# Patient Record
Sex: FEMALE | Race: WHITE | NOT HISPANIC OR LATINO | Employment: OTHER | URBAN - METROPOLITAN AREA
[De-identification: names, ages, dates, MRNs, and addresses within clinical notes are randomized per-mention and may not be internally consistent; named-entity substitution may affect disease eponyms.]

---

## 2017-04-19 ENCOUNTER — APPOINTMENT (OUTPATIENT)
Dept: MRI IMAGING | Facility: HOSPITAL | Age: 77
End: 2017-04-19

## 2017-04-19 ENCOUNTER — APPOINTMENT (OUTPATIENT)
Dept: CT IMAGING | Facility: HOSPITAL | Age: 77
End: 2017-04-19

## 2017-04-19 ENCOUNTER — HOSPITAL ENCOUNTER (INPATIENT)
Facility: HOSPITAL | Age: 77
LOS: 2 days | Discharge: HOME | End: 2017-04-21
Attending: EMERGENCY MEDICINE | Admitting: HOSPITALIST

## 2017-04-19 ENCOUNTER — APPOINTMENT (OUTPATIENT)
Dept: CARDIOLOGY | Facility: HOSPITAL | Age: 77
End: 2017-04-19
Attending: HOSPITALIST

## 2017-04-19 ENCOUNTER — APPOINTMENT (OUTPATIENT)
Dept: GENERAL RADIOLOGY | Facility: HOSPITAL | Age: 77
End: 2017-04-19

## 2017-04-19 DIAGNOSIS — R62.7 ADULT FAILURE TO THRIVE: ICD-10-CM

## 2017-04-19 DIAGNOSIS — R73.09 ELEVATED GLUCOSE: ICD-10-CM

## 2017-04-19 DIAGNOSIS — G45.1 HEMISPHERIC CAROTID ARTERY SYNDROME: Primary | ICD-10-CM

## 2017-04-19 DIAGNOSIS — I50.32 CHRONIC DIASTOLIC HEART FAILURE (HCC): ICD-10-CM

## 2017-04-19 DIAGNOSIS — N17.9 RENAL FAILURE (ARF), ACUTE ON CHRONIC (HCC): ICD-10-CM

## 2017-04-19 DIAGNOSIS — Z74.09 IMPAIRED MOBILITY AND ADLS: ICD-10-CM

## 2017-04-19 DIAGNOSIS — N30.00 ACUTE CYSTITIS WITHOUT HEMATURIA: ICD-10-CM

## 2017-04-19 DIAGNOSIS — Z74.09 IMPAIRED FUNCTIONAL MOBILITY, BALANCE, GAIT, AND ENDURANCE: ICD-10-CM

## 2017-04-19 DIAGNOSIS — N18.9 RENAL FAILURE (ARF), ACUTE ON CHRONIC (HCC): ICD-10-CM

## 2017-04-19 DIAGNOSIS — Z78.9 IMPAIRED MOBILITY AND ADLS: ICD-10-CM

## 2017-04-19 DIAGNOSIS — G45.9 TRANSIENT CEREBRAL ISCHEMIA, UNSPECIFIED TYPE: ICD-10-CM

## 2017-04-19 DIAGNOSIS — J96.01 ACUTE HYPOXEMIC RESPIRATORY FAILURE (HCC): ICD-10-CM

## 2017-04-19 PROBLEM — N18.30 CHRONIC KIDNEY DISEASE (CKD), STAGE III (MODERATE) (HCC): Status: ACTIVE | Noted: 2017-04-19

## 2017-04-19 PROBLEM — K21.9 GERD (GASTROESOPHAGEAL REFLUX DISEASE): Status: ACTIVE | Noted: 2017-04-19

## 2017-04-19 LAB
ALBUMIN SERPL-MCNC: 4 G/DL (ref 3.2–4.8)
ALBUMIN/GLOB SERPL: 1.3 G/DL (ref 1.5–2.5)
ALP SERPL-CCNC: 54 U/L (ref 25–100)
ALT SERPL W P-5'-P-CCNC: 15 U/L (ref 7–40)
ANION GAP SERPL CALCULATED.3IONS-SCNC: -2 MMOL/L (ref 3–11)
ANION GAP SERPL CALCULATED.3IONS-SCNC: 10 MMOL/L (ref 3–11)
AST SERPL-CCNC: 25 U/L (ref 0–33)
BACTERIA UR QL AUTO: ABNORMAL /HPF
BASOPHILS # BLD AUTO: 0.07 10*3/MM3 (ref 0–0.2)
BASOPHILS NFR BLD AUTO: 0.4 % (ref 0–1)
BILIRUB SERPL-MCNC: 0.2 MG/DL (ref 0.3–1.2)
BILIRUB UR QL STRIP: NEGATIVE
BNP SERPL-MCNC: 242 PG/ML (ref 0–100)
BUN BLD-MCNC: 32 MG/DL (ref 9–23)
BUN BLD-MCNC: 34 MG/DL (ref 9–23)
BUN BLDA-MCNC: 47 MG/DL (ref 8–26)
BUN/CREAT SERPL: 21.3 (ref 7–25)
BUN/CREAT SERPL: 21.3 (ref 7–25)
CA-I BLDA-SCNC: 1.21 MMOL/L (ref 1.2–1.32)
CALCIUM SPEC-SCNC: 9.4 MG/DL (ref 8.7–10.4)
CALCIUM SPEC-SCNC: 9.6 MG/DL (ref 8.7–10.4)
CHLORIDE BLDA-SCNC: 103 MMOL/L (ref 98–109)
CHLORIDE SERPL-SCNC: 104 MMOL/L (ref 99–109)
CHLORIDE SERPL-SCNC: 107 MMOL/L (ref 99–109)
CLARITY UR: ABNORMAL
CO2 BLDA-SCNC: 27 MMOL/L (ref 24–29)
CO2 SERPL-SCNC: 27 MMOL/L (ref 20–31)
CO2 SERPL-SCNC: 35 MMOL/L (ref 20–31)
COLOR UR: YELLOW
CREAT BLD-MCNC: 1.5 MG/DL (ref 0.6–1.3)
CREAT BLD-MCNC: 1.6 MG/DL (ref 0.6–1.3)
CREAT BLDA-MCNC: 1.7 MG/DL (ref 0.6–1.3)
DEPRECATED RDW RBC AUTO: 56 FL (ref 37–54)
EOSINOPHIL # BLD AUTO: 0.2 10*3/MM3 (ref 0.1–0.3)
EOSINOPHIL NFR BLD AUTO: 1.2 % (ref 0–3)
ERYTHROCYTE [DISTWIDTH] IN BLOOD BY AUTOMATED COUNT: 16.2 % (ref 11.3–14.5)
GFR SERPL CREATININE-BSD FRML MDRD: 31 ML/MIN/1.73
GFR SERPL CREATININE-BSD FRML MDRD: 34 ML/MIN/1.73
GLOBULIN UR ELPH-MCNC: 3 GM/DL
GLUCOSE BLD-MCNC: 205 MG/DL (ref 70–100)
GLUCOSE BLD-MCNC: 92 MG/DL (ref 70–100)
GLUCOSE BLDC GLUCOMTR-MCNC: 200 MG/DL (ref 70–130)
GLUCOSE BLDC GLUCOMTR-MCNC: 60 MG/DL (ref 70–130)
GLUCOSE BLDC GLUCOMTR-MCNC: 87 MG/DL (ref 70–130)
GLUCOSE BLDC GLUCOMTR-MCNC: 94 MG/DL (ref 70–130)
GLUCOSE BLDC GLUCOMTR-MCNC: 95 MG/DL (ref 70–130)
GLUCOSE UR STRIP-MCNC: ABNORMAL MG/DL
HCT VFR BLD AUTO: 34.2 % (ref 34.5–44)
HCT VFR BLDA CALC: 34 % (ref 38–51)
HGB BLD-MCNC: 10.5 G/DL (ref 11.5–15.5)
HGB BLDA-MCNC: 11.6 G/DL (ref 12–17)
HGB UR QL STRIP.AUTO: ABNORMAL
HYALINE CASTS UR QL AUTO: ABNORMAL /LPF
IMM GRANULOCYTES # BLD: 0.05 10*3/MM3 (ref 0–0.03)
IMM GRANULOCYTES NFR BLD: 0.3 % (ref 0–0.6)
INR PPP: 0.99
INR PPP: 1 (ref 0.8–1.2)
KETONES UR QL STRIP: NEGATIVE
LEUKOCYTE ESTERASE UR QL STRIP.AUTO: ABNORMAL
LYMPHOCYTES # BLD AUTO: 1.53 10*3/MM3 (ref 0.6–4.8)
LYMPHOCYTES NFR BLD AUTO: 9.4 % (ref 24–44)
MCH RBC QN AUTO: 28.8 PG (ref 27–31)
MCHC RBC AUTO-ENTMCNC: 30.7 G/DL (ref 32–36)
MCV RBC AUTO: 94 FL (ref 80–99)
MONOCYTES # BLD AUTO: 1.08 10*3/MM3 (ref 0–1)
MONOCYTES NFR BLD AUTO: 6.6 % (ref 0–12)
NEUTROPHILS # BLD AUTO: 13.37 10*3/MM3 (ref 1.5–8.3)
NEUTROPHILS NFR BLD AUTO: 82.1 % (ref 41–71)
NITRITE UR QL STRIP: POSITIVE
PH UR STRIP.AUTO: 6.5 [PH] (ref 5–8)
PLATELET # BLD AUTO: 280 10*3/MM3 (ref 150–450)
PMV BLD AUTO: 10.7 FL (ref 6–12)
POTASSIUM BLD-SCNC: 4.1 MMOL/L (ref 3.5–5.5)
POTASSIUM BLD-SCNC: 4.9 MMOL/L (ref 3.5–5.5)
POTASSIUM BLDA-SCNC: 4.9 MMOL/L (ref 3.5–4.9)
PROT SERPL-MCNC: 7 G/DL (ref 5.7–8.2)
PROT UR QL STRIP: ABNORMAL
PROTHROMBIN TIME: 10.8 SECONDS (ref 9.6–11.5)
PROTHROMBIN TIME: 12.1 SECONDS (ref 12.8–15.2)
RBC # BLD AUTO: 3.64 10*6/MM3 (ref 3.89–5.14)
RBC # UR: ABNORMAL /HPF
REF LAB TEST METHOD: ABNORMAL
SODIUM BLD-SCNC: 137 MMOL/L (ref 132–146)
SODIUM BLD-SCNC: 144 MMOL/L (ref 132–146)
SODIUM BLDA-SCNC: 141 MMOL/L (ref 138–146)
SP GR UR STRIP: 1.02 (ref 1–1.03)
SQUAMOUS #/AREA URNS HPF: ABNORMAL /HPF
TROPONIN I SERPL-MCNC: 0.02 NG/ML (ref 0–0.07)
TROPONIN I SERPL-MCNC: 0.02 NG/ML (ref 0–0.07)
UROBILINOGEN UR QL STRIP: ABNORMAL
WBC NRBC COR # BLD: 16.3 10*3/MM3 (ref 3.5–10.8)
WBC UR QL AUTO: ABNORMAL /HPF

## 2017-04-19 PROCEDURE — G8997 SWALLOW GOAL STATUS: HCPCS

## 2017-04-19 PROCEDURE — 25010000002 HEPARIN (PORCINE) PER 1000 UNITS: Performed by: HOSPITALIST

## 2017-04-19 PROCEDURE — G8996 SWALLOW CURRENT STATUS: HCPCS

## 2017-04-19 PROCEDURE — 81001 URINALYSIS AUTO W/SCOPE: CPT | Performed by: EMERGENCY MEDICINE

## 2017-04-19 PROCEDURE — 99223 1ST HOSP IP/OBS HIGH 75: CPT | Performed by: HOSPITALIST

## 2017-04-19 PROCEDURE — 84484 ASSAY OF TROPONIN QUANT: CPT

## 2017-04-19 PROCEDURE — 87086 URINE CULTURE/COLONY COUNT: CPT | Performed by: EMERGENCY MEDICINE

## 2017-04-19 PROCEDURE — 71010 HC CHEST PA OR AP: CPT

## 2017-04-19 PROCEDURE — 93880 EXTRACRANIAL BILAT STUDY: CPT

## 2017-04-19 PROCEDURE — 99223 1ST HOSP IP/OBS HIGH 75: CPT | Performed by: PSYCHIATRY & NEUROLOGY

## 2017-04-19 PROCEDURE — 70551 MRI BRAIN STEM W/O DYE: CPT

## 2017-04-19 PROCEDURE — 70450 CT HEAD/BRAIN W/O DYE: CPT

## 2017-04-19 PROCEDURE — 93005 ELECTROCARDIOGRAM TRACING: CPT | Performed by: EMERGENCY MEDICINE

## 2017-04-19 PROCEDURE — 80053 COMPREHEN METABOLIC PANEL: CPT | Performed by: EMERGENCY MEDICINE

## 2017-04-19 PROCEDURE — 85025 COMPLETE CBC W/AUTO DIFF WBC: CPT | Performed by: EMERGENCY MEDICINE

## 2017-04-19 PROCEDURE — 99291 CRITICAL CARE FIRST HOUR: CPT

## 2017-04-19 PROCEDURE — 87186 SC STD MICRODIL/AGAR DIL: CPT | Performed by: EMERGENCY MEDICINE

## 2017-04-19 PROCEDURE — 87077 CULTURE AEROBIC IDENTIFY: CPT | Performed by: EMERGENCY MEDICINE

## 2017-04-19 PROCEDURE — 80047 BASIC METABLC PNL IONIZED CA: CPT

## 2017-04-19 PROCEDURE — 93306 TTE W/DOPPLER COMPLETE: CPT

## 2017-04-19 PROCEDURE — 82962 GLUCOSE BLOOD TEST: CPT

## 2017-04-19 PROCEDURE — 92610 EVALUATE SWALLOWING FUNCTION: CPT

## 2017-04-19 PROCEDURE — 25010000003 CEFTRIAXONE PER 250 MG: Performed by: EMERGENCY MEDICINE

## 2017-04-19 PROCEDURE — 83880 ASSAY OF NATRIURETIC PEPTIDE: CPT | Performed by: EMERGENCY MEDICINE

## 2017-04-19 PROCEDURE — 85014 HEMATOCRIT: CPT

## 2017-04-19 PROCEDURE — 85610 PROTHROMBIN TIME: CPT

## 2017-04-19 PROCEDURE — 85610 PROTHROMBIN TIME: CPT | Performed by: EMERGENCY MEDICINE

## 2017-04-19 PROCEDURE — P9612 CATHETERIZE FOR URINE SPEC: HCPCS

## 2017-04-19 PROCEDURE — G8998 SWALLOW D/C STATUS: HCPCS

## 2017-04-19 RX ORDER — ONDANSETRON 4 MG/1
4 TABLET, FILM COATED ORAL EVERY 6 HOURS PRN
Status: DISCONTINUED | OUTPATIENT
Start: 2017-04-19 | End: 2017-04-21 | Stop reason: HOSPADM

## 2017-04-19 RX ORDER — ONDANSETRON 2 MG/ML
4 INJECTION INTRAMUSCULAR; INTRAVENOUS EVERY 6 HOURS PRN
Status: DISCONTINUED | OUTPATIENT
Start: 2017-04-19 | End: 2017-04-21 | Stop reason: HOSPADM

## 2017-04-19 RX ORDER — GLIMEPIRIDE 4 MG/1
2 TABLET ORAL EVERY EVENING
COMMUNITY
End: 2017-09-05

## 2017-04-19 RX ORDER — CEFTRIAXONE SODIUM 1 G/50ML
1 INJECTION, SOLUTION INTRAVENOUS DAILY
Status: DISCONTINUED | OUTPATIENT
Start: 2017-04-20 | End: 2017-04-21 | Stop reason: HOSPADM

## 2017-04-19 RX ORDER — GLIMEPIRIDE 4 MG/1
4 TABLET ORAL
COMMUNITY
End: 2017-09-24 | Stop reason: HOSPADM

## 2017-04-19 RX ORDER — ATORVASTATIN CALCIUM 40 MG/1
80 TABLET, FILM COATED ORAL NIGHTLY
Status: DISCONTINUED | OUTPATIENT
Start: 2017-04-19 | End: 2017-04-21 | Stop reason: HOSPADM

## 2017-04-19 RX ORDER — LOSARTAN POTASSIUM 50 MG/1
50 TABLET ORAL DAILY
Status: DISCONTINUED | OUTPATIENT
Start: 2017-04-20 | End: 2017-04-21 | Stop reason: HOSPADM

## 2017-04-19 RX ORDER — INSULIN GLARGINE 100 [IU]/ML
40 INJECTION, SOLUTION SUBCUTANEOUS EVERY MORNING
COMMUNITY
End: 2017-09-24 | Stop reason: HOSPADM

## 2017-04-19 RX ORDER — BACLOFEN 10 MG/1
10 TABLET ORAL 3 TIMES DAILY
Status: DISCONTINUED | OUTPATIENT
Start: 2017-04-19 | End: 2017-04-21 | Stop reason: HOSPADM

## 2017-04-19 RX ORDER — ASPIRIN 325 MG
325 TABLET ORAL DAILY
Status: DISCONTINUED | OUTPATIENT
Start: 2017-04-20 | End: 2017-04-21 | Stop reason: HOSPADM

## 2017-04-19 RX ORDER — LEVOTHYROXINE SODIUM 0.1 MG/1
200 TABLET ORAL
Status: DISCONTINUED | OUTPATIENT
Start: 2017-04-20 | End: 2017-04-21 | Stop reason: HOSPADM

## 2017-04-19 RX ORDER — ACETAMINOPHEN 325 MG/1
650 TABLET ORAL EVERY 6 HOURS PRN
Status: DISCONTINUED | OUTPATIENT
Start: 2017-04-19 | End: 2017-04-21 | Stop reason: HOSPADM

## 2017-04-19 RX ORDER — AMLODIPINE BESYLATE 10 MG/1
10 TABLET ORAL NIGHTLY
Status: DISCONTINUED | OUTPATIENT
Start: 2017-04-19 | End: 2017-04-21 | Stop reason: HOSPADM

## 2017-04-19 RX ORDER — SODIUM CHLORIDE 0.9 % (FLUSH) 0.9 %
1-10 SYRINGE (ML) INJECTION AS NEEDED
Status: DISCONTINUED | OUTPATIENT
Start: 2017-04-19 | End: 2017-04-21 | Stop reason: HOSPADM

## 2017-04-19 RX ORDER — METOPROLOL TARTRATE 50 MG/1
50 TABLET, FILM COATED ORAL EVERY 12 HOURS SCHEDULED
Status: DISCONTINUED | OUTPATIENT
Start: 2017-04-19 | End: 2017-04-21 | Stop reason: HOSPADM

## 2017-04-19 RX ORDER — BACITRACIN, NEOMYCIN, POLYMYXIN B 400; 3.5; 5 [USP'U]/G; MG/G; [USP'U]/G
OINTMENT TOPICAL
Status: DISCONTINUED
Start: 2017-04-19 | End: 2017-04-19 | Stop reason: WASHOUT

## 2017-04-19 RX ORDER — CLOPIDOGREL BISULFATE 75 MG/1
75 TABLET ORAL DAILY
Status: DISCONTINUED | OUTPATIENT
Start: 2017-04-19 | End: 2017-04-21 | Stop reason: HOSPADM

## 2017-04-19 RX ORDER — CEFTRIAXONE SODIUM 1 G/50ML
1 INJECTION, SOLUTION INTRAVENOUS ONCE
Status: COMPLETED | OUTPATIENT
Start: 2017-04-19 | End: 2017-04-19

## 2017-04-19 RX ORDER — NICOTINE POLACRILEX 4 MG
15 LOZENGE BUCCAL
Status: DISCONTINUED | OUTPATIENT
Start: 2017-04-19 | End: 2017-04-21 | Stop reason: HOSPADM

## 2017-04-19 RX ORDER — BACLOFEN 10 MG/1
10 TABLET ORAL 3 TIMES DAILY
COMMUNITY

## 2017-04-19 RX ORDER — INSULIN GLARGINE 100 [IU]/ML
30 INJECTION, SOLUTION SUBCUTANEOUS EVERY EVENING
COMMUNITY
End: 2017-09-05

## 2017-04-19 RX ORDER — PANTOPRAZOLE SODIUM 40 MG/1
40 TABLET, DELAYED RELEASE ORAL DAILY
Status: DISCONTINUED | OUTPATIENT
Start: 2017-04-20 | End: 2017-04-21 | Stop reason: HOSPADM

## 2017-04-19 RX ORDER — DEXTROSE MONOHYDRATE 25 G/50ML
25 INJECTION, SOLUTION INTRAVENOUS
Status: DISCONTINUED | OUTPATIENT
Start: 2017-04-19 | End: 2017-04-21 | Stop reason: HOSPADM

## 2017-04-19 RX ORDER — FUROSEMIDE 40 MG/1
40 TABLET ORAL DAILY
Status: DISCONTINUED | OUTPATIENT
Start: 2017-04-20 | End: 2017-04-21 | Stop reason: HOSPADM

## 2017-04-19 RX ORDER — LOSARTAN POTASSIUM 50 MG/1
50 TABLET ORAL DAILY
COMMUNITY
End: 2017-09-24 | Stop reason: HOSPADM

## 2017-04-19 RX ORDER — HEPARIN SODIUM 5000 [USP'U]/ML
5000 INJECTION, SOLUTION INTRAVENOUS; SUBCUTANEOUS EVERY 12 HOURS SCHEDULED
Status: DISCONTINUED | OUTPATIENT
Start: 2017-04-19 | End: 2017-04-21 | Stop reason: HOSPADM

## 2017-04-19 RX ORDER — ASPIRIN 300 MG/1
300 SUPPOSITORY RECTAL ONCE
Status: COMPLETED | OUTPATIENT
Start: 2017-04-19 | End: 2017-04-19

## 2017-04-19 RX ORDER — GABAPENTIN 300 MG/1
300 CAPSULE ORAL NIGHTLY
Status: DISCONTINUED | OUTPATIENT
Start: 2017-04-19 | End: 2017-04-21 | Stop reason: HOSPADM

## 2017-04-19 RX ORDER — LEVOTHYROXINE SODIUM 0.2 MG/1
200 TABLET ORAL DAILY
COMMUNITY

## 2017-04-19 RX ORDER — ASPIRIN 300 MG/1
300 SUPPOSITORY RECTAL DAILY
Status: DISCONTINUED | OUTPATIENT
Start: 2017-04-20 | End: 2017-04-21 | Stop reason: HOSPADM

## 2017-04-19 RX ORDER — ASPIRIN 325 MG
325 TABLET ORAL ONCE
Status: DISCONTINUED | OUTPATIENT
Start: 2017-04-19 | End: 2017-04-19

## 2017-04-19 RX ADMIN — HEPARIN SODIUM 5000 UNITS: 5000 INJECTION, SOLUTION INTRAVENOUS; SUBCUTANEOUS at 23:07

## 2017-04-19 RX ADMIN — ATORVASTATIN CALCIUM 80 MG: 40 TABLET, FILM COATED ORAL at 20:51

## 2017-04-19 RX ADMIN — BACLOFEN 10 MG: 10 TABLET ORAL at 23:06

## 2017-04-19 RX ADMIN — GABAPENTIN 300 MG: 300 CAPSULE ORAL at 23:06

## 2017-04-19 RX ADMIN — CLOPIDOGREL BISULFATE 75 MG: 75 TABLET ORAL at 23:05

## 2017-04-19 RX ADMIN — ASPIRIN 300 MG: 300 SUPPOSITORY RECTAL at 13:02

## 2017-04-19 RX ADMIN — AMLODIPINE BESYLATE 10 MG: 10 TABLET ORAL at 23:07

## 2017-04-19 RX ADMIN — ACETAMINOPHEN 650 MG: 325 TABLET ORAL at 23:18

## 2017-04-19 RX ADMIN — METOPROLOL TARTRATE 50 MG: 50 TABLET, FILM COATED ORAL at 23:07

## 2017-04-19 RX ADMIN — CEFTRIAXONE SODIUM 1 G: 1 INJECTION, SOLUTION INTRAVENOUS at 12:38

## 2017-04-19 NOTE — CONSULTS
Neurology    Referring Provider: Dr. Dela Cruz    Reason for Consultation: TIA      Chief complaint: Left-sided weakness    Subjective .     History of present illness:  Mrs. Oliveira is a pleasant 76-year-old female with a past medical history significant for diabetes mellitus, hypertension, prior ischemic stroke (residual right-sided weakness) who is admitted to the hospitalist service for an episode of left hemiparesis.  She states that she woke up this morning and noticed slurring of her speech as well as left facial droop and weakness involving the left arm.  She denied any headache, visual changes, or dysphagia.  By the time she arrived to the [facility] ED her symptoms had resolved.  CT head and MRI brain were done which were both unremarkable for any acute process.    Review of Systems: Positive for weakness and slurring of her speech.Otherwise complete review of systems was discussed with the patient and found to be negative except for that mentioned in history of present illness or in the initial H&P dated 04/19/2017    History  Past Medical History:   Diagnosis Date   • Diabetes mellitus    • Disease of thyroid gland    • GERD (gastroesophageal reflux disease)    • Hypertension    • PVD (peripheral vascular disease)    • Stroke    ,   Past Surgical History:   Procedure Laterality Date   • CARPAL TUNNEL RELEASE     • CHOLECYSTECTOMY     • FEMORAL POPLITEAL BYPASS     • HYSTERECTOMY     , History reviewed. No pertinent family history.,   Social History   Substance Use Topics   • Smoking status: Former Smoker   • Smokeless tobacco: None   • Alcohol use No   ,   Prescriptions Prior to Admission   Medication Sig Dispense Refill Last Dose   • amLODIPine (NORVASC) 10 MG tablet Take 10 mg by mouth Every Night.   11/6/2016 at 2000   • aspirin 81 MG chewable tablet Chew 81 mg Daily.   11/8/2016 at 0800   • baclofen (LIORESAL) 10 MG tablet Take 10 mg by mouth 3 (Three) Times a Day.      • clopidogrel (PLAVIX) 75 MG tablet Take  75 mg by mouth Daily.   11/8/2016 at 0800   • furosemide (LASIX) 40 MG tablet Take 1 tablet by mouth Daily. 30 tablet 12    • gabapentin (NEURONTIN) 100 MG capsule Take 300 mg by mouth Every Night.   11/7/2016 at 2000   • glimepiride (AMARYL) 4 MG tablet Take 4 mg by mouth Every Morning Before Breakfast.      • glimepiride (AMARYL) 4 MG tablet Take 4 mg by mouth Every Evening. Take 1/2 tab in evening      • insulin glargine (LANTUS) 100 UNIT/ML injection Inject 40 Units under the skin Every Morning.      • insulin glargine (LANTUS) 100 UNIT/ML injection Inject 30 Units under the skin Every Evening.      • levothyroxine (SYNTHROID, LEVOTHROID) 175 MCG tablet Take 200 mcg by mouth Daily.   11/8/2016 at 0600   • losartan (COZAAR) 50 MG tablet Take 50 mg by mouth Daily.      • metFORMIN (GLUCOPHAGE) 500 MG tablet Take 1,000 mg by mouth 2 (Two) Times a Day With Meals.      • metoprolol tartrate (LOPRESSOR) 50 MG tablet Take 1 tablet by mouth Every 12 (Twelve) Hours. 60 tablet 12    • pantoprazole (PROTONIX) 40 MG EC tablet Take 40 mg by mouth Daily.   Unknown at Unknown time   • pravastatin (PRAVACHOL) 40 MG tablet Take 40 mg by mouth Daily.   11/8/2016 at 0800   • benzocaine-menthol (CEPACOL) 15-3.6 MG lozenge lozenge Dissolve 1 lozenge in the mouth Every 2 (Two) Hours As Needed (sore throat). 168 each 0    • docusate sodium (COLACE) 100 MG capsule Take 100 mg by mouth 2 (Two) Times a Day.      • ferrous sulfate 325 (65 FE) MG tablet Take 325 mg by mouth Daily With Breakfast.      • insulin detemir (LEVEMIR) 100 UNIT/ML injection Inject 20 Units under the skin Every Night. 10 mL 0    • insulin lispro (humaLOG) 100 UNIT/ML injection Inject 0-9 Units under the skin 4 (Four) Times a Day With Meals & at Bedtime. 10 mL 0    • insulin lispro (humaLOG) 100 UNIT/ML injection Inject 4 Units under the skin 3 (Three) Times a Day With Meals. 10 mL 12    • Multiple Vitamins-Minerals (MULTIVITAMIN PO) Take 1 tablet by mouth Daily.    "Past Week at Unknown time   • nitroglycerin (NITROSTAT) 0.4 MG SL tablet Place 0.4 mg under the tongue Every 5 (Five) Minutes As Needed for chest pain. Take no more than 3 doses in 15 minutes.      • potassium chloride (MICRO-K) 10 MEQ CR capsule Take 2 capsules by mouth Daily. 30 capsule 12     and Allergies:  Erythromycin    Objective     Vital Signs   Blood pressure 150/61, pulse 73, temperature 98.8 °F (37.1 °C), temperature source Oral, resp. rate 16, height 60\" (152.4 cm), weight 167 lb (75.8 kg), SpO2 97 %.    Physical Exam:      Gen: Lying in bed with eyes open. In NAD. Appears stated age   Eyes: PERRL, conjuntivae/lids normal   ENT: External canals normal bilaterally. Oropharynx normal.   Neck: Supple. No thyroid enlargement noted   Respiratory: CTA bilaterally. Respirations unlabored   CV: RRR, S1 and S2 nml. Radial pulses 2+ bilaterally.    Skin: No rashes noted on exposed skin. Normal tugor.   MSK: Normal bulk and tone. Nml ROM     Neurologic:   Mental status: Awake, alert, oriented x4. Follows commands. Speech fluent.    CN: PERRL, EOM intact, sensation intact in upper/mid/lower face bilaterally, facial movements symmetric, hearing intact to finger rub bilaterally, palate elevates symmetrically, tongue movements and SCMs strong bilaterally    Motor: Full strength noted in the left arm and leg.  There is residual weakness in the right arm and leg about 4+/5    Reflexes: 1+ throughout    Sensory: Intact to LT throughout   Coordination: No dysmetria noted   Gait: Not tested        Results Reviewed:     Labs reviewed  CT head personally reviewed.  Agree with the report  MRI brain personally reviewed.  No evidence of acute infarct seen.  Agree with the report                 Assessment/Plan     1.  TIA = Possibly due to small vessel disease vs atheroembolism from underlying carotid disease. Awaiting carotid duplex report. Pt reports TTE was recently done by Dr. Huynh last week and was unremarkable. On ASA " and Plavix at home. Recommend continuing for now. Will check P2Y12 assay in AM. If evidence of Plavix resistance, will switch to Aggrenox. PT/OT/ST evaluation when feasible.    2.  Hypertension = continue meds    3.  Hyperlipidemia = FLP in AM. On atorvastatin    4.  Type 2 diabetes mellitus = A1c in AM. Glycemic monitoring        Erika Giang MD  04/19/17  2:41 PM

## 2017-04-19 NOTE — PLAN OF CARE
Problem: Patient Care Overview (Adult)  Goal: Plan of Care Review  Outcome: Ongoing (interventions implemented as appropriate)    04/19/17 5804   Coping/Psychosocial Response Interventions   Plan Of Care Reviewed With patient   Patient Care Overview   Progress (Evaluation)   Outcome Evaluation   Outcome Summary/Follow up Plan Clinical Dys Evaluation completed. No s/s w/ any PO even when pt pushed. Timing and elevation adequate per palpation w/ all even when pushed. Inc'd prep time w/ solid 2' ltd dention (pt reports she used to have lower front bridge that has broken). Despite this, able to fully masticate and clear solid item w/ independent self-cue for liquid wash when needed. Rec: Mech soft and thins (2' pt's ltd dentition), and S/L eval per pathway

## 2017-04-19 NOTE — PLAN OF CARE
Problem: Stroke (Ischemic) (Adult)  Goal: Signs and Symptoms of Listed Potential Problems Will be Absent or Manageable (Stroke)  Outcome: Ongoing (interventions implemented as appropriate)    04/19/17 5051   Stroke (Ischemic)   Problems Assessed (Stroke (Ischemic)/TIA) motor/sensory impairment;eating/swallowing impairment;cognitive impairment   Problems Present (Stroke (Ischemic)/TIA) motor/sensory impairment

## 2017-04-19 NOTE — PROGRESS NOTES
Acute Care - Speech Language Pathology   Swallow Initial Evaluation [facility]   Clinical Swallow Evaluation       Patient Name: Jennifer Oliveira  : 1940  MRN: [id number]  Today's Date: 2017               Admit Date: 2017    Visit Dx:     ICD-10-CM ICD-9-CM   1. 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 Elevated glucose R73.09 790.29   3. Renal failure (ARF), acute on chronic N17.9 584.9    N18.9 585.9   4. Acute cystitis without hematuria N30.00 595.0   5. Adult failure to thrive R62.7 783.7     Patient Active Problem List   Diagnosis   • Acute hypoxemic respiratory failure   • PAD (peripheral artery disease)   • Normochromic normocytic anemia   • Chronic diastolic heart failure   • Diabetes type 2, uncontrolled   • Lower extremity cellulitis   • Hypothyroid   • HTN (hypertension)   • Metabolic encephalopathy   • TIA (transient ischemic attack)   • Acute cystitis without hematuria   • Chronic kidney disease (CKD), stage III (moderate)   • GERD (gastroesophageal reflux disease) with hx of gastritis      Past Medical History:   Diagnosis Date   • Diabetes mellitus    • Disease of thyroid gland    • GERD (gastroesophageal reflux disease)    • Hypertension    • PVD (peripheral vascular disease)    • Stroke      Past Surgical History:   Procedure Laterality Date   • CARPAL TUNNEL RELEASE     • CHOLECYSTECTOMY     • FEMORAL POPLITEAL BYPASS     • HYSTERECTOMY            SWALLOW EVALUATION (last 72 hours)      Swallow Evaluation       17 1530                Rehab Evaluation    Document Type evaluation  -SG        Subjective Information no complaints;agree to therapy  -SG        Patient Effort, Rehab Treatment good  -SG        Symptoms Noted During/After Treatment none  -SG        General Information    Patient Profile Review yes  -SG        Onset of Illness/Injury 17  -SG        Pertinent History Of Current Problem TIA vs. atheroembolism. Adm on CVA pathway  -SG        Current  Diet Limitations NPO  -SG        Precautions/Limitations, Vision WFL with corrective lenses  -SG        Precautions/Limitations, Hearing WFL  -SG        Prior Level of Function- Communication other (comment)   unknown baseline  -SG        Prior Level of Function- Swallowing no diet consistency restrictions  -SG        Plans/Goals Discussed With patient;agreed upon  -SG        Barriers to Rehab none identified  -SG        Clinical Impression    Patient's Goals For Discharge return to PO diet  -SG        SLP Swallowing Diagnosis oral dysfunction   appears to be baseline 2' ltd denetition  -SG        Rehab Potential/Prognosis, Swallowing good, to achieve stated therapy goals  -SG        Criteria for Skilled Therapeutic Interventions Met no problems identified which require skilled intervention  -SG        FCM, Swallowing 7-->Level 7  -SG        Therapy Frequency evaluation only  -SG        SLP Diet Recommendation chopped;thin liquids  -SG        SLP Rec. for Method of Medication Administration meds whole with thin liquid  -SG        Monitor For Signs Of Aspiration cough;gurgly voice;throat clearing;fever;upper respiratory infection;pneumonia  -SG        Anticipated Discharge Disposition inpatient rehabilitation facility  -SG        Pain Assessment    Pain Assessment No/denies pain  -SG        Oral Motor Structure and Function    Oral Motor Anatomy and Physiology patient demonstrates anatomy that is WNL  -SG        Dentition Assessment missing teeth   lower front bridge broken per pt  -SG        Secretion Management WNL/WFL  -SG        Mucosal Quality moist, healthy  -SG        Velar Elevation WFL (within functional limits)  -SG        Gag Response WFL (within functional limits)  -SG        Volitional Swallow no difficulties initiating volitional swallow  -SG        Volitional Cough no difficulties initiating volitional cough  -SG        Oral Musculature General Assessment oral labial impairment   mild weakness on L   -SG        General Feeding/Swallowing Observations    Current Feeding Method NPO  -SG        Observations of Self Feeding Skills appropriate self feeding skills observed  -SG        Observations of Posture During Feeding upright at 90 for evaluation  -SG        General Swallow Observations    General Swallow Observations Intact  -SG        Clinical Swallow Exam    Mode of Presentation fed by clinician;cup;spoon;straw  -SG        Oral Preparation Concerns increased prep time   w/ solid but cleared w/ liquid wash  -SG        Oral Residue sulci residue   pt able to clear independently  -SG        Oral Phase Results intact oral phase without signs of dysfunction  -SG        Pharyngeal Phase Results no signs/symptoms of pharyngeal impairment  -SG        Summary of Clinical Exam Clinical Dys Evaluation completed. No s/s w/ any PO even when pt pushed. Timing and elevation adequate per palpation w/ all even when pushed. Inc'd prep time w/ solid 2' ltd dentions (pt reports she used to have lower front bridge that has broken). Despite this, able to fully masticate and clear solid item w/ independent self-cue for liquid wash when needed. Rec: Mech soft and thins (2' pt's ltd dentition), and S/L eval per pathway  -SG        Swallow Recommendations    Eating Assistance needs constant supervision during self eating activity  -SG        Oral Care oral care with toothbrush and dentifrice BID and PRN  -SG        Modified Eating Strategies upright positioning 90 degrees  -SG        Other Recommendations mechanical soft;thin  -SG        Recommended Diet soft textures;chopped;thin liquids  -SG          User Key  (r) = Recorded By, (t) = Taken By, (c) = Cosigned By    Initials Name Effective Dates     Taniya Oneil Jason, MS LEIGH-SLP 06/22/15 -         EDUCATION  The patient has been educated in the following areas:   Dysphagia (Swallowing Impairment) Oral Care/Hydration Modified Diet Instruction.    SLP Recommendation and  Plan  SLP Swallowing Diagnosis: oral dysfunction (appears to be baseline 2' ltd denetition)  SLP Diet Recommendation: chopped, thin liquids     SLP Rec. for Method of Medication Administration: meds whole with thin liquid  Monitor For Signs Of Aspiration: cough, gurgly voice, throat clearing, fever, upper respiratory infection, pneumonia     Criteria for Skilled Therapeutic Interventions Met: no problems identified which require skilled intervention  Anticipated Discharge Disposition: inpatient rehabilitation facility  Rehab Potential/Prognosis, Swallowing: good, to achieve stated therapy goals  Therapy Frequency: evaluation only             Plan of Care Review  Plan Of Care Reviewed With: patient  Progress:  (Evaluation)  Outcome Summary/Follow up Plan: Clinical Dys Evaluation completed. No s/s w/ any PO even when pt pushed. Timing and elevation adequate per palpation w/ all even when pushed. Inc'd prep time w/ solid 2' ltd dentions (pt reports she used to have lower front bridge that has broken). Despite this, able to fully masticate and clear solid item w/ independent self-cue for liquid wash when needed. Rec: Mech soft and thins (2' pt's ltd dentition), and S/L eval per pathway             Time Calculation:         Time Calculation- SLP       04/19/17 1600          Time Calculation- SLP    SLP Start Time 1530  -SG      SLP Received On 04/19/17  -        User Key  (r) = Recorded By, (t) = Taken By, (c) = Cosigned By    Initials Name Provider Type     Taniya Gomez MS CCC-SLP Speech and Language Pathologist          Therapy Charges for Today     Code Description Service Date Service Provider Modifiers Qty    53703667588 HC ST SWALLOWING CURRENT STATUS 4/19/2017 Taniya Gomez MS CCC-SLP GN, CH 1    [id number] HC ST SWALLOWING PROJECTED 4/19/2017 Taniya Gomez MS CCC-SLP GN, CH 1    [id number] HC ST SWALLOWING DISCHARGE 4/19/2017 Taniya Gomez MS  CCC-SLP GN,  1    [id number]  ST EVAL ORAL PHARYNG SWALLOW 4 4/19/2017 Taniya Gomez, MS CCC-SLP GN 1          SLP G-Codes  Functional Limitations: Swallowing  Swallow Current Status (): 0 percent impaired, limited or restricted  Swallow Goal Status (): 0 percent impaired, limited or restricted  Swallow Discharge Status (): 0 percent impaired, limited or restricted    Taniya Gomez MS CCC-SLP  4/19/2017

## 2017-04-19 NOTE — ED PROVIDER NOTES
Subjective   HPI Comments: Jennifer Oliveira is a 76 y.o.female who presents to the ED with c/o slurred speech. Patient has chronic right hemiplegia s/p previous CVA.   At baseline, she gets out of bed and room to room on a walker at home without difficulty.She reports that she was normal last night when she went to bed. She awoke this morning at 0500 to use the restroom and noticed left sided weakness. She went back to sleep but when she awoke again, the left-sided weakness was still present along with a left facial droop. Additionally, she noted slurred speech at that time. Her son agrees that her speech appears to be slurred, although he states that it is improved compared to before. She denies any headache, numbness/tingling, or any other acute sx at this time.    Patient is a 76 y.o. female presenting with neurologic complaint.   History provided by:  Patient  Neurologic Problem   The patient's primary symptoms include focal weakness, slurred speech and weakness (left sided). This is a new problem. The current episode started today. The neurological problem developed insidiously. The problem has been gradually improving since onset. There was left-sided, lower extremity, upper extremity and facial focality noted. Pertinent negatives include no abdominal pain, back pain, chest pain, diaphoresis, fever, headaches, nausea, shortness of breath or vomiting. Her past medical history is significant for a CVA.       Review of Systems   Constitutional: Negative for activity change, appetite change, chills, diaphoresis and fever.   HENT: Negative for congestion, postnasal drip, rhinorrhea and sore throat.    Eyes: Negative for visual disturbance.   Respiratory: Negative for apnea, cough, chest tightness and shortness of breath.    Cardiovascular: Negative for chest pain and leg swelling.   Gastrointestinal: Negative for abdominal pain, blood in stool, diarrhea, nausea and vomiting.   Genitourinary: Negative for difficulty  urinating.   Musculoskeletal: Negative for back pain.   Skin: Negative for pallor.   Allergic/Immunologic: Negative for immunocompromised state.   Neurological: Positive for focal weakness, speech difficulty (slurred) and weakness (left sided). Negative for headaches.   Psychiatric/Behavioral: Negative for behavioral problems.   All other systems reviewed and are negative.      Past Medical History:   Diagnosis Date   • Diabetes mellitus    • Disease of thyroid gland    • GERD (gastroesophageal reflux disease)    • Hypertension    • PVD (peripheral vascular disease)    • Stroke       Discharge Diagnoses:  Principal Problem:  Acute hypoxemic respiratory failure  Active Problems:  PAD (peripheral artery disease)  Anemia  Acute on chronic diastolic (congestive) heart failure  Diabetes type 2, uncontrolled  Lower extremity cellulitis  Hypothyroid  HTN (hypertension)  Metabolic encephalopathy    Allergies   Allergen Reactions   • Erythromycin        Past Surgical History:   Procedure Laterality Date   • CARPAL TUNNEL RELEASE     • CHOLECYSTECTOMY     • FEMORAL POPLITEAL BYPASS     • HYSTERECTOMY         History reviewed. No pertinent family history.    Social History     Social History   • Marital status: Single     Spouse name: N/A   • Number of children: N/A   • Years of education: N/A     Social History Main Topics   • Smoking status: Former Smoker   • Smokeless tobacco: None   • Alcohol use No   • Drug use: No   • Sexual activity: Not Asked     Other Topics Concern   • None     Social History Narrative   • None         Objective   Physical Exam   Constitutional: She is oriented to person, place, and time. She appears well-developed and well-nourished.   Able to give history.   HENT:   Head: Normocephalic and atraumatic.   Nose: Nose normal.   Eyes: Conjunctivae and EOM are normal. Pupils are equal, round, and reactive to light.   Neck: Normal range of motion. Neck supple. No JVD present. Carotid bruit is not present.    Cardiovascular: Normal rate, regular rhythm, normal heart sounds and intact distal pulses.    Pulmonary/Chest: Effort normal and breath sounds normal. No respiratory distress. She exhibits no tenderness.   Abdominal: Soft. Bowel sounds are normal. She exhibits no mass. There is no tenderness. There is no guarding.   Mildly obese   Musculoskeletal: Normal range of motion.   arthritic changes in hand, otherwise good pulses.  brawny edema to knees, moderate with venous stasis changes, 3/4 pulses, no tissue loss.  RUE chronically weak, in chronic sling.   Lymphadenopathy:     She has no cervical adenopathy.   Neurological: She is alert and oriented to person, place, and time. No cranial nerve deficit. She exhibits normal muscle tone.   Reflex Scores:       Patellar reflexes are 1+ on the right side and 1+ on the left side.  I did not appreciate any slurring, however her son reports that her speech is slightly slurred and improving.  mild LLE and LUE weakness, the patient required assistance from 2 people to CT scanner.   Skin: Skin is warm and dry. She is not diaphoretic.   Psychiatric: She has a normal mood and affect. Her behavior is normal.   Nursing note and vitals reviewed.      Critical Care  Performed by: LAVINIA JARQUIN  Authorized by: LAVINIA JARQUIN   Total critical care time: 50 minutes  Critical care time was exclusive of separately billable procedures and treating other patients.  Critical care was necessary to treat or prevent imminent or life-threatening deterioration of the following conditions: CNS failure or compromise.  Critical care was time spent personally by me on the following activities: evaluation of patient's response to treatment, obtaining history from patient or surrogate, ordering and review of laboratory studies, pulse oximetry, ordering and performing treatments and interventions, ordering and review of radiographic studies, re-evaluation of patient's condition, examination of  patient, development of treatment plan with patient or surrogate and review of old charts.               ED Course  ED Course   Comment By Time   Discussed case in detail with Dr. Dela Cruz, hospitalist, who will admit. -MARCOS Cabrera 04/19 3114     Recent Results (from the past 24 hour(s))   POC Protime / INR    Collection Time: 04/19/17 10:27 AM   Result Value Ref Range    Protime 12.1 (L) 12.8 - 15.2 seconds    INR 1.0 0.8 - 1.2   POC CHEM 8    Collection Time: 04/19/17 10:28 AM   Result Value Ref Range    Glucose 200 (H) 70 - 130 mg/dL    BUN, Arterial 47 (H) 8 - 26 mg/dL    Creatinine 1.70 (H) 0.60 - 1.30 mg/dL    Sodium 141 138 - 146 mmol/L    Potassium 4.9 3.5 - 4.9 mmol/L    Chloride 103 98 - 109 mmol/L    Total CO2 27 24 - 29 mmol/L    Hemoglobin 11.6 (L) 12.0 - 17.0 g/dL    Hematocrit 34 (L) 38 - 51 %    Ionized Calcium 1.21 1.20 - 1.32 mmol/L   POC Troponin, Rapid    Collection Time: 04/19/17 10:31 AM   Result Value Ref Range    Troponin I 0.02 0.00 - 0.07 ng/mL   Comprehensive Metabolic Panel    Collection Time: 04/19/17 10:34 AM   Result Value Ref Range    Glucose 205 (H) 70 - 100 mg/dL    BUN 34 (H) 9 - 23 mg/dL    Creatinine 1.60 (H) 0.60 - 1.30 mg/dL    Sodium 144 132 - 146 mmol/L    Potassium 4.9 3.5 - 5.5 mmol/L    Chloride 107 99 - 109 mmol/L    CO2 27.0 20.0 - 31.0 mmol/L    Calcium 9.4 8.7 - 10.4 mg/dL    Total Protein 7.0 5.7 - 8.2 g/dL    Albumin 4.00 3.20 - 4.80 g/dL    ALT (SGPT) 15 7 - 40 U/L    AST (SGOT) 25 0 - 33 U/L    Alkaline Phosphatase 54 25 - 100 U/L    Total Bilirubin 0.2 (L) 0.3 - 1.2 mg/dL    eGFR Non African Amer 31 (L) >60 mL/min/1.73    Globulin 3.0 gm/dL    A/G Ratio 1.3 (L) 1.5 - 2.5 g/dL    BUN/Creatinine Ratio 21.3 7.0 - 25.0    Anion Gap 10.0 3.0 - 11.0 mmol/L   BNP    Collection Time: 04/19/17 10:34 AM   Result Value Ref Range    .0 (H) 0.0 - 100.0 pg/mL   CBC Auto Differential    Collection Time: 04/19/17 10:34 AM   Result Value Ref Range    WBC 16.30 (H)  3.50 - 10.80 10*3/mm3    RBC 3.64 (L) 3.89 - 5.14 10*6/mm3    Hemoglobin 10.5 (L) 11.5 - 15.5 g/dL    Hematocrit 34.2 (L) 34.5 - 44.0 %    MCV 94.0 80.0 - 99.0 fL    MCH 28.8 27.0 - 31.0 pg    MCHC 30.7 (L) 32.0 - 36.0 g/dL    RDW 16.2 (H) 11.3 - 14.5 %    RDW-SD 56.0 (H) 37.0 - 54.0 fl    MPV 10.7 6.0 - 12.0 fL    Platelets 280 150 - 450 10*3/mm3    Neutrophil % 82.1 (H) 41.0 - 71.0 %    Lymphocyte % 9.4 (L) 24.0 - 44.0 %    Monocyte % 6.6 0.0 - 12.0 %    Eosinophil % 1.2 0.0 - 3.0 %    Basophil % 0.4 0.0 - 1.0 %    Immature Grans % 0.3 0.0 - 0.6 %    Neutrophils, Absolute 13.37 (H) 1.50 - 8.30 10*3/mm3    Lymphocytes, Absolute 1.53 0.60 - 4.80 10*3/mm3    Monocytes, Absolute 1.08 (H) 0.00 - 1.00 10*3/mm3    Eosinophils, Absolute 0.20 0.10 - 0.30 10*3/mm3    Basophils, Absolute 0.07 0.00 - 0.20 10*3/mm3    Immature Grans, Absolute 0.05 (H) 0.00 - 0.03 10*3/mm3   Urinalysis With / Culture If Indicated    Collection Time: 04/19/17 12:08 PM   Result Value Ref Range    Color, UA Yellow Yellow, Straw    Appearance, UA Cloudy (A) Clear    pH, UA 6.5 5.0 - 8.0    Specific Gravity, UA 1.021 1.001 - 1.030    Glucose,  mg/dL (1+) (A) Negative    Ketones, UA Negative Negative    Bilirubin, UA Negative Negative    Blood, UA Small (1+) (A) Negative    Protein, UA >=300 mg/dL (3+) (A) Negative    Leuk Esterase, UA Small (1+) (A) Negative    Nitrite, UA Positive (A) Negative    Urobilinogen, UA 0.2 E.U./dL 0.2 - 1.0 E.U./dL   Urinalysis, Microscopic Only    Collection Time: 04/19/17 12:08 PM   Result Value Ref Range    RBC, UA 0-2 None Seen, 0-2 /HPF    WBC, UA Too Numerous to Count (A) None Seen /HPF    Bacteria, UA 1+ (A) None Seen, Trace /HPF    Squamous Epithelial Cells, UA 0-2 None Seen, 0-2 /HPF    Hyaline Casts, UA 7-12 0 - 6 /LPF    Methodology Automated Microscopy    POC Troponin, Rapid    Collection Time: 04/19/17 12:28 PM   Result Value Ref Range    Troponin I 0.02 0.00 - 0.07 ng/mL   Protime-INR    Collection  Time: 04/19/17 12:31 PM   Result Value Ref Range    Protime 10.8 9.6 - 11.5 Seconds    INR 0.99      Note: In addition to lab results from this visit, the labs listed above may include labs taken at another facility or during a different encounter within the last 24 hours. Please correlate lab times with ED admission and discharge times for further clarification of the services performed during this visit.    MRI Brain Without Contrast   Preliminary Result   Suspected focal atrophy, as from old infarct of the anterior   left mabel, but unchanged from prior studies. No evidence of acute   infarct or other new intracranial disease. Chronic appearing changes of   the aging brain elsewhere.       D:  04/19/2017   E:  04/19/2017               XR Chest 1 View   Preliminary Result   Chronic appearing changes of the lower lungs similar to   previous exam except for mild new right basilar discoid atelectasis.       D:  04/19/2017   E:  04/19/2017              CT Head Without Contrast   Preliminary Result   Chronic appearing changes of the aging brain. No evidence of   acute infarction, intracranial hemorrhage, or other new intracranial   disease.       NOTE: Exam time is shown as 1024. Study was reviewed and discussed with   Dr. High at 1025.            D:  04/19/2017   E:  04/19/2017            Vitals:    04/19/17 1219 04/19/17 1220 04/19/17 1238 04/19/17 1240   BP:  149/76  159/62   BP Location:       Patient Position:       Pulse: 64  68 69   Resp:       Temp:       TempSrc:       SpO2: 97%  100% 97%   Weight:       Height:         Medications   cefTRIAXone (ROCEPHIN) IVPB 1 g (0 g Intravenous Stopped 4/19/17 1308)   aspirin suppository 300 mg (300 mg Rectal Given 4/19/17 1302)     ECG/EMG Results (last 24 hours)     ** No results found for the last 24 hours. **                        MDM  Number of Diagnoses or Management Options  Acute cystitis without hematuria:   Adult failure to thrive:   Elevated glucose:    Hemispheric carotid artery syndrome:   Renal failure (ARF), acute on chronic:   Diagnosis management comments:       I reviewed all available studies the bedside with the patient and her family.    The patient's MRI of the brain shows no evidence of acute infarct or any evidence of old infarct for that matter.  Her weakness on the right side is chronic may be from another source.  Certainly she may have had a TIA today and the daughter spoke of some type of blockage in her neck that the MRI is reassuring and we'll give her an aspirin pending further evaluation.    His are as the cause of her weakness today this very well may be her urinary tract infection.  She has too numerous to count white cells in the urine I've started her on IV Rocephin.    She also has some acute on chronic renal failure as well and an elevated glucose.    On recheck she seemed to be a little bit more confused but her left side seem to be a little bit stronger than when she initially came in.    She will need admission the hospital for serial exams and further evaluation and treatment I've a call to Dr. tidwell, on-call hospital medicine, to admit the patient.    All are agreeable with the plan       Amount and/or Complexity of Data Reviewed  Clinical lab tests: reviewed  Tests in the radiology section of CPT®: reviewed  Tests in the medicine section of CPT®: reviewed  Decide to obtain previous medical records or to obtain history from someone other than the patient: yes        Final diagnoses:   Hemispheric carotid artery syndrome   Elevated glucose   Renal failure (ARF), acute on chronic   Acute cystitis without hematuria   Adult failure to thrive     EMR Dragon/Transcription disclaimer:   Much of this encounter note is an electronic transcription/translation of spoken language to printed text. The electronic translation of spoken language may permit erroneous, or at times, nonsensical words or phrases to be inadvertently transcribed;  Although I have reviewed the note for such errors, some may still exist.     Documentation assistance provided by gertrudis Cabrera.  Information recorded by the scribe was done at my direction and has been verified and validated by me.     Tracy Cabrera  04/19/17 1035       Tracy Cabrera  04/19/17 1147       Tracy Cabrera  04/19/17 1246       Joel High MD  04/19/17 5998

## 2017-04-19 NOTE — H&P
[facility]  HISTORY AND PHYSICAL    Primary Care Physician: Samuel Buck MD    Subjective     Chief Complaint:  Slurred Speech and Facial Droop     History of Present Illness:   Patient is a 76-year-old female with a past history of CKD III, diastolic heart failure, chronic anemia, colon polyps, type 2 diabetes mellitus, hypertension, PAD with prior right fem-pop bypass, and remote CVA with chronic right homa-plegia who presented to the [facility] ER today for evaluation of the acute onset of transient left-sided weakness, facial droop, and slurred speech.  Patient went to sleep last night at her baseline of health but then awoke this morning at 5 AM and noted that she was weak on her entire left side, unable to get to the bathroom.  Her neighbor came over and helped her to the kitchen.  When her daughter found out, she called EMS and patient was brought to [facility] where she has been ruled out for an acute stroke and her left-sided deficits have improved with some only mild residual subjective left-sided weakness.  When seen, patient denies headache, vision changes, aphasia, dysphagia, or focal numbness/tingling.  No recent fevers or chills.  Patient denies any recent melena or hematochezia.  No recent falls or loss of consciousness.  Of note, patient does deny any dysuria, frequency, or urinary urgency.  ER evaluation did disclose a UTI.  Patient does have chronic erythema and swelling to her right ankle, managed with Ace wraps and supportive care.      Review of Systems   Constitutional: Positive for fatigue. Negative for appetite change, chills, fever and unexpected weight change.   HENT: Negative.    Eyes: Negative.    Respiratory: Negative for cough, chest tightness, shortness of breath and wheezing.    Cardiovascular: Positive for leg swelling. Negative for chest pain.   Gastrointestinal: Negative.    Endocrine: Negative.    Genitourinary: Negative for  difficulty urinating, dysuria, flank pain, frequency, hematuria and urgency.   Skin: Positive for color change (BLE ).   Neurological: Positive for facial asymmetry, speech difficulty and weakness. Negative for dizziness, light-headedness and headaches.   Psychiatric/Behavioral: Negative.       Otherwise complete ROS performed and negative except as mentioned in the HPI.    Past Medical History:   Diagnosis Date   • CKD (chronic kidney disease), stage III     baseline Cr 1.5-1.6   • Diabetes mellitus    • Diastolic heart failure     last LVEF 70%   • Disease of thyroid gland    • GERD (gastroesophageal reflux disease)    • Hypertension    • PAD (peripheral artery disease)     s/p right fem-pop bypass 7/2016   • Stroke     residual chronic right hemiplegia       Past Surgical History:   Procedure Laterality Date   • CARPAL TUNNEL RELEASE     • CHOLECYSTECTOMY     • FEMORAL POPLITEAL BYPASS Right    • HYSTERECTOMY         Family History   Problem Relation Age of Onset   • Diabetes Mother    • Heart disease Father        Social History     Social History   • Marital status: Single     Spouse name: N/A   • Number of children: N/A   • Years of education: N/A     Occupational History   • Not on file.     Social History Main Topics   • Smoking status: Former Smoker   • Smokeless tobacco: Never Used   • Alcohol use No   • Drug use: No   • Sexual activity: Not on file     Other Topics Concern   • Not on file     Social History Narrative    Lives alone in [place].        Medications:  Prescriptions Prior to Admission   Medication Sig Dispense Refill Last Dose   • amLODIPine (NORVASC) 10 MG tablet Take 10 mg by mouth Every Night.   11/6/2016 at 2000   • aspirin 81 MG chewable tablet Chew 81 mg Daily.   11/8/2016 at 0800   • baclofen (LIORESAL) 10 MG tablet Take 10 mg by mouth 3 (Three) Times a Day.      • clopidogrel (PLAVIX) 75 MG tablet Take 75 mg by mouth Daily.   11/8/2016 at 0800   • furosemide (LASIX) 40 MG tablet Take  "1 tablet by mouth Daily. 30 tablet 12    • gabapentin (NEURONTIN) 100 MG capsule Take 300 mg by mouth Every Night.   11/7/2016 at 2000   • glimepiride (AMARYL) 4 MG tablet Take 4 mg by mouth Every Morning Before Breakfast.      • glimepiride (AMARYL) 4 MG tablet Take 2 mg by mouth Every Evening.      • insulin glargine (LANTUS) 100 UNIT/ML injection Inject 40 Units under the skin Every Morning.      • insulin glargine (LANTUS) 100 UNIT/ML injection Inject 30 Units under the skin Every Evening.      • levothyroxine (SYNTHROID, LEVOTHROID) 200 MCG tablet Take 200 mcg by mouth Daily.      • losartan (COZAAR) 50 MG tablet Take 50 mg by mouth Daily.      • metFORMIN (GLUCOPHAGE) 500 MG tablet Take 1,000 mg by mouth 2 (Two) Times a Day With Meals.      • metoprolol tartrate (LOPRESSOR) 50 MG tablet Take 1 tablet by mouth Every 12 (Twelve) Hours. 60 tablet 12    • pantoprazole (PROTONIX) 40 MG EC tablet Take 40 mg by mouth Daily.   Unknown at Unknown time   • pravastatin (PRAVACHOL) 40 MG tablet Take 40 mg by mouth Daily.   11/8/2016 at 0800       Allergies:  Allergies   Allergen Reactions   • Erythromycin      Objective     Physical Exam:  Vital Signs: /62  Pulse 69  Temp 98.5 °F (36.9 °C) (Oral)   Resp 14  Ht 60\" (152.4 cm)  Wt 167 lb (75.8 kg)  SpO2 97%  BMI 32.61 kg/m2  Physical Exam  Constitutional: no acute distress, awake, alert  Eyes: PERRLA, sclerae anicteric, no conjunctival injection, EOMI  Neck: supple, no thyromegaly, trachea midline, no bruits  Respiratory: Clear to auscultation bilaterally, nonlabored respirations   Cardiovascular: RRR, no murmurs, rubs, or gallops, weakly palpable pedal pulses bilaterally  Gastrointestinal: Positive bowel sounds, soft, nontender, nondistended, obese  Musculoskeletal: 2+ right ankle edema with mild blanching erythema to shin, no clubbing or cyanosis to bilateral lower extremities  Psychiatric: oriented x 3, appropriate affect, cooperative  Neurologic: Right arm " and leg hemiparesis (4/5), left-sided strength 5/5, Cranial Nerves grossly intact to confrontation, speech fluent, reflexes symmetric, sensation intact to light touch throughout        Results Reviewed:    Results from last 7 days  Lab Units 04/19/17  1034   WBC 10*3/mm3 16.30*   HEMOGLOBIN g/dL 10.5*   PLATELETS 10*3/mm3 280       Results from last 7 days  Lab Units 04/19/17  1034   SODIUM mmol/L 144   POTASSIUM mmol/L 4.9   TOTAL CO2 mmol/L 27.0   CREATININE mg/dL 1.60*   GLUCOSE mg/dL 205*   CALCIUM mg/dL 9.4         MRI Brain with possible old left mabel infarct, no acute abnormality  CT Head negative for acute abnormality  ECG NSR no acute abnormalities  CXR chronic changes, right base atelectasis    I have personally reviewed and interpreted available lab data, radiology studies and ECG obtained at time of admission.     Assessment / Plan     Assessment/Problem List:   Hospital Problem List     * (Principal)TIA (transient ischemic attack)    Overview Addendum 4/19/2017  1:11 PM by Samuel Dela Cruz MD     - Onset AM 4/19, MRI Brain negative for acute CVA   - Associated with transient left-sided weakness and dysarthria   - Pt chronically on aspirin, plavix, pravastatin  - Hx of right-sided weakness due to hx of CVA, MRI Brain with possible old left pontine CVA         PAD (peripheral artery disease)    Overview Signed 4/19/2017  1:13 PM by Samuel Dela Cruz MD     - Hx of Fem-pop bypass 7/2016         Normochromic normocytic anemia    Overview Signed 4/19/2017  1:22 PM by Samuel Dela Cruz MD     - Baseline Hgb 9s  - Hx of C-scope with polyps 2016, EGD 2016 with gastritis          Chronic diastolic heart failure    Overview Signed 4/19/2017  1:21 PM by Samuel Dela Cruz MD     - Last echo 11/2016 LVEF 70%, normal VHD         Diabetes type 2, uncontrolled    Hypothyroid    HTN (hypertension)    Acute cystitis without hematuria    Overview Signed 4/19/2017  1:12 PM by Samuel Dela Cruz MD     - Possible  contributor to TIA symptoms          Chronic kidney disease (CKD), stage III (moderate)    Overview Signed 4/19/2017  1:14 PM by Samuel Dela Cruz MD     - baseline Cr 1.5-1.6         GERD (gastroesophageal reflux disease) with hx of gastritis             Plan:  - Continue aspirin, plavix   - Change from pravastatin to atorvastatin  - Neuro has seen, will check P2Y12 and consider change to aggrenox if plavix resistance  - Check echo with bubble study and carotid duplex  - PT/OT/SLP/CM consulted  - Check FLP and A1c  - Neuro checks and NIH SS as per protocol  - IV rocephin for cystitis, follow up urine culture   - Restart home antihypertensives   - Monitor renal function, Cr is at baseline  - Low dose SSI insulin, restart long acting prn  - Monitor volume status while inpateint    DVT prophylaxis: SQ heparin, no SCDs/OKSANA hose due to PAD  Code Status: FULL   Admission Status: Patient will be admitted to INPATIENT status due to the need for care of TIA and acute cystitis which can only be reasonably provided in an hospital setting such as aggressive/expedited ancillary services and/or consultation services and the necessity for IV medications, close physician monitoring and/or the possible need for procedures.  In such, I feel patient’s risk for adverse outcomes and need for care warrant INPATIENT evaluation and predict the patient’s care encounter to likely last beyond 2 midnights.     Samuel Dela Cruz MD 04/19/17 9:04 PM

## 2017-04-20 LAB
ANION GAP SERPL CALCULATED.3IONS-SCNC: 6 MMOL/L (ref 3–11)
ARTICHOKE IGE QN: 94 MG/DL (ref 0–130)
BASOPHILS # BLD AUTO: 0.05 10*3/MM3 (ref 0–0.2)
BASOPHILS NFR BLD AUTO: 0.5 % (ref 0–1)
BH CV ECHO MEAS - AO ROOT AREA: 3.8 CM^2
BH CV ECHO MEAS - AO ROOT DIAM: 2.2 CM
BH CV ECHO MEAS - EDV(CUBED): 57.1 ML
BH CV ECHO MEAS - EDV(TEICH): 63.9 ML
BH CV ECHO MEAS - EF(CUBED): 77.8 %
BH CV ECHO MEAS - EF(TEICH): 70.7 %
BH CV ECHO MEAS - ESV(CUBED): 12.6 ML
BH CV ECHO MEAS - ESV(TEICH): 18.7 ML
BH CV ECHO MEAS - FS: 39.5 %
BH CV ECHO MEAS - IVS/LVPW: 0.98
BH CV ECHO MEAS - IVSD: 1.1 CM
BH CV ECHO MEAS - LA DIMENSION: 3.7 CM
BH CV ECHO MEAS - LA/AO: 1.7
BH CV ECHO MEAS - LV MASS(C)D: 131.9 GRAMS
BH CV ECHO MEAS - LVIDD: 3.9 CM
BH CV ECHO MEAS - LVIDS: 2.3 CM
BH CV ECHO MEAS - LVPWD: 1.1 CM
BH CV ECHO MEAS - MV A MAX VEL: 109 CM/SEC
BH CV ECHO MEAS - MV DEC SLOPE: 581.5 CM/SEC^2
BH CV ECHO MEAS - MV DEC TIME: 0.21 SEC
BH CV ECHO MEAS - MV E MAX VEL: 128 CM/SEC
BH CV ECHO MEAS - MV E/A: 1.2
BH CV ECHO MEAS - MV P1/2T MAX VEL: 149 CM/SEC
BH CV ECHO MEAS - MV P1/2T: 75 MSEC
BH CV ECHO MEAS - MVA P1/2T LCG: 1.5 CM^2
BH CV ECHO MEAS - MVA(P1/2T): 2.9 CM^2
BH CV ECHO MEAS - PA ACC SLOPE: 504 CM/SEC^2
BH CV ECHO MEAS - PA ACC TIME: 0.15 SEC
BH CV ECHO MEAS - PA PR(ACCEL): 12.4 MMHG
BH CV ECHO MEAS - PULM A REVS VEL: 25.5 CM/SEC
BH CV ECHO MEAS - PULM DIAS VEL: 41.1 CM/SEC
BH CV ECHO MEAS - PULM S/D: 1.5
BH CV ECHO MEAS - PULM SYS VEL: 61.9 CM/SEC
BH CV ECHO MEAS - RV MAX PG: 1.7 MMHG
BH CV ECHO MEAS - RV V1 MAX: 65.2 CM/SEC
BH CV ECHO MEAS - SV(CUBED): 44.4 ML
BH CV ECHO MEAS - SV(TEICH): 45.2 ML
BH CV ECHO MEAS - TAPSE (>1.6): 2 CM2
BH CV XLRA - RV BASE: 2.6 CM
BH CV XLRA - RV LENGTH: 6.8 CM
BH CV XLRA - RV MID: 2.4 CM
BH CV XLRA - TDI S': 9.43 CM/SEC
BH CV XLRA MEAS LEFT CCA RATIO VEL: 315 CM/SEC
BH CV XLRA MEAS LEFT DIST CCA EDV: 37.9 CM/SEC
BH CV XLRA MEAS LEFT DIST CCA PSV: 315 CM/SEC
BH CV XLRA MEAS LEFT DIST ICA EDV: 37.3 CM/SEC
BH CV XLRA MEAS LEFT DIST ICA PSV: 139 CM/SEC
BH CV XLRA MEAS LEFT ICA RATIO VEL: 190 CM/SEC
BH CV XLRA MEAS LEFT ICA/CCA RATIO: 0.6
BH CV XLRA MEAS LEFT MID ICA EDV: 32.2 CM/SEC
BH CV XLRA MEAS LEFT MID ICA PSV: 190 CM/SEC
BH CV XLRA MEAS LEFT PROX CCA EDV: 39.1 CM/SEC
BH CV XLRA MEAS LEFT PROX CCA PSV: 193 CM/SEC
BH CV XLRA MEAS LEFT PROX ECA PSV: 306 CM/SEC
BH CV XLRA MEAS LEFT PROX ICA EDV: 38.9 CM/SEC
BH CV XLRA MEAS LEFT PROX ICA PSV: 185 CM/SEC
BH CV XLRA MEAS LEFT PROX SCLA PSV: 292 CM/SEC
BH CV XLRA MEAS LEFT VERTEBRAL A PSV: 89.8 CM/SEC
BH CV XLRA MEAS RIGHT CCA RATIO VEL: 91.1 CM/SEC
BH CV XLRA MEAS RIGHT DIST CCA EDV: 8.8 CM/SEC
BH CV XLRA MEAS RIGHT DIST CCA PSV: 91.1 CM/SEC
BH CV XLRA MEAS RIGHT DIST ICA EDV: 18.9 CM/SEC
BH CV XLRA MEAS RIGHT DIST ICA PSV: 87.2 CM/SEC
BH CV XLRA MEAS RIGHT ICA RATIO VEL: 179 CM/SEC
BH CV XLRA MEAS RIGHT ICA/CCA RATIO: 2
BH CV XLRA MEAS RIGHT MID ICA EDV: 20.4 CM/SEC
BH CV XLRA MEAS RIGHT MID ICA PSV: 117 CM/SEC
BH CV XLRA MEAS RIGHT PROX CCA EDV: 10.7 CM/SEC
BH CV XLRA MEAS RIGHT PROX CCA PSV: 114 CM/SEC
BH CV XLRA MEAS RIGHT PROX ECA PSV: 126 CM/SEC
BH CV XLRA MEAS RIGHT PROX ICA EDV: 47.1 CM/SEC
BH CV XLRA MEAS RIGHT PROX ICA PSV: 179 CM/SEC
BH CV XLRA MEAS RIGHT PROX SCLA PSV: 187 CM/SEC
BH CV XLRA MEAS RIGHT VERTEBRAL A PSV: 47.1 CM/SEC
BUN BLD-MCNC: 27 MG/DL (ref 9–23)
BUN/CREAT SERPL: 16.9 (ref 7–25)
CALCIUM SPEC-SCNC: 9.5 MG/DL (ref 8.7–10.4)
CHLORIDE SERPL-SCNC: 105 MMOL/L (ref 99–109)
CHOLEST SERPL-MCNC: 190 MG/DL (ref 0–200)
CO2 SERPL-SCNC: 30 MMOL/L (ref 20–31)
CREAT BLD-MCNC: 1.6 MG/DL (ref 0.6–1.3)
DEPRECATED RDW RBC AUTO: 56.6 FL (ref 37–54)
EOSINOPHIL # BLD AUTO: 0.36 10*3/MM3 (ref 0.1–0.3)
EOSINOPHIL NFR BLD AUTO: 3.8 % (ref 0–3)
ERYTHROCYTE [DISTWIDTH] IN BLOOD BY AUTOMATED COUNT: 16.3 % (ref 11.3–14.5)
GFR SERPL CREATININE-BSD FRML MDRD: 31 ML/MIN/1.73
GLUCOSE BLD-MCNC: 72 MG/DL (ref 70–100)
GLUCOSE BLDC GLUCOMTR-MCNC: 137 MG/DL (ref 70–130)
GLUCOSE BLDC GLUCOMTR-MCNC: 140 MG/DL (ref 70–130)
GLUCOSE BLDC GLUCOMTR-MCNC: 168 MG/DL (ref 70–130)
GLUCOSE BLDC GLUCOMTR-MCNC: 80 MG/DL (ref 70–130)
HBA1C MFR BLD: 6.9 % (ref 4.8–5.6)
HCT VFR BLD AUTO: 29.6 % (ref 34.5–44)
HDLC SERPL-MCNC: 37 MG/DL (ref 40–60)
HGB BLD-MCNC: 9.2 G/DL (ref 11.5–15.5)
IMM GRANULOCYTES # BLD: 0.02 10*3/MM3 (ref 0–0.03)
IMM GRANULOCYTES NFR BLD: 0.2 % (ref 0–0.6)
LYMPHOCYTES # BLD AUTO: 1.51 10*3/MM3 (ref 0.6–4.8)
LYMPHOCYTES NFR BLD AUTO: 16 % (ref 24–44)
MCH RBC QN AUTO: 29.3 PG (ref 27–31)
MCHC RBC AUTO-ENTMCNC: 31.1 G/DL (ref 32–36)
MCV RBC AUTO: 94.3 FL (ref 80–99)
MONOCYTES # BLD AUTO: 0.82 10*3/MM3 (ref 0–1)
MONOCYTES NFR BLD AUTO: 8.7 % (ref 0–12)
NEUTROPHILS # BLD AUTO: 6.65 10*3/MM3 (ref 1.5–8.3)
NEUTROPHILS NFR BLD AUTO: 70.8 % (ref 41–71)
PA ADP PRP-ACNC: 225 PRU
PLATELET # BLD AUTO: 215 10*3/MM3 (ref 150–450)
PMV BLD AUTO: 10.5 FL (ref 6–12)
POTASSIUM BLD-SCNC: 4.2 MMOL/L (ref 3.5–5.5)
RBC # BLD AUTO: 3.14 10*6/MM3 (ref 3.89–5.14)
SODIUM BLD-SCNC: 141 MMOL/L (ref 132–146)
TRIGL SERPL-MCNC: 314 MG/DL (ref 0–150)
WBC NRBC COR # BLD: 9.41 10*3/MM3 (ref 3.5–10.8)

## 2017-04-20 PROCEDURE — 85025 COMPLETE CBC W/AUTO DIFF WBC: CPT | Performed by: HOSPITALIST

## 2017-04-20 PROCEDURE — 85576 BLOOD PLATELET AGGREGATION: CPT | Performed by: PSYCHIATRY & NEUROLOGY

## 2017-04-20 PROCEDURE — 97162 PT EVAL MOD COMPLEX 30 MIN: CPT

## 2017-04-20 PROCEDURE — 80048 BASIC METABOLIC PNL TOTAL CA: CPT | Performed by: HOSPITALIST

## 2017-04-20 PROCEDURE — 63710000001 INSULIN LISPRO (HUMAN) PER 5 UNITS: Performed by: HOSPITALIST

## 2017-04-20 PROCEDURE — 99232 SBSQ HOSP IP/OBS MODERATE 35: CPT | Performed by: PSYCHIATRY & NEUROLOGY

## 2017-04-20 PROCEDURE — 97165 OT EVAL LOW COMPLEX 30 MIN: CPT

## 2017-04-20 PROCEDURE — 97116 GAIT TRAINING THERAPY: CPT

## 2017-04-20 PROCEDURE — 25010000002 HEPARIN (PORCINE) PER 1000 UNITS: Performed by: HOSPITALIST

## 2017-04-20 PROCEDURE — 25010000003 CEFTRIAXONE PER 250 MG: Performed by: HOSPITALIST

## 2017-04-20 PROCEDURE — 83036 HEMOGLOBIN GLYCOSYLATED A1C: CPT | Performed by: HOSPITALIST

## 2017-04-20 PROCEDURE — 80061 LIPID PANEL: CPT | Performed by: HOSPITALIST

## 2017-04-20 PROCEDURE — 82962 GLUCOSE BLOOD TEST: CPT

## 2017-04-20 PROCEDURE — 99233 SBSQ HOSP IP/OBS HIGH 50: CPT | Performed by: HOSPITALIST

## 2017-04-20 RX ADMIN — CEFTRIAXONE SODIUM 1 G: 1 INJECTION, SOLUTION INTRAVENOUS at 12:15

## 2017-04-20 RX ADMIN — FUROSEMIDE 40 MG: 40 TABLET ORAL at 12:15

## 2017-04-20 RX ADMIN — ATORVASTATIN CALCIUM 80 MG: 40 TABLET, FILM COATED ORAL at 21:06

## 2017-04-20 RX ADMIN — METOPROLOL TARTRATE 50 MG: 50 TABLET, FILM COATED ORAL at 21:06

## 2017-04-20 RX ADMIN — GABAPENTIN 300 MG: 300 CAPSULE ORAL at 21:06

## 2017-04-20 RX ADMIN — BACLOFEN 10 MG: 10 TABLET ORAL at 08:48

## 2017-04-20 RX ADMIN — ASPIRIN 325 MG ORAL TABLET 325 MG: 325 PILL ORAL at 12:15

## 2017-04-20 RX ADMIN — LOSARTAN POTASSIUM 50 MG: 50 TABLET, FILM COATED ORAL at 08:48

## 2017-04-20 RX ADMIN — METOPROLOL TARTRATE 50 MG: 50 TABLET, FILM COATED ORAL at 08:48

## 2017-04-20 RX ADMIN — HEPARIN SODIUM 5000 UNITS: 5000 INJECTION, SOLUTION INTRAVENOUS; SUBCUTANEOUS at 21:06

## 2017-04-20 RX ADMIN — PANTOPRAZOLE SODIUM 40 MG: 40 TABLET, DELAYED RELEASE ORAL at 08:48

## 2017-04-20 RX ADMIN — BACLOFEN 10 MG: 10 TABLET ORAL at 16:30

## 2017-04-20 RX ADMIN — LEVOTHYROXINE SODIUM 200 MCG: 100 TABLET ORAL at 05:25

## 2017-04-20 RX ADMIN — AMLODIPINE BESYLATE 10 MG: 10 TABLET ORAL at 21:06

## 2017-04-20 RX ADMIN — BACLOFEN 10 MG: 10 TABLET ORAL at 21:06

## 2017-04-20 RX ADMIN — INSULIN LISPRO 2 UNITS: 100 INJECTION, SOLUTION INTRAVENOUS; SUBCUTANEOUS at 21:08

## 2017-04-20 RX ADMIN — HEPARIN SODIUM 5000 UNITS: 5000 INJECTION, SOLUTION INTRAVENOUS; SUBCUTANEOUS at 08:47

## 2017-04-20 NOTE — CONSULTS
Diabetes Education  Assessment/Teaching    Patient Name:  Jennifer Oliveira  YOB: 1940  MRN: [id number]  Admit Date:  4/19/2017      Assessment Date:  4/20/2017       Most Recent Value    General Information      Referral From:  MD soliman    Height  5' (1.524 m)    Weight  167 lb (75.8 kg)    Weight Method  Stated    Pregnancy Assessment     Diabetes History     What type of diabetes do you have?  Type 2    Length of Diabetes Diagnosis  10 + years    Current DM knowledge  good    Do you have any diabetes complications?  circulation problems    Education Preferences     Nutrition Information     Assessment Topics     DM Goals                Most Recent Value    DM Education Needs     Meter  Has own    Meter Type  One Touch    Frequency of Testing  Daily    Medication  Oral, Insulin, Side effects, Actions, Administration, Pen [Pt's home meds are Amaryl, Lantus, and Metfromin]    Problem Solving  Hypoglycemia, Hyperglycemia, Signs, Symptoms, Treatment    Reducing Risks  A1C testing    Healthy Coping  Appropriate    Discharge Plan  Home, Follow-up with PCP    Motivation  Moderate    Teaching Method  Explanation, Discussion, Handouts    Patient Response  Verbalized understanding            Other Comments:  Courtesy Visit Completed. Pt is a pleasant 76 year old female with history well controlled Type 2 DM per her A1C of 6.9%. Pt home meds are Amaryl, Lantus, and Metformin. Pt does self-monitor daily at home. Reviewed pt home meds and common side effects. Also, discussed and reviewed treatment of hypoglycemia and recommended target BG goals. Pt does have limited use of her right hand. Pt is able to use her insulin pen at home and administer her insulin with her left hand. Pt was congratulated on her successful diabetes management at home and encouraged to continue.         Electronically signed by:  Aggie Peralta RN  04/20/17 11:31 AM

## 2017-04-20 NOTE — PLAN OF CARE
Problem: Patient Care Overview (Adult)  Goal: Plan of Care Review  Outcome: Ongoing (interventions implemented as appropriate)    04/20/17 1054   Coping/Psychosocial Response Interventions   Plan Of Care Reviewed With patient   Outcome Evaluation   Outcome Summary/Follow up Plan Pt demonstrates deficits with bed mobility, txfr I and ADL I. Recommended iinpt rehab to pt, but she states she wants to go home. Educated pt that currently she would need to go home with some as she needs help with all ADL's and txfrs.         Problem: Stroke (Ischemic) (Adult)  Goal: Signs and Symptoms of Listed Potential Problems Will be Absent or Manageable (Stroke)  Outcome: Ongoing (interventions implemented as appropriate)    04/20/17 1054   Stroke (Ischemic)   Problems Assessed (Stroke (Ischemic)/TIA) communication impairment;motor/sensory impairment;cognitive impairment   Problems Present (Stroke (Ischemic)/TIA) motor/sensory impairment         Problem: Inpatient Occupational Therapy  Goal: Bed Mobility Goal LTG- OT  Outcome: Ongoing (interventions implemented as appropriate)    04/20/17 1054   Bed Mobility OT LTG   Bed Mobility OT LTG, Date Established 04/20/17   Bed Mobility OT LTG, Time to Achieve 1 wk   Bed Mobility OT LTG, Activity Type all bed mobility   Bed Mobility OT LTG, [place] Level set up required       Goal: Transfer Training Goal 1 LTG- OT  Outcome: Ongoing (interventions implemented as appropriate)    04/20/17 1054   Transfer Training OT LTG   Transfer Training OT LTG, Date Established 04/20/17   Transfer Training OT LTG, Time to Achieve 1 wk   Transfer Training OT LTG, Activity Type toilet;sit to stand/stand to sit;bed to chair /chair to bed   Transfer Training OT LTG, [place] Level supervision required       Goal: Toileting Goal LTG- OT  Outcome: Ongoing (interventions implemented as appropriate)    04/20/17 1054   Toileting OT LTG   Toileting Goal OT LTG, Date Established 04/20/17   Toileting Goal OT  LTG, Time to Achieve 1 wk   Toileting Goal OT LTG, [place] Level conditional independence       Goal: Functional Mobility Goal LTG- OT  Outcome: Ongoing (interventions implemented as appropriate)    04/20/17 1054   Functional Mobility OT LTG   Functional Mobility Goal OT LTG, Date Established 04/20/17   Functional Mobility Goal OT LTG, Time to Achieve 1 wk   Functional Mobility Goal OT LTG, [place] Level modified independent   Functional Mobility Goal OT LTG, Assist Device homa walker   Functional Mobility Goal OT LTG, Distance to Achieve to the door

## 2017-04-20 NOTE — PROGRESS NOTES
[facility] Medicine Services  INPATIENT PROGRESS NOTE    Date of Admission: 4/19/2017  Length of Stay: 1  Primary Care Physician: Samuel Buck MD    Subjective   CC: f/u TIA, UTI  HPI:  Pt feels overall better today without recurrent facial droop or left-sided weakness. Continues to deny urinary urgency, frequency, or dysuria. No fevers or chills. Had not been up yet with therapy when seen this morning. No headache, vision changes, or speech difficulties.     Review Of Systems:   Review of Systems   Constitutional: Negative for chills and fever.   Respiratory: Negative for cough and shortness of breath.    Cardiovascular: Negative for chest pain and palpitations.   Gastrointestinal: Negative for abdominal pain, nausea and vomiting.   Genitourinary: Negative for dysuria and frequency.   Neurological: Positive for weakness. Negative for headaches.        Chronic right-sided weakness     Objective      Temp:  [97.8 °F (36.6 °C)-98.9 °F (37.2 °C)] 97.8 °F (36.6 °C)  Heart Rate:  [63-73] 73  Resp:  [16-18] 18  BP: (135-151)/(50-78) 142/78  Physical Exam  Constitutional: no acute distress, awake, alert  Respiratory: Clear to auscultation bilaterally, nonlabored respirations   Cardiovascular: RRR, no murmurs, rubs, or gallops, palpable pedal pulses bilaterally  Gastrointestinal: Positive bowel sounds, soft, nontender, nondistended  Musculoskeletal: No bilateral ankle edema  Psychiatric: oriented x 3, appropriate affect, cooperative  Neurologic: 4/5 strength right arm and leg, 5/5 strength on left, no facial droop or asymmetry, cranial nerves grossly intact     Results Review:    I have reviewed the labs, radiology results and diagnostic studies.      Results from last 7 days  Lab Units 04/20/17  0747   WBC 10*3/mm3 9.41   HEMOGLOBIN g/dL 9.2*   PLATELETS 10*3/mm3 215       Results from last 7 days  Lab Units 04/20/17  0747   SODIUM mmol/L 141   POTASSIUM mmol/L 4.2   CHLORIDE mmol/L 105   TOTAL  CO2 mmol/L 30.0   BUN mg/dL 27*   CREATININE mg/dL 1.60*   GLUCOSE mg/dL 72   CALCIUM mg/dL 9.5       Results from last 7 days  Lab Units 04/20/17  0747   HEMOGLOBIN A1C % 6.90*       Results from last 7 days  Lab Units 04/20/17  0747   CHOLESTEROL mg/dL 190   TRIGLYCERIDES mg/dL 314*   HDL CHOL mg/dL 37*   LDL 94     P2Y12 225 (goal < 208 for pts on plavix)    Culture Data: Cultures:    Urine Culture   Date Value Ref Range Status   04/19/2017 >100,000 CFU/mL Gram Negative Bacilli (A)  Preliminary     Radiology Data:     Carotid Duplex completed with official interp pending  Echo completed with official interp pending    I have reviewed the medications.    Assessment/Plan   Patient is a 76-year-old female with a past history of CKD III, diastolic heart failure, chronic anemia, colon polyps, type 2 diabetes mellitus, hypertension, PAD with prior right fem-pop bypass, and remote CVA with chronic right homa-plegia who presented to the [facility] ER on 4/19 for evaluation of the acute onset of transient left-sided weakness, facial droop, and slurred speech with subsequent evaluation disclosing UTI but no acute CVA.    Problem List  Hospital Problem List     * (Principal)TIA (transient ischemic attack)    Overview Addendum 4/19/2017  1:11 PM by Samuel Dela Cruz MD     - Onset AM 4/19, MRI Brain negative for acute CVA   - Associated with transient left-sided weakness and dysarthria   - Pt chronically on aspirin, plavix, pravastatin  - Hx of right-sided weakness due to hx of CVA, MRI Brain with possible old left pontine CVA         Acute cystitis without hematuria    Overview Signed 4/19/2017  1:12 PM by Samuel Dela Cruz MD     - Possible contributor to TIA symptoms          Chronic diastolic heart failure    Overview Signed 4/19/2017  1:21 PM by Samuel Dela Cruz MD     - Last echo 11/2016 LVEF 70%, normal VHD         Diabetes type 2, uncontrolled    HTN (hypertension)    Chronic kidney disease (CKD), stage III (moderate)     Overview Signed 4/19/2017  1:14 PM by Samuel Dela Cruz MD     - baseline Cr 1.5-1.6         PAD (peripheral artery disease)    Overview Signed 4/19/2017  1:13 PM by Samuel Dela Cruz MD     - Hx of Fem-pop bypass 7/2016         Normochromic normocytic anemia    Overview Signed 4/19/2017  1:22 PM by Samuel Dela Cruz MD     - Baseline Hgb 9s  - Hx of C-scope with polyps 2016, EGD 2016 with gastritis          Hypothyroid    GERD (gastroesophageal reflux disease) with hx of gastritis         Assessment/Plan:  - Continue aspirin, plavix   - Changed from pravastatin to atorvastatin  - Neuro has seen, P2Y12 elevated, ? Change to aggrenox  - F/u echo with bubble study and carotid duplex  - PT/OT/SLP/CM consulted  - A1c at goal of 6.9%, Lipid panel with hypertriglyceridemia, LDL 94  - Neuro checks and NIH SS as per protocol  - IV rocephin for cystitis, follow up urine culture (hopefully will be resulted tomorrow)  - Monitor BP on home meds   - Monitor renal function, Cr is at baseline  - Low dose SSI insulin (has not needed) with QIDACHS, FSBS reviewed and acceptable   - Monitor volume status while inpateint  - May need short term rehab as lives alone     DVT prophylaxis: SQ heparin  Discharge Planning: I expect patient to be discharged to home vs. Short term rehab in 1-2 days.    Samuel Dela Cruz MD   04/20/17   1:11 PM    Please note that portions of this note may have been completed with a voice recognition program. Efforts were made to edit the dictations, but occasionally words are mistranscribed.

## 2017-04-20 NOTE — PROGRESS NOTES
Acute Care - Occupational Therapy Initial Evaluation  [facility]     Patient Name: Jennifer Oliveira  : 1940  MRN: [id number]  Today's Date: 2017  Onset of Illness/Injury or Date of Surgery Date: 17  Date of Referral to OT: 17  Referring Physician: DR ZAPATA    Admit Date: 2017       ICD-10-CM ICD-9-CM   1. 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 Elevated glucose R73.09 790.29   3. Renal failure (ARF), acute on chronic N17.9 584.9    N18.9 585.9   4. Acute cystitis without hematuria N30.00 595.0   5. Adult failure to thrive R62.7 783.7   6. Impaired mobility and ADLs Z74.09 799.89   7. Impaired functional mobility, balance, gait, and endurance Z74.09 V49.89     Patient Active Problem List   Diagnosis   • Acute hypoxemic respiratory failure   • PAD (peripheral artery disease)   • Normochromic normocytic anemia   • Chronic diastolic heart failure   • Diabetes type 2, uncontrolled   • Lower extremity cellulitis   • Hypothyroid   • HTN (hypertension)   • Metabolic encephalopathy   • TIA (transient ischemic attack)   • Acute cystitis without hematuria   • Chronic kidney disease (CKD), stage III (moderate)   • GERD (gastroesophageal reflux disease) with hx of gastritis      Past Medical History:   Diagnosis Date   • CKD (chronic kidney disease), stage III     baseline Cr 1.5-1.6   • Diabetes mellitus    • Diastolic heart failure     last LVEF 70%   • Disease of thyroid gland    • GERD (gastroesophageal reflux disease)    • Hypertension    • PAD (peripheral artery disease)     s/p right fem-pop bypass 2016   • Stroke     residual chronic right hemiplegia     Past Surgical History:   Procedure Laterality Date   • CARPAL TUNNEL RELEASE     • CHOLECYSTECTOMY     • FEMORAL POPLITEAL BYPASS Right    • HYSTERECTOMY            OT ASSESSMENT FLOWSHEET (last 72 hours)      OT Evaluation       17 0955 17 0946 17 1530 17 1500       Rehab Evaluation    Document Type  evaluation   COMPLETED CHART REVIEW [id number]. CO- RX WITH O.T.   -CD evaluation  -AN evaluation  -SG      Subjective Information no complaints;agree to therapy   PT REPORTS SHE FEELS SHE IS BACK TO HER BASELINE,   -CD no complaints;agree to therapy  -AN no complaints;agree to therapy  -SG      Patient Effort, Rehab Treatment good  -CD good  -AN good  -SG      Symptoms Noted During/After Treatment  none  -AN none  -SG      General Information    Patient Profile Review yes  -CD yes  -AN       Onset of Illness/Injury or Date of Surgery Date 04/19/17  -CD 04/19/17  -AN       Referring Physician DR ZAPATA  -CD MD Lanie  -AN       General Observations (P)  PT SUPINE UPON ARRIVAL ON RA AND WITH IV HEPLOCKED. NOTED B LE INDURATED, ERYTHEMIC AND EDEMATOUS. PT REPORTS THIS IS BASELINE.   -CD Pt supine in bed, no one else present  -AN       Pertinent History Of Current Problem (P)  TO ED WITH SLURRED SPEECH, L FACIAL DROOP, L ARM WEAKNESS. SX'S WERE REPORTED TO HAVE RESOLVED.   -CD Pt admitted with slurred speech. Pt hx of CVA with R hemiplegia. MRI showed no new injuries  -AN       Precautions/Limitations (P)  fall precautions   R HP AT BASELINE. USED HEMIWALKER PTA.   -CD fall precautions   R side homa  -AN       Prior Level of Function (P)  independent:;all household mobility;bed mobility;dressing;min assist:;bathing;dependent:;home management;driving   DTR ASSIST WITH BATHING AND DRESSING FOR OUT IN COMMUNITY.  -CD independent:;feeding;grooming;cooking;all household mobility;transfer;min assist:;dressing;bathing;mod assist:;home management;dependent:;shopping;driving   Daughter helps with ADL's, drives pt  -AN       Equipment Currently Used at Home (P)  walker, homa;raised toilet;bath bench   HAS BRACE FOR R LE BUT ONLY WEARS IF OUT IN COMMUNITY..   -CD bath bench;walker, homa;wheelchair;commode   has AE and AFO brace, pt does not wear in home  -AN  walker, homa  -SD     Plans/Goals Discussed With (P)  patient;agreed  upon  -CD patient;agreed upon  -AN       Risks Reviewed (P)  patient:;LOB;increased discomfort;change in vital signs  -CD patient:;LOB;dizziness;increased discomfort;change in vital signs  -AN       Benefits Reviewed (P)  patient:;improve function;increase independence;increase strength;increase balance;increase knowledge  -CD patient:;improve function;increase independence;increase strength;increase balance  -AN       Barriers to Rehab (P)  previous functional deficit  -CD previous functional deficit  -AN       Living Environment    Lives With (P)  alone  -CD alone  -AN  alone  -SD     Living Arrangements (P)  mobile home  -CD mobile home  -AN  mobile home  -SD     Home Accessibility (P)  ramps present at home;tub/shower is not walk in   HAS TUB BENCH AND BSC, AND ELEVATED T.S.   -CD ramps present at home;tub/shower is not walk in  -AN  no concerns  -SD     Stair Railings at Home    none  -SD     Type of Financial/Environmental Concern    none  -SD     Transportation Available  family or friend will provide  -AN  car  -SD     Living Environment Comment (P)  WANTS TO GO HOME AT D/C. P.T. RECOMMENDS SHORT COURSE OF INPT REHAB  -CD neighbors, daughter assist  -AN  neighbors check on you  -SD     Clinical Impression    Date of Referral to OT  04/19/17  -AN       OT Diagnosis  Decreased ADL I  -AN       Impairments Found (describe specific impairments)  joint integrity and mobility;gait, locomotion, and balance  -AN       Patient/Family Goals Statement  Agreeable to OT; does not want to go to rehab  -AN       Criteria for Skilled Therapeutic Interventions Met  yes;treatment indicated  -AN       Rehab Potential  good, to achieve stated therapy goals  -AN       Therapy Frequency  daily  -AN       Anticipated Discharge Disposition  inpatient rehabilitation facility   pt. wants to go home, recommend home health  -AN       Functional Level Prior    Ambulation    1-->assistive equipment  -SD     Transferring    1-->assistive  equipment  -SD     Toileting    1-->assistive equipment  -SD     Bathing    3-->assistive equipment and person  -SD     Dressing    0-->independent  -SD     Eating    0-->independent  -SD     Communication    0-->understands/communicates without difficulty  -SD     Swallowing    0-->swallows foods/liquids without difficulty  -SD     Prior Functional Level Comment    patient states that she is independent and only needs assistance with bathing  -SD     Vital Signs    Pre Systolic BP Rehab (P)  150  -  -AN       Pre Treatment Diastolic BP (P)  74  -CD 74  -AN       Pretreatment Heart Rate (beats/min) (P)  76  -CD 76  -AN       Intratreatment Heart Rate (beats/min) (P)  87  -CD        Posttreatment Heart Rate (beats/min)  87  -AN       Pre SpO2 (%) (P)  93  -CD 95  -AN       O2 Delivery Pre Treatment (P)  supplemental O2  -CD supplemental O2  -AN       Intra SpO2 (%) (P)  88  -CD 88  -AN       O2 Delivery Intra Treatment (P)  room air  -CD room air  -AN       Post SpO2 (%) (P)  93   NSG TO CONTINUE TO MONITOR SATS ON RA.   -CD 93  -AN       O2 Delivery Post Treatment (P)  room air   NSG OK'D TO LEAVE ON RA AS PT DOES NOT WEAR O2 AT HOME.   -CD room air  -AN       Pre Patient Position (P)  Supine  -CD Supine  -AN       Intra Patient Position (P)  Sitting  -CD Standing  -AN       Post Patient Position (P)  Sitting  -CD Sitting  -AN       Pain Assessment    Pain Assessment (P)  No/denies pain  -CD No/denies pain  -AN No/denies pain  -SG      Vision Assessment/Intervention    Visual Impairment (P)  WFL  -CD WFL with corrective lenses  -AN       Cognitive Assessment/Intervention    Current Cognitive/Communication Assessment (P)  functional  -CD functional  -AN       Orientation Status (P)  oriented x 4  -CD oriented x 4  -AN       Follows Commands/Answers Questions (P)  100% of the time  -% of the time  -AN       Personal Safety (P)  WNL/WFL   PT AWARE OF DEFICITS.   -CD WNL/WFL  -AN       Personal Safety  Interventions (P)  fall prevention program maintained;gait belt;nonskid shoes/slippers when out of bed;supervised activity  -CD        ROM (Range of Motion)    General ROM (P)  lower extremity range of motion deficits identified  -CD upper extremity range of motion deficits identified  -AN       General ROM Detail  hx trace R shoulder, elbow flaccid wrist/hand; WLF scapular; L UE WFL  -AN       MMT (Manual Muscle Testing)    General MMT Assessment  upper extremity strength deficits identified  -AN       General MMT Assessment Detail  L UE 4/5; R shoulder 2/5, elbow 1/5, wrist/hand 0/5  -AN       Bed Mobility, Assessment/Treatment    Bed Mob, Supine to Sit, [place]  moderate assist (50% patient effort)  -AN       Bed Mobility, Impairments  impaired balance;strength decreased  -AN       Bed Mobility, Comment  to L side  -AN       Transfer Assessment/Treatment    Transfers, Bed-Chair [place]  minimum assist (75% patient effort)  -AN       Transfers, Sit-Stand [place]  contact guard assist  -AN       Transfers, Stand-Sit [place]  minimum assist (75% patient effort);verbal cues required  -AN       Transfer, Comment  uses homa walker at home, VALENTINA vallesrt this date  -AN       Functional Mobility    Functional Mobility- Ind. Level  minimum assist (75% patient effort);2 person assist required;verbal cues required  -AN       Functional Mobility-Distance (Feet)  --   to chair  -AN       Functional Mobility- Comment  pt does not have AFO or homa walker here to assist  -AN       Lower Body Bathing Assessment/Training    LB Bathing Assess/Train, Comment  simulated min assist; offered AE, pt reports daughter assists  -AN       Upper Body Dressing Assessment/Training    UB Dressing Assess/Train, Clothing Type  donning:;hospital gown  -AN       UB Dressing Assess/Train, Position  sitting;edge of bed  -AN       UB Dressing Assess/Train, [place]  minimum assist (75% patient effort)  -AN       Lower Body  Dressing Assessment/Training    LB Dressing Assess/Train, Clothing Type  doffing:;donning:;socks  -AN       LB Dressing Assess/Train, Position  edge of bed  -AN       LB Dressing Assess/Train, [place]  moderate assist (50% patient effort)  -AN       LB Dressing Assess/Train, Comment  reports use of dressing stick a deep  -AN       Motor Skills/Interventions    Additional Documentation  Balance Skills Training (Group);Fine Motor Coordination Training (Group);Gross Motor Coordination Training (Group)  -AN       Balance Skills Training    Sitting-Level of Assistance  Distant supervision  -AN       Sitting-Balance Support  Left upper extremity supported  -AN       Sitting-Balance Activities  Forward lean;Lateral lean  -AN       Sitting # of Minutes  7  -AN       Standing-Level of Assistance  Contact guard;x2  -AN       Static Standing Balance Support  Right upper extremity supported;Left upper extremity supported  -AN       Standing-Balance Activities  Weight Shift R-L  -AN       Gross Motor Coordination Training    Gross Motor Skill, Impairments Detail  L WFL;  hx R impairment  -AN       General Therapy Interventions    Planned Therapy Interventions  activity intolerance;ADL retraining;transfer training;balance training  -AN       Positioning and Restraints    Pre-Treatment Position  in bed  -AN       Post Treatment Position  chair  -AN       In Chair  reclined;call light within reach;encouraged to call for assist;notified nsg;MILO elevated  -AN         User Key  (r) = Recorded By, (t) = Taken By, (c) = Cosigned By    Initials Name Effective Dates    CD Jacqueline Carter, PT 06/19/15 -     MARLEEN Gomez, MS CCC-SLP 06/22/15 -     KANDACE Santoyo, OT 06/22/15 -     MARY Chang RN 06/16/16 -            Occupational Therapy Education     Title: PT OT SLP Therapies (Active)     Topic: Occupational Therapy (Active)     Point: ADL training (Done)    Description: Instruct learner(s) on proper  safety adaptation and remediation techniques during self care or transfers.   Instruct in proper use of assistive devices.    Learning Progress Summary    Learner Readiness Method Response Comment Documented by Status   Patient Acceptance LORRI SCHOFIELD DU,NR Educated pt on current needs for assist and safety. Provided some ex's for pt to do 2x day while in hospital. AN 04/20/17 1053 Done               Point: Home exercise program (Done)    Description: Instruct learner(s) on appropriate technique for monitoring, assisting and/or progressing therapeutic exercises/activities.    Learning Progress Summary    Learner Readiness Method Response Comment Documented by Status   Patient Acceptance LORRI SCHOFIELD DU,NR Educated pt on current needs for assist and safety. Provided some ex's for pt to do 2x day while in hospital. AN 04/20/17 1053 Done                      User Key     Initials Effective Dates Name Provider Type Discipline    AN 06/22/15 -  Elissa Santoyo OT Occupational Therapist OT                  OT Recommendation and Plan  Anticipated Discharge Disposition: inpatient rehabilitation facility (pt. wants to go home, recommend home health)  Planned Therapy Interventions: activity intolerance, ADL retraining, transfer training, balance training  Therapy Frequency: daily  Plan of Care Review  Plan Of Care Reviewed With: patient  Outcome Summary/Follow up Plan: Pt demonstrates deficits with bed mobility, txfr I and ADL I. Recommended iinpt rehab to pt, but she states she wants to go home. Educated pt that currently she would need to go home with some as she needs help with all ADL's and txfrs.          OT Goals       04/20/17 1054          Bed Mobility OT LTG    Bed Mobility OT LTG, Date Established 04/20/17  -AN      Bed Mobility OT LTG, Time to Achieve 1 wk  -AN      Bed Mobility OT LTG, Activity Type all bed mobility  -AN      Bed Mobility OT LTG, [place] Level set up required  -AN      Transfer Training OT LTG    Transfer  Training OT LTG, Date Established 04/20/17  -AN      Transfer Training OT LTG, Time to Achieve 1 wk  -AN      Transfer Training OT LTG, Activity Type toilet;sit to stand/stand to sit;bed to chair /chair to bed  -AN      Transfer Training OT LTG, [place] Level supervision required  -AN      Toileting OT LTG    Toileting Goal OT LTG, Date Established 04/20/17  -AN      Toileting Goal OT LTG, Time to Achieve 1 wk  -AN      Toileting Goal OT LTG, [place] Level conditional independence  -AN      Functional Mobility OT LTG    Functional Mobility Goal OT LTG, Date Established 04/20/17  -AN      Functional Mobility Goal OT LTG, Time to Achieve 1 wk  -AN      Functional Mobility Goal OT LTG, [place] Level modified independent  -AN      Functional Mobility Goal OT LTG, Assist Device homa walker  -AN      Functional Mobility Goal OT LTG, Distance to Achieve to the door  -AN        User Key  (r) = Recorded By, (t) = Taken By, (c) = Cosigned By    Initials Name Provider Type    KANDACE Santoyo OT Occupational Therapist                Outcome Measures       04/20/17 0946          How much help from another is currently needed...    Putting on and taking off regular lower body clothing? 2  -AN      Bathing (including washing, rinsing, and drying) 2  -AN      Toileting (which includes using toilet bed pan or urinal) 3  -AN      Putting on and taking off regular upper body clothing 3  -AN      Taking care of personal grooming (such as brushing teeth) 3  -AN      Eating meals 3  -AN      Score 16  -AN      Modified [place] Scale    Modified [place] Scale 3 - Moderate disability.  Requiring some help, but able to walk without assistance.  -AN      Functional Assessment    Outcome Measure Options AM-PAC 6 Clicks Daily Activity (OT);Modified [place]  -AN        User Key  (r) = Recorded By, (t) = Taken By, (c) = Cosigned By    Initials Name Provider Type    KANDACE Santoyo OT Occupational Therapist          Time  Calculation:   OT Start Time: 0946    Therapy Charges for Today     Code Description Service Date Service Provider Modifiers Qty    [id number] HC OT EVAL LOW COMPLEXITY 4 4/20/2017 Elissa Santoyo OT GO 1               Elissa Santoyo OT  4/20/2017

## 2017-04-20 NOTE — PROGRESS NOTES
Acute Care - Physical Therapy Initial Evaluation  [facility]     Patient Name: Jennifer Oliveira  : 1940  MRN: [id number]  Today's Date: 2017   Onset of Illness/Injury or Date of Surgery Date: 17  Date of Referral to PT: 17  Referring Physician: DR ZAPATA      Admit Date: 2017     Visit Dx:    ICD-10-CM ICD-9-CM   1. 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 Elevated glucose R73.09 790.29   3. Renal failure (ARF), acute on chronic N17.9 584.9    N18.9 585.9   4. Acute cystitis without hematuria N30.00 595.0   5. Adult failure to thrive R62.7 783.7   6. Impaired mobility and ADLs Z74.09 799.89   7. Impaired functional mobility, balance, gait, and endurance Z74.09 V49.89     Patient Active Problem List   Diagnosis   • Acute hypoxemic respiratory failure   • PAD (peripheral artery disease)   • Normochromic normocytic anemia   • Chronic diastolic heart failure   • Diabetes type 2, uncontrolled   • Lower extremity cellulitis   • Hypothyroid   • HTN (hypertension)   • Metabolic encephalopathy   • TIA (transient ischemic attack)   • Acute cystitis without hematuria   • Chronic kidney disease (CKD), stage III (moderate)   • GERD (gastroesophageal reflux disease) with hx of gastritis      Past Medical History:   Diagnosis Date   • CKD (chronic kidney disease), stage III     baseline Cr 1.5-1.6   • Diabetes mellitus    • Diastolic heart failure     last LVEF 70%   • Disease of thyroid gland    • GERD (gastroesophageal reflux disease)    • Hypertension    • PAD (peripheral artery disease)     s/p right fem-pop bypass 2016   • Stroke     residual chronic right hemiplegia     Past Surgical History:   Procedure Laterality Date   • CARPAL TUNNEL RELEASE     • CHOLECYSTECTOMY     • FEMORAL POPLITEAL BYPASS Right    • HYSTERECTOMY            PT ASSESSMENT (last 72 hours)      PT Evaluation       17 1358 17 0955    Rehab Evaluation    Document Type  evaluation   COMPLETED CHART  REVIEW [id number]. CO- RX WITH O.T.   -CD    Subjective Information  no complaints;agree to therapy   PT REPORTS SHE FEELS SHE IS BACK TO HER BASELINE,   -CD    Patient Effort, Rehab Treatment  good  -CD    General Information    Patient Profile Review  yes  -CD    Onset of Illness/Injury or Date of Surgery Date  04/19/17  -CD    Referring Physician  DR ZAPATA  -    General Observations  PT SUPINE UPON ARRIVAL ON RA AND WITH IV HEPLOCKED. NOTED B LE INDURATED, ERYTHEMIC AND EDEMATOUS. PT REPORTS THIS IS BASELINE.   -CD    Pertinent History Of Current Problem  TO ED WITH SLURRED SPEECH, L FACIAL DROOP, L ARM WEAKNESS. SX'S WERE REPORTED TO HAVE RESOLVED.   -CD    Precautions/Limitations  fall precautions   R HP AT BASELINE. USED HEMIWALKER PTA.   -CD    Prior Level of Function  independent:;all household mobility;bed mobility;dressing;min assist:;bathing;dependent:;home management;driving   DTR ASSIST WITH BATHING AND DRESSING FOR OUT IN COMMUNITY.  -CD    Equipment Currently Used at Home walker, rolling;cane, straight;raised toilet;oxygen   Oxygen supplie by [facility]  - walker, homa;raised toilet;bath bench   HAS BRACE FOR R LE BUT ONLY WEARS IF OUT IN COMMUNITY..   -CD    Plans/Goals Discussed With  patient;agreed upon  -CD    Risks Reviewed  patient:;LOB;increased discomfort;change in vital signs  -CD    Benefits Reviewed  patient:;improve function;increase independence;increase strength;increase balance;increase knowledge  -CD    Barriers to Rehab  previous functional deficit  -CD    Living Environment    Lives With alone  - alone  -CD    Living Arrangements mobile home   Pt has ramp access to home  - mobile home  -CD    Home Accessibility  ramps present at home;tub/shower is not walk in   HAS TUB BENCH AND BSC, AND ELEVATED T.S.   -CD    Transportation Available car;family or friend will provide  -     Living Environment Comment  WANTS TO GO HOME AT D/C. P.T. RECOMMENDS SHORT COURSE OF INPT REHAB   -CD    Clinical Impression    Date of Referral to PT  04/19/17  -CD    PT Diagnosis  IMPAIRED FUNCTIONAL MOBILITY, TIA.   -CD    Patient/Family Goals Statement  TO GO HOME.   -CD    Criteria for Skilled Therapeutic Interventions Met  yes  -CD    Rehab Potential  good, to achieve stated therapy goals  -CD    Vital Signs    Pre Systolic BP Rehab  150  -CD    Pre Treatment Diastolic BP  74  -CD    Pretreatment Heart Rate (beats/min)  76  -CD    Intratreatment Heart Rate (beats/min)  87  -CD    Pre SpO2 (%)  93  -CD    O2 Delivery Pre Treatment  supplemental O2  -CD    Intra SpO2 (%)  88  -CD    O2 Delivery Intra Treatment  room air  -CD    Post SpO2 (%)  93   NSG TO CONTINUE TO MONITOR SATS ON RA.   -CD    O2 Delivery Post Treatment  room air   NSG OK'D TO LEAVE ON RA AS PT DOES NOT WEAR O2 AT HOME.   -CD    Pre Patient Position  Supine  -CD    Intra Patient Position  Sitting  -CD    Post Patient Position  Sitting  -CD    Pain Assessment    Pain Assessment  No/denies pain  -CD    Vision Assessment/Intervention    Visual Impairment  WFL  -CD    Cognitive Assessment/Intervention    Current Cognitive/Communication Assessment  functional  -CD    Orientation Status  oriented x 4  -CD    Follows Commands/Answers Questions  100% of the time  -CD    Personal Safety  WNL/WFL   PT AWARE OF DEFICITS.   -CD    Personal Safety Interventions  fall prevention program maintained;gait belt;nonskid shoes/slippers when out of bed;supervised activity  -CD    ROM (Range of Motion)    General ROM  no range of motion deficits identified   FUNCTIONAL FOR SITTING EOB/GAIT. R DF LIMITED TO NEUTRAL  -CD    MMT (Manual Muscle Testing)    General MMT Assessment  lower extremity strength deficits identified   BASELINE WEAKNESS R LE FROM OLD CVA 3-4/5. L LE 5/5.   -CD    Bed Mobility, Assessment/Treatment    Bed Mob, Supine to Sit, [place]  moderate assist (50% patient effort);verbal cues required   CUES TO ROLL TO L. USED LLE TO LIFT R LE.    -CD    Bed Mobility, Impairments  impaired balance;strength decreased  -CD    Transfer Assessment/Treatment    Transfers, Bed-Chair [place]  minimum assist (75% patient effort);verbal cues required   VIA L UE SUPPORT AND GAIT BELT.  -CD    Transfers, Sit-Stand [place]  contact guard assist;verbal cues required  -CD    Transfers, Stand-Sit [place]  minimum assist (75% patient effort);verbal cues required  -CD    Transfers, Sit-Stand-Sit, Assist Device  --   VIA L UE SUPPORT AND GAIT BELT.   -CD    Transfer, Safety Issues  step length decreased  -CD    Transfer, Impairments  strength decreased;impaired balance  -CD    Transfer, Comment  SMALL STEPS TO TURN AND SIT IN RECLINER . TRANSFERRED TO THE L.   -CD    Gait Assessment/Treatment    Gait, Comment  SMALL STEPS TO TURN AND SIT IN RECLINER FROM EOB. PT DECLINED FURTHER GAIT.   -CD    Motor Skills/Interventions    Additional Documentation  Balance Skills Training (Group)  -CD    Balance Skills Training    Sitting-Level of Assistance  Distant supervision  -CD    Sitting-Balance Support  Left upper extremity supported  -CD    Sitting-Balance Activities  Forward lean;Lateral lean  -CD    Sitting # of Minutes  7  -CD    Standing-Level of Assistance  Contact guard;x2  -CD    Static Standing Balance Support  Right upper extremity supported  -CD    Standing-Balance Activities  Weight Shift R-L  -CD    Gross Motor Coordination Training    Gross Motor Skill, Impairments Detail  L LE WFL'S. R LE IMPAIRED AT BASELINE FROM OLD CVA.   -CD    Positioning and Restraints    Pre-Treatment Position  in bed  -CD    Post Treatment Position  chair  -CD    In Chair  reclined;call light within reach;encouraged to call for assist;exit alarm on;notified nsg;legs elevated;RUE elevated  -      04/20/17 0946 04/19/17 1530    Rehab Evaluation    Document Type evaluation  -AN evaluation  -SG    Subjective Information no complaints;agree to therapy  -AN no complaints;agree to  therapy  -SG    Patient Effort, Rehab Treatment good  -AN good  -SG    Symptoms Noted During/After Treatment none  -AN none  -SG    General Information    Patient Profile Review yes  -AN     Onset of Illness/Injury or Date of Surgery Date 04/19/17  -AN     Referring Physician MD Lanie  -AN     General Observations Pt supine in bed, no one else present  -AN     Pertinent History Of Current Problem Pt admitted with slurred speech. Pt hx of CVA with R hemiplegia. MRI showed no new injuries  -AN     Precautions/Limitations fall precautions   R side homa  -AN     Prior Level of Function independent:;feeding;grooming;cooking;all household mobility;transfer;min assist:;dressing;bathing;mod assist:;home management;dependent:;shopping;driving   Daughter helps with ADL's, drives pt  -AN     Equipment Currently Used at Home bath bench;walker, homa;wheelchair;commode   has AE and AFO brace, pt does not wear in home  -AN     Plans/Goals Discussed With patient;agreed upon  -AN     Risks Reviewed patient:;LOB;dizziness;increased discomfort;change in vital signs  -AN     Benefits Reviewed patient:;improve function;increase independence;increase strength;increase balance  -AN     Barriers to Rehab previous functional deficit  -AN     Living Environment    Lives With alone  -AN     Living Arrangements mobile home  -AN     Home Accessibility ramps present at home;tub/shower is not walk in  -AN     Transportation Available family or friend will provide  -AN     Living Environment Comment neighbors, daughter assist  -AN     Vital Signs    Pre Systolic BP Rehab 150  -AN     Pre Treatment Diastolic BP 74  -AN     Pretreatment Heart Rate (beats/min) 76  -AN     Posttreatment Heart Rate (beats/min) 87  -AN     Pre SpO2 (%) 95  -AN     O2 Delivery Pre Treatment supplemental O2  -AN     Intra SpO2 (%) 88  -AN     O2 Delivery Intra Treatment room air  -AN     Post SpO2 (%) 93  -AN     O2 Delivery Post Treatment room air  -AN     Pre Patient  Position Supine  -AN     Intra Patient Position Standing  -AN     Post Patient Position Sitting  -AN     Pain Assessment    Pain Assessment No/denies pain  -AN No/denies pain  -SG    Vision Assessment/Intervention    Visual Impairment WFL with corrective lenses  -AN     Cognitive Assessment/Intervention    Current Cognitive/Communication Assessment functional  -AN     Orientation Status oriented x 4  -AN     Follows Commands/Answers Questions 100% of the time  -AN     Personal Safety WNL/WFL  -AN     ROM (Range of Motion)    General ROM upper extremity range of motion deficits identified  -AN     General ROM Detail hx trace R shoulder, elbow flaccid wrist/hand; WLF scapular; L UE WFL  -AN     MMT (Manual Muscle Testing)    General MMT Assessment upper extremity strength deficits identified  -AN     General MMT Assessment Detail L UE 4/5; R shoulder 2/5, elbow 1/5, wrist/hand 0/5  -AN     Bed Mobility, Assessment/Treatment    Bed Mob, Supine to Sit, [place] moderate assist (50% patient effort)  -AN     Bed Mobility, Impairments impaired balance;strength decreased  -AN     Bed Mobility, Comment to L side  -AN     Transfer Assessment/Treatment    Transfers, Bed-Chair [place] minimum assist (75% patient effort)  -AN     Transfers, Sit-Stand [place] contact guard assist  -AN     Transfers, Stand-Sit [place] minimum assist (75% patient effort);verbal cues required  -AN     Transfer, Comment uses homa walker at home, UE supoport this date  -AN     Motor Skills/Interventions    Additional Documentation Balance Skills Training (Group);Fine Motor Coordination Training (Group);Gross Motor Coordination Training (Group)  -AN     Balance Skills Training    Sitting-Level of Assistance Distant supervision  -AN     Sitting-Balance Support Left upper extremity supported  -AN     Sitting-Balance Activities Forward lean;Lateral lean  -AN     Sitting # of Minutes 7  -AN     Standing-Level of Assistance Contact  guard;x2  -AN     Static Standing Balance Support Right upper extremity supported;Left upper extremity supported  -AN     Standing-Balance Activities Weight Shift R-L  -AN     Gross Motor Coordination Training    Gross Motor Skill, Impairments Detail L WFL;  hx R impairment  -AN     Positioning and Restraints    Pre-Treatment Position in bed  -AN     Post Treatment Position chair  -AN     In Chair reclined;call light within reach;encouraged to call for assist;notified nsg;RUE elevated  -AN       04/19/17 1500       General Information    Equipment Currently Used at Home walker, homa  -SD     Living Environment    Lives With alone  -SD     Living Arrangements mobile home  -SD     Home Accessibility no concerns  -SD     Stair Railings at Home none  -SD     Type of Financial/Environmental Concern none  -SD     Transportation Available car  -SD     Living Environment Comment neighbors check on you  -SD       User Key  (r) = Recorded By, (t) = Taken By, (c) = Cosigned By    Initials Name Provider Type    FRANCI Carter, PT Physical Therapist    MARLEEN Gomez, MS CCC-SLP Speech and Language Pathologist    KANDACE Santoyo, OT Occupational Therapist    JONEL Porter, RN Case Manager    MARY Chang RN Registered Nurse          Physical Therapy Education     Title: PT OT SLP Therapies (Active)     Topic: Physical Therapy (Done)     Point: Mobility training (Done)    Learning Progress Summary    Learner Readiness Method Response Comment Documented by Status   Patient Acceptance E VU,NR SAFETY WITH MOBILITY, D/C PLANNING, BENEFIT OF OOB ACTIVITY, PROGRESSION OF POC.  04/20/17 1620 Done               Point: Home exercise program (Done)    Learning Progress Summary    Learner Readiness Method Response Comment Documented by Status   Patient Acceptance E VU,NR SAFETY WITH MOBILITY, D/C PLANNING, BENEFIT OF OOB ACTIVITY, PROGRESSION OF POC.  04/20/17 1620 Done               Point: Body  mechanics (Done)    Learning Progress Summary    Learner Readiness Method Response Comment Documented by Status   Patient Acceptance E VU,NR SAFETY WITH MOBILITY, D/C PLANNING, BENEFIT OF OOB ACTIVITY, PROGRESSION OF POC.  04/20/17 1620 Done               Point: Precautions (Done)    Learning Progress Summary    Learner Readiness Method Response Comment Documented by Status   Patient Acceptance E VU,NR SAFETY WITH MOBILITY, D/C PLANNING, BENEFIT OF OOB ACTIVITY, PROGRESSION OF POC.  04/20/17 1620 Done                      User Key     Initials Effective Dates Name Provider Type Discipline     06/19/15 -  Jacqueline Carter, PT Physical Therapist PT                PT Recommendation and Plan  Anticipated Equipment Needs At Discharge:  (TBD)  Anticipated Discharge Disposition: inpatient rehabilitation facility (PT PREFERS TO GO HOME. IF GOES HOME, RECOMMEND HHPT. )  Planned Therapy Interventions: balance training, bed mobility training, gait training, transfer training (ASKED FAMILY TO BRING IN HEMIWALKER AND PT'S AFO/BOOT. )  Plan of Care Review  Plan Of Care Reviewed With: patient  Outcome Summary/Follow up Plan: PT PRESENTS WITH DECLINE IN FUNCTIONAL MOBILITY FROM BASELINE AND WILL BENFIT FROM P.T. TO INCREASE INDEPENDENCE/SAFETY PRIOR TO D/C. RECOMMEND INPT REHAB BUT PT WANTS TO GO HOME. AT PRESENT, IF PT DISCHARGED HOME,  WOULD NEED 24/7 ASSIST UNTIL DEEMED SAFE BY HHPT.           IP PT Goals       04/20/17 1621          Bed Mobility PT LTG    Bed Mobility PT LTG, Time to Achieve 2 wks  -CD      Bed Mobility PT LTG, Activity Type all bed mobility  -CD      Bed Mobility PT LTG, [place] Level independent  -CD      Transfer Training PT LTG    Transfer Training PT LTG, Time to Achieve 2 wks  -CD      Transfer Training PT LTG, Activity Type all transfers  -CD      Transfer Training PT LTG, [place] Level independent  -CD      Transfer Training PT LTG, Assist Device walker, homa  -CD      Gait Training PT LTG     Gait Training Goal PT LTG, Time to Achieve 2 wks  -CD      Gait Training Goal PT LTG, [place] Level independent  -CD      Gait Training Goal PT LTG, Assist Device walker, homa  -CD      Gait Training Goal PT LTG, Distance to Achieve 100  -CD        User Key  (r) = Recorded By, (t) = Taken By, (c) = Cosigned By    Initials Name Provider Type    CD Jacqueline Carter, PT Physical Therapist                Outcome Measures       04/20/17 0955 04/20/17 0946       How much help from another person do you currently need...    Turning from your back to your side while in flat bed without using bedrails? 2  -CD      Moving from lying on back to sitting on the side of a flat bed without bedrails? 2  -CD      Moving to and from a bed to a chair (including a wheelchair)? 3  -CD      Standing up from a chair using your arms (e.g., wheelchair, bedside chair)? 3  -CD      Climbing 3-5 steps with a railing? 1  -CD      To walk in hospital room? 3  -CD      AM-PAC 6 Clicks Score 14  -CD      How much help from another is currently needed...    Putting on and taking off regular lower body clothing?  2  -AN     Bathing (including washing, rinsing, and drying)  2  -AN     Toileting (which includes using toilet bed pan or urinal)  3  -AN     Putting on and taking off regular upper body clothing  3  -AN     Taking care of personal grooming (such as brushing teeth)  3  -AN     Eating meals  3  -AN     Score  16  -AN     Modified [place] Scale    Modified [place] Scale 4 - Moderately severe disability.  Unable to walk without assistance, and unable to attend to own bodily needs without assistance.  -CD 3 - Moderate disability.  Requiring some help, but able to walk without assistance.  -AN     Functional Assessment    Outcome Measure Options AM-PAC 6 Clicks Basic Mobility (PT);Modified [place]  -CD AM-PAC 6 Clicks Daily Activity (OT);Modified [place]  -AN       User Key  (r) = Recorded By, (t) = Taken By, (c) = Cosigned By    Initials Name  Provider Type    FRANCI Carter, PT Physical Therapist    KANDACE Santoyo, OT Occupational Therapist           Time Calculation:         PT Charges       04/20/17 1625          Time Calculation    PT Received On 04/20/17  -      PT Goal Re-Cert Due Date 04/30/17  -      Time Calculation- PT    Total Timed Code Minutes- PT 10 minute(s)  -CD        User Key  (r) = Recorded By, (t) = Taken By, (c) = Cosigned By    Initials Name Provider Type    FRANCI Carter, PT Physical Therapist          Therapy Charges for Today     Code Description Service Date Service Provider Modifiers Qty    69135700415 HC GAIT TRAINING EA 15 MIN 4/20/2017 Jacqueline Carter, PT GP 1    05842373389 HC PT EVAL MOD COMPLEXITY 4 4/20/2017 Jacqueline Carter, PT GP 1          PT G-Codes  Outcome Measure Options: AM-PAC 6 Clicks Basic Mobility (PT), Modified [place] Carter, PT  4/20/2017

## 2017-04-20 NOTE — PROGRESS NOTES
Discharge Planning Assessment  [facility]     Patient Name: Jennifer Oliveira  MRN: [id number]  Today's Date: 4/20/2017    Admit Date: 4/19/2017          Discharge Needs Assessment       04/20/17 1358    Living Environment    Lives With alone    Living Arrangements mobile home   Pt has ramp access to home    Provides Primary Care For no one, unable/limited ability to care for self    Quality Of Family Relationships supportive;helpful;involved    Able to Return to Prior Living Arrangements yes    Discharge Needs Assessment    Concerns To Be Addressed discharge planning concerns    Readmission Within The Last 30 Days no previous admission in last 30 days    Community Agency Name(S) Pt has used [facility] and [facility] in the past.  Pt would like to use [facility] at discharge.     Anticipated Changes Related to Illness none    Equipment Currently Used at Home walker, rolling;cane, straight;raised toilet;oxygen   Oxygen supplie by [facility] [facility]    Equipment Needed After Discharge walker, rolling   Pt requesting a new walker    Discharge Facility/Level Of Care Needs home with home health;nursing facility, skilled    Transportation Available car;family or friend will provide    Current Discharge Risk physical impairment    Discharge Disposition home or self-care;home healthcare service;skilled nursing facility    Discharge Contact Information if Applicable Daughter (POA) Stacey Schwarz- [phone]            Discharge Plan       04/20/17 1406    Case Management/Social Work Plan    Plan Home with HH    Patient/Family In Agreement With Plan yes    Additional Comments Spoke with patient at bedside.  Pt lives alone in [facility] and has a walker, a cane, and home Oxygen supplied by [employer].  Pt is requesting a new walker at discharge for home use.  Pt has used HH services in the past but is not current with HH.  Pt would like to use [facility] for HH at discharge if needed.  Pt denied the need for inpt rehab at  discharge, stated she wants to go home.  Plan is to go home with  services at discharge.  Family can transport.  CM will continue to follow for discharge needs.         Discharge Placement     No information found        Expected Discharge Date and Time     Expected Discharge Date Expected Discharge Time    Apr 23, 2017               Demographic Summary       04/20/17 8894    Referral Information    Admission Type inpatient    Arrived From admitted as an inpatient    Referral Source admission list    Reason For Consult discharge planning    Record Reviewed history and physical;medical record;patient profile    Contact Information    Permission Granted to Share Information With family/designee    Primary Care Physician Information    Name Samuel Buck            Functional Status       04/20/17 1474    Functional Status Current    Current Functional Level Comment See Nursing Assessment    Change in Functional Status Since Onset of Current Illness/Injury yes    Functional Status Prior    Ambulation 1-->assistive equipment    Transferring 1-->assistive equipment    Toileting 1-->assistive equipment    Bathing 2-->assistive person    Dressing 0-->independent    Eating 0-->independent    Communication 0-->understands/communicates without difficulty    Swallowing 0-->swallows foods/liquids without difficulty    IADL    Medications assistive person    Meal Preparation independent    Housekeeping assistive person    Laundry assistive person    Shopping assistive person    Oral Care independent    Activity Tolerance    Usual Activity Tolerance moderate    Current Activity Tolerance fair    Employment/Financial    Employment/Finance Comments Pt confirms Medicare A/B and [employer].  Pt has prescription coverage with [facility]            Psychosocial     None            Abuse/Neglect     None            Legal     None            Substance Abuse     None            Patient Forms     None          Norah Porter RN

## 2017-04-20 NOTE — PLAN OF CARE
Problem: Patient Care Overview (Adult)  Goal: Plan of Care Review  Outcome: Ongoing (interventions implemented as appropriate)    04/20/17 1621   Coping/Psychosocial Response Interventions   Plan Of Care Reviewed With patient   Outcome Evaluation   Outcome Summary/Follow up Plan PT PRESENTS WITH DECLINE IN FUNCTIONAL MOBILITY FROM BASELINE AND WILL BENFIT FROM P.T. TO INCREASE INDEPENDENCE/SAFETY PRIOR TO D/C. RECOMMEND INPT REHAB BUT PT WANTS TO GO HOME. AT PRESENT, IF PT DISCHARGED HOME, WOULD NEED 24/7 ASSIST UNTIL DEEMED SAFE BY HHPT.          Problem: Stroke (Ischemic) (Adult)  Goal: Signs and Symptoms of Listed Potential Problems Will be Absent or Manageable (Stroke)  Outcome: Ongoing (interventions implemented as appropriate)    04/20/17 1621   Stroke (Ischemic)   Problems Assessed (Stroke (Ischemic)/TIA) cognitive impairment;communication impairment;motor/sensory impairment   Problems Present (Stroke (Ischemic)/TIA) motor/sensory impairment  (R SIDE BASELINE FROM OLD CVA. )         Problem: Inpatient Physical Therapy  Goal: Bed Mobility Goal LTG- PT  Outcome: Ongoing (interventions implemented as appropriate)    04/20/17 1621   Bed Mobility PT LTG   Bed Mobility PT LTG, Time to Achieve 2 wks   Bed Mobility PT LTG, Activity Type all bed mobility   Bed Mobility PT LTG, [place] Level independent       Goal: Transfer Training Goal 1 LTG- PT  Outcome: Ongoing (interventions implemented as appropriate)    04/20/17 1621   Transfer Training PT LTG   Transfer Training PT LTG, Time to Achieve 2 wks   Transfer Training PT LTG, Activity Type all transfers   Transfer Training PT LTG, [place] Level independent   Transfer Training PT LTG, Assist Device walker, homa       Goal: Gait Training Goal LTG- PT  Outcome: Ongoing (interventions implemented as appropriate)    04/20/17 1621   Gait Training PT LTG   Gait Training Goal PT LTG, Time to Achieve 2 wks   Gait Training Goal PT LTG, [place] Level independent    Gait Training Goal PT LTG, Assist Device walker, homa   Gait Training Goal PT LTG, Distance to Achieve 100

## 2017-04-20 NOTE — PROGRESS NOTES
Daily Progress Note  Neurology     LOS: 1 day     Subjective     Interval History: No acute events overnight     ROS: Negative for fever    Objective     Vital signs in last 24 hours:  Temp:  [97.8 °F (36.6 °C)-98.9 °F (37.2 °C)] 97.8 °F (36.6 °C)  Heart Rate:  [63-73] 73  Resp:  [18] 18  BP: (135-151)/(50-78) 142/78      Physical Exam:   General: Sitting in bedside chair with eyes open. In NAD.     Respiratory: Respirations unlabored   CV: RRR       Neurologic Exam:   Mental status: Awake, alert, Follows commands. Speech fluent   CN: II-XII intact     Motor: Full strength noted in the bilateral upper extremities                         Results from last 7 days  Lab Units 04/20/17  0747   WBC 10*3/mm3 9.41   HEMOGLOBIN g/dL 9.2*   HEMATOCRIT % 29.6*   PLATELETS 10*3/mm3 215           Results Review:  Labs reviewed  TTE report reviewed  Carotid duplex preliminary report reviewed      Assessment/Plan     Principal Problem:    TIA (transient ischemic attack)  Active Problems:    PAD (peripheral artery disease)    Normochromic normocytic anemia    Chronic diastolic heart failure    Diabetes type 2, uncontrolled    Hypothyroid    HTN (hypertension)    Acute cystitis without hematuria    Chronic kidney disease (CKD), stage III (moderate)    GERD (gastroesophageal reflux disease) with hx of gastritis         1.  TIA = Possibly due to small vessel disease vs atheroembolism from underlying carotid disease. Awaiting carotid duplex final report.  TTE unremarkable. On ASA and Plavix at home. P2Y12 assay was done which came back elevated at 225, suggestive of mild Plavix resistance. Patient requests that we discuss the possibility of switching her Plavix to Aggrenox with her cardiologist Dr. Huynh. If he is agreeable, recommend making the switch, if not, recommend staying on her current dual antiplatelet therapy.     2.  Hypertension = continue meds     3.  Hyperlipidemia = On atorvastatin     4.  Type 2 diabetes mellitus =  continue glycemic monitoring    No further recommendations at this time.  Okay from neuro standpoint for discharge home when medically appropriate.       Erika Giang MD  04/20/17  2:29 PM

## 2017-04-21 VITALS
WEIGHT: 167 LBS | SYSTOLIC BLOOD PRESSURE: 165 MMHG | OXYGEN SATURATION: 94 % | HEART RATE: 70 BPM | BODY MASS INDEX: 32.79 KG/M2 | RESPIRATION RATE: 18 BRPM | DIASTOLIC BLOOD PRESSURE: 67 MMHG | TEMPERATURE: 97.7 F | HEIGHT: 60 IN

## 2017-04-21 LAB
ANION GAP SERPL CALCULATED.3IONS-SCNC: 4 MMOL/L (ref 3–11)
BACTERIA SPEC AEROBE CULT: ABNORMAL
BASOPHILS # BLD AUTO: 0.04 10*3/MM3 (ref 0–0.2)
BASOPHILS NFR BLD AUTO: 0.6 % (ref 0–1)
BUN BLD-MCNC: 27 MG/DL (ref 9–23)
BUN/CREAT SERPL: 16.9 (ref 7–25)
CALCIUM SPEC-SCNC: 9.2 MG/DL (ref 8.7–10.4)
CHLORIDE SERPL-SCNC: 106 MMOL/L (ref 99–109)
CO2 SERPL-SCNC: 29 MMOL/L (ref 20–31)
CREAT BLD-MCNC: 1.6 MG/DL (ref 0.6–1.3)
DEPRECATED RDW RBC AUTO: 56.5 FL (ref 37–54)
EOSINOPHIL # BLD AUTO: 0.36 10*3/MM3 (ref 0.1–0.3)
EOSINOPHIL NFR BLD AUTO: 5.2 % (ref 0–3)
ERYTHROCYTE [DISTWIDTH] IN BLOOD BY AUTOMATED COUNT: 16.1 % (ref 11.3–14.5)
GFR SERPL CREATININE-BSD FRML MDRD: 31 ML/MIN/1.73
GLUCOSE BLD-MCNC: 122 MG/DL (ref 70–100)
GLUCOSE BLDC GLUCOMTR-MCNC: 127 MG/DL (ref 70–130)
GLUCOSE BLDC GLUCOMTR-MCNC: 294 MG/DL (ref 70–130)
HCT VFR BLD AUTO: 28.9 % (ref 34.5–44)
HGB BLD-MCNC: 8.6 G/DL (ref 11.5–15.5)
IMM GRANULOCYTES # BLD: 0.01 10*3/MM3 (ref 0–0.03)
IMM GRANULOCYTES NFR BLD: 0.1 % (ref 0–0.6)
LYMPHOCYTES # BLD AUTO: 1.42 10*3/MM3 (ref 0.6–4.8)
LYMPHOCYTES NFR BLD AUTO: 20.5 % (ref 24–44)
MCH RBC QN AUTO: 28.4 PG (ref 27–31)
MCHC RBC AUTO-ENTMCNC: 29.8 G/DL (ref 32–36)
MCV RBC AUTO: 95.4 FL (ref 80–99)
MONOCYTES # BLD AUTO: 0.67 10*3/MM3 (ref 0–1)
MONOCYTES NFR BLD AUTO: 9.7 % (ref 0–12)
NEUTROPHILS # BLD AUTO: 4.43 10*3/MM3 (ref 1.5–8.3)
NEUTROPHILS NFR BLD AUTO: 63.9 % (ref 41–71)
PLATELET # BLD AUTO: 215 10*3/MM3 (ref 150–450)
PMV BLD AUTO: 10.7 FL (ref 6–12)
POTASSIUM BLD-SCNC: 4.2 MMOL/L (ref 3.5–5.5)
RBC # BLD AUTO: 3.03 10*6/MM3 (ref 3.89–5.14)
SODIUM BLD-SCNC: 139 MMOL/L (ref 132–146)
WBC NRBC COR # BLD: 6.93 10*3/MM3 (ref 3.5–10.8)

## 2017-04-21 PROCEDURE — 85025 COMPLETE CBC W/AUTO DIFF WBC: CPT | Performed by: HOSPITALIST

## 2017-04-21 PROCEDURE — 82962 GLUCOSE BLOOD TEST: CPT

## 2017-04-21 PROCEDURE — 99239 HOSP IP/OBS DSCHRG MGMT >30: CPT | Performed by: NURSE PRACTITIONER

## 2017-04-21 PROCEDURE — 92523 SPEECH SOUND LANG COMPREHEN: CPT

## 2017-04-21 PROCEDURE — 25010000002 HEPARIN (PORCINE) PER 1000 UNITS: Performed by: HOSPITALIST

## 2017-04-21 PROCEDURE — 80048 BASIC METABOLIC PNL TOTAL CA: CPT | Performed by: HOSPITALIST

## 2017-04-21 PROCEDURE — 25010000003 CEFTRIAXONE PER 250 MG: Performed by: HOSPITALIST

## 2017-04-21 RX ORDER — ATORVASTATIN CALCIUM 80 MG/1
80 TABLET, FILM COATED ORAL NIGHTLY
Qty: 30 TABLET | Refills: 1 | Status: SHIPPED | OUTPATIENT
Start: 2017-04-21 | End: 2017-05-19 | Stop reason: HOSPADM

## 2017-04-21 RX ORDER — CEFUROXIME AXETIL 500 MG/1
500 TABLET ORAL 2 TIMES DAILY
Qty: 8 TABLET | Refills: 0 | Status: SHIPPED | OUTPATIENT
Start: 2017-04-21 | End: 2017-04-25

## 2017-04-21 RX ORDER — PRASUGREL 10 MG/1
10 TABLET, FILM COATED ORAL DAILY
Qty: 30 TABLET | Refills: 1 | Status: SHIPPED | OUTPATIENT
Start: 2017-04-21 | End: 2017-06-05 | Stop reason: HOSPADM

## 2017-04-21 RX ADMIN — PANTOPRAZOLE SODIUM 40 MG: 40 TABLET, DELAYED RELEASE ORAL at 08:54

## 2017-04-21 RX ADMIN — CEFTRIAXONE SODIUM 1 G: 1 INJECTION, SOLUTION INTRAVENOUS at 12:13

## 2017-04-21 RX ADMIN — CLOPIDOGREL BISULFATE 75 MG: 75 TABLET ORAL at 08:54

## 2017-04-21 RX ADMIN — ASPIRIN 325 MG ORAL TABLET 325 MG: 325 PILL ORAL at 08:54

## 2017-04-21 RX ADMIN — INSULIN LISPRO 4 UNITS: 100 INJECTION, SOLUTION INTRAVENOUS; SUBCUTANEOUS at 12:13

## 2017-04-21 RX ADMIN — LEVOTHYROXINE SODIUM 200 MCG: 100 TABLET ORAL at 05:32

## 2017-04-21 RX ADMIN — LOSARTAN POTASSIUM 50 MG: 50 TABLET, FILM COATED ORAL at 08:54

## 2017-04-21 RX ADMIN — HEPARIN SODIUM 5000 UNITS: 5000 INJECTION, SOLUTION INTRAVENOUS; SUBCUTANEOUS at 08:53

## 2017-04-21 RX ADMIN — METOPROLOL TARTRATE 50 MG: 50 TABLET, FILM COATED ORAL at 08:54

## 2017-04-21 RX ADMIN — FUROSEMIDE 40 MG: 40 TABLET ORAL at 08:54

## 2017-04-21 RX ADMIN — BACLOFEN 10 MG: 10 TABLET ORAL at 08:54

## 2017-04-21 NOTE — PROGRESS NOTES
Continued Stay Note  [facility]     Patient Name: Jennifer Oliveira  MRN: [id number]  Today's Date: 4/21/2017    Admit Date: 4/19/2017          Discharge Plan       04/21/17 1531    Case Management/Social Work Plan    Plan Danie Walker    Patient/Family In Agreement With Plan yes    Additional Comments Rolling walker was delivered via [employer], however, pt needs a Danie walker not a rolling walker.  [employer] does not supply danie walkers.  CM called Tayla with [employer] and faxed order for a danie walker, they will deliver the danie walker to patients home.  [employer]'s contact info in AVS summary.        04/21/17 1421    Case Management/Social Work Plan    Plan Home with HH    Patient/Family In Agreement With Plan yes    Additional Comments Patient is medically ready for discharge today.  Pt preferred  services with [facility] for PT/OT, referral called to [facility].  Pt stated she needed a new walker because hers was given to her.  Walker arranged via [employer] and will be delivered to bedside prior to discharge.  Family to transport patient home and bring her portable O2 tank if needed.  No other discharge needs identified.                Discharge Codes     None        Expected Discharge Date and Time     Expected Discharge Date Expected Discharge Time    Apr 21, 2017             Norah Porter RN

## 2017-04-21 NOTE — DISCHARGE SUMMARY
[facility] Medicine Services  DISCHARGE SUMMARY       Date of Admission: 4/19/2017  Date of Discharge:  4/21/2017  Primary Care Physician: Samuel Buck MD  Consulting Physician(s)     Provider Relationship    Erika Giang MD Consulting Physician          Discharge Diagnoses:  Active Hospital Problems (** Indicates Principal Problem)    Diagnosis Date Noted   • **TIA (transient ischemic attack) [G45.9] 04/19/2017     - Onset AM 4/19, MRI Brain negative for acute CVA   - Associated with transient left-sided weakness and dysarthria   - Pt chronically on aspirin, plavix, pravastatin  - Hx of right-sided weakness due to hx of CVA, MRI Brain with possible old left pontine CVA     • Acute cystitis without hematuria [N30.00] 04/19/2017     - Possible contributor to TIA symptoms      • Chronic kidney disease (CKD), stage III (moderate) [N18.3] 04/19/2017     - baseline Cr 1.5-1.6     • GERD (gastroesophageal reflux disease) with hx of gastritis  [K21.9] 04/19/2017   • HTN (hypertension) [I10] 11/14/2016   • Hypothyroid [E03.9] 11/14/2016   • PAD (peripheral artery disease) [I73.9] 11/14/2016     - Hx of Fem-pop bypass 7/2016     • Chronic diastolic heart failure [I50.32] 11/14/2016     - Last echo 11/2016 LVEF 70%, normal VHD     • Normochromic normocytic anemia [D64.9] 11/14/2016     - Baseline Hgb 9s  - Hx of C-scope with polyps 2016, EGD 2016 with gastritis      • Diabetes type 2, uncontrolled [E11.65] 11/14/2016      Resolved Hospital Problems    Diagnosis Date Noted Date Resolved   No resolved problems to display.       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 female with a past history of CKD III, diastolic heart failure, chronic anemia, colon polyps, type 2 diabetes mellitus, hypertension, PAD with  prior right fem-pop bypass, and remote CVA with chronic right homa-plegia who presented to the [facility] ER 4/19/17 for evaluation of the acute onset of transient left-sided weakness, facial droop, and slurred speech.  Patient underwent stroke workup on arrival and symptoms resolved without need for TPA.  MRI did not reveal acute stroke but did fine residual findings of a left pontine CVA.  Patient is chronically on aspirin, Plavix, pravastatin.  Neurology was consulted for further evaluation.    Patient has recovered well with all symptoms resolving.  Patient is back to baseline functional status.  During her workup her P2 Y 12 was noted to be elevated at 225.  Concerns for Plavix resistance to discontinuation of Plavix.  It was recommended per neurology she start Aggrenox and aspirin for medical therapy.  This has been discussed with primary cardiologist, Dr. Huynh's PA-C, who favors Effient secondary to 100% occlusion of right femoral status post right femoropopliteal bypass.  Continue aspirin, Effient and will start Lipitor at discharge.    Patient was also noted to have UTI with sensitivities to Rocephin/cephalosporins.  She will be transitioned to Ceftin therapy at discharge for an additional 4 days.  Currently patient is asymptomatic, eating well, ambulating, denies any pain.  She'll be discharged today with follow-up to primary care in 1 week.  She can follow-up with Dr. Huynh in approximately 6 weeks.    Procedures Performed         Consults:   Consults     Date and Time Order Name Status Description    4/19/2017 1431 Inpatient Consult to Neurology Completed           Pertinent Test Results:  Results for JESSIKA SMITH (MRN [id number]) as of 4/21/2017 14:06   Ref. Range 4/21/2017 08:25   Glucose Latest Ref Range: 70 - 100 mg/dL 122 (H)   Sodium Latest Ref Range: 132 - 146 mmol/L 139   Potassium Latest Ref Range: 3.5 - 5.5 mmol/L 4.2   CO2 Latest Ref Range: 20.0 - 31.0 mmol/L 29.0   Chloride Latest Ref  Range: 99 - 109 mmol/L 106   Anion Gap Latest Ref Range: 3.0 - 11.0 mmol/L 4.0   Creatinine Latest Ref Range: 0.60 - 1.30 mg/dL 1.60 (H)   BUN Latest Ref Range: 9 - 23 mg/dL 27 (H)   BUN/Creatinine Ratio Latest Ref Range: 7.0 - 25.0  16.9   Calcium Latest Ref Range: 8.7 - 10.4 mg/dL 9.2   eGFR Non African Amer Latest Ref Range: >60 mL/min/1.73 31 (L)   WBC Latest Ref Range: 3.50 - 10.80 10*3/mm3 6.93   RBC Latest Ref Range: 3.89 - 5.14 10*6/mm3 3.03 (L)   Hemoglobin Latest Ref Range: 11.5 - 15.5 g/dL 8.6 (L)   Hematocrit Latest Ref Range: 34.5 - 44.0 % 28.9 (L)   RDW Latest Ref Range: 11.3 - 14.5 % 16.1 (H)   MCV Latest Ref Range: 80.0 - 99.0 fL 95.4   MCH Latest Ref Range: 27.0 - 31.0 pg 28.4   MCHC Latest Ref Range: 32.0 - 36.0 g/dL 29.8 (L)   MPV Latest Ref Range: 6.0 - 12.0 fL 10.7   Platelets Latest Ref Range: 150 - 450 10*3/mm3 215   RDW-SD Latest Ref Range: 37.0 - 54.0 fl 56.5 (H)   Neutrophil % Latest Ref Range: 41.0 - 71.0 % 63.9   Lymphocyte % Latest Ref Range: 24.0 - 44.0 % 20.5 (L)   Monocyte % Latest Ref Range: 0.0 - 12.0 % 9.7   Eosinophil % Latest Ref Range: 0.0 - 3.0 % 5.2 (H)   Basophil % Latest Ref Range: 0.0 - 1.0 % 0.6   Immature Grans % Latest Ref Range: 0.0 - 0.6 % 0.1   Neutrophils, Absolute Latest Ref Range: 1.50 - 8.30 10*3/mm3 4.43   Lymphocytes, Absolute Latest Ref Range: 0.60 - 4.80 10*3/mm3 1.42   Monocytes, Absolute Latest Ref Range: 0.00 - 1.00 10*3/mm3 0.67   Eosinophils, Absolute Latest Ref Range: 0.10 - 0.30 10*3/mm3 0.36 (H)   Basophils, Absolute Latest Ref Range: 0.00 - 0.20 10*3/mm3 0.04   Immature Grans, Absolute Latest Ref Range: 0.00 - 0.03 10*3/mm3 0.01   Results for JESSIKA SMITH (MRN [id number]) as of 4/21/2017 14:06   Ref. Range 4/19/2017 10:34 4/19/2017 12:28   Troponin I Latest Ref Range: 0.00 - 0.07 ng/mL  0.02   BNP Latest Ref Range: 0.0 - 100.0 pg/mL 242.0 (H)    Results for JESSIKA SMITH (MRN [id number]) as of 4/21/2017 14:06   Ref. Range 4/20/2017 07:47    Total Cholesterol Latest Ref Range: 0 - 200 mg/dL 190   HDL Cholesterol Latest Ref Range: 40 - 60 mg/dL 37 (L)   LDL Cholesterol  Latest Ref Range: 0 - 130 mg/dL 94   Triglycerides Latest Ref Range: 0 - 150 mg/dL 314 (H)   Culture   >100,000 CFU/mL Citrobacter freundii complex (A)      Resulting Agency: [facility]   Susceptibility    Citrobacter freundii complex     KAIN     Ampicillin 16 ug/ml Intermediate     Ampicillin + Sulbactam <=8/4 ug/ml Susceptible     Aztreonam <=8 ug/ml Susceptible     Cefepime <=8 ug/ml Susceptible     Cefotaxime <=2 ug/ml Susceptible     Ceftriaxone <=8 ug/ml Susceptible     Cefuroxime <=4 ug/ml Susceptible     Cephalothin >16 ug/ml Resistant     Ertapenem <=1 ug/ml Susceptible     Gentamicin <=4 ug/ml Susceptible     Levofloxacin <=2 ug/ml Susceptible     Meropenem <=1 ug/ml Susceptible     Nitrofurantoin <=32 ug/ml Susceptible     Piperacillin + Tazobactam <=16 ug/ml Susceptible     Tetracycline <=4 ug/ml Susceptible     Tobramycin <=4 ug/ml Susceptible     Trimethoprim + Sulfamethoxazole <=2/38 ug/ml Susceptible              Specimen         Study Result      EXAMINATION: XR CHEST 1 VW- 04/19/2017      INDICATION: stroke       COMPARISON: 11/12/2016 portable chest      FINDINGS: Heart and vasculature appear normal in size. Background  interstitial changes are again noted that are likely chronic. There is  also a small focus of new increased density in the right base that may  represent new discoid atelectasis. Left basilar interstitial changes  appear stable. No effusion or pneumothorax is seen.       IMPRESSION:  Chronic appearing changes of the lower lungs similar to  previous exam except for mild new right basilar discoid atelectasis.      D: 04/19/2017  E: 04/19/2017   EXAMINATION: MRI BRAIN WO CONTRAST- 04/19/2017      INDICATION: stroke; slurred speech, previous CVA with chronic right  hemiplegia, new left-sided weakness.      TECHNIQUE: Sagittal and axial T1 and axial T2  FLAIR diffusion weighted  images of the brain without contrast.       COMPARISON: Unenhanced head CT scan of same date.      FINDINGS: Repeat imaging series are performed due to patient movement.  Images overall are mildly to moderately motion degraded.      Diffusion-weighted series show no evidence of restricted diffusion to  suggest acute infarction. Multiple series, although motion degraded,  give the impression of some volume loss of the left anterior mabel, also  suggested on the CT scan from today and the earlier CT scan, although  these areas are less well seen due to skull base streak artifact. This  would suggest remote infarct/ischemia. No significant focal atrophy is  seen elsewhere. There is no clear evidence of old infarct elsewhere, no  evidence of hemorrhage, hydrocephalus, mass or mass effect or abnormal  extra-axial collection.      IMPRESSION:  Suspected focal atrophy, as from old infarct of the anterior  left mabel, but unchanged from prior studies. No evidence of acute  infarct or other new intracranial disease. Chronic appearing changes of  the aging brain elsewhere.      D: 2017  E: 2017  Patient Name JORGE Sex  (Age)      Jennifer Oliveira [id number] Female 1940 (76 y.o.)       Admission Information      Admission Date/Time Discharge Date/Time Room/Bed     17  1015  S478/1       Interpretation Summary      · Mild mitral valve regurgitation is present  · Left ventricular function is normal.              Patient Name JORGE Townsend  (Age)     Jennifer Oliveira [id number] Female 1940 (76 y.o.)       Admission Information      Admission Date/Time Discharge Date/Time Room/Bed     17  1015  S478/1       Interpretation Summary      · Right internal carotid artery stenosis of 0-49%.  · Left internal carotid artery stenosis of 50-69%.                 Condition on Discharge:  stable    Physical Exam on Discharge:/67  Pulse 70  Temp 97.7 °F (36.5 °C) (Oral)   Resp 18  " Ht 60\" (152.4 cm)  Wt 167 lb (75.8 kg)  SpO2 94%  BMI 32.61 kg/m2  Physical Exam   Constitutional: She is oriented to person, place, and time. She appears well-developed and well-nourished.   HENT:   Head: Normocephalic and atraumatic.   Eyes: No scleral icterus.   Neck: Normal range of motion. No JVD present.   Cardiovascular: Normal rate, regular rhythm, normal heart sounds and intact distal pulses.    Pulmonary/Chest: Effort normal and breath sounds normal. No respiratory distress. She has no wheezes.   Abdominal: Soft. Bowel sounds are normal. She exhibits no distension.   Musculoskeletal: She exhibits no edema.   Right hemiparesis- baseline   Neurological: She is alert and oriented to person, place, and time.   Skin: Skin is warm and dry.   Psychiatric: She has a normal mood and affect. Her behavior is normal. Judgment and thought content normal.         Discharge Disposition  Home or Self Care    Discharge Medications   Jennifer Oliveira   Home Medication Instructions KAMAR:[id number]    Printed on:04/21/17 1404   Medication Information                      amLODIPine (NORVASC) 10 MG tablet  Take 10 mg by mouth Every Night.             aspirin 81 MG chewable tablet  Chew 81 mg Daily.             atorvastatin (LIPITOR) 80 MG tablet  Take 1 tablet by mouth Every Night.             baclofen (LIORESAL) 10 MG tablet  Take 10 mg by mouth 3 (Three) Times a Day.             cefuroxime (CEFTIN) 500 MG tablet  Take 1 tablet by mouth 2 (Two) Times a Day for 4 days.             furosemide (LASIX) 40 MG tablet  Take 1 tablet by mouth Daily.             gabapentin (NEURONTIN) 100 MG capsule  Take 300 mg by mouth Every Night.             glimepiride (AMARYL) 4 MG tablet  Take 4 mg by mouth Every Morning Before Breakfast.             glimepiride (AMARYL) 4 MG tablet  Take 2 mg by mouth Every Evening.             insulin glargine (LANTUS) 100 UNIT/ML injection  Inject 40 Units under the skin Every Morning.           "   insulin glargine (LANTUS) 100 UNIT/ML injection  Inject 30 Units under the skin Every Evening.             levothyroxine (SYNTHROID, LEVOTHROID) 200 MCG tablet  Take 200 mcg by mouth Daily.             losartan (COZAAR) 50 MG tablet  Take 50 mg by mouth Daily.             metFORMIN (GLUCOPHAGE) 500 MG tablet  Take 1,000 mg by mouth 2 (Two) Times a Day With Meals.             metoprolol tartrate (LOPRESSOR) 50 MG tablet  Take 1 tablet by mouth Every 12 (Twelve) Hours.             pantoprazole (PROTONIX) 40 MG EC tablet  Take 40 mg by mouth Daily.             prasugrel (EFFIENT) 10 MG tablet  Take 1 tablet by mouth Daily.                 Discharge Diet:   Diet Instructions     Diet: Regular, Consistent Carbohydrate, Cardiac, Soft Texture; Thin Liquids, No Restrictions; Chopped       Discharge Diet:   Regular  Consistent Carbohydrate  Cardiac  Soft Texture      Fluid Consistency:  Thin Liquids, No Restrictions   Soft Options:  Chopped                 Discharge Care Plan / Instructions:    Activity at Discharge:   Activity Instructions     Activity as Tolerated                     Follow-up Appointments  No future appointments.  Additional Instructions for the Follow-ups that You Need to Schedule     Discharge Follow-Up With Specified Provider    As directed    To:  Lino   Follow Up:  6 Weeks   Follow Up Details:  TIA changed to effient; off plavix       Discharge Follow-up with PCP    As directed    Follow Up Details:  1 week                 Test Results Pending at Discharge       ARSLAN Starks 04/21/17 2:04 PM    Time: Discharge 33 min    Please note that portions of this note may have been completed with a voice recognition program. Efforts were made to edit the dictations, but occasionally words are mistranscribed.

## 2017-04-21 NOTE — DISCHARGE PLACEMENT REQUEST
"Jennifer Oliveira (76 y.o. Female)     [address]  [address] [facility] [address]  Phone: [phone]  Fax:  Date Ordered: 2017         Patient: Jennifer Oliveira MRN: [id number]   [address] [facility] [address] : 1940  SSN:    Phone: [phone] Sex: F     Weight: 167 lb (75.8 kg)         Ht Readings from Last 1 Encounters:   17 60\" (152.4 cm)         Walker (Order ID: [id number])   Diagnosis: Impaired mobility and ADLs (Z74.09 [ICD-10-CM] 799.89 [ICD-9-CM])  Impaired functional mobility, balance, gait, and endurance (Z74.09 [ICD-10-CM] V49.89 [ICD-9-CM])  Transient cerebral ischemia, unspecified type (G45.9 [ICD-10-CM] 435.9 [ICD-9-CM])   Quantity: 1     Equipment:  Walker Folding with Wheels  Length of Need (99 Months = Lifetime): 99 Months = Lifetime            Authorizing Provider:Samuel Dela Cruz MD  Order Entered By: Norah Porter RN 2017 2:16 PM     Electronically signed by: Samuel Dela Cruz MD (NPI: [id number]) 2017 2:16 PM                Date of Birth Social Security Number Address Home Phone MRN    1940  [facility] [address] [place] [id number] [phone] [id number]    Gnosticism Marital Status          Non-Alevism Single       Admission Date Admission Type Admitting Provider Attending Provider Department, Room/Bed    17 Emergency Samuel Dela Cruz MD Repass, Gregory L, MD [address], S478/1    Discharge Date Discharge Disposition Discharge Destination         Home or Self Care             Attending Provider: Samuel Dela Cruz MD     Allergies:  Erythromycin    Isolation:  None   Infection:  None   Code Status:  FULL    Ht:  60\" (152.4 cm)   Wt:  167 lb (75.8 kg)    Admission Cmt:  None   Principal Problem:  TIA (transient ischemic attack) [G45.9] More...                 Active Insurance as of 2017     Primary Coverage     Payor Plan Insurance Group " Employer/Plan Group    MEDICARE MEDICARE A & B      Payor Plan Address Payor Plan Phone Number Effective From Effective To    [address] [phone] 8/1/2005     [place] [address]       Subscriber Name Subscriber Birth Date Member ID       JESSIKA SMITH 1940 [id number]           Secondary Coverage     Payor Plan Insurance Group Employer/Plan Group    HUMANA HUMANA SUPPLEMENT [id number]     Payor Plan Address Payor Plan Phone Number Effective From Effective To    [address] [address]  1/1/2013     [place] [address]       Subscriber Name Subscriber Birth Date Member ID       JESSIKA SMITH 1940 [id number]                 Emergency Contacts      (Rel.) Home Phone Work Phone Mobile Phone    Stacey Gonzales (Daughter) [phone] --    Maryana Smith (Daughter) -- -- [phone]    Adonis Burgos (Neighbor) -- -- [phone]               History & Physical      Samuel Dela Cruz MD at 4/19/2017  1:25 PM              [facility] Medicine Services  HISTORY AND PHYSICAL    Primary Care Physician: Samuel Buck MD    Subjective     Chief Complaint:  Slurred Speech and Facial Droop     History of Present Illness:   Patient is a 76-year-old female with a past history of CKD III, diastolic heart failure, chronic anemia, colon polyps, type 2 diabetes mellitus, hypertension, PAD with prior right fem-pop bypass, and remote CVA with chronic right homa-plegia who presented to the [facility] ER today for evaluation of the acute onset of transient left-sided weakness, facial droop, and slurred speech.  Patient went to sleep last night at her baseline of health but then awoke this morning at 5 AM and noted that she was weak on her entire left side, unable to get to the bathroom.  Her neighbor came over and helped her to the kitchen.  When her daughter found out, she called EMS and patient was brought to [facility] where she has been ruled out for an acute stroke and  her left-sided deficits have improved with some only mild residual subjective left-sided weakness.  When seen, patient denies headache, vision changes, aphasia, dysphagia, or focal numbness/tingling.  No recent fevers or chills.  Patient denies any recent melena or hematochezia.  No recent falls or loss of consciousness.  Of note, patient does deny any dysuria, frequency, or urinary urgency.  ER evaluation did disclose a UTI.  Patient does have chronic erythema and swelling to her right ankle, managed with Ace wraps and supportive care.      Review of Systems   Constitutional: Positive for fatigue. Negative for appetite change, chills, fever and unexpected weight change.   HENT: Negative.    Eyes: Negative.    Respiratory: Negative for cough, chest tightness, shortness of breath and wheezing.    Cardiovascular: Positive for leg swelling. Negative for chest pain.   Gastrointestinal: Negative.    Endocrine: Negative.    Genitourinary: Negative for difficulty urinating, dysuria, flank pain, frequency, hematuria and urgency.   Skin: Positive for color change (BLE ).   Neurological: Positive for facial asymmetry, speech difficulty and weakness. Negative for dizziness, light-headedness and headaches.   Psychiatric/Behavioral: Negative.       Otherwise complete ROS performed and negative except as mentioned in the HPI.    Past Medical History:   Diagnosis Date   • CKD (chronic kidney disease), stage III     baseline Cr 1.5-1.6   • Diabetes mellitus    • Diastolic heart failure     last LVEF 70%   • Disease of thyroid gland    • GERD (gastroesophageal reflux disease)    • Hypertension    • PAD (peripheral artery disease)     s/p right fem-pop bypass 7/2016   • Stroke     residual chronic right hemiplegia       Past Surgical History:   Procedure Laterality Date   • CARPAL TUNNEL RELEASE     • CHOLECYSTECTOMY     • FEMORAL POPLITEAL BYPASS Right    • HYSTERECTOMY         Family History   Problem Relation Age of Onset   •  Diabetes Mother    • Heart disease Father        Social History     Social History   • Marital status: Single     Spouse name: N/A   • Number of children: N/A   • Years of education: N/A     Occupational History   • Not on file.     Social History Main Topics   • Smoking status: Former Smoker   • Smokeless tobacco: Never Used   • Alcohol use No   • Drug use: No   • Sexual activity: Not on file     Other Topics Concern   • Not on file     Social History Narrative    Lives alone in [place].        Medications:  Prescriptions Prior to Admission   Medication Sig Dispense Refill Last Dose   • amLODIPine (NORVASC) 10 MG tablet Take 10 mg by mouth Every Night.   11/6/2016 at 2000   • aspirin 81 MG chewable tablet Chew 81 mg Daily.   11/8/2016 at 0800   • baclofen (LIORESAL) 10 MG tablet Take 10 mg by mouth 3 (Three) Times a Day.      • clopidogrel (PLAVIX) 75 MG tablet Take 75 mg by mouth Daily.   11/8/2016 at 0800   • furosemide (LASIX) 40 MG tablet Take 1 tablet by mouth Daily. 30 tablet 12    • gabapentin (NEURONTIN) 100 MG capsule Take 300 mg by mouth Every Night.   11/7/2016 at 2000   • glimepiride (AMARYL) 4 MG tablet Take 4 mg by mouth Every Morning Before Breakfast.      • glimepiride (AMARYL) 4 MG tablet Take 2 mg by mouth Every Evening.      • insulin glargine (LANTUS) 100 UNIT/ML injection Inject 40 Units under the skin Every Morning.      • insulin glargine (LANTUS) 100 UNIT/ML injection Inject 30 Units under the skin Every Evening.      • levothyroxine (SYNTHROID, LEVOTHROID) 200 MCG tablet Take 200 mcg by mouth Daily.      • losartan (COZAAR) 50 MG tablet Take 50 mg by mouth Daily.      • metFORMIN (GLUCOPHAGE) 500 MG tablet Take 1,000 mg by mouth 2 (Two) Times a Day With Meals.      • metoprolol tartrate (LOPRESSOR) 50 MG tablet Take 1 tablet by mouth Every 12 (Twelve) Hours. 60 tablet 12    • pantoprazole (PROTONIX) 40 MG EC tablet Take 40 mg by mouth Daily.   Unknown at Unknown time   • pravastatin  "(PRAVACHOL) 40 MG tablet Take 40 mg by mouth Daily.   11/8/2016 at 0800       Allergies:  Allergies   Allergen Reactions   • Erythromycin      Objective     Physical Exam:  Vital Signs: /62  Pulse 69  Temp 98.5 °F (36.9 °C) (Oral)   Resp 14  Ht 60\" (152.4 cm)  Wt 167 lb (75.8 kg)  SpO2 97%  BMI 32.61 kg/m2  Physical Exam  Constitutional: no acute distress, awake, alert  Eyes: PERRLA, sclerae anicteric, no conjunctival injection, EOMI  Neck: supple, no thyromegaly, trachea midline, no bruits  Respiratory: Clear to auscultation bilaterally, nonlabored respirations   Cardiovascular: RRR, no murmurs, rubs, or gallops, weakly palpable pedal pulses bilaterally  Gastrointestinal: Positive bowel sounds, soft, nontender, nondistended, obese  Musculoskeletal: 2+ right ankle edema with mild blanching erythema to shin, no clubbing or cyanosis to bilateral lower extremities  Psychiatric: oriented x 3, appropriate affect, cooperative  Neurologic: Right arm and leg hemiparesis (4/5), left-sided strength 5/5, Cranial Nerves grossly intact to confrontation, speech fluent, reflexes symmetric, sensation intact to light touch throughout        Results Reviewed:    Results from last 7 days  Lab Units 04/19/17  1034   WBC 10*3/mm3 16.30*   HEMOGLOBIN g/dL 10.5*   PLATELETS 10*3/mm3 280       Results from last 7 days  Lab Units 04/19/17  1034   SODIUM mmol/L 144   POTASSIUM mmol/L 4.9   TOTAL CO2 mmol/L 27.0   CREATININE mg/dL 1.60*   GLUCOSE mg/dL 205*   CALCIUM mg/dL 9.4         MRI Brain with possible old left mabel infarct, no acute abnormality  CT Head negative for acute abnormality  ECG NSR no acute abnormalities  CXR chronic changes, right base atelectasis    I have personally reviewed and interpreted available lab data, radiology studies and ECG obtained at time of admission.     Assessment / Plan     Assessment/Problem List:   Hospital Problem List     * (Principal)TIA (transient ischemic attack)    Overview Addendum " 4/19/2017  1:11 PM by Samuel Dela Cruz MD     - Onset AM 4/19, MRI Brain negative for acute CVA   - Associated with transient left-sided weakness and dysarthria   - Pt chronically on aspirin, plavix, pravastatin  - Hx of right-sided weakness due to hx of CVA, MRI Brain with possible old left pontine CVA         PAD (peripheral artery disease)    Overview Signed 4/19/2017  1:13 PM by Samuel Dela Cruz MD     - Hx of Fem-pop bypass 7/2016         Normochromic normocytic anemia    Overview Signed 4/19/2017  1:22 PM by Samuel Dela Cruz MD     - Baseline Hgb 9s  - Hx of C-scope with polyps 2016, EGD 2016 with gastritis          Chronic diastolic heart failure    Overview Signed 4/19/2017  1:21 PM by Samuel Dela Cruz MD     - Last echo 11/2016 LVEF 70%, normal VHD         Diabetes type 2, uncontrolled    Hypothyroid    HTN (hypertension)    Acute cystitis without hematuria    Overview Signed 4/19/2017  1:12 PM by Samuel Dela Cruz MD     - Possible contributor to TIA symptoms          Chronic kidney disease (CKD), stage III (moderate)    Overview Signed 4/19/2017  1:14 PM by Samuel Dela Cruz MD     - baseline Cr 1.5-1.6         GERD (gastroesophageal reflux disease) with hx of gastritis             Plan:  - Continue aspirin, plavix   - Change from pravastatin to atorvastatin  - Neuro has seen, will check P2Y12 and consider change to aggrenox if plavix resistance  - Check echo with bubble study and carotid duplex  - PT/OT/SLP/CM consulted  - Check FLP and A1c  - Neuro checks and NIH SS as per protocol  - IV rocephin for cystitis, follow up urine culture   - Restart home antihypertensives   - Monitor renal function, Cr is at baseline  - Low dose SSI insulin, restart long acting prn  - Monitor volume status while inpateint    DVT prophylaxis: SQ heparin, no SCDs/OKSANA hose due to PAD  Code Status: FULL   Admission Status: Patient will be admitted to INPATIENT status due to the need for care of TIA and acute cystitis which  can only be reasonably provided in an hospital setting such as aggressive/expedited ancillary services and/or consultation services and the necessity for IV medications, close physician monitoring and/or the possible need for procedures.  In such, I feel patient’s risk for adverse outcomes and need for care warrant INPATIENT evaluation and predict the patient’s care encounter to likely last beyond 2 midnights.     Samuel Dela Cruz MD 04/19/17 9:04 PM           Electronically signed by Samuel Dela Cruz MD at 4/19/2017  9:06 PM

## 2017-04-21 NOTE — PLAN OF CARE
Problem: Patient Care Overview (Adult)  Goal: Plan of Care Review  Outcome: Ongoing (interventions implemented as appropriate)    04/21/17 0901   Coping/Psychosocial Response Interventions   Plan Of Care Reviewed With patient   Outcome Evaluation   Outcome Summary/Follow up Plan Cog-Comm Eval completed. Functional/baseline. No SLP needs at this time.

## 2017-04-21 NOTE — THERAPY DISCHARGE NOTE
Acute Care - Speech Language Pathology   Swallow Eval/Discharge [facility]   Cognitive-Communication Evaluation     Patient Name: Jennifer Oliveira  : 1940  MRN: [id number]  Today's Date: 2017  Onset of Illness/Injury or Date of Surgery Date: 17            Admit Date: 2017    Visit Dx:    ICD-10-CM ICD-9-CM   1. 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 Elevated glucose R73.09 790.29   3. Renal failure (ARF), acute on chronic N17.9 584.9    N18.9 585.9   4. Acute cystitis without hematuria N30.00 595.0   5. Adult failure to thrive R62.7 783.7   6. Impaired mobility and ADLs Z74.09 799.89   7. Impaired functional mobility, balance, gait, and endurance Z74.09 V49.89     Patient Active Problem List   Diagnosis   • Acute hypoxemic respiratory failure   • PAD (peripheral artery disease)   • Normochromic normocytic anemia   • Chronic diastolic heart failure   • Diabetes type 2, uncontrolled   • Lower extremity cellulitis   • Hypothyroid   • HTN (hypertension)   • Metabolic encephalopathy   • TIA (transient ischemic attack)   • Acute cystitis without hematuria   • Chronic kidney disease (CKD), stage III (moderate)   • GERD (gastroesophageal reflux disease) with hx of gastritis      Past Medical History:   Diagnosis Date   • CKD (chronic kidney disease), stage III     baseline Cr 1.5-1.6   • Diabetes mellitus    • Diastolic heart failure     last LVEF 70%   • Disease of thyroid gland    • GERD (gastroesophageal reflux disease)    • Hypertension    • PAD (peripheral artery disease)     s/p right fem-pop bypass 2016   • Stroke     residual chronic right hemiplegia     Past Surgical History:   Procedure Laterality Date   • CARPAL TUNNEL RELEASE     • CHOLECYSTECTOMY     • FEMORAL POPLITEAL BYPASS Right    • HYSTERECTOMY            SWALLOW EVALUATION (last 72 hours)      Swallow Evaluation       17 1530                Rehab Evaluation    Document Type evaluation  -SG        Subjective  Information no complaints;agree to therapy  -SG        Patient Effort, Rehab Treatment good  -SG        Symptoms Noted During/After Treatment none  -SG        General Information    Patient Profile Review yes  -SG        Onset of Illness/Injury 04/19/17  -SG        Pertinent History Of Current Problem TIA vs. atheroembolism. Adm on CVA pathway  -SG        Current Diet Limitations NPO  -SG        Precautions/Limitations, Vision WFL with corrective lenses  -SG        Precautions/Limitations, Hearing WFL  -SG        Prior Level of Function- Communication other (comment)   unknown baseline  -SG        Prior Level of Function- Swallowing no diet consistency restrictions  -SG        Plans/Goals Discussed With patient;agreed upon  -SG        Barriers to Rehab none identified  -SG        Clinical Impression    Patient's Goals For Discharge return to PO diet  -SG        SLP Swallowing Diagnosis oral dysfunction   appears to be baseline 2' ltd denetition  -SG        Rehab Potential/Prognosis, Swallowing good, to achieve stated therapy goals  -SG        Criteria for Skilled Therapeutic Interventions Met no problems identified which require skilled intervention  -SG        FCM, Swallowing 7-->Level 7  -SG        Therapy Frequency evaluation only  -SG        SLP Diet Recommendation chopped;thin liquids  -SG        SLP Rec. for Method of Medication Administration meds whole with thin liquid  -SG        Monitor For Signs Of Aspiration cough;gurgly voice;throat clearing;fever;upper respiratory infection;pneumonia  -SG        Anticipated Discharge Disposition inpatient rehabilitation facility  -SG        Pain Assessment    Pain Assessment No/denies pain  -SG        Oral Motor Structure and Function    Oral Motor Anatomy and Physiology patient demonstrates anatomy that is WNL  -SG        Dentition Assessment missing teeth   lower front bridge broken per pt  -SG        Secretion Management WNL/WFL  -SG        Mucosal Quality moist,  healthy  -SG        Velar Elevation WFL (within functional limits)  -SG        Gag Response WFL (within functional limits)  -SG        Volitional Swallow no difficulties initiating volitional swallow  -SG        Volitional Cough no difficulties initiating volitional cough  -SG        Oral Musculature General Assessment oral labial impairment   mild weakness on L  -SG        General Feeding/Swallowing Observations    Current Feeding Method NPO  -SG        Observations of Self Feeding Skills appropriate self feeding skills observed  -SG        Observations of Posture During Feeding upright at 90 for evaluation  -SG        General Swallow Observations    General Swallow Observations Intact  -SG        Clinical Swallow Exam    Mode of Presentation fed by clinician;cup;spoon;straw  -SG        Oral Preparation Concerns increased prep time   w/ solid but cleared w/ liquid wash  -SG        Oral Residue sulci residue   pt able to clear independently  -SG        Oral Phase Results intact oral phase without signs of dysfunction  -SG        Pharyngeal Phase Results no signs/symptoms of pharyngeal impairment  -SG        Summary of Clinical Exam Clinical Dys Evaluation completed. No s/s w/ any PO even when pt pushed. Timing and elevation adequate per palpation w/ all even when pushed. Inc'd prep time w/ solid 2' ltd dentions (pt reports she used to have lower front bridge that has broken). Despite this, able to fully masticate and clear solid item w/ independent self-cue for liquid wash when needed. Rec: Mech soft and thins (2' pt's ltd dentition), and S/L eval per pathway  -SG        Swallow Recommendations    Eating Assistance needs constant supervision during self eating activity  -SG        Oral Care oral care with toothbrush and dentifrice BID and PRN  -SG        Modified Eating Strategies upright positioning 90 degrees  -SG        Other Recommendations mechanical soft;thin  -SG        Recommended Diet soft  textures;chopped;thin liquids  -SG          User Key  (r) = Recorded By, (t) = Taken By, (c) = Cosigned By    Initials Name Effective Dates     Taniya Oneil Concepcion-Hansel MS CCC-SLP 06/22/15 -         EDUCATION  The patient has been educated in the following areas:   Cognitive Impairment Communication Impairment.    SLP Recommendation and Plan        Plan of Care Review  Plan Of Care Reviewed With: patient  Outcome Summary/Follow up Plan: Cog-Comm Eval completed.  Functional/baseline.  No SLP needs at this time.              Time Calculation:         Time Calculation- SLP       04/21/17 0937          Time Calculation- SLP    SLP Start Time 0850  -      SLP Received On 04/21/17  -        User Key  (r) = Recorded By, (t) = Taken By, (c) = Cosigned By    Initials Name Provider Type     Sita Lincoln MS CCC-SLP Speech and Language Pathologist          Therapy Charges for Today     Code Description Service Date Service Provider Modifiers Qty    81791433078 HC ST EVAL SPEECH AND PROD W LANG  2 4/21/2017 Sita Lincoln MS CCC-SLP GN 1          SLP G-Codes  Functional Limitations: Swallowing  Swallow Current Status (): 0 percent impaired, limited or restricted  Swallow Goal Status (): 0 percent impaired, limited or restricted  Swallow Discharge Status (): 0 percent impaired, limited or restricted         Sita Lincoln MS CCC-SLP  4/21/2017

## 2017-04-21 NOTE — PROGRESS NOTES
Continued Stay Note  [facility]     Patient Name: Jennifer Oliveira  MRN: [id number]  Today's Date: 4/21/2017    Admit Date: 4/19/2017          Discharge Plan       04/21/17 1421    Case Management/Social Work Plan    Plan Home with     Patient/Family In Agreement With Plan yes    Additional Comments Patient is medically ready for discharge today.  Pt preferred  services with [facility] for PT/OT, referral called to [facility].  Pt stated she needed a new walker because hers was given to her.  Walker arranged via [facility] and will be delivered to bedside prior to discharge.  Family to transport patient home and bring her portable O2 tank if needed.  No other discharge needs identified.                Discharge Codes       04/21/17 1424    Discharge Codes    Discharge Codes DD  Home with [facility] other        Expected Discharge Date and Time     Expected Discharge Date Expected Discharge Time    Apr 21, 2017             Norah Porter RN

## 2017-04-23 NOTE — THERAPY DISCHARGE NOTE
Acute Care - Occupational Therapy Discharge Summary  [facility]     Patient Name: Jennifer Oliveira  : 1940  MRN: [id number]    Today's Date: 2017  Onset of Illness/Injury or Date of Surgery Date: 17    Date of Referral to OT: 17  Referring Physician: DR ZAPATA      Admit Date: 2017        OT Recommendation and Plan    Visit Dx:    ICD-10-CM ICD-9-CM   1. 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 Elevated glucose R73.09 790.29   3. Renal failure (ARF), acute on chronic N17.9 584.9    N18.9 585.9   4. Acute cystitis without hematuria N30.00 595.0   5. Adult failure to thrive R62.7 783.7   6. Impaired mobility and ADLs Z74.09 799.89   7. Impaired functional mobility, balance, gait, and endurance Z74.09 V49.89   8. Chronic diastolic heart failure I50.32 428.32   9. Transient cerebral ischemia, unspecified type G45.9 435.9   10. Acute hypoxemic respiratory failure J96.01 518.81                     OT Goals       17 1054          Bed Mobility OT LTG    Bed Mobility OT LTG, Date Established 17  -AN      Bed Mobility OT LTG, Time to Achieve 1 wk  -AN      Bed Mobility OT LTG, Activity Type all bed mobility  -AN      Bed Mobility OT LTG, Sterling Level set up required  -AN      Transfer Training OT LTG    Transfer Training OT LTG, Date Established 17  -AN      Transfer Training OT LTG, Time to Achieve 1 wk  -AN      Transfer Training OT LTG, Activity Type toilet;sit to stand/stand to sit;bed to chair /chair to bed  -AN      Transfer Training OT LTG, Sterling Level supervision required  -AN      Toileting OT LTG    Toileting Goal OT LTG, Date Established 17  -AN      Toileting Goal OT LTG, Time to Achieve 1 wk  -AN      Toileting Goal OT LTG, Sterling Level conditional independence  -AN      Functional Mobility OT LTG    Functional Mobility Goal OT LTG, Date Established 17  -AN      Functional Mobility Goal OT LTG, Time to Achieve 1 wk  -AN       Functional Mobility Goal OT LTG, [place] Level modified independent  -AN      Functional Mobility Goal OT LTG, Assist Device homa walker  -AN      Functional Mobility Goal OT LTG, Distance to Achieve to the door  -AN        User Key  (r) = Recorded By, (t) = Taken By, (c) = Cosigned By    Initials Name Provider Type    KANDACE Santoyo OT Occupational Therapist                Outcome Measures       04/23/17 0700 04/20/17 0955 04/20/17 0946    How much help from another person do you currently need...    Turning from your back to your side while in flat bed without using bedrails?  2  -CD     Moving from lying on back to sitting on the side of a flat bed without bedrails?  2  -CD     Moving to and from a bed to a chair (including a wheelchair)?  3  -CD     Standing up from a chair using your arms (e.g., wheelchair, bedside chair)?  3  -CD     Climbing 3-5 steps with a railing?  1  -CD     To walk in hospital room?  3  -CD     AM-PAC 6 Clicks Score  14  -CD     How much help from another is currently needed...    Putting on and taking off regular lower body clothing?   2  -AN    Bathing (including washing, rinsing, and drying)   2  -AN    Toileting (which includes using toilet bed pan or urinal)   3  -AN    Putting on and taking off regular upper body clothing   3  -AN    Taking care of personal grooming (such as brushing teeth)   3  -AN    Eating meals   3  -AN    Score   16  -AN    Modified Nanjemoy Scale    Modified Nanjemoy Scale 3 - Moderate disability.  Requiring some help, but able to walk without assistance.  -JR 4 - Moderately severe disability.  Unable to walk without assistance, and unable to attend to own bodily needs without assistance.  -CD 3 - Moderate disability.  Requiring some help, but able to walk without assistance.  -AN    Functional Assessment    Outcome Measure Options  AM-PAC 6 Clicks Basic Mobility (PT);Modified Mago  -CD AM-PAC 6 Clicks Daily Activity (OT);Modified Nanjemoy  -AN       User Key  (r) = Recorded By, (t) = Taken By, (c) = Cosigned By    Initials Name Provider Type    CD Jacqueline Carter, PT Physical Therapist    KANDACE Santoyo, OT Occupational Therapist    JR Magalie Trinh, OT Occupational Therapist              OT Discharge Summary  Reason for Discharge: Discharge from facility  Outcomes Achieved: Refer to plan of care for updates on goals achieved  Discharge Destination: Home      Magalie Trinh, OT  4/23/2017

## 2017-05-14 ENCOUNTER — HOSPITAL ENCOUNTER (INPATIENT)
Facility: HOSPITAL | Age: 77
LOS: 5 days | Discharge: HOME | End: 2017-05-19
Attending: EMERGENCY MEDICINE | Admitting: INTERNAL MEDICINE

## 2017-05-14 ENCOUNTER — APPOINTMENT (OUTPATIENT)
Dept: GENERAL RADIOLOGY | Facility: HOSPITAL | Age: 77
End: 2017-05-14

## 2017-05-14 DIAGNOSIS — Z74.09 IMPAIRED FUNCTIONAL MOBILITY, BALANCE, GAIT, AND ENDURANCE: ICD-10-CM

## 2017-05-14 DIAGNOSIS — D50.0 IRON DEFICIENCY ANEMIA DUE TO CHRONIC BLOOD LOSS: ICD-10-CM

## 2017-05-14 DIAGNOSIS — R60.0 BILATERAL LOWER EXTREMITY EDEMA: ICD-10-CM

## 2017-05-14 DIAGNOSIS — K92.2 GASTROINTESTINAL HEMORRHAGE, UNSPECIFIED GASTROINTESTINAL HEMORRHAGE TYPE: Primary | ICD-10-CM

## 2017-05-14 DIAGNOSIS — S81.811A NONINFECTED SKIN TEAR OF LOWER EXTREMITY, RIGHT, INITIAL ENCOUNTER: ICD-10-CM

## 2017-05-14 DIAGNOSIS — I73.9 PERIPHERAL VASCULAR DISEASE (HCC): ICD-10-CM

## 2017-05-14 PROBLEM — D62 ACUTE BLOOD LOSS ANEMIA: Status: ACTIVE | Noted: 2017-05-14

## 2017-05-14 LAB
ABO GROUP BLD: NORMAL
ALBUMIN SERPL-MCNC: 3.8 G/DL (ref 3.2–4.8)
ALBUMIN/GLOB SERPL: 1.5 G/DL (ref 1.5–2.5)
ALP SERPL-CCNC: 49 U/L (ref 25–100)
ALT SERPL W P-5'-P-CCNC: 15 U/L (ref 7–40)
ANION GAP SERPL CALCULATED.3IONS-SCNC: 3 MMOL/L (ref 3–11)
AST SERPL-CCNC: 15 U/L (ref 0–33)
BACTERIA UR QL AUTO: ABNORMAL /HPF
BASOPHILS # BLD AUTO: 0.05 10*3/MM3 (ref 0–0.2)
BASOPHILS NFR BLD AUTO: 0.7 % (ref 0–1)
BILIRUB SERPL-MCNC: 0.2 MG/DL (ref 0.3–1.2)
BILIRUB UR QL STRIP: NEGATIVE
BLD GP AB SCN SERPL QL: NEGATIVE
BNP SERPL-MCNC: 328 PG/ML (ref 0–100)
BUN BLD-MCNC: 36 MG/DL (ref 9–23)
BUN/CREAT SERPL: 24 (ref 7–25)
CALCIUM SPEC-SCNC: 9.2 MG/DL (ref 8.7–10.4)
CHLORIDE SERPL-SCNC: 108 MMOL/L (ref 99–109)
CLARITY UR: CLEAR
CO2 SERPL-SCNC: 27 MMOL/L (ref 20–31)
COLOR UR: YELLOW
CREAT BLD-MCNC: 1.5 MG/DL (ref 0.6–1.3)
DEPRECATED RDW RBC AUTO: 55.2 FL (ref 37–54)
DEVELOPER EXPIRATION DATE: ABNORMAL
DEVELOPER LOT NUMBER: ABNORMAL
EOSINOPHIL # BLD AUTO: 0.12 10*3/MM3 (ref 0.1–0.3)
EOSINOPHIL NFR BLD AUTO: 1.7 % (ref 0–3)
ERYTHROCYTE [DISTWIDTH] IN BLOOD BY AUTOMATED COUNT: 16.3 % (ref 11.3–14.5)
EXPIRATION DATE: ABNORMAL
FECAL OCCULT BLOOD SCREEN, POC: POSITIVE
GFR SERPL CREATININE-BSD FRML MDRD: 34 ML/MIN/1.73
GLOBULIN UR ELPH-MCNC: 2.6 GM/DL
GLUCOSE BLD-MCNC: 155 MG/DL (ref 70–100)
GLUCOSE UR STRIP-MCNC: ABNORMAL MG/DL
HCT VFR BLD AUTO: 17.6 % (ref 34.5–44)
HCT VFR BLD AUTO: 17.7 % (ref 34.5–44)
HGB BLD-MCNC: 5.3 G/DL (ref 11.5–15.5)
HGB BLD-MCNC: 5.4 G/DL (ref 11.5–15.5)
HGB UR QL STRIP.AUTO: ABNORMAL
HOLD SPECIMEN: NORMAL
HYALINE CASTS UR QL AUTO: ABNORMAL /LPF
IMM GRANULOCYTES # BLD: 0.02 10*3/MM3 (ref 0–0.03)
IMM GRANULOCYTES NFR BLD: 0.3 % (ref 0–0.6)
KETONES UR QL STRIP: NEGATIVE
LEUKOCYTE ESTERASE UR QL STRIP.AUTO: NEGATIVE
LYMPHOCYTES # BLD AUTO: 1.58 10*3/MM3 (ref 0.6–4.8)
LYMPHOCYTES NFR BLD AUTO: 22.3 % (ref 24–44)
Lab: ABNORMAL
MCH RBC QN AUTO: 28.4 PG (ref 27–31)
MCHC RBC AUTO-ENTMCNC: 30.7 G/DL (ref 32–36)
MCV RBC AUTO: 92.6 FL (ref 80–99)
MONOCYTES # BLD AUTO: 0.5 10*3/MM3 (ref 0–1)
MONOCYTES NFR BLD AUTO: 7.1 % (ref 0–12)
NEGATIVE CONTROL: NEGATIVE
NEUTROPHILS # BLD AUTO: 4.81 10*3/MM3 (ref 1.5–8.3)
NEUTROPHILS NFR BLD AUTO: 67.9 % (ref 41–71)
NITRITE UR QL STRIP: NEGATIVE
PH UR STRIP.AUTO: 6 [PH] (ref 5–8)
PLATELET # BLD AUTO: 249 10*3/MM3 (ref 150–450)
PMV BLD AUTO: 9.9 FL (ref 6–12)
POSITIVE CONTROL: POSITIVE
POTASSIUM BLD-SCNC: 4.3 MMOL/L (ref 3.5–5.5)
PROT SERPL-MCNC: 6.4 G/DL (ref 5.7–8.2)
PROT UR QL STRIP: ABNORMAL
RBC # BLD AUTO: 1.9 10*6/MM3 (ref 3.89–5.14)
RBC # UR: ABNORMAL /HPF
REF LAB TEST METHOD: ABNORMAL
RH BLD: POSITIVE
SODIUM BLD-SCNC: 138 MMOL/L (ref 132–146)
SP GR UR STRIP: 1.02 (ref 1–1.03)
SQUAMOUS #/AREA URNS HPF: ABNORMAL /HPF
TROPONIN I SERPL-MCNC: <0.006 NG/ML
UROBILINOGEN UR QL STRIP: ABNORMAL
WBC NRBC COR # BLD: 7.08 10*3/MM3 (ref 3.5–10.8)
WBC UR QL AUTO: ABNORMAL /HPF
WHOLE BLOOD HOLD SPECIMEN: NORMAL

## 2017-05-14 PROCEDURE — 81001 URINALYSIS AUTO W/SCOPE: CPT | Performed by: EMERGENCY MEDICINE

## 2017-05-14 PROCEDURE — 86901 BLOOD TYPING SEROLOGIC RH(D): CPT | Performed by: EMERGENCY MEDICINE

## 2017-05-14 PROCEDURE — 85025 COMPLETE CBC W/AUTO DIFF WBC: CPT | Performed by: EMERGENCY MEDICINE

## 2017-05-14 PROCEDURE — 99285 EMERGENCY DEPT VISIT HI MDM: CPT

## 2017-05-14 PROCEDURE — 86920 COMPATIBILITY TEST SPIN: CPT

## 2017-05-14 PROCEDURE — 86850 RBC ANTIBODY SCREEN: CPT | Performed by: EMERGENCY MEDICINE

## 2017-05-14 PROCEDURE — 85014 HEMATOCRIT: CPT | Performed by: HOSPITALIST

## 2017-05-14 PROCEDURE — 80053 COMPREHEN METABOLIC PANEL: CPT | Performed by: EMERGENCY MEDICINE

## 2017-05-14 PROCEDURE — 93005 ELECTROCARDIOGRAM TRACING: CPT

## 2017-05-14 PROCEDURE — 86900 BLOOD TYPING SEROLOGIC ABO: CPT | Performed by: EMERGENCY MEDICINE

## 2017-05-14 PROCEDURE — 83880 ASSAY OF NATRIURETIC PEPTIDE: CPT | Performed by: EMERGENCY MEDICINE

## 2017-05-14 PROCEDURE — 99223 1ST HOSP IP/OBS HIGH 75: CPT | Performed by: HOSPITALIST

## 2017-05-14 PROCEDURE — 84484 ASSAY OF TROPONIN QUANT: CPT | Performed by: EMERGENCY MEDICINE

## 2017-05-14 PROCEDURE — 85018 HEMOGLOBIN: CPT | Performed by: HOSPITALIST

## 2017-05-14 PROCEDURE — 71010 HC CHEST PA OR AP: CPT

## 2017-05-14 RX ORDER — BACLOFEN 10 MG/1
10 TABLET ORAL 3 TIMES DAILY
Status: DISCONTINUED | OUTPATIENT
Start: 2017-05-14 | End: 2017-05-19 | Stop reason: HOSPADM

## 2017-05-14 RX ORDER — CEFUROXIME AXETIL 500 MG/1
500 TABLET ORAL 2 TIMES DAILY
COMMUNITY
End: 2017-05-19 | Stop reason: HOSPADM

## 2017-05-14 RX ORDER — ACETAMINOPHEN 325 MG/1
650 TABLET ORAL EVERY 4 HOURS PRN
Status: DISCONTINUED | OUTPATIENT
Start: 2017-05-14 | End: 2017-05-19 | Stop reason: HOSPADM

## 2017-05-14 RX ORDER — DEXTROSE MONOHYDRATE 25 G/50ML
25 INJECTION, SOLUTION INTRAVENOUS
Status: DISCONTINUED | OUTPATIENT
Start: 2017-05-14 | End: 2017-05-19 | Stop reason: HOSPADM

## 2017-05-14 RX ORDER — LEVOTHYROXINE SODIUM 0.1 MG/1
200 TABLET ORAL DAILY
Status: DISCONTINUED | OUTPATIENT
Start: 2017-05-15 | End: 2017-05-19 | Stop reason: HOSPADM

## 2017-05-14 RX ORDER — SODIUM CHLORIDE 0.9 % (FLUSH) 0.9 %
10 SYRINGE (ML) INJECTION AS NEEDED
Status: DISCONTINUED | OUTPATIENT
Start: 2017-05-14 | End: 2017-05-19 | Stop reason: HOSPADM

## 2017-05-14 RX ORDER — SODIUM CHLORIDE 9 MG/ML
125 INJECTION, SOLUTION INTRAVENOUS CONTINUOUS
Status: DISCONTINUED | OUTPATIENT
Start: 2017-05-14 | End: 2017-05-14

## 2017-05-14 RX ORDER — METOPROLOL TARTRATE 50 MG/1
50 TABLET, FILM COATED ORAL EVERY 12 HOURS SCHEDULED
Status: DISCONTINUED | OUTPATIENT
Start: 2017-05-14 | End: 2017-05-19 | Stop reason: HOSPADM

## 2017-05-14 RX ORDER — PANTOPRAZOLE SODIUM 40 MG/10ML
80 INJECTION, POWDER, LYOPHILIZED, FOR SOLUTION INTRAVENOUS ONCE
Status: COMPLETED | OUTPATIENT
Start: 2017-05-14 | End: 2017-05-14

## 2017-05-14 RX ORDER — FUROSEMIDE 10 MG/ML
20 INJECTION INTRAMUSCULAR; INTRAVENOUS ONCE
Status: COMPLETED | OUTPATIENT
Start: 2017-05-14 | End: 2017-05-15

## 2017-05-14 RX ORDER — NICOTINE POLACRILEX 4 MG
15 LOZENGE BUCCAL
Status: DISCONTINUED | OUTPATIENT
Start: 2017-05-14 | End: 2017-05-19 | Stop reason: HOSPADM

## 2017-05-14 RX ORDER — LOSARTAN POTASSIUM 25 MG/1
50 TABLET ORAL DAILY
Status: DISCONTINUED | OUTPATIENT
Start: 2017-05-15 | End: 2017-05-19 | Stop reason: HOSPADM

## 2017-05-14 RX ORDER — ATORVASTATIN CALCIUM 40 MG/1
80 TABLET, FILM COATED ORAL NIGHTLY
Status: DISCONTINUED | OUTPATIENT
Start: 2017-05-14 | End: 2017-05-19 | Stop reason: HOSPADM

## 2017-05-14 RX ORDER — SODIUM CHLORIDE 0.9 % (FLUSH) 0.9 %
1-10 SYRINGE (ML) INJECTION AS NEEDED
Status: DISCONTINUED | OUTPATIENT
Start: 2017-05-14 | End: 2017-05-19 | Stop reason: HOSPADM

## 2017-05-14 RX ORDER — FUROSEMIDE 40 MG/1
40 TABLET ORAL DAILY
Status: DISCONTINUED | OUTPATIENT
Start: 2017-05-15 | End: 2017-05-19 | Stop reason: HOSPADM

## 2017-05-14 RX ORDER — AMLODIPINE BESYLATE 5 MG/1
10 TABLET ORAL NIGHTLY
Status: DISCONTINUED | OUTPATIENT
Start: 2017-05-14 | End: 2017-05-19 | Stop reason: HOSPADM

## 2017-05-14 RX ORDER — GABAPENTIN 300 MG/1
300 CAPSULE ORAL NIGHTLY
Status: DISCONTINUED | OUTPATIENT
Start: 2017-05-14 | End: 2017-05-19 | Stop reason: HOSPADM

## 2017-05-14 RX ORDER — GABAPENTIN 300 MG/1
900 CAPSULE ORAL
COMMUNITY

## 2017-05-14 RX ADMIN — SODIUM CHLORIDE 1000 ML: 9 INJECTION, SOLUTION INTRAVENOUS at 21:52

## 2017-05-14 RX ADMIN — SODIUM CHLORIDE 125 ML/HR: 9 INJECTION, SOLUTION INTRAVENOUS at 22:48

## 2017-05-14 RX ADMIN — PANTOPRAZOLE SODIUM 80 MG: 40 INJECTION, POWDER, FOR SOLUTION INTRAVENOUS at 22:49

## 2017-05-15 ENCOUNTER — APPOINTMENT (OUTPATIENT)
Dept: CT IMAGING | Facility: HOSPITAL | Age: 77
End: 2017-05-15

## 2017-05-15 ENCOUNTER — ANESTHESIA (OUTPATIENT)
Dept: GASTROENTEROLOGY | Facility: HOSPITAL | Age: 77
End: 2017-05-15

## 2017-05-15 ENCOUNTER — ANESTHESIA EVENT (OUTPATIENT)
Dept: GASTROENTEROLOGY | Facility: HOSPITAL | Age: 77
End: 2017-05-15

## 2017-05-15 LAB
BASOPHILS # BLD AUTO: 0.04 10*3/MM3 (ref 0–0.2)
BASOPHILS # BLD AUTO: 0.05 10*3/MM3 (ref 0–0.2)
BASOPHILS NFR BLD AUTO: 0.7 % (ref 0–1)
BASOPHILS NFR BLD AUTO: 0.9 % (ref 0–1)
BNP SERPL-MCNC: 320 PG/ML (ref 0–100)
DEPRECATED RDW RBC AUTO: 56.7 FL (ref 37–54)
DEPRECATED RDW RBC AUTO: 56.9 FL (ref 37–54)
EOSINOPHIL # BLD AUTO: 0.16 10*3/MM3 (ref 0.1–0.3)
EOSINOPHIL # BLD AUTO: 0.21 10*3/MM3 (ref 0.1–0.3)
EOSINOPHIL NFR BLD AUTO: 2.8 % (ref 0–3)
EOSINOPHIL NFR BLD AUTO: 3.7 % (ref 0–3)
ERYTHROCYTE [DISTWIDTH] IN BLOOD BY AUTOMATED COUNT: 16.6 % (ref 11.3–14.5)
ERYTHROCYTE [DISTWIDTH] IN BLOOD BY AUTOMATED COUNT: 16.7 % (ref 11.3–14.5)
GLUCOSE BLDC GLUCOMTR-MCNC: 119 MG/DL (ref 70–130)
GLUCOSE BLDC GLUCOMTR-MCNC: 120 MG/DL (ref 70–130)
GLUCOSE BLDC GLUCOMTR-MCNC: 161 MG/DL (ref 70–130)
GLUCOSE BLDC GLUCOMTR-MCNC: 164 MG/DL (ref 70–130)
GLUCOSE BLDC GLUCOMTR-MCNC: 165 MG/DL (ref 70–130)
GLUCOSE BLDC GLUCOMTR-MCNC: 283 MG/DL (ref 70–130)
HCT VFR BLD AUTO: 24.4 % (ref 34.5–44)
HCT VFR BLD AUTO: 25.2 % (ref 34.5–44)
HCT VFR BLD AUTO: 25.6 % (ref 34.5–44)
HGB BLD-MCNC: 7.6 G/DL (ref 11.5–15.5)
HGB BLD-MCNC: 7.8 G/DL (ref 11.5–15.5)
HGB BLD-MCNC: 7.9 G/DL (ref 11.5–15.5)
IMM GRANULOCYTES # BLD: 0.02 10*3/MM3 (ref 0–0.03)
IMM GRANULOCYTES # BLD: 0.02 10*3/MM3 (ref 0–0.03)
IMM GRANULOCYTES NFR BLD: 0.3 % (ref 0–0.6)
IMM GRANULOCYTES NFR BLD: 0.4 % (ref 0–0.6)
LYMPHOCYTES # BLD AUTO: 1.11 10*3/MM3 (ref 0.6–4.8)
LYMPHOCYTES # BLD AUTO: 1.28 10*3/MM3 (ref 0.6–4.8)
LYMPHOCYTES NFR BLD AUTO: 19.5 % (ref 24–44)
LYMPHOCYTES NFR BLD AUTO: 22.2 % (ref 24–44)
MCH RBC QN AUTO: 28.8 PG (ref 27–31)
MCH RBC QN AUTO: 29 PG (ref 27–31)
MCHC RBC AUTO-ENTMCNC: 30.9 G/DL (ref 32–36)
MCHC RBC AUTO-ENTMCNC: 31 G/DL (ref 32–36)
MCV RBC AUTO: 93.4 FL (ref 80–99)
MCV RBC AUTO: 93.7 FL (ref 80–99)
MONOCYTES # BLD AUTO: 0.63 10*3/MM3 (ref 0–1)
MONOCYTES # BLD AUTO: 0.63 10*3/MM3 (ref 0–1)
MONOCYTES NFR BLD AUTO: 10.9 % (ref 0–12)
MONOCYTES NFR BLD AUTO: 11.1 % (ref 0–12)
NEUTROPHILS # BLD AUTO: 3.63 10*3/MM3 (ref 1.5–8.3)
NEUTROPHILS # BLD AUTO: 3.66 10*3/MM3 (ref 1.5–8.3)
NEUTROPHILS NFR BLD AUTO: 63.1 % (ref 41–71)
NEUTROPHILS NFR BLD AUTO: 64.4 % (ref 41–71)
PLATELET # BLD AUTO: 209 10*3/MM3 (ref 150–450)
PLATELET # BLD AUTO: 213 10*3/MM3 (ref 150–450)
PMV BLD AUTO: 9.8 FL (ref 6–12)
PMV BLD AUTO: 9.9 FL (ref 6–12)
RBC # BLD AUTO: 2.69 10*6/MM3 (ref 3.89–5.14)
RBC # BLD AUTO: 2.74 10*6/MM3 (ref 3.89–5.14)
WBC NRBC COR # BLD: 5.68 10*3/MM3 (ref 3.5–10.8)
WBC NRBC COR # BLD: 5.76 10*3/MM3 (ref 3.5–10.8)

## 2017-05-15 PROCEDURE — P9016 RBC LEUKOCYTES REDUCED: HCPCS

## 2017-05-15 PROCEDURE — 83880 ASSAY OF NATRIURETIC PEPTIDE: CPT | Performed by: HOSPITALIST

## 2017-05-15 PROCEDURE — 86900 BLOOD TYPING SEROLOGIC ABO: CPT

## 2017-05-15 PROCEDURE — 63710000001 INSULIN LISPRO (HUMAN) PER 5 UNITS: Performed by: HOSPITALIST

## 2017-05-15 PROCEDURE — 99233 SBSQ HOSP IP/OBS HIGH 50: CPT | Performed by: INTERNAL MEDICINE

## 2017-05-15 PROCEDURE — 36430 TRANSFUSION BLD/BLD COMPNT: CPT

## 2017-05-15 PROCEDURE — 0DJ08ZZ INSPECTION OF UPPER INTESTINAL TRACT, VIA NATURAL OR ARTIFICIAL OPENING ENDOSCOPIC: ICD-10-PCS | Performed by: INTERNAL MEDICINE

## 2017-05-15 PROCEDURE — 85025 COMPLETE CBC W/AUTO DIFF WBC: CPT | Performed by: INTERNAL MEDICINE

## 2017-05-15 PROCEDURE — 85018 HEMOGLOBIN: CPT | Performed by: HOSPITALIST

## 2017-05-15 PROCEDURE — 82962 GLUCOSE BLOOD TEST: CPT

## 2017-05-15 PROCEDURE — 0 DIATRIZOATE MEGLUMINE & SODIUM PER 1 ML

## 2017-05-15 PROCEDURE — 25010000002 PROPOFOL 1000 MG/ML EMULSION: Performed by: NURSE ANESTHETIST, CERTIFIED REGISTERED

## 2017-05-15 PROCEDURE — 85014 HEMATOCRIT: CPT | Performed by: HOSPITALIST

## 2017-05-15 PROCEDURE — 85025 COMPLETE CBC W/AUTO DIFF WBC: CPT | Performed by: HOSPITALIST

## 2017-05-15 PROCEDURE — 25010000002 FUROSEMIDE PER 20 MG: Performed by: HOSPITALIST

## 2017-05-15 PROCEDURE — 99221 1ST HOSP IP/OBS SF/LOW 40: CPT | Performed by: INTERNAL MEDICINE

## 2017-05-15 PROCEDURE — 74176 CT ABD & PELVIS W/O CONTRAST: CPT

## 2017-05-15 RX ORDER — LIDOCAINE HYDROCHLORIDE 10 MG/ML
0.5 INJECTION, SOLUTION EPIDURAL; INFILTRATION; INTRACAUDAL; PERINEURAL ONCE AS NEEDED
Status: DISCONTINUED | OUTPATIENT
Start: 2017-05-15 | End: 2017-05-19 | Stop reason: HOSPADM

## 2017-05-15 RX ORDER — SODIUM CHLORIDE 0.9 % (FLUSH) 0.9 %
1-10 SYRINGE (ML) INJECTION AS NEEDED
Status: DISCONTINUED | OUTPATIENT
Start: 2017-05-15 | End: 2017-05-19 | Stop reason: HOSPADM

## 2017-05-15 RX ORDER — SODIUM CHLORIDE, SODIUM LACTATE, POTASSIUM CHLORIDE, CALCIUM CHLORIDE 600; 310; 30; 20 MG/100ML; MG/100ML; MG/100ML; MG/100ML
9 INJECTION, SOLUTION INTRAVENOUS CONTINUOUS
Status: DISCONTINUED | OUTPATIENT
Start: 2017-05-15 | End: 2017-05-19 | Stop reason: HOSPADM

## 2017-05-15 RX ADMIN — GABAPENTIN 300 MG: 300 CAPSULE ORAL at 01:09

## 2017-05-15 RX ADMIN — METOPROLOL TARTRATE 50 MG: 50 TABLET, FILM COATED ORAL at 01:10

## 2017-05-15 RX ADMIN — ACETAMINOPHEN 650 MG: 325 TABLET, FILM COATED ORAL at 23:51

## 2017-05-15 RX ADMIN — BACLOFEN 10 MG: 10 TABLET ORAL at 18:07

## 2017-05-15 RX ADMIN — INSULIN LISPRO 4 UNITS: 100 INJECTION, SOLUTION INTRAVENOUS; SUBCUTANEOUS at 21:46

## 2017-05-15 RX ADMIN — LEVOTHYROXINE SODIUM 200 MCG: 100 TABLET ORAL at 04:51

## 2017-05-15 RX ADMIN — BACLOFEN 10 MG: 10 TABLET ORAL at 09:12

## 2017-05-15 RX ADMIN — SODIUM CHLORIDE 8 MG/HR: 900 INJECTION INTRAVENOUS at 09:12

## 2017-05-15 RX ADMIN — METOPROLOL TARTRATE 50 MG: 50 TABLET, FILM COATED ORAL at 21:40

## 2017-05-15 RX ADMIN — INSULIN LISPRO 2 UNITS: 100 INJECTION, SOLUTION INTRAVENOUS; SUBCUTANEOUS at 11:15

## 2017-05-15 RX ADMIN — SODIUM CHLORIDE 8 MG/HR: 900 INJECTION INTRAVENOUS at 21:34

## 2017-05-15 RX ADMIN — FUROSEMIDE 40 MG: 40 TABLET ORAL at 09:12

## 2017-05-15 RX ADMIN — INSULIN LISPRO 2 UNITS: 100 INJECTION, SOLUTION INTRAVENOUS; SUBCUTANEOUS at 09:12

## 2017-05-15 RX ADMIN — SODIUM CHLORIDE 8 MG/HR: 900 INJECTION INTRAVENOUS at 14:22

## 2017-05-15 RX ADMIN — GABAPENTIN 300 MG: 300 CAPSULE ORAL at 21:35

## 2017-05-15 RX ADMIN — BACLOFEN 10 MG: 10 TABLET ORAL at 21:41

## 2017-05-15 RX ADMIN — SODIUM CHLORIDE 8 MG/HR: 900 INJECTION INTRAVENOUS at 01:10

## 2017-05-15 RX ADMIN — PROPOFOL 300 MCG/KG/MIN: 10 INJECTION, EMULSION INTRAVENOUS at 15:20

## 2017-05-15 RX ADMIN — METOPROLOL TARTRATE 50 MG: 50 TABLET, FILM COATED ORAL at 09:12

## 2017-05-15 RX ADMIN — AMLODIPINE BESYLATE 10 MG: 5 TABLET ORAL at 21:35

## 2017-05-15 RX ADMIN — AMLODIPINE BESYLATE 10 MG: 5 TABLET ORAL at 01:10

## 2017-05-15 RX ADMIN — LOSARTAN POTASSIUM 50 MG: 25 TABLET, FILM COATED ORAL at 09:12

## 2017-05-15 RX ADMIN — BACLOFEN 10 MG: 10 TABLET ORAL at 01:10

## 2017-05-15 RX ADMIN — ACETAMINOPHEN 650 MG: 325 TABLET, FILM COATED ORAL at 11:15

## 2017-05-15 RX ADMIN — ATORVASTATIN CALCIUM 80 MG: 40 TABLET, FILM COATED ORAL at 21:34

## 2017-05-15 RX ADMIN — Medication: at 17:51

## 2017-05-15 RX ADMIN — ATORVASTATIN CALCIUM 80 MG: 40 TABLET, FILM COATED ORAL at 01:10

## 2017-05-15 RX ADMIN — FUROSEMIDE 20 MG: 10 INJECTION, SOLUTION INTRAMUSCULAR; INTRAVENOUS at 04:51

## 2017-05-16 LAB
ABO + RH BLD: NORMAL
ABO + RH BLD: NORMAL
BH BB BLOOD EXPIRATION DATE: NORMAL
BH BB BLOOD EXPIRATION DATE: NORMAL
BH BB BLOOD TYPE BARCODE: 6200
BH BB BLOOD TYPE BARCODE: 6200
BH BB DISPENSE STATUS: NORMAL
BH BB DISPENSE STATUS: NORMAL
BH BB PRODUCT CODE: NORMAL
BH BB PRODUCT CODE: NORMAL
BH BB UNIT NUMBER: NORMAL
BH BB UNIT NUMBER: NORMAL
CROSSMATCH INTERPRETATION: NORMAL
CROSSMATCH INTERPRETATION: NORMAL
GLUCOSE BLDC GLUCOMTR-MCNC: 141 MG/DL (ref 70–130)
GLUCOSE BLDC GLUCOMTR-MCNC: 183 MG/DL (ref 70–130)
GLUCOSE BLDC GLUCOMTR-MCNC: 251 MG/DL (ref 70–130)
GLUCOSE BLDC GLUCOMTR-MCNC: 256 MG/DL (ref 70–130)
HCT VFR BLD AUTO: 22.8 % (ref 34.5–44)
HCT VFR BLD AUTO: 24.3 % (ref 34.5–44)
HCT VFR BLD AUTO: 25.1 % (ref 34.5–44)
HGB BLD-MCNC: 7.2 G/DL (ref 11.5–15.5)
HGB BLD-MCNC: 7.6 G/DL (ref 11.5–15.5)
HGB BLD-MCNC: 7.7 G/DL (ref 11.5–15.5)
UNIT  ABO: NORMAL
UNIT  ABO: NORMAL
UNIT  RH: NORMAL
UNIT  RH: NORMAL

## 2017-05-16 PROCEDURE — 63710000001 INSULIN DETEMIR PER 5 UNITS: Performed by: INTERNAL MEDICINE

## 2017-05-16 PROCEDURE — 85018 HEMOGLOBIN: CPT | Performed by: HOSPITALIST

## 2017-05-16 PROCEDURE — 85014 HEMATOCRIT: CPT | Performed by: HOSPITALIST

## 2017-05-16 PROCEDURE — 99233 SBSQ HOSP IP/OBS HIGH 50: CPT | Performed by: INTERNAL MEDICINE

## 2017-05-16 PROCEDURE — 82962 GLUCOSE BLOOD TEST: CPT

## 2017-05-16 RX ORDER — PANTOPRAZOLE SODIUM 40 MG/1
40 TABLET, DELAYED RELEASE ORAL
Status: DISCONTINUED | OUTPATIENT
Start: 2017-05-16 | End: 2017-05-19 | Stop reason: HOSPADM

## 2017-05-16 RX ADMIN — INSULIN LISPRO 4 UNITS: 100 INJECTION, SOLUTION INTRAVENOUS; SUBCUTANEOUS at 11:43

## 2017-05-16 RX ADMIN — ATORVASTATIN CALCIUM 80 MG: 40 TABLET, FILM COATED ORAL at 21:39

## 2017-05-16 RX ADMIN — GABAPENTIN 300 MG: 300 CAPSULE ORAL at 21:38

## 2017-05-16 RX ADMIN — INSULIN LISPRO 2 UNITS: 100 INJECTION, SOLUTION INTRAVENOUS; SUBCUTANEOUS at 22:48

## 2017-05-16 RX ADMIN — ACETAMINOPHEN 650 MG: 325 TABLET, FILM COATED ORAL at 21:39

## 2017-05-16 RX ADMIN — PANTOPRAZOLE SODIUM 40 MG: 40 TABLET, DELAYED RELEASE ORAL at 06:58

## 2017-05-16 RX ADMIN — BACLOFEN 10 MG: 10 TABLET ORAL at 17:33

## 2017-05-16 RX ADMIN — INSULIN DETEMIR 30 UNITS: 100 INJECTION, SOLUTION SUBCUTANEOUS at 06:58

## 2017-05-16 RX ADMIN — BACLOFEN 10 MG: 10 TABLET ORAL at 21:39

## 2017-05-16 RX ADMIN — BACLOFEN 10 MG: 10 TABLET ORAL at 08:12

## 2017-05-16 RX ADMIN — LEVOTHYROXINE SODIUM 200 MCG: 100 TABLET ORAL at 05:54

## 2017-05-16 RX ADMIN — FUROSEMIDE 40 MG: 40 TABLET ORAL at 08:12

## 2017-05-16 RX ADMIN — METOPROLOL TARTRATE 50 MG: 50 TABLET, FILM COATED ORAL at 21:39

## 2017-05-16 RX ADMIN — INSULIN LISPRO 4 UNITS: 100 INJECTION, SOLUTION INTRAVENOUS; SUBCUTANEOUS at 17:32

## 2017-05-16 RX ADMIN — AMLODIPINE BESYLATE 10 MG: 5 TABLET ORAL at 21:38

## 2017-05-16 RX ADMIN — LOSARTAN POTASSIUM 50 MG: 25 TABLET, FILM COATED ORAL at 08:12

## 2017-05-17 LAB
GLUCOSE BLDC GLUCOMTR-MCNC: 177 MG/DL (ref 70–130)
GLUCOSE BLDC GLUCOMTR-MCNC: 191 MG/DL (ref 70–130)
GLUCOSE BLDC GLUCOMTR-MCNC: 250 MG/DL (ref 70–130)
GLUCOSE BLDC GLUCOMTR-MCNC: 269 MG/DL (ref 70–130)
HCT VFR BLD AUTO: 23.8 % (ref 34.5–44)
HCT VFR BLD AUTO: 25.9 % (ref 34.5–44)
HCT VFR BLD AUTO: 30.9 % (ref 34.5–44)
HGB BLD-MCNC: 7.3 G/DL (ref 11.5–15.5)
HGB BLD-MCNC: 7.9 G/DL (ref 11.5–15.5)
HGB BLD-MCNC: 9.2 G/DL (ref 11.5–15.5)

## 2017-05-17 PROCEDURE — 99232 SBSQ HOSP IP/OBS MODERATE 35: CPT | Performed by: INTERNAL MEDICINE

## 2017-05-17 PROCEDURE — 85018 HEMOGLOBIN: CPT | Performed by: HOSPITALIST

## 2017-05-17 PROCEDURE — 63710000001 INSULIN DETEMIR PER 5 UNITS: Performed by: INTERNAL MEDICINE

## 2017-05-17 PROCEDURE — 85014 HEMATOCRIT: CPT | Performed by: HOSPITALIST

## 2017-05-17 PROCEDURE — 82962 GLUCOSE BLOOD TEST: CPT

## 2017-05-17 PROCEDURE — 99233 SBSQ HOSP IP/OBS HIGH 50: CPT | Performed by: INTERNAL MEDICINE

## 2017-05-17 RX ORDER — ASPIRIN 81 MG/1
81 TABLET, CHEWABLE ORAL DAILY
Status: DISCONTINUED | OUTPATIENT
Start: 2017-05-17 | End: 2017-05-19 | Stop reason: HOSPADM

## 2017-05-17 RX ORDER — GABAPENTIN 300 MG/1
300 CAPSULE ORAL NIGHTLY
Status: DISCONTINUED | OUTPATIENT
Start: 2017-05-17 | End: 2017-05-19 | Stop reason: HOSPADM

## 2017-05-17 RX ORDER — PRASUGREL 10 MG/1
10 TABLET, FILM COATED ORAL DAILY
Status: DISCONTINUED | OUTPATIENT
Start: 2017-05-17 | End: 2017-05-19 | Stop reason: HOSPADM

## 2017-05-17 RX ADMIN — FUROSEMIDE 40 MG: 40 TABLET ORAL at 08:36

## 2017-05-17 RX ADMIN — ATORVASTATIN CALCIUM 80 MG: 40 TABLET, FILM COATED ORAL at 20:34

## 2017-05-17 RX ADMIN — GABAPENTIN 300 MG: 300 CAPSULE ORAL at 20:34

## 2017-05-17 RX ADMIN — LEVOTHYROXINE SODIUM 200 MCG: 100 TABLET ORAL at 06:28

## 2017-05-17 RX ADMIN — BACLOFEN 10 MG: 10 TABLET ORAL at 08:36

## 2017-05-17 RX ADMIN — INSULIN LISPRO 4 UNITS: 100 INJECTION, SOLUTION INTRAVENOUS; SUBCUTANEOUS at 21:56

## 2017-05-17 RX ADMIN — INSULIN LISPRO 2 UNITS: 100 INJECTION, SOLUTION INTRAVENOUS; SUBCUTANEOUS at 17:39

## 2017-05-17 RX ADMIN — ASPIRIN 81 MG 81 MG: 81 TABLET ORAL at 12:22

## 2017-05-17 RX ADMIN — BACLOFEN 10 MG: 10 TABLET ORAL at 20:34

## 2017-05-17 RX ADMIN — INSULIN LISPRO 4 UNITS: 100 INJECTION, SOLUTION INTRAVENOUS; SUBCUTANEOUS at 12:27

## 2017-05-17 RX ADMIN — PRASUGREL HYDROCHLORIDE 10 MG: 10 TABLET, FILM COATED ORAL at 12:25

## 2017-05-17 RX ADMIN — METOPROLOL TARTRATE 50 MG: 50 TABLET, FILM COATED ORAL at 09:00

## 2017-05-17 RX ADMIN — BACLOFEN 10 MG: 10 TABLET ORAL at 17:38

## 2017-05-17 RX ADMIN — METOPROLOL TARTRATE 50 MG: 50 TABLET, FILM COATED ORAL at 20:35

## 2017-05-17 RX ADMIN — LOSARTAN POTASSIUM 50 MG: 25 TABLET, FILM COATED ORAL at 08:36

## 2017-05-17 RX ADMIN — INSULIN LISPRO 2 UNITS: 100 INJECTION, SOLUTION INTRAVENOUS; SUBCUTANEOUS at 08:35

## 2017-05-17 RX ADMIN — PANTOPRAZOLE SODIUM 40 MG: 40 TABLET, DELAYED RELEASE ORAL at 06:28

## 2017-05-17 RX ADMIN — INSULIN DETEMIR 30 UNITS: 100 INJECTION, SOLUTION SUBCUTANEOUS at 07:04

## 2017-05-17 RX ADMIN — AMLODIPINE BESYLATE 10 MG: 5 TABLET ORAL at 20:34

## 2017-05-18 LAB
GLUCOSE BLDC GLUCOMTR-MCNC: 187 MG/DL (ref 70–130)
GLUCOSE BLDC GLUCOMTR-MCNC: 221 MG/DL (ref 70–130)
GLUCOSE BLDC GLUCOMTR-MCNC: 239 MG/DL (ref 70–130)
GLUCOSE BLDC GLUCOMTR-MCNC: 310 MG/DL (ref 70–130)
HCT VFR BLD AUTO: 25.3 % (ref 34.5–44)
HCT VFR BLD AUTO: 26.1 % (ref 34.5–44)
HCT VFR BLD AUTO: 27.3 % (ref 34.5–44)
HGB BLD-MCNC: 7.8 G/DL (ref 11.5–15.5)
HGB BLD-MCNC: 8 G/DL (ref 11.5–15.5)
HGB BLD-MCNC: 8.4 G/DL (ref 11.5–15.5)

## 2017-05-18 PROCEDURE — 82962 GLUCOSE BLOOD TEST: CPT

## 2017-05-18 PROCEDURE — 85014 HEMATOCRIT: CPT | Performed by: HOSPITALIST

## 2017-05-18 PROCEDURE — 63710000001 INSULIN DETEMIR PER 5 UNITS: Performed by: INTERNAL MEDICINE

## 2017-05-18 PROCEDURE — 99233 SBSQ HOSP IP/OBS HIGH 50: CPT | Performed by: INTERNAL MEDICINE

## 2017-05-18 PROCEDURE — 97162 PT EVAL MOD COMPLEX 30 MIN: CPT

## 2017-05-18 PROCEDURE — 99232 SBSQ HOSP IP/OBS MODERATE 35: CPT | Performed by: INTERNAL MEDICINE

## 2017-05-18 PROCEDURE — 97165 OT EVAL LOW COMPLEX 30 MIN: CPT

## 2017-05-18 PROCEDURE — 97116 GAIT TRAINING THERAPY: CPT

## 2017-05-18 PROCEDURE — 85018 HEMOGLOBIN: CPT | Performed by: HOSPITALIST

## 2017-05-18 RX ORDER — DOCUSATE SODIUM 100 MG/1
100 CAPSULE, LIQUID FILLED ORAL 2 TIMES DAILY
Status: DISCONTINUED | OUTPATIENT
Start: 2017-05-18 | End: 2017-05-19 | Stop reason: HOSPADM

## 2017-05-18 RX ORDER — FERROUS SULFATE 325(65) MG
325 TABLET ORAL
Status: DISCONTINUED | OUTPATIENT
Start: 2017-05-18 | End: 2017-05-19 | Stop reason: HOSPADM

## 2017-05-18 RX ORDER — ASCORBIC ACID 500 MG
500 TABLET ORAL DAILY
Status: DISCONTINUED | OUTPATIENT
Start: 2017-05-18 | End: 2017-05-19 | Stop reason: HOSPADM

## 2017-05-18 RX ADMIN — BACLOFEN 10 MG: 10 TABLET ORAL at 16:18

## 2017-05-18 RX ADMIN — INSULIN LISPRO 5 UNITS: 100 INJECTION, SOLUTION INTRAVENOUS; SUBCUTANEOUS at 20:24

## 2017-05-18 RX ADMIN — INSULIN DETEMIR 30 UNITS: 100 INJECTION, SOLUTION SUBCUTANEOUS at 08:29

## 2017-05-18 RX ADMIN — BACLOFEN 10 MG: 10 TABLET ORAL at 20:22

## 2017-05-18 RX ADMIN — INSULIN LISPRO 3 UNITS: 100 INJECTION, SOLUTION INTRAVENOUS; SUBCUTANEOUS at 18:02

## 2017-05-18 RX ADMIN — PRASUGREL HYDROCHLORIDE 10 MG: 10 TABLET, FILM COATED ORAL at 08:28

## 2017-05-18 RX ADMIN — BACLOFEN 10 MG: 10 TABLET ORAL at 08:29

## 2017-05-18 RX ADMIN — METOPROLOL TARTRATE 50 MG: 50 TABLET, FILM COATED ORAL at 20:22

## 2017-05-18 RX ADMIN — FUROSEMIDE 40 MG: 40 TABLET ORAL at 08:28

## 2017-05-18 RX ADMIN — METOPROLOL TARTRATE 50 MG: 50 TABLET, FILM COATED ORAL at 08:28

## 2017-05-18 RX ADMIN — LEVOTHYROXINE SODIUM 200 MCG: 100 TABLET ORAL at 06:06

## 2017-05-18 RX ADMIN — INSULIN LISPRO 4 UNITS: 100 INJECTION, SOLUTION INTRAVENOUS; SUBCUTANEOUS at 12:30

## 2017-05-18 RX ADMIN — AMLODIPINE BESYLATE 10 MG: 5 TABLET ORAL at 20:23

## 2017-05-18 RX ADMIN — INSULIN LISPRO 2 UNITS: 100 INJECTION, SOLUTION INTRAVENOUS; SUBCUTANEOUS at 08:29

## 2017-05-18 RX ADMIN — PANTOPRAZOLE SODIUM 40 MG: 40 TABLET, DELAYED RELEASE ORAL at 06:06

## 2017-05-18 RX ADMIN — LOSARTAN POTASSIUM 50 MG: 25 TABLET, FILM COATED ORAL at 08:28

## 2017-05-18 RX ADMIN — FERROUS SULFATE TAB 325 MG (65 MG ELEMENTAL FE) 325 MG: 325 (65 FE) TAB at 12:30

## 2017-05-18 RX ADMIN — OXYCODONE HYDROCHLORIDE AND ACETAMINOPHEN 500 MG: 500 TABLET ORAL at 16:21

## 2017-05-18 RX ADMIN — ASPIRIN 81 MG 81 MG: 81 TABLET ORAL at 08:29

## 2017-05-18 RX ADMIN — GABAPENTIN 300 MG: 300 CAPSULE ORAL at 20:22

## 2017-05-18 RX ADMIN — ATORVASTATIN CALCIUM 80 MG: 40 TABLET, FILM COATED ORAL at 20:23

## 2017-05-18 RX ADMIN — DOCUSATE SODIUM 100 MG: 100 CAPSULE, LIQUID FILLED ORAL at 18:02

## 2017-05-19 VITALS
WEIGHT: 170.2 LBS | HEART RATE: 65 BPM | SYSTOLIC BLOOD PRESSURE: 152 MMHG | DIASTOLIC BLOOD PRESSURE: 59 MMHG | HEIGHT: 60 IN | TEMPERATURE: 96.3 F | RESPIRATION RATE: 16 BRPM | OXYGEN SATURATION: 98 % | BODY MASS INDEX: 33.41 KG/M2

## 2017-05-19 PROBLEM — K92.2 GASTROINTESTINAL HEMORRHAGE: Status: RESOLVED | Noted: 2017-05-14 | Resolved: 2017-05-19

## 2017-05-19 PROBLEM — D62 ACUTE BLOOD LOSS ANEMIA: Status: RESOLVED | Noted: 2017-05-14 | Resolved: 2017-05-19

## 2017-05-19 LAB
ABO GROUP BLD: NORMAL
ANION GAP SERPL CALCULATED.3IONS-SCNC: 3 MMOL/L (ref 3–11)
BASOPHILS # BLD AUTO: 0.04 10*3/MM3 (ref 0–0.2)
BASOPHILS NFR BLD AUTO: 0.7 % (ref 0–1)
BLD GP AB SCN SERPL QL: NEGATIVE
BUN BLD-MCNC: 30 MG/DL (ref 9–23)
BUN/CREAT SERPL: 17.6 (ref 7–25)
CALCIUM SPEC-SCNC: 8.9 MG/DL (ref 8.7–10.4)
CHLORIDE SERPL-SCNC: 108 MMOL/L (ref 99–109)
CO2 SERPL-SCNC: 29 MMOL/L (ref 20–31)
CREAT BLD-MCNC: 1.7 MG/DL (ref 0.6–1.3)
DEPRECATED RDW RBC AUTO: 54.8 FL (ref 37–54)
EOSINOPHIL # BLD AUTO: 0.32 10*3/MM3 (ref 0.1–0.3)
EOSINOPHIL NFR BLD AUTO: 5.3 % (ref 0–3)
ERYTHROCYTE [DISTWIDTH] IN BLOOD BY AUTOMATED COUNT: 16.1 % (ref 11.3–14.5)
GFR SERPL CREATININE-BSD FRML MDRD: 29 ML/MIN/1.73
GLUCOSE BLD-MCNC: 132 MG/DL (ref 70–100)
GLUCOSE BLDC GLUCOMTR-MCNC: 135 MG/DL (ref 70–130)
GLUCOSE BLDC GLUCOMTR-MCNC: 319 MG/DL (ref 70–130)
HCT VFR BLD AUTO: 24.6 % (ref 34.5–44)
HCT VFR BLD AUTO: 24.8 % (ref 34.5–44)
HCT VFR BLD AUTO: 28 % (ref 34.5–44)
HGB BLD-MCNC: 7.5 G/DL (ref 11.5–15.5)
HGB BLD-MCNC: 7.6 G/DL (ref 11.5–15.5)
HGB BLD-MCNC: 8.5 G/DL (ref 11.5–15.5)
IMM GRANULOCYTES # BLD: 0.01 10*3/MM3 (ref 0–0.03)
IMM GRANULOCYTES NFR BLD: 0.2 % (ref 0–0.6)
LYMPHOCYTES # BLD AUTO: 1.63 10*3/MM3 (ref 0.6–4.8)
LYMPHOCYTES NFR BLD AUTO: 26.8 % (ref 24–44)
MCH RBC QN AUTO: 28.8 PG (ref 27–31)
MCHC RBC AUTO-ENTMCNC: 30.6 G/DL (ref 32–36)
MCV RBC AUTO: 93.9 FL (ref 80–99)
MONOCYTES # BLD AUTO: 0.62 10*3/MM3 (ref 0–1)
MONOCYTES NFR BLD AUTO: 10.2 % (ref 0–12)
NEUTROPHILS # BLD AUTO: 3.47 10*3/MM3 (ref 1.5–8.3)
NEUTROPHILS NFR BLD AUTO: 56.8 % (ref 41–71)
PLATELET # BLD AUTO: 223 10*3/MM3 (ref 150–450)
PMV BLD AUTO: 9.9 FL (ref 6–12)
POTASSIUM BLD-SCNC: 4 MMOL/L (ref 3.5–5.5)
RBC # BLD AUTO: 2.64 10*6/MM3 (ref 3.89–5.14)
RH BLD: POSITIVE
SODIUM BLD-SCNC: 140 MMOL/L (ref 132–146)
WBC NRBC COR # BLD: 6.09 10*3/MM3 (ref 3.5–10.8)

## 2017-05-19 PROCEDURE — 85025 COMPLETE CBC W/AUTO DIFF WBC: CPT | Performed by: NURSE PRACTITIONER

## 2017-05-19 PROCEDURE — 99239 HOSP IP/OBS DSCHRG MGMT >30: CPT | Performed by: NURSE PRACTITIONER

## 2017-05-19 PROCEDURE — 85014 HEMATOCRIT: CPT | Performed by: HOSPITALIST

## 2017-05-19 PROCEDURE — 80048 BASIC METABOLIC PNL TOTAL CA: CPT | Performed by: INTERNAL MEDICINE

## 2017-05-19 PROCEDURE — 82962 GLUCOSE BLOOD TEST: CPT

## 2017-05-19 PROCEDURE — 86901 BLOOD TYPING SEROLOGIC RH(D): CPT | Performed by: NURSE PRACTITIONER

## 2017-05-19 PROCEDURE — 63710000001 INSULIN DETEMIR PER 5 UNITS: Performed by: INTERNAL MEDICINE

## 2017-05-19 PROCEDURE — 85018 HEMOGLOBIN: CPT | Performed by: HOSPITALIST

## 2017-05-19 PROCEDURE — 86850 RBC ANTIBODY SCREEN: CPT | Performed by: NURSE PRACTITIONER

## 2017-05-19 PROCEDURE — 86900 BLOOD TYPING SEROLOGIC ABO: CPT | Performed by: NURSE PRACTITIONER

## 2017-05-19 RX ORDER — FERROUS SULFATE 325(65) MG
325 TABLET ORAL
Qty: 30 TABLET | Refills: 1 | Status: SHIPPED | OUTPATIENT
Start: 2017-05-19

## 2017-05-19 RX ORDER — ROSUVASTATIN CALCIUM 40 MG/1
40 TABLET, COATED ORAL DAILY
Qty: 30 TABLET | Refills: 0 | Status: SHIPPED | OUTPATIENT
Start: 2017-05-19 | End: 2017-09-05

## 2017-05-19 RX ORDER — ASCORBIC ACID 500 MG
500 TABLET ORAL DAILY
Qty: 30 TABLET | Refills: 1 | Status: SHIPPED | OUTPATIENT
Start: 2017-05-19

## 2017-05-19 RX ADMIN — LOSARTAN POTASSIUM 50 MG: 25 TABLET, FILM COATED ORAL at 09:45

## 2017-05-19 RX ADMIN — METOPROLOL TARTRATE 50 MG: 50 TABLET, FILM COATED ORAL at 09:46

## 2017-05-19 RX ADMIN — ASPIRIN 81 MG 81 MG: 81 TABLET ORAL at 09:45

## 2017-05-19 RX ADMIN — OXYCODONE HYDROCHLORIDE AND ACETAMINOPHEN 500 MG: 500 TABLET ORAL at 09:46

## 2017-05-19 RX ADMIN — PANTOPRAZOLE SODIUM 40 MG: 40 TABLET, DELAYED RELEASE ORAL at 05:58

## 2017-05-19 RX ADMIN — DOCUSATE SODIUM 100 MG: 100 CAPSULE, LIQUID FILLED ORAL at 09:46

## 2017-05-19 RX ADMIN — FUROSEMIDE 40 MG: 40 TABLET ORAL at 09:45

## 2017-05-19 RX ADMIN — LEVOTHYROXINE SODIUM 200 MCG: 100 TABLET ORAL at 05:58

## 2017-05-19 RX ADMIN — INSULIN DETEMIR 30 UNITS: 100 INJECTION, SOLUTION SUBCUTANEOUS at 06:00

## 2017-05-19 RX ADMIN — INSULIN LISPRO 5 UNITS: 100 INJECTION, SOLUTION INTRAVENOUS; SUBCUTANEOUS at 12:14

## 2017-05-19 RX ADMIN — BACLOFEN 10 MG: 10 TABLET ORAL at 09:46

## 2017-05-19 RX ADMIN — PRASUGREL HYDROCHLORIDE 10 MG: 10 TABLET, FILM COATED ORAL at 09:46

## 2017-05-19 RX ADMIN — FERROUS SULFATE TAB 325 MG (65 MG ELEMENTAL FE) 325 MG: 325 (65 FE) TAB at 09:52

## 2017-05-26 ENCOUNTER — APPOINTMENT (OUTPATIENT)
Dept: GENERAL RADIOLOGY | Facility: HOSPITAL | Age: 77
End: 2017-05-26

## 2017-05-26 ENCOUNTER — HOSPITAL ENCOUNTER (INPATIENT)
Facility: HOSPITAL | Age: 77
LOS: 10 days | Discharge: NURSING, REHAB OR LONG-TERM CARE | End: 2017-06-05
Attending: EMERGENCY MEDICINE | Admitting: INTERNAL MEDICINE

## 2017-05-26 DIAGNOSIS — N18.9 CHRONIC RENAL INSUFFICIENCY, UNSPECIFIED STAGE: ICD-10-CM

## 2017-05-26 DIAGNOSIS — D64.9 SYMPTOMATIC ANEMIA: Primary | ICD-10-CM

## 2017-05-26 DIAGNOSIS — D64.9 NORMOCHROMIC NORMOCYTIC ANEMIA: ICD-10-CM

## 2017-05-26 DIAGNOSIS — K92.2 GASTROINTESTINAL HEMORRHAGE, UNSPECIFIED GASTROINTESTINAL HEMORRHAGE TYPE: ICD-10-CM

## 2017-05-26 DIAGNOSIS — Z74.09 IMPAIRED MOBILITY AND ADLS: ICD-10-CM

## 2017-05-26 DIAGNOSIS — Z74.09 IMPAIRED FUNCTIONAL MOBILITY, BALANCE, GAIT, AND ENDURANCE: ICD-10-CM

## 2017-05-26 DIAGNOSIS — K92.1 GASTROINTESTINAL HEMORRHAGE WITH MELENA: ICD-10-CM

## 2017-05-26 DIAGNOSIS — Z78.9 IMPAIRED MOBILITY AND ADLS: ICD-10-CM

## 2017-05-26 PROBLEM — N17.9 ACUTE-ON-CHRONIC KIDNEY INJURY (HCC): Status: ACTIVE | Noted: 2017-05-26

## 2017-05-26 LAB
ABO GROUP BLD: NORMAL
ALBUMIN SERPL-MCNC: 3.9 G/DL (ref 3.2–4.8)
ALBUMIN/GLOB SERPL: 1.6 G/DL (ref 1.5–2.5)
ALP SERPL-CCNC: 47 U/L (ref 25–100)
ALT SERPL W P-5'-P-CCNC: 16 U/L (ref 7–40)
ANION GAP SERPL CALCULATED.3IONS-SCNC: 2 MMOL/L (ref 3–11)
APTT PPP: <24 SECONDS (ref 24–31)
AST SERPL-CCNC: 20 U/L (ref 0–33)
BASOPHILS # BLD AUTO: 0.05 10*3/MM3 (ref 0–0.2)
BASOPHILS NFR BLD AUTO: 0.6 % (ref 0–1)
BILIRUB SERPL-MCNC: 0.2 MG/DL (ref 0.3–1.2)
BLD GP AB SCN SERPL QL: NEGATIVE
BUN BLD-MCNC: 61 MG/DL (ref 9–23)
BUN/CREAT SERPL: 35.9 (ref 7–25)
CALCIUM SPEC-SCNC: 9 MG/DL (ref 8.7–10.4)
CHLORIDE SERPL-SCNC: 113 MMOL/L (ref 99–109)
CO2 SERPL-SCNC: 24 MMOL/L (ref 20–31)
CREAT BLD-MCNC: 1.7 MG/DL (ref 0.6–1.3)
DEPRECATED RDW RBC AUTO: 52.3 FL (ref 37–54)
EOSINOPHIL # BLD AUTO: 0.16 10*3/MM3 (ref 0.1–0.3)
EOSINOPHIL NFR BLD AUTO: 1.8 % (ref 0–3)
ERYTHROCYTE [DISTWIDTH] IN BLOOD BY AUTOMATED COUNT: 15.5 % (ref 11.3–14.5)
GFR SERPL CREATININE-BSD FRML MDRD: 29 ML/MIN/1.73
GLOBULIN UR ELPH-MCNC: 2.4 GM/DL
GLUCOSE BLD-MCNC: 364 MG/DL (ref 70–100)
GLUCOSE BLDC GLUCOMTR-MCNC: 435 MG/DL (ref 70–130)
HCT VFR BLD AUTO: 18.3 % (ref 34.5–44)
HGB BLD-MCNC: 5.6 G/DL (ref 11.5–15.5)
HOLD SPECIMEN: NORMAL
HOLD SPECIMEN: NORMAL
IMM GRANULOCYTES # BLD: 0.06 10*3/MM3 (ref 0–0.03)
IMM GRANULOCYTES NFR BLD: 0.7 % (ref 0–0.6)
INR PPP: 0.96
LYMPHOCYTES # BLD AUTO: 1.36 10*3/MM3 (ref 0.6–4.8)
LYMPHOCYTES NFR BLD AUTO: 15.3 % (ref 24–44)
MCH RBC QN AUTO: 28.3 PG (ref 27–31)
MCHC RBC AUTO-ENTMCNC: 30.6 G/DL (ref 32–36)
MCV RBC AUTO: 92.4 FL (ref 80–99)
MONOCYTES # BLD AUTO: 0.58 10*3/MM3 (ref 0–1)
MONOCYTES NFR BLD AUTO: 6.5 % (ref 0–12)
NEUTROPHILS # BLD AUTO: 6.69 10*3/MM3 (ref 1.5–8.3)
NEUTROPHILS NFR BLD AUTO: 75.1 % (ref 41–71)
PLATELET # BLD AUTO: 237 10*3/MM3 (ref 150–450)
PMV BLD AUTO: 10.5 FL (ref 6–12)
POTASSIUM BLD-SCNC: 4.8 MMOL/L (ref 3.5–5.5)
PROT SERPL-MCNC: 6.3 G/DL (ref 5.7–8.2)
PROTHROMBIN TIME: 10.4 SECONDS (ref 9.6–11.5)
RBC # BLD AUTO: 1.98 10*6/MM3 (ref 3.89–5.14)
RH BLD: POSITIVE
SODIUM BLD-SCNC: 139 MMOL/L (ref 132–146)
WBC NRBC COR # BLD: 8.9 10*3/MM3 (ref 3.5–10.8)
WHOLE BLOOD HOLD SPECIMEN: NORMAL
WHOLE BLOOD HOLD SPECIMEN: NORMAL

## 2017-05-26 PROCEDURE — 85025 COMPLETE CBC W/AUTO DIFF WBC: CPT | Performed by: EMERGENCY MEDICINE

## 2017-05-26 PROCEDURE — 86900 BLOOD TYPING SEROLOGIC ABO: CPT | Performed by: EMERGENCY MEDICINE

## 2017-05-26 PROCEDURE — 85014 HEMATOCRIT: CPT | Performed by: PHYSICIAN ASSISTANT

## 2017-05-26 PROCEDURE — 85018 HEMOGLOBIN: CPT | Performed by: PHYSICIAN ASSISTANT

## 2017-05-26 PROCEDURE — 85045 AUTOMATED RETICULOCYTE COUNT: CPT | Performed by: INTERNAL MEDICINE

## 2017-05-26 PROCEDURE — 85730 THROMBOPLASTIN TIME PARTIAL: CPT | Performed by: EMERGENCY MEDICINE

## 2017-05-26 PROCEDURE — 86901 BLOOD TYPING SEROLOGIC RH(D): CPT | Performed by: EMERGENCY MEDICINE

## 2017-05-26 PROCEDURE — 82728 ASSAY OF FERRITIN: CPT | Performed by: INTERNAL MEDICINE

## 2017-05-26 PROCEDURE — 36430 TRANSFUSION BLD/BLD COMPNT: CPT

## 2017-05-26 PROCEDURE — 82746 ASSAY OF FOLIC ACID SERUM: CPT | Performed by: INTERNAL MEDICINE

## 2017-05-26 PROCEDURE — 82607 VITAMIN B-12: CPT | Performed by: INTERNAL MEDICINE

## 2017-05-26 PROCEDURE — 99284 EMERGENCY DEPT VISIT MOD MDM: CPT

## 2017-05-26 PROCEDURE — 93005 ELECTROCARDIOGRAM TRACING: CPT | Performed by: EMERGENCY MEDICINE

## 2017-05-26 PROCEDURE — P9016 RBC LEUKOCYTES REDUCED: HCPCS

## 2017-05-26 PROCEDURE — 83615 LACTATE (LD) (LDH) ENZYME: CPT | Performed by: INTERNAL MEDICINE

## 2017-05-26 PROCEDURE — 86900 BLOOD TYPING SEROLOGIC ABO: CPT

## 2017-05-26 PROCEDURE — 83550 IRON BINDING TEST: CPT | Performed by: INTERNAL MEDICINE

## 2017-05-26 PROCEDURE — 86920 COMPATIBILITY TEST SPIN: CPT

## 2017-05-26 PROCEDURE — 86850 RBC ANTIBODY SCREEN: CPT | Performed by: EMERGENCY MEDICINE

## 2017-05-26 PROCEDURE — 99223 1ST HOSP IP/OBS HIGH 75: CPT | Performed by: INTERNAL MEDICINE

## 2017-05-26 PROCEDURE — 83540 ASSAY OF IRON: CPT | Performed by: INTERNAL MEDICINE

## 2017-05-26 PROCEDURE — 85610 PROTHROMBIN TIME: CPT | Performed by: EMERGENCY MEDICINE

## 2017-05-26 PROCEDURE — 71010 HC CHEST PA OR AP: CPT

## 2017-05-26 PROCEDURE — 82962 GLUCOSE BLOOD TEST: CPT

## 2017-05-26 PROCEDURE — 80053 COMPREHEN METABOLIC PANEL: CPT | Performed by: EMERGENCY MEDICINE

## 2017-05-26 RX ORDER — PANTOPRAZOLE SODIUM 40 MG/10ML
40 INJECTION, POWDER, LYOPHILIZED, FOR SOLUTION INTRAVENOUS
Status: DISCONTINUED | OUTPATIENT
Start: 2017-05-27 | End: 2017-06-01

## 2017-05-26 RX ORDER — ASCORBIC ACID 500 MG
500 TABLET ORAL DAILY
Status: DISCONTINUED | OUTPATIENT
Start: 2017-05-27 | End: 2017-06-05 | Stop reason: HOSPADM

## 2017-05-26 RX ORDER — HYDRALAZINE HYDROCHLORIDE 20 MG/ML
10 INJECTION INTRAMUSCULAR; INTRAVENOUS EVERY 6 HOURS PRN
Status: DISCONTINUED | OUTPATIENT
Start: 2017-05-26 | End: 2017-06-05 | Stop reason: HOSPADM

## 2017-05-26 RX ORDER — ONDANSETRON 2 MG/ML
4 INJECTION INTRAMUSCULAR; INTRAVENOUS EVERY 6 HOURS PRN
Status: DISCONTINUED | OUTPATIENT
Start: 2017-05-26 | End: 2017-06-05 | Stop reason: HOSPADM

## 2017-05-26 RX ORDER — SODIUM CHLORIDE 0.9 % (FLUSH) 0.9 %
10 SYRINGE (ML) INJECTION AS NEEDED
Status: DISCONTINUED | OUTPATIENT
Start: 2017-05-26 | End: 2017-06-05 | Stop reason: HOSPADM

## 2017-05-26 RX ORDER — DOCUSATE SODIUM 100 MG/1
100 CAPSULE, LIQUID FILLED ORAL 2 TIMES DAILY
Status: DISCONTINUED | OUTPATIENT
Start: 2017-05-27 | End: 2017-06-05 | Stop reason: HOSPADM

## 2017-05-26 RX ORDER — BACLOFEN 10 MG/1
10 TABLET ORAL EVERY 8 HOURS SCHEDULED
Status: DISCONTINUED | OUTPATIENT
Start: 2017-05-26 | End: 2017-05-26

## 2017-05-26 RX ORDER — FAMOTIDINE 10 MG/ML
20 INJECTION, SOLUTION INTRAVENOUS ONCE
Status: COMPLETED | OUTPATIENT
Start: 2017-05-26 | End: 2017-05-26

## 2017-05-26 RX ORDER — NICOTINE POLACRILEX 4 MG
15 LOZENGE BUCCAL
Status: DISCONTINUED | OUTPATIENT
Start: 2017-05-26 | End: 2017-06-05 | Stop reason: HOSPADM

## 2017-05-26 RX ORDER — METOPROLOL TARTRATE 50 MG/1
50 TABLET, FILM COATED ORAL EVERY 12 HOURS SCHEDULED
Status: DISCONTINUED | OUTPATIENT
Start: 2017-05-26 | End: 2017-06-05 | Stop reason: HOSPADM

## 2017-05-26 RX ORDER — LEVOTHYROXINE SODIUM 0.1 MG/1
200 TABLET ORAL
Status: DISCONTINUED | OUTPATIENT
Start: 2017-05-27 | End: 2017-06-05 | Stop reason: HOSPADM

## 2017-05-26 RX ORDER — PANTOPRAZOLE SODIUM 40 MG/10ML
80 INJECTION, POWDER, LYOPHILIZED, FOR SOLUTION INTRAVENOUS ONCE
Status: COMPLETED | OUTPATIENT
Start: 2017-05-26 | End: 2017-05-26

## 2017-05-26 RX ORDER — DEXTROSE MONOHYDRATE 25 G/50ML
25 INJECTION, SOLUTION INTRAVENOUS
Status: DISCONTINUED | OUTPATIENT
Start: 2017-05-26 | End: 2017-06-05 | Stop reason: HOSPADM

## 2017-05-26 RX ORDER — ATORVASTATIN CALCIUM 40 MG/1
80 TABLET, FILM COATED ORAL DAILY
Status: DISCONTINUED | OUTPATIENT
Start: 2017-05-27 | End: 2017-06-05 | Stop reason: HOSPADM

## 2017-05-26 RX ORDER — BACLOFEN 10 MG/1
10 TABLET ORAL EVERY 8 HOURS SCHEDULED
Status: DISCONTINUED | OUTPATIENT
Start: 2017-05-26 | End: 2017-06-05 | Stop reason: HOSPADM

## 2017-05-26 RX ORDER — SODIUM CHLORIDE 0.9 % (FLUSH) 0.9 %
1-10 SYRINGE (ML) INJECTION AS NEEDED
Status: DISCONTINUED | OUTPATIENT
Start: 2017-05-26 | End: 2017-06-05 | Stop reason: HOSPADM

## 2017-05-26 RX ORDER — ACETAMINOPHEN 325 MG/1
650 TABLET ORAL EVERY 4 HOURS PRN
Status: DISCONTINUED | OUTPATIENT
Start: 2017-05-26 | End: 2017-06-05 | Stop reason: HOSPADM

## 2017-05-26 RX ORDER — GABAPENTIN 300 MG/1
300 CAPSULE ORAL NIGHTLY
Status: DISCONTINUED | OUTPATIENT
Start: 2017-05-26 | End: 2017-06-05 | Stop reason: HOSPADM

## 2017-05-26 RX ORDER — ONDANSETRON 4 MG/1
4 TABLET, FILM COATED ORAL EVERY 6 HOURS PRN
Status: DISCONTINUED | OUTPATIENT
Start: 2017-05-26 | End: 2017-06-05 | Stop reason: HOSPADM

## 2017-05-26 RX ORDER — FERROUS SULFATE 325(65) MG
325 TABLET ORAL
Status: DISCONTINUED | OUTPATIENT
Start: 2017-05-27 | End: 2017-05-30

## 2017-05-26 RX ADMIN — BACLOFEN 10 MG: 10 TABLET ORAL at 23:41

## 2017-05-26 RX ADMIN — FAMOTIDINE 20 MG: 10 INJECTION, SOLUTION INTRAVENOUS at 19:53

## 2017-05-26 RX ADMIN — METOPROLOL TARTRATE 50 MG: 50 TABLET, FILM COATED ORAL at 23:41

## 2017-05-26 RX ADMIN — GABAPENTIN 300 MG: 300 CAPSULE ORAL at 23:42

## 2017-05-26 RX ADMIN — PANTOPRAZOLE SODIUM 80 MG: 40 INJECTION, POWDER, FOR SOLUTION INTRAVENOUS at 19:52

## 2017-05-27 LAB
ANION GAP SERPL CALCULATED.3IONS-SCNC: 3 MMOL/L (ref 3–11)
BACTERIA UR QL AUTO: ABNORMAL /HPF
BILIRUB UR QL STRIP: NEGATIVE
BUN BLD-MCNC: 53 MG/DL (ref 9–23)
BUN/CREAT SERPL: 33.1 (ref 7–25)
CALCIUM SPEC-SCNC: 8.8 MG/DL (ref 8.7–10.4)
CHLORIDE SERPL-SCNC: 117 MMOL/L (ref 99–109)
CLARITY UR: ABNORMAL
CO2 SERPL-SCNC: 23 MMOL/L (ref 20–31)
COLOR UR: YELLOW
CREAT BLD-MCNC: 1.6 MG/DL (ref 0.6–1.3)
DEPRECATED RDW RBC AUTO: 51.2 FL (ref 37–54)
ERYTHROCYTE [DISTWIDTH] IN BLOOD BY AUTOMATED COUNT: 15.3 % (ref 11.3–14.5)
FERRITIN SERPL-MCNC: 16 NG/ML (ref 10–291)
FOLATE SERPL-MCNC: 10.05 NG/ML (ref 3.2–20)
GFR SERPL CREATININE-BSD FRML MDRD: 31 ML/MIN/1.73
GLUCOSE BLD-MCNC: 256 MG/DL (ref 70–100)
GLUCOSE BLDC GLUCOMTR-MCNC: 217 MG/DL (ref 70–130)
GLUCOSE BLDC GLUCOMTR-MCNC: 231 MG/DL (ref 70–130)
GLUCOSE BLDC GLUCOMTR-MCNC: 231 MG/DL (ref 70–130)
GLUCOSE BLDC GLUCOMTR-MCNC: 281 MG/DL (ref 70–130)
GLUCOSE BLDC GLUCOMTR-MCNC: 374 MG/DL (ref 70–130)
GLUCOSE UR STRIP-MCNC: ABNORMAL MG/DL
HCT VFR BLD AUTO: 18.8 % (ref 34.5–44)
HCT VFR BLD AUTO: 23.1 % (ref 34.5–44)
HCT VFR BLD AUTO: 23.7 % (ref 34.5–44)
HCT VFR BLD AUTO: 23.7 % (ref 34.5–44)
HEMOCCULT STL QL: POSITIVE
HGB BLD-MCNC: 5.7 G/DL (ref 11.5–15.5)
HGB BLD-MCNC: 7.3 G/DL (ref 11.5–15.5)
HGB BLD-MCNC: 7.5 G/DL (ref 11.5–15.5)
HGB BLD-MCNC: 7.5 G/DL (ref 11.5–15.5)
HGB UR QL STRIP.AUTO: ABNORMAL
HYALINE CASTS UR QL AUTO: ABNORMAL /LPF
IRON 24H UR-MRATE: 414 MCG/DL (ref 50–175)
IRON SATN MFR SERPL: 94 % (ref 15–50)
KETONES UR QL STRIP: NEGATIVE
LDH SERPL-CCNC: 264 U/L (ref 120–246)
LEUKOCYTE ESTERASE UR QL STRIP.AUTO: ABNORMAL
MCH RBC QN AUTO: 29.3 PG (ref 27–31)
MCHC RBC AUTO-ENTMCNC: 31.6 G/DL (ref 32–36)
MCV RBC AUTO: 92.6 FL (ref 80–99)
NITRITE UR QL STRIP: NEGATIVE
PH UR STRIP.AUTO: 5.5 [PH] (ref 5–8)
PLATELET # BLD AUTO: 188 10*3/MM3 (ref 150–450)
PMV BLD AUTO: 10.3 FL (ref 6–12)
POTASSIUM BLD-SCNC: 4.4 MMOL/L (ref 3.5–5.5)
PROT UR QL STRIP: ABNORMAL
RBC # BLD AUTO: 2.56 10*6/MM3 (ref 3.89–5.14)
RBC # UR: ABNORMAL /HPF
REF LAB TEST METHOD: ABNORMAL
RETICS/RBC NFR AUTO: 5.6 % (ref 0.5–1.5)
SODIUM BLD-SCNC: 143 MMOL/L (ref 132–146)
SP GR UR STRIP: 1.02 (ref 1–1.03)
SQUAMOUS #/AREA URNS HPF: ABNORMAL /HPF
TIBC SERPL-MCNC: 441 MCG/DL (ref 250–450)
UROBILINOGEN UR QL STRIP: ABNORMAL
VIT B12 BLD-MCNC: 300 PG/ML (ref 211–911)
WBC NRBC COR # BLD: 6.33 10*3/MM3 (ref 3.5–10.8)
WBC UR QL AUTO: ABNORMAL /HPF

## 2017-05-27 PROCEDURE — 99233 SBSQ HOSP IP/OBS HIGH 50: CPT | Performed by: FAMILY MEDICINE

## 2017-05-27 PROCEDURE — 63710000001 INSULIN LISPRO (HUMAN) PER 5 UNITS: Performed by: INTERNAL MEDICINE

## 2017-05-27 PROCEDURE — 82962 GLUCOSE BLOOD TEST: CPT

## 2017-05-27 PROCEDURE — 82270 OCCULT BLOOD FECES: CPT | Performed by: INTERNAL MEDICINE

## 2017-05-27 PROCEDURE — 63710000001 INSULIN DETEMIR PER 5 UNITS: Performed by: INTERNAL MEDICINE

## 2017-05-27 PROCEDURE — 85014 HEMATOCRIT: CPT | Performed by: FAMILY MEDICINE

## 2017-05-27 PROCEDURE — 85027 COMPLETE CBC AUTOMATED: CPT | Performed by: PHYSICIAN ASSISTANT

## 2017-05-27 PROCEDURE — 36430 TRANSFUSION BLD/BLD COMPNT: CPT

## 2017-05-27 PROCEDURE — 80048 BASIC METABOLIC PNL TOTAL CA: CPT | Performed by: PHYSICIAN ASSISTANT

## 2017-05-27 PROCEDURE — 86900 BLOOD TYPING SEROLOGIC ABO: CPT

## 2017-05-27 PROCEDURE — 85018 HEMOGLOBIN: CPT | Performed by: FAMILY MEDICINE

## 2017-05-27 PROCEDURE — P9016 RBC LEUKOCYTES REDUCED: HCPCS

## 2017-05-27 PROCEDURE — 63710000001 INSULIN REGULAR HUMAN PER 5 UNITS: Performed by: NURSE PRACTITIONER

## 2017-05-27 PROCEDURE — 81001 URINALYSIS AUTO W/SCOPE: CPT | Performed by: PHYSICIAN ASSISTANT

## 2017-05-27 PROCEDURE — 87086 URINE CULTURE/COLONY COUNT: CPT | Performed by: PHYSICIAN ASSISTANT

## 2017-05-27 RX ADMIN — DOCUSATE SODIUM 100 MG: 100 CAPSULE, LIQUID FILLED ORAL at 08:04

## 2017-05-27 RX ADMIN — METOPROLOL TARTRATE 50 MG: 50 TABLET, FILM COATED ORAL at 22:31

## 2017-05-27 RX ADMIN — INSULIN LISPRO 4 UNITS: 100 INJECTION, SOLUTION INTRAVENOUS; SUBCUTANEOUS at 22:31

## 2017-05-27 RX ADMIN — INSULIN DETEMIR 18 UNITS: 100 INJECTION, SOLUTION SUBCUTANEOUS at 00:28

## 2017-05-27 RX ADMIN — ATORVASTATIN CALCIUM 80 MG: 40 TABLET, FILM COATED ORAL at 08:04

## 2017-05-27 RX ADMIN — METOPROLOL TARTRATE 50 MG: 50 TABLET, FILM COATED ORAL at 08:04

## 2017-05-27 RX ADMIN — Medication 5 MG: at 22:30

## 2017-05-27 RX ADMIN — PANTOPRAZOLE SODIUM 40 MG: 40 INJECTION, POWDER, FOR SOLUTION INTRAVENOUS at 18:23

## 2017-05-27 RX ADMIN — LEVOTHYROXINE SODIUM 200 MCG: 100 TABLET ORAL at 05:16

## 2017-05-27 RX ADMIN — INSULIN DETEMIR 18 UNITS: 100 INJECTION, SOLUTION SUBCUTANEOUS at 22:31

## 2017-05-27 RX ADMIN — INSULIN HUMAN 4 UNITS: 100 INJECTION, SOLUTION PARENTERAL at 07:08

## 2017-05-27 RX ADMIN — BACLOFEN 10 MG: 10 TABLET ORAL at 05:16

## 2017-05-27 RX ADMIN — DOCUSATE SODIUM 100 MG: 100 CAPSULE, LIQUID FILLED ORAL at 18:27

## 2017-05-27 RX ADMIN — BACLOFEN 10 MG: 10 TABLET ORAL at 13:33

## 2017-05-27 RX ADMIN — Medication 325 MG: at 08:04

## 2017-05-27 RX ADMIN — BACLOFEN 10 MG: 10 TABLET ORAL at 22:30

## 2017-05-27 RX ADMIN — OXYCODONE HYDROCHLORIDE AND ACETAMINOPHEN 500 MG: 500 TABLET ORAL at 08:04

## 2017-05-27 RX ADMIN — GABAPENTIN 300 MG: 300 CAPSULE ORAL at 22:30

## 2017-05-27 RX ADMIN — INSULIN HUMAN 6 UNITS: 100 INJECTION, SOLUTION PARENTERAL at 01:31

## 2017-05-27 RX ADMIN — INSULIN HUMAN 3 UNITS: 100 INJECTION, SOLUTION PARENTERAL at 13:38

## 2017-05-27 RX ADMIN — PANTOPRAZOLE SODIUM 40 MG: 40 INJECTION, POWDER, FOR SOLUTION INTRAVENOUS at 08:03

## 2017-05-28 LAB
ABO + RH BLD: NORMAL
ABO + RH BLD: NORMAL
ANION GAP SERPL CALCULATED.3IONS-SCNC: 5 MMOL/L (ref 3–11)
BH BB BLOOD EXPIRATION DATE: NORMAL
BH BB BLOOD EXPIRATION DATE: NORMAL
BH BB BLOOD TYPE BARCODE: 6200
BH BB BLOOD TYPE BARCODE: 6200
BH BB DISPENSE STATUS: NORMAL
BH BB DISPENSE STATUS: NORMAL
BH BB PRODUCT CODE: NORMAL
BH BB PRODUCT CODE: NORMAL
BH BB UNIT NUMBER: NORMAL
BH BB UNIT NUMBER: NORMAL
BUN BLD-MCNC: 36 MG/DL (ref 9–23)
BUN/CREAT SERPL: 24 (ref 7–25)
CALCIUM SPEC-SCNC: 8.8 MG/DL (ref 8.7–10.4)
CHLORIDE SERPL-SCNC: 114 MMOL/L (ref 99–109)
CO2 SERPL-SCNC: 23 MMOL/L (ref 20–31)
CREAT BLD-MCNC: 1.5 MG/DL (ref 0.6–1.3)
CROSSMATCH INTERPRETATION: NORMAL
CROSSMATCH INTERPRETATION: NORMAL
DEPRECATED RDW RBC AUTO: 52.4 FL (ref 37–54)
ERYTHROCYTE [DISTWIDTH] IN BLOOD BY AUTOMATED COUNT: 16 % (ref 11.3–14.5)
GFR SERPL CREATININE-BSD FRML MDRD: 34 ML/MIN/1.73
GLUCOSE BLD-MCNC: 127 MG/DL (ref 70–100)
GLUCOSE BLDC GLUCOMTR-MCNC: 140 MG/DL (ref 70–130)
GLUCOSE BLDC GLUCOMTR-MCNC: 223 MG/DL (ref 70–130)
GLUCOSE BLDC GLUCOMTR-MCNC: 249 MG/DL (ref 70–130)
GLUCOSE BLDC GLUCOMTR-MCNC: 257 MG/DL (ref 70–130)
HCT VFR BLD AUTO: 22.2 % (ref 34.5–44)
HCT VFR BLD AUTO: 31.3 % (ref 34.5–44)
HGB BLD-MCNC: 6.9 G/DL (ref 11.5–15.5)
HGB BLD-MCNC: 9.8 G/DL (ref 11.5–15.5)
MCH RBC QN AUTO: 28.6 PG (ref 27–31)
MCHC RBC AUTO-ENTMCNC: 31.1 G/DL (ref 32–36)
MCV RBC AUTO: 92.1 FL (ref 80–99)
PLATELET # BLD AUTO: 205 10*3/MM3 (ref 150–450)
PMV BLD AUTO: 10.3 FL (ref 6–12)
POTASSIUM BLD-SCNC: 4 MMOL/L (ref 3.5–5.5)
RBC # BLD AUTO: 2.41 10*6/MM3 (ref 3.89–5.14)
SODIUM BLD-SCNC: 142 MMOL/L (ref 132–146)
UNIT  ABO: NORMAL
UNIT  ABO: NORMAL
UNIT  RH: NORMAL
UNIT  RH: NORMAL
WBC NRBC COR # BLD: 7.45 10*3/MM3 (ref 3.5–10.8)

## 2017-05-28 PROCEDURE — 86900 BLOOD TYPING SEROLOGIC ABO: CPT

## 2017-05-28 PROCEDURE — 63710000001 INSULIN DETEMIR PER 5 UNITS: Performed by: INTERNAL MEDICINE

## 2017-05-28 PROCEDURE — 82962 GLUCOSE BLOOD TEST: CPT

## 2017-05-28 PROCEDURE — 36430 TRANSFUSION BLD/BLD COMPNT: CPT

## 2017-05-28 PROCEDURE — P9016 RBC LEUKOCYTES REDUCED: HCPCS

## 2017-05-28 PROCEDURE — 85014 HEMATOCRIT: CPT | Performed by: FAMILY MEDICINE

## 2017-05-28 PROCEDURE — 80048 BASIC METABOLIC PNL TOTAL CA: CPT | Performed by: FAMILY MEDICINE

## 2017-05-28 PROCEDURE — 85018 HEMOGLOBIN: CPT | Performed by: FAMILY MEDICINE

## 2017-05-28 PROCEDURE — 99233 SBSQ HOSP IP/OBS HIGH 50: CPT | Performed by: FAMILY MEDICINE

## 2017-05-28 PROCEDURE — 85027 COMPLETE CBC AUTOMATED: CPT | Performed by: FAMILY MEDICINE

## 2017-05-28 RX ORDER — ASPIRIN 81 MG/1
81 TABLET ORAL DAILY
Status: DISCONTINUED | OUTPATIENT
Start: 2017-05-28 | End: 2017-06-05 | Stop reason: HOSPADM

## 2017-05-28 RX ADMIN — ASPIRIN 81 MG: 81 TABLET, COATED ORAL at 14:35

## 2017-05-28 RX ADMIN — ATORVASTATIN CALCIUM 80 MG: 40 TABLET, FILM COATED ORAL at 08:05

## 2017-05-28 RX ADMIN — METOPROLOL TARTRATE 50 MG: 50 TABLET, FILM COATED ORAL at 08:06

## 2017-05-28 RX ADMIN — BACLOFEN 10 MG: 10 TABLET ORAL at 05:16

## 2017-05-28 RX ADMIN — Medication 5 MG: at 21:36

## 2017-05-28 RX ADMIN — GABAPENTIN 300 MG: 300 CAPSULE ORAL at 21:35

## 2017-05-28 RX ADMIN — BACLOFEN 10 MG: 10 TABLET ORAL at 14:35

## 2017-05-28 RX ADMIN — BACLOFEN 10 MG: 10 TABLET ORAL at 21:35

## 2017-05-28 RX ADMIN — METOPROLOL TARTRATE 50 MG: 50 TABLET, FILM COATED ORAL at 21:35

## 2017-05-28 RX ADMIN — DOCUSATE SODIUM 100 MG: 100 CAPSULE, LIQUID FILLED ORAL at 08:05

## 2017-05-28 RX ADMIN — INSULIN LISPRO 4 UNITS: 100 INJECTION, SOLUTION INTRAVENOUS; SUBCUTANEOUS at 21:33

## 2017-05-28 RX ADMIN — PANTOPRAZOLE SODIUM 40 MG: 40 INJECTION, POWDER, FOR SOLUTION INTRAVENOUS at 17:19

## 2017-05-28 RX ADMIN — ACETAMINOPHEN 650 MG: 325 TABLET, FILM COATED ORAL at 16:32

## 2017-05-28 RX ADMIN — PANTOPRAZOLE SODIUM 40 MG: 40 INJECTION, POWDER, FOR SOLUTION INTRAVENOUS at 08:05

## 2017-05-28 RX ADMIN — OXYCODONE HYDROCHLORIDE AND ACETAMINOPHEN 500 MG: 500 TABLET ORAL at 08:06

## 2017-05-28 RX ADMIN — LEVOTHYROXINE SODIUM 200 MCG: 100 TABLET ORAL at 05:16

## 2017-05-28 RX ADMIN — ACETAMINOPHEN 650 MG: 325 TABLET, FILM COATED ORAL at 21:35

## 2017-05-28 RX ADMIN — INSULIN DETEMIR 18 UNITS: 100 INJECTION, SOLUTION SUBCUTANEOUS at 21:36

## 2017-05-28 RX ADMIN — Medication 325 MG: at 08:05

## 2017-05-28 RX ADMIN — DOCUSATE SODIUM 100 MG: 100 CAPSULE, LIQUID FILLED ORAL at 17:18

## 2017-05-28 RX ADMIN — INSULIN LISPRO 3 UNITS: 100 INJECTION, SOLUTION INTRAVENOUS; SUBCUTANEOUS at 12:02

## 2017-05-28 RX ADMIN — INSULIN LISPRO 3 UNITS: 100 INJECTION, SOLUTION INTRAVENOUS; SUBCUTANEOUS at 17:18

## 2017-05-29 LAB
ABO + RH BLD: NORMAL
ABO + RH BLD: NORMAL
ANION GAP SERPL CALCULATED.3IONS-SCNC: 7 MMOL/L (ref 3–11)
BACTERIA SPEC AEROBE CULT: NORMAL
BH BB BLOOD EXPIRATION DATE: NORMAL
BH BB BLOOD EXPIRATION DATE: NORMAL
BH BB BLOOD TYPE BARCODE: 6200
BH BB BLOOD TYPE BARCODE: 6200
BH BB DISPENSE STATUS: NORMAL
BH BB DISPENSE STATUS: NORMAL
BH BB PRODUCT CODE: NORMAL
BH BB PRODUCT CODE: NORMAL
BH BB UNIT NUMBER: NORMAL
BH BB UNIT NUMBER: NORMAL
BUN BLD-MCNC: 27 MG/DL (ref 9–23)
BUN/CREAT SERPL: 19.3 (ref 7–25)
CALCIUM SPEC-SCNC: 9 MG/DL (ref 8.7–10.4)
CHLORIDE SERPL-SCNC: 114 MMOL/L (ref 99–109)
CO2 SERPL-SCNC: 21 MMOL/L (ref 20–31)
CREAT BLD-MCNC: 1.4 MG/DL (ref 0.6–1.3)
CROSSMATCH INTERPRETATION: NORMAL
CROSSMATCH INTERPRETATION: NORMAL
DEPRECATED RDW RBC AUTO: 53.3 FL (ref 37–54)
ERYTHROCYTE [DISTWIDTH] IN BLOOD BY AUTOMATED COUNT: 15.9 % (ref 11.3–14.5)
GFR SERPL CREATININE-BSD FRML MDRD: 37 ML/MIN/1.73
GLUCOSE BLD-MCNC: 136 MG/DL (ref 70–100)
GLUCOSE BLDC GLUCOMTR-MCNC: 156 MG/DL (ref 70–130)
GLUCOSE BLDC GLUCOMTR-MCNC: 197 MG/DL (ref 70–130)
GLUCOSE BLDC GLUCOMTR-MCNC: 214 MG/DL (ref 70–130)
GLUCOSE BLDC GLUCOMTR-MCNC: 230 MG/DL (ref 70–130)
HCT VFR BLD AUTO: 29.5 % (ref 34.5–44)
HCT VFR BLD AUTO: 31.6 % (ref 34.5–44)
HCT VFR BLD AUTO: 31.8 % (ref 34.5–44)
HCT VFR BLD AUTO: 32.2 % (ref 34.5–44)
HGB BLD-MCNC: 10 G/DL (ref 11.5–15.5)
HGB BLD-MCNC: 10.1 G/DL (ref 11.5–15.5)
HGB BLD-MCNC: 9.4 G/DL (ref 11.5–15.5)
HGB BLD-MCNC: 9.9 G/DL (ref 11.5–15.5)
MCH RBC QN AUTO: 29.5 PG (ref 27–31)
MCHC RBC AUTO-ENTMCNC: 31.3 G/DL (ref 32–36)
MCV RBC AUTO: 94 FL (ref 80–99)
PLATELET # BLD AUTO: 203 10*3/MM3 (ref 150–450)
PMV BLD AUTO: 10.5 FL (ref 6–12)
POTASSIUM BLD-SCNC: 4.3 MMOL/L (ref 3.5–5.5)
RBC # BLD AUTO: 3.36 10*6/MM3 (ref 3.89–5.14)
SODIUM BLD-SCNC: 142 MMOL/L (ref 132–146)
UNIT  ABO: NORMAL
UNIT  ABO: NORMAL
UNIT  RH: NORMAL
UNIT  RH: NORMAL
WBC NRBC COR # BLD: 7.84 10*3/MM3 (ref 3.5–10.8)

## 2017-05-29 PROCEDURE — 99233 SBSQ HOSP IP/OBS HIGH 50: CPT | Performed by: INTERNAL MEDICINE

## 2017-05-29 PROCEDURE — 25010000002 FUROSEMIDE PER 20 MG: Performed by: INTERNAL MEDICINE

## 2017-05-29 PROCEDURE — 80048 BASIC METABOLIC PNL TOTAL CA: CPT | Performed by: FAMILY MEDICINE

## 2017-05-29 PROCEDURE — 63710000001 INSULIN DETEMIR PER 5 UNITS: Performed by: INTERNAL MEDICINE

## 2017-05-29 PROCEDURE — 85014 HEMATOCRIT: CPT | Performed by: FAMILY MEDICINE

## 2017-05-29 PROCEDURE — 85027 COMPLETE CBC AUTOMATED: CPT | Performed by: FAMILY MEDICINE

## 2017-05-29 PROCEDURE — 82962 GLUCOSE BLOOD TEST: CPT

## 2017-05-29 PROCEDURE — 99231 SBSQ HOSP IP/OBS SF/LOW 25: CPT | Performed by: INTERNAL MEDICINE

## 2017-05-29 PROCEDURE — 85018 HEMOGLOBIN: CPT | Performed by: FAMILY MEDICINE

## 2017-05-29 RX ORDER — IPRATROPIUM BROMIDE AND ALBUTEROL SULFATE 2.5; .5 MG/3ML; MG/3ML
3 SOLUTION RESPIRATORY (INHALATION) EVERY 6 HOURS PRN
Status: DISCONTINUED | OUTPATIENT
Start: 2017-05-29 | End: 2017-06-05 | Stop reason: HOSPADM

## 2017-05-29 RX ORDER — FUROSEMIDE 10 MG/ML
20 INJECTION INTRAMUSCULAR; INTRAVENOUS EVERY 6 HOURS
Status: COMPLETED | OUTPATIENT
Start: 2017-05-29 | End: 2017-05-29

## 2017-05-29 RX ADMIN — ACETAMINOPHEN 650 MG: 325 TABLET, FILM COATED ORAL at 21:12

## 2017-05-29 RX ADMIN — METOPROLOL TARTRATE 50 MG: 50 TABLET, FILM COATED ORAL at 21:10

## 2017-05-29 RX ADMIN — PANTOPRAZOLE SODIUM 40 MG: 40 INJECTION, POWDER, FOR SOLUTION INTRAVENOUS at 08:10

## 2017-05-29 RX ADMIN — OXYCODONE HYDROCHLORIDE AND ACETAMINOPHEN 500 MG: 500 TABLET ORAL at 08:11

## 2017-05-29 RX ADMIN — FUROSEMIDE 20 MG: 10 INJECTION, SOLUTION INTRAVENOUS at 21:12

## 2017-05-29 RX ADMIN — INSULIN LISPRO 3 UNITS: 100 INJECTION, SOLUTION INTRAVENOUS; SUBCUTANEOUS at 21:13

## 2017-05-29 RX ADMIN — BACLOFEN 10 MG: 10 TABLET ORAL at 14:45

## 2017-05-29 RX ADMIN — INSULIN LISPRO 3 UNITS: 100 INJECTION, SOLUTION INTRAVENOUS; SUBCUTANEOUS at 16:36

## 2017-05-29 RX ADMIN — BACLOFEN 10 MG: 10 TABLET ORAL at 21:12

## 2017-05-29 RX ADMIN — BACLOFEN 10 MG: 10 TABLET ORAL at 05:07

## 2017-05-29 RX ADMIN — ASPIRIN 81 MG: 81 TABLET, COATED ORAL at 08:11

## 2017-05-29 RX ADMIN — INSULIN DETEMIR 18 UNITS: 100 INJECTION, SOLUTION SUBCUTANEOUS at 21:13

## 2017-05-29 RX ADMIN — Medication 325 MG: at 08:11

## 2017-05-29 RX ADMIN — PANTOPRAZOLE SODIUM 40 MG: 40 INJECTION, POWDER, FOR SOLUTION INTRAVENOUS at 16:34

## 2017-05-29 RX ADMIN — LEVOTHYROXINE SODIUM 200 MCG: 100 TABLET ORAL at 05:07

## 2017-05-29 RX ADMIN — INSULIN LISPRO 2 UNITS: 100 INJECTION, SOLUTION INTRAVENOUS; SUBCUTANEOUS at 08:10

## 2017-05-29 RX ADMIN — FUROSEMIDE 20 MG: 10 INJECTION, SOLUTION INTRAVENOUS at 14:45

## 2017-05-29 RX ADMIN — ATORVASTATIN CALCIUM 80 MG: 40 TABLET, FILM COATED ORAL at 08:11

## 2017-05-29 RX ADMIN — DOCUSATE SODIUM 100 MG: 100 CAPSULE, LIQUID FILLED ORAL at 08:11

## 2017-05-29 RX ADMIN — GABAPENTIN 300 MG: 300 CAPSULE ORAL at 21:11

## 2017-05-29 RX ADMIN — INSULIN LISPRO 2 UNITS: 100 INJECTION, SOLUTION INTRAVENOUS; SUBCUTANEOUS at 11:58

## 2017-05-29 RX ADMIN — METOPROLOL TARTRATE 50 MG: 50 TABLET, FILM COATED ORAL at 08:11

## 2017-05-30 ENCOUNTER — ANESTHESIA (OUTPATIENT)
Dept: GASTROENTEROLOGY | Facility: HOSPITAL | Age: 77
End: 2017-05-30

## 2017-05-30 ENCOUNTER — ANESTHESIA EVENT (OUTPATIENT)
Dept: GASTROENTEROLOGY | Facility: HOSPITAL | Age: 77
End: 2017-05-30

## 2017-05-30 LAB
BASOPHILS # BLD AUTO: 0.05 10*3/MM3 (ref 0–0.2)
BASOPHILS NFR BLD AUTO: 0.8 % (ref 0–1)
DEPRECATED RDW RBC AUTO: 51 FL (ref 37–54)
EOSINOPHIL # BLD AUTO: 0.23 10*3/MM3 (ref 0.1–0.3)
EOSINOPHIL NFR BLD AUTO: 3.8 % (ref 0–3)
ERYTHROCYTE [DISTWIDTH] IN BLOOD BY AUTOMATED COUNT: 15.5 % (ref 11.3–14.5)
GLUCOSE BLDC GLUCOMTR-MCNC: 140 MG/DL (ref 70–130)
GLUCOSE BLDC GLUCOMTR-MCNC: 160 MG/DL (ref 70–130)
GLUCOSE BLDC GLUCOMTR-MCNC: 236 MG/DL (ref 70–130)
GLUCOSE BLDC GLUCOMTR-MCNC: 278 MG/DL (ref 70–130)
HCT VFR BLD AUTO: 30.6 % (ref 34.5–44)
HGB BLD-MCNC: 9.7 G/DL (ref 11.5–15.5)
IMM GRANULOCYTES # BLD: 0.01 10*3/MM3 (ref 0–0.03)
IMM GRANULOCYTES NFR BLD: 0.2 % (ref 0–0.6)
LYMPHOCYTES # BLD AUTO: 1.4 10*3/MM3 (ref 0.6–4.8)
LYMPHOCYTES NFR BLD AUTO: 22.8 % (ref 24–44)
MCH RBC QN AUTO: 29.5 PG (ref 27–31)
MCHC RBC AUTO-ENTMCNC: 31.7 G/DL (ref 32–36)
MCV RBC AUTO: 93 FL (ref 80–99)
MONOCYTES # BLD AUTO: 0.67 10*3/MM3 (ref 0–1)
MONOCYTES NFR BLD AUTO: 10.9 % (ref 0–12)
NEUTROPHILS # BLD AUTO: 3.77 10*3/MM3 (ref 1.5–8.3)
NEUTROPHILS NFR BLD AUTO: 61.5 % (ref 41–71)
PLATELET # BLD AUTO: 200 10*3/MM3 (ref 150–450)
PMV BLD AUTO: 10.1 FL (ref 6–12)
RBC # BLD AUTO: 3.29 10*6/MM3 (ref 3.89–5.14)
WBC NRBC COR # BLD: 6.13 10*3/MM3 (ref 3.5–10.8)

## 2017-05-30 PROCEDURE — 99233 SBSQ HOSP IP/OBS HIGH 50: CPT | Performed by: INTERNAL MEDICINE

## 2017-05-30 PROCEDURE — 82962 GLUCOSE BLOOD TEST: CPT

## 2017-05-30 PROCEDURE — 0DJ08ZZ INSPECTION OF UPPER INTESTINAL TRACT, VIA NATURAL OR ARTIFICIAL OPENING ENDOSCOPIC: ICD-10-PCS | Performed by: INTERNAL MEDICINE

## 2017-05-30 PROCEDURE — 25010000002 PROPOFOL 10 MG/ML EMULSION: Performed by: NURSE ANESTHETIST, CERTIFIED REGISTERED

## 2017-05-30 PROCEDURE — 3E0G8GC INTRODUCTION OF OTHER THERAPEUTIC SUBSTANCE INTO UPPER GI, VIA NATURAL OR ARTIFICIAL OPENING ENDOSCOPIC: ICD-10-PCS | Performed by: INTERNAL MEDICINE

## 2017-05-30 PROCEDURE — 85025 COMPLETE CBC W/AUTO DIFF WBC: CPT | Performed by: INTERNAL MEDICINE

## 2017-05-30 PROCEDURE — 63710000001 INSULIN DETEMIR PER 5 UNITS: Performed by: INTERNAL MEDICINE

## 2017-05-30 RX ORDER — LIDOCAINE HYDROCHLORIDE 10 MG/ML
INJECTION, SOLUTION INFILTRATION; PERINEURAL AS NEEDED
Status: DISCONTINUED | OUTPATIENT
Start: 2017-05-30 | End: 2017-05-30 | Stop reason: SURG

## 2017-05-30 RX ORDER — SODIUM CHLORIDE, SODIUM LACTATE, POTASSIUM CHLORIDE, CALCIUM CHLORIDE 600; 310; 30; 20 MG/100ML; MG/100ML; MG/100ML; MG/100ML
9 INJECTION, SOLUTION INTRAVENOUS CONTINUOUS
Status: DISCONTINUED | OUTPATIENT
Start: 2017-05-30 | End: 2017-06-05 | Stop reason: HOSPADM

## 2017-05-30 RX ORDER — FUROSEMIDE 10 MG/ML
20 INJECTION INTRAMUSCULAR; INTRAVENOUS 2 TIMES DAILY
Status: DISCONTINUED | OUTPATIENT
Start: 2017-05-31 | End: 2017-05-31

## 2017-05-30 RX ORDER — AMLODIPINE BESYLATE 5 MG/1
5 TABLET ORAL EVERY 12 HOURS SCHEDULED
Status: DISCONTINUED | OUTPATIENT
Start: 2017-05-30 | End: 2017-06-05 | Stop reason: HOSPADM

## 2017-05-30 RX ORDER — FAMOTIDINE 10 MG/ML
20 INJECTION, SOLUTION INTRAVENOUS ONCE
Status: CANCELLED | OUTPATIENT
Start: 2017-05-30 | End: 2017-05-30

## 2017-05-30 RX ORDER — SODIUM CHLORIDE 0.9 % (FLUSH) 0.9 %
1-10 SYRINGE (ML) INJECTION AS NEEDED
Status: DISCONTINUED | OUTPATIENT
Start: 2017-05-30 | End: 2017-05-30 | Stop reason: HOSPADM

## 2017-05-30 RX ORDER — SODIUM CHLORIDE 9 MG/ML
INJECTION, SOLUTION INTRAVENOUS CONTINUOUS PRN
Status: DISCONTINUED | OUTPATIENT
Start: 2017-05-30 | End: 2017-05-30 | Stop reason: SURG

## 2017-05-30 RX ORDER — FAMOTIDINE 20 MG/1
20 TABLET, FILM COATED ORAL ONCE
Status: CANCELLED | OUTPATIENT
Start: 2017-05-30 | End: 2017-05-30

## 2017-05-30 RX ORDER — LIDOCAINE HYDROCHLORIDE 10 MG/ML
0.5 INJECTION, SOLUTION EPIDURAL; INFILTRATION; INTRACAUDAL; PERINEURAL ONCE AS NEEDED
Status: DISCONTINUED | OUTPATIENT
Start: 2017-05-30 | End: 2017-05-30 | Stop reason: HOSPADM

## 2017-05-30 RX ORDER — CLONIDINE HYDROCHLORIDE 0.1 MG/1
0.1 TABLET ORAL EVERY 6 HOURS PRN
Status: DISCONTINUED | OUTPATIENT
Start: 2017-05-30 | End: 2017-06-05 | Stop reason: HOSPADM

## 2017-05-30 RX ORDER — PROPOFOL 10 MG/ML
VIAL (ML) INTRAVENOUS AS NEEDED
Status: DISCONTINUED | OUTPATIENT
Start: 2017-05-30 | End: 2017-05-30 | Stop reason: SURG

## 2017-05-30 RX ORDER — ONDANSETRON 2 MG/ML
4 INJECTION INTRAMUSCULAR; INTRAVENOUS ONCE AS NEEDED
Status: DISCONTINUED | OUTPATIENT
Start: 2017-05-30 | End: 2017-05-30 | Stop reason: HOSPADM

## 2017-05-30 RX ADMIN — PROPOFOL 50 MG: 10 INJECTION, EMULSION INTRAVENOUS at 12:04

## 2017-05-30 RX ADMIN — PANTOPRAZOLE SODIUM 40 MG: 40 INJECTION, POWDER, FOR SOLUTION INTRAVENOUS at 06:41

## 2017-05-30 RX ADMIN — LIDOCAINE HYDROCHLORIDE 50 MG: 10 INJECTION, SOLUTION INFILTRATION; PERINEURAL at 11:59

## 2017-05-30 RX ADMIN — SODIUM CHLORIDE: 9 INJECTION, SOLUTION INTRAVENOUS at 11:55

## 2017-05-30 RX ADMIN — PROPOFOL 50 MG: 10 INJECTION, EMULSION INTRAVENOUS at 11:59

## 2017-05-30 RX ADMIN — INSULIN DETEMIR 18 UNITS: 100 INJECTION, SOLUTION SUBCUTANEOUS at 20:14

## 2017-05-30 RX ADMIN — GABAPENTIN 300 MG: 300 CAPSULE ORAL at 20:15

## 2017-05-30 RX ADMIN — METOPROLOL TARTRATE 50 MG: 50 TABLET, FILM COATED ORAL at 20:15

## 2017-05-30 RX ADMIN — DOCUSATE SODIUM 100 MG: 100 CAPSULE, LIQUID FILLED ORAL at 17:01

## 2017-05-30 RX ADMIN — LEVOTHYROXINE SODIUM 200 MCG: 100 TABLET ORAL at 06:41

## 2017-05-30 RX ADMIN — BACLOFEN 10 MG: 10 TABLET ORAL at 06:41

## 2017-05-30 RX ADMIN — BACLOFEN 10 MG: 10 TABLET ORAL at 14:13

## 2017-05-30 RX ADMIN — PANTOPRAZOLE SODIUM 40 MG: 40 INJECTION, POWDER, FOR SOLUTION INTRAVENOUS at 17:01

## 2017-05-30 RX ADMIN — BACLOFEN 10 MG: 10 TABLET ORAL at 21:31

## 2017-05-30 RX ADMIN — ACETAMINOPHEN 650 MG: 325 TABLET, FILM COATED ORAL at 20:15

## 2017-05-30 RX ADMIN — PROPOFOL 80 MG: 10 INJECTION, EMULSION INTRAVENOUS at 12:08

## 2017-05-30 RX ADMIN — AMLODIPINE BESYLATE 5 MG: 5 TABLET ORAL at 23:10

## 2017-05-30 RX ADMIN — POLYETHYLENE GLYCOL 3350, SODIUM SULFATE ANHYDROUS, SODIUM BICARBONATE, SODIUM CHLORIDE, POTASSIUM CHLORIDE 4000 ML: 236; 22.74; 6.74; 5.86; 2.97 POWDER, FOR SOLUTION ORAL at 15:27

## 2017-05-31 ENCOUNTER — APPOINTMENT (OUTPATIENT)
Dept: GENERAL RADIOLOGY | Facility: HOSPITAL | Age: 77
End: 2017-05-31
Attending: INTERNAL MEDICINE

## 2017-05-31 LAB
BASOPHILS # BLD AUTO: 0.04 10*3/MM3 (ref 0–0.2)
BASOPHILS NFR BLD AUTO: 0.6 % (ref 0–1)
BNP SERPL-MCNC: 268 PG/ML (ref 0–100)
DEPRECATED RDW RBC AUTO: 51.2 FL (ref 37–54)
EOSINOPHIL # BLD AUTO: 0.29 10*3/MM3 (ref 0.1–0.3)
EOSINOPHIL NFR BLD AUTO: 4.4 % (ref 0–3)
ERYTHROCYTE [DISTWIDTH] IN BLOOD BY AUTOMATED COUNT: 15.5 % (ref 11.3–14.5)
GLUCOSE BLDC GLUCOMTR-MCNC: 135 MG/DL (ref 70–130)
GLUCOSE BLDC GLUCOMTR-MCNC: 186 MG/DL (ref 70–130)
GLUCOSE BLDC GLUCOMTR-MCNC: 206 MG/DL (ref 70–130)
GLUCOSE BLDC GLUCOMTR-MCNC: 219 MG/DL (ref 70–130)
HCT VFR BLD AUTO: 33 % (ref 34.5–44)
HGB BLD-MCNC: 10.2 G/DL (ref 11.5–15.5)
IMM GRANULOCYTES # BLD: 0.01 10*3/MM3 (ref 0–0.03)
IMM GRANULOCYTES NFR BLD: 0.2 % (ref 0–0.6)
LYMPHOCYTES # BLD AUTO: 1.17 10*3/MM3 (ref 0.6–4.8)
LYMPHOCYTES NFR BLD AUTO: 17.8 % (ref 24–44)
MCH RBC QN AUTO: 29.1 PG (ref 27–31)
MCHC RBC AUTO-ENTMCNC: 30.9 G/DL (ref 32–36)
MCV RBC AUTO: 94.3 FL (ref 80–99)
MONOCYTES # BLD AUTO: 0.62 10*3/MM3 (ref 0–1)
MONOCYTES NFR BLD AUTO: 9.4 % (ref 0–12)
NEUTROPHILS # BLD AUTO: 4.45 10*3/MM3 (ref 1.5–8.3)
NEUTROPHILS NFR BLD AUTO: 67.6 % (ref 41–71)
PLATELET # BLD AUTO: 229 10*3/MM3 (ref 150–450)
PMV BLD AUTO: 9.9 FL (ref 6–12)
RBC # BLD AUTO: 3.5 10*6/MM3 (ref 3.89–5.14)
TROPONIN I SERPL-MCNC: 0.02 NG/ML
WBC NRBC COR # BLD: 6.58 10*3/MM3 (ref 3.5–10.8)

## 2017-05-31 PROCEDURE — 83880 ASSAY OF NATRIURETIC PEPTIDE: CPT | Performed by: INTERNAL MEDICINE

## 2017-05-31 PROCEDURE — 82962 GLUCOSE BLOOD TEST: CPT

## 2017-05-31 PROCEDURE — 25010000002 HYDRALAZINE PER 20 MG: Performed by: PHYSICIAN ASSISTANT

## 2017-05-31 PROCEDURE — 74250 X-RAY XM SM INT 1CNTRST STD: CPT

## 2017-05-31 PROCEDURE — 84484 ASSAY OF TROPONIN QUANT: CPT | Performed by: INTERNAL MEDICINE

## 2017-05-31 PROCEDURE — 85025 COMPLETE CBC W/AUTO DIFF WBC: CPT | Performed by: INTERNAL MEDICINE

## 2017-05-31 PROCEDURE — 25010000002 FUROSEMIDE PER 20 MG: Performed by: INTERNAL MEDICINE

## 2017-05-31 PROCEDURE — 99233 SBSQ HOSP IP/OBS HIGH 50: CPT | Performed by: INTERNAL MEDICINE

## 2017-05-31 PROCEDURE — 63710000001 INSULIN DETEMIR PER 5 UNITS: Performed by: INTERNAL MEDICINE

## 2017-05-31 RX ORDER — FUROSEMIDE 10 MG/ML
40 INJECTION INTRAMUSCULAR; INTRAVENOUS DAILY
Status: DISCONTINUED | OUTPATIENT
Start: 2017-06-01 | End: 2017-06-02

## 2017-05-31 RX ORDER — IPRATROPIUM BROMIDE AND ALBUTEROL SULFATE 2.5; .5 MG/3ML; MG/3ML
3 SOLUTION RESPIRATORY (INHALATION)
Status: DISCONTINUED | OUTPATIENT
Start: 2017-05-31 | End: 2017-06-04

## 2017-05-31 RX ADMIN — ACETAMINOPHEN 650 MG: 325 TABLET, FILM COATED ORAL at 20:43

## 2017-05-31 RX ADMIN — GABAPENTIN 300 MG: 300 CAPSULE ORAL at 20:42

## 2017-05-31 RX ADMIN — HYDRALAZINE HYDROCHLORIDE 10 MG: 20 INJECTION INTRAMUSCULAR; INTRAVENOUS at 05:52

## 2017-05-31 RX ADMIN — PANTOPRAZOLE SODIUM 40 MG: 40 INJECTION, POWDER, FOR SOLUTION INTRAVENOUS at 13:42

## 2017-05-31 RX ADMIN — BARIUM SULFATE 480 ML: 960 POWDER, FOR SUSPENSION ORAL at 09:14

## 2017-05-31 RX ADMIN — ASPIRIN 81 MG: 81 TABLET, COATED ORAL at 13:43

## 2017-05-31 RX ADMIN — INSULIN DETEMIR 18 UNITS: 100 INJECTION, SOLUTION SUBCUTANEOUS at 20:44

## 2017-05-31 RX ADMIN — DOCUSATE SODIUM 100 MG: 100 CAPSULE, LIQUID FILLED ORAL at 18:48

## 2017-05-31 RX ADMIN — NITROGLYCERIN 0.5 INCH: 20 OINTMENT TOPICAL at 15:03

## 2017-05-31 RX ADMIN — FUROSEMIDE 20 MG: 10 INJECTION, SOLUTION INTRAMUSCULAR; INTRAVENOUS at 18:48

## 2017-05-31 RX ADMIN — OXYCODONE HYDROCHLORIDE AND ACETAMINOPHEN 500 MG: 500 TABLET ORAL at 13:44

## 2017-05-31 RX ADMIN — FUROSEMIDE 20 MG: 10 INJECTION, SOLUTION INTRAMUSCULAR; INTRAVENOUS at 13:43

## 2017-05-31 RX ADMIN — LEVOTHYROXINE SODIUM 200 MCG: 100 TABLET ORAL at 05:37

## 2017-05-31 RX ADMIN — BACLOFEN 10 MG: 10 TABLET ORAL at 13:43

## 2017-05-31 RX ADMIN — BACLOFEN 10 MG: 10 TABLET ORAL at 20:42

## 2017-05-31 RX ADMIN — ACETAMINOPHEN 650 MG: 325 TABLET, FILM COATED ORAL at 14:54

## 2017-05-31 RX ADMIN — METOPROLOL TARTRATE 50 MG: 50 TABLET, FILM COATED ORAL at 20:42

## 2017-05-31 RX ADMIN — BACLOFEN 10 MG: 10 TABLET ORAL at 05:37

## 2017-05-31 RX ADMIN — AMLODIPINE BESYLATE 5 MG: 5 TABLET ORAL at 20:43

## 2017-05-31 RX ADMIN — CLONIDINE HYDROCHLORIDE 0.1 MG: 0.1 TABLET ORAL at 14:13

## 2017-05-31 RX ADMIN — METOPROLOL TARTRATE 50 MG: 50 TABLET, FILM COATED ORAL at 10:00

## 2017-05-31 RX ADMIN — PANTOPRAZOLE SODIUM 40 MG: 40 INJECTION, POWDER, FOR SOLUTION INTRAVENOUS at 18:48

## 2017-05-31 RX ADMIN — AMLODIPINE BESYLATE 5 MG: 5 TABLET ORAL at 10:00

## 2017-06-01 LAB
ANION GAP SERPL CALCULATED.3IONS-SCNC: 4 MMOL/L (ref 3–11)
BASOPHILS # BLD AUTO: 0.04 10*3/MM3 (ref 0–0.2)
BASOPHILS NFR BLD AUTO: 0.5 % (ref 0–1)
BUN BLD-MCNC: 16 MG/DL (ref 9–23)
BUN/CREAT SERPL: 11.4 (ref 7–25)
CALCIUM SPEC-SCNC: 9.2 MG/DL (ref 8.7–10.4)
CHLORIDE SERPL-SCNC: 109 MMOL/L (ref 99–109)
CO2 SERPL-SCNC: 28 MMOL/L (ref 20–31)
CREAT BLD-MCNC: 1.4 MG/DL (ref 0.6–1.3)
DEPRECATED RDW RBC AUTO: 51.4 FL (ref 37–54)
EOSINOPHIL # BLD AUTO: 0.25 10*3/MM3 (ref 0.1–0.3)
EOSINOPHIL NFR BLD AUTO: 3.2 % (ref 0–3)
ERYTHROCYTE [DISTWIDTH] IN BLOOD BY AUTOMATED COUNT: 15.3 % (ref 11.3–14.5)
GFR SERPL CREATININE-BSD FRML MDRD: 37 ML/MIN/1.73
GLUCOSE BLD-MCNC: 157 MG/DL (ref 70–100)
GLUCOSE BLDC GLUCOMTR-MCNC: 173 MG/DL (ref 70–130)
GLUCOSE BLDC GLUCOMTR-MCNC: 260 MG/DL (ref 70–130)
GLUCOSE BLDC GLUCOMTR-MCNC: 270 MG/DL (ref 70–130)
GLUCOSE BLDC GLUCOMTR-MCNC: 287 MG/DL (ref 70–130)
HCT VFR BLD AUTO: 34.1 % (ref 34.5–44)
HGB BLD-MCNC: 10.6 G/DL (ref 11.5–15.5)
IMM GRANULOCYTES # BLD: 0.01 10*3/MM3 (ref 0–0.03)
IMM GRANULOCYTES NFR BLD: 0.1 % (ref 0–0.6)
LYMPHOCYTES # BLD AUTO: 0.75 10*3/MM3 (ref 0.6–4.8)
LYMPHOCYTES NFR BLD AUTO: 9.5 % (ref 24–44)
MCH RBC QN AUTO: 29.3 PG (ref 27–31)
MCHC RBC AUTO-ENTMCNC: 31.1 G/DL (ref 32–36)
MCV RBC AUTO: 94.2 FL (ref 80–99)
MONOCYTES # BLD AUTO: 0.74 10*3/MM3 (ref 0–1)
MONOCYTES NFR BLD AUTO: 9.3 % (ref 0–12)
NEUTROPHILS # BLD AUTO: 6.13 10*3/MM3 (ref 1.5–8.3)
NEUTROPHILS NFR BLD AUTO: 77.4 % (ref 41–71)
PLATELET # BLD AUTO: 226 10*3/MM3 (ref 150–450)
PMV BLD AUTO: 10 FL (ref 6–12)
POTASSIUM BLD-SCNC: 3.7 MMOL/L (ref 3.5–5.5)
RBC # BLD AUTO: 3.62 10*6/MM3 (ref 3.89–5.14)
SODIUM BLD-SCNC: 141 MMOL/L (ref 132–146)
TSH SERPL DL<=0.05 MIU/L-ACNC: 4.08 MIU/ML (ref 0.35–5.35)
WBC NRBC COR # BLD: 7.92 10*3/MM3 (ref 3.5–10.8)

## 2017-06-01 PROCEDURE — 94799 UNLISTED PULMONARY SVC/PX: CPT

## 2017-06-01 PROCEDURE — 80048 BASIC METABOLIC PNL TOTAL CA: CPT | Performed by: INTERNAL MEDICINE

## 2017-06-01 PROCEDURE — 94640 AIRWAY INHALATION TREATMENT: CPT

## 2017-06-01 PROCEDURE — 99232 SBSQ HOSP IP/OBS MODERATE 35: CPT | Performed by: INTERNAL MEDICINE

## 2017-06-01 PROCEDURE — 85025 COMPLETE CBC W/AUTO DIFF WBC: CPT | Performed by: INTERNAL MEDICINE

## 2017-06-01 PROCEDURE — 63710000001 INSULIN DETEMIR PER 5 UNITS: Performed by: INTERNAL MEDICINE

## 2017-06-01 PROCEDURE — 82962 GLUCOSE BLOOD TEST: CPT

## 2017-06-01 PROCEDURE — 25010000002 FUROSEMIDE PER 20 MG: Performed by: INTERNAL MEDICINE

## 2017-06-01 PROCEDURE — 84443 ASSAY THYROID STIM HORMONE: CPT | Performed by: INTERNAL MEDICINE

## 2017-06-01 RX ORDER — PANTOPRAZOLE SODIUM 40 MG/1
40 TABLET, DELAYED RELEASE ORAL
Status: DISCONTINUED | OUTPATIENT
Start: 2017-06-02 | End: 2017-06-05 | Stop reason: HOSPADM

## 2017-06-01 RX ADMIN — FUROSEMIDE 40 MG: 10 INJECTION, SOLUTION INTRAMUSCULAR; INTRAVENOUS at 08:39

## 2017-06-01 RX ADMIN — DOCUSATE SODIUM 100 MG: 100 CAPSULE, LIQUID FILLED ORAL at 08:40

## 2017-06-01 RX ADMIN — METOPROLOL TARTRATE 50 MG: 50 TABLET, FILM COATED ORAL at 21:30

## 2017-06-01 RX ADMIN — GABAPENTIN 300 MG: 300 CAPSULE ORAL at 21:30

## 2017-06-01 RX ADMIN — IPRATROPIUM BROMIDE AND ALBUTEROL SULFATE 3 ML: .5; 3 SOLUTION RESPIRATORY (INHALATION) at 12:56

## 2017-06-01 RX ADMIN — BACLOFEN 10 MG: 10 TABLET ORAL at 21:30

## 2017-06-01 RX ADMIN — INSULIN DETEMIR 18 UNITS: 100 INJECTION, SOLUTION SUBCUTANEOUS at 21:31

## 2017-06-01 RX ADMIN — AMLODIPINE BESYLATE 5 MG: 5 TABLET ORAL at 08:40

## 2017-06-01 RX ADMIN — IPRATROPIUM BROMIDE AND ALBUTEROL SULFATE 3 ML: .5; 3 SOLUTION RESPIRATORY (INHALATION) at 20:44

## 2017-06-01 RX ADMIN — OXYCODONE HYDROCHLORIDE AND ACETAMINOPHEN 500 MG: 500 TABLET ORAL at 08:39

## 2017-06-01 RX ADMIN — ATORVASTATIN CALCIUM 80 MG: 40 TABLET, FILM COATED ORAL at 08:40

## 2017-06-01 RX ADMIN — ASPIRIN 81 MG: 81 TABLET, COATED ORAL at 08:40

## 2017-06-01 RX ADMIN — BACLOFEN 10 MG: 10 TABLET ORAL at 13:18

## 2017-06-01 RX ADMIN — PANTOPRAZOLE SODIUM 40 MG: 40 INJECTION, POWDER, FOR SOLUTION INTRAVENOUS at 08:39

## 2017-06-01 RX ADMIN — IPRATROPIUM BROMIDE AND ALBUTEROL SULFATE 3 ML: .5; 3 SOLUTION RESPIRATORY (INHALATION) at 07:14

## 2017-06-01 RX ADMIN — METOPROLOL TARTRATE 50 MG: 50 TABLET, FILM COATED ORAL at 08:40

## 2017-06-01 RX ADMIN — BACLOFEN 10 MG: 10 TABLET ORAL at 06:35

## 2017-06-01 RX ADMIN — LEVOTHYROXINE SODIUM 200 MCG: 100 TABLET ORAL at 06:35

## 2017-06-01 RX ADMIN — DOCUSATE SODIUM 100 MG: 100 CAPSULE, LIQUID FILLED ORAL at 17:10

## 2017-06-01 RX ADMIN — AMLODIPINE BESYLATE 5 MG: 5 TABLET ORAL at 21:30

## 2017-06-01 NOTE — PLAN OF CARE
Problem: Patient Care Overview (Adult)  Goal: Plan of Care Review  Outcome: Ongoing (interventions implemented as appropriate)    06/01/17 0227   Coping/Psychosocial Response Interventions   Plan Of Care Reviewed With patient   Patient Care Overview   Progress progress towards functional goals is fair         Problem: Diabetes, Type 2 (Adult)  Goal: Signs and Symptoms of Listed Potential Problems Will be Absent or Manageable (Diabetes, Type 2)  Outcome: Ongoing (interventions implemented as appropriate)    Problem: Fall Risk (Adult)  Goal: Absence of Falls  Outcome: Ongoing (interventions implemented as appropriate)    06/01/17 0227   Fall Risk (Adult)   Absence of Falls making progress toward outcome         Problem: Gastrointestinal Bleeding (Adult)  Goal: Signs and Symptoms of Listed Potential Problems Will be Absent or Manageable (Gastrointestinal Bleeding)  Outcome: Ongoing (interventions implemented as appropriate)    06/01/17 0227   Gastrointestinal Bleeding   Problems Assessed (GI Bleeding) all   Problems Present (GI Bleeding) none         Problem: Pressure Ulcer Risk (Herman Scale) (Adult,Obstetrics,Pediatric)  Goal: Identify Related Risk Factors and Signs and Symptoms  Outcome: Ongoing (interventions implemented as appropriate)    06/01/17 0227   Pressure Ulcer Risk (Herman Scale)   Related Risk Factors (Pressure Ulcer Risk (Herman Scale)) age extremes;mobility impaired       Goal: Skin Integrity  Outcome: Ongoing (interventions implemented as appropriate)    06/01/17 0227   Pressure Ulcer Risk (Herman Scale) (Adult,Obstetrics,Pediatric)   Skin Integrity making progress toward outcome         Problem: GI Endoscopy (Adult)  Goal: Signs and Symptoms of Listed Potential Problems Will be Absent or Manageable (GI Endoscopy)    06/01/17 0227   GI Endoscopy   Problems Assessed (GI Endoscopy) all   Problems Present (GI Endoscopy) situational response

## 2017-06-01 NOTE — PROGRESS NOTES
"[facility]     Chief Complaint:  weakness      Interval History: Patient and daughter in room.  Patient denies seeing any melena.  Patient tolerated diet.  Discussed with daughter reasons why colonoscopy was deferred.  Patient denies any abdominal pain or nausea      Objective     Vital Signs Blood pressure 157/53, pulse 60, temperature 98 °F (36.7 °C), temperature source Oral, resp. rate 16, height 60\" (152.4 cm), weight 182 lb 4.8 oz (82.7 kg), SpO2 94 %.    Physical Exam:   General Appearance: alert, appears stated age and cooperative  Head: normocephalic, without obvious abnormality and atraumatic  Lungs: clear to auscultation, respirations regular, respirations even and respirations unlabored  Heart: regular rhythm & normal rate, normal S1, S2, no murmur, no anna, no rub and no click  Abdomen: normal bowel sounds, no masses, no hepatomegaly, no splenomegaly, soft non-tender, no guarding and no rebound tenderness     Results Review:   I reviewed the patient's new clinical results.    LABS:      Lab Results (last 24 hours)     Procedure Component Value Units Date/Time    POC Glucose Fingerstick [[id number]]  (Abnormal) Collected:  05/31/17 1656    Specimen:  Blood Updated:  05/31/17 1657     Glucose 219 (H) mg/dL     Narrative:       Meter: [id number] : [id number] Stefany Grande    POC Glucose Fingerstick [[id number]]  (Abnormal) Collected:  05/31/17 2019    Specimen:  Blood Updated:  05/31/17 2021     Glucose 206 (H) mg/dL     Narrative:       Meter: [id number] : [id number] Jeffry Elliott    CBC & Differential [[id number]] Collected:  06/01/17 0441    Specimen:  Blood Updated:  06/01/17 0510    Narrative:       The following orders were created for panel order CBC & Differential.  Procedure                               Abnormality         Status                     ---------                               -----------         ------                     CBC Auto Differential[[id number]]        " Abnormal            Final result                 Please view results for these tests on the individual orders.    CBC Auto Differential [[id number]]  (Abnormal) Collected:  06/01/17 0441    Specimen:  Blood Updated:  06/01/17 0510     WBC 7.92 10*3/mm3      RBC 3.62 (L) 10*6/mm3      Hemoglobin 10.6 (L) g/dL      Hematocrit 34.1 (L) %      MCV 94.2 fL      MCH 29.3 pg      MCHC 31.1 (L) g/dL      RDW 15.3 (H) %      RDW-SD 51.4 fl      MPV 10.0 fL      Platelets 226 10*3/mm3      Neutrophil % 77.4 (H) %      Lymphocyte % 9.5 (L) %      Monocyte % 9.3 %      Eosinophil % 3.2 (H) %      Basophil % 0.5 %      Immature Grans % 0.1 %      Neutrophils, Absolute 6.13 10*3/mm3      Lymphocytes, Absolute 0.75 10*3/mm3      Monocytes, Absolute 0.74 10*3/mm3      Eosinophils, Absolute 0.25 10*3/mm3      Basophils, Absolute 0.04 10*3/mm3      Immature Grans, Absolute 0.01 10*3/mm3     Basic Metabolic Panel [[id number]]  (Abnormal) Collected:  06/01/17 0441    Specimen:  Blood Updated:  06/01/17 0547     Glucose 157 (H) mg/dL      BUN 16 mg/dL      Creatinine 1.40 (H) mg/dL      Sodium 141 mmol/L      Potassium 3.7 mmol/L      Chloride 109 mmol/L      CO2 28.0 mmol/L      Calcium 9.2 mg/dL      eGFR Non African Amer 37 (L) mL/min/1.73      BUN/Creatinine Ratio 11.4     Anion Gap 4.0 mmol/L     Narrative:       National [employer] Guidelines    Stage     Description        GFR  1         Normal or High     90+  2         Mild decrease      60-89  3         Moderate decrease  30-59  4         Severe decrease    15-29  5         Kidney failure     <15    TSH [[id number]]  (Normal) Collected:  06/01/17 0441    Specimen:  Blood Updated:  06/01/17 0547     TSH 4.077 mIU/mL     POC Glucose Fingerstick [[id number]]  (Abnormal) Collected:  06/01/17 0728    Specimen:  Blood Updated:  06/01/17 0732     Glucose 173 (H) mg/dL     Narrative:       Meter: [id number] : [id number] Milan Zimmerman    POC Glucose Fingerstick [[id number]]   (Abnormal) Collected:  06/01/17 1132    Specimen:  Blood Updated:  06/01/17 1134     Glucose 270 (H) mg/dL     Narrative:       Meter: [id number] : [id number] Yates Emily        RADIOLOGY:  Imaging Results (last 24 hours)     Procedure Component Value Units Date/Time    FL Small Bowel Follow Through [[id number]] Collected:  05/31/17 1505     Updated:  05/31/17 1620    Narrative:       EXAMINATION: FL SMALL BOWEL FOLLOW THROUGH-     INDICATION: swallowing; D64.9-Anemia, unspecified;  K92.2-Gastrointestinal hemorrhage, unspecified; N18.9-Chronic kidney  disease, unspecified; D64.9-Anemia, unspecified; K92.1-Melena         TECHNIQUE: 14 seconds of fluoroscopic time was used for this exam. 4  associated images were saved.  imaging reveals a nonobstructive  bowel gas pattern. A single contrast study was performed using thin  barium. Images of the small bowel were obtained at 15 minutes, and 95  minutes.     COMPARISON: NONE     FINDINGS: 15 minute film shows contrast opacification of grossly  normal-appearing proximal, mid, and distal small bowel. Contrast is seen  reaching the colon in 95 minutes. There was no dilatation or other  evidence of obstruction. There was no fold thickening, filling defect,  or mucosal irregularity. There was normal peristalsis throughout the  small bowel. The terminal ileum was well defined and spot imaging, and  appeared within normal limits.          Impression:       Small bowel series appeared within normal limits.         This report was finalized on 5/31/2017 4:18 PM by Dr. Tan Mujica MD.               Current Facility-Administered Medications:   •  acetaminophen (TYLENOL) tablet 650 mg, 650 mg, Oral, Q4H PRN, Michael Alvarez PA-C, 650 mg at 05/31/17 2043  •  amLODIPine (NORVASC) tablet 5 mg, 5 mg, Oral, Q12H, Qiana Leslie MD, 5 mg at 06/01/17 0840  •  aspirin EC tablet 81 mg, 81 mg, Oral, Daily, Tiffanie Noel DO, 81 mg at 06/01/17 0840  •  atorvastatin (LIPITOR)  tablet 80 mg, 80 mg, Oral, Daily, Michael Alvarez PA-C, 80 mg at 06/01/17 0840  •  baclofen (LIORESAL) tablet 10 mg, 10 mg, Oral, Q8H, Snehal Goode MD, 10 mg at 06/01/17 0635  •  CloNIDine (CATAPRES) tablet 0.1 mg, 0.1 mg, Oral, Q6H PRN, Qiana Leslie MD, 0.1 mg at 05/31/17 1413  •  dextrose (D50W) solution 25 g, 25 g, Intravenous, Q15 Min PRN, Michael Alvarez PA-C  •  dextrose (GLUTOSE) oral gel 15 g, 15 g, Oral, Q15 Min PRN, Michael Alvarez PA-C  •  docusate sodium (COLACE) capsule 100 mg, 100 mg, Oral, BID, Michael Alvarez PA-C, 100 mg at 06/01/17 0840  •  furosemide (LASIX) injection 40 mg, 40 mg, Intravenous, Daily, Qiana Leslie MD, 40 mg at 06/01/17 0839  •  gabapentin (NEURONTIN) capsule 300 mg, 300 mg, Oral, Nightly, Snehal Goode MD, 300 mg at 05/31/17 2042  •  glucagon (GLUCAGEN) injection 1 mg, 1 mg, Subcutaneous, Q15 Min PRN, Michael Alvarez PA-C  •  hydrALAZINE (APRESOLINE) injection 10 mg, 10 mg, Intravenous, Q6H PRN, Michael Alvarez PA-C, 10 mg at 05/31/17 0552  •  insulin detemir (LEVEMIR) injection 18 Units, 18 Units, Subcutaneous, Nightly, Snehal Goode MD, 18 Units at 05/31/17 2044  •  ipratropium-albuterol (DUO-NEB) nebulizer solution 3 mL, 3 mL, Nebulization, Q6H PRN, Qiana Leslie MD  •  ipratropium-albuterol (DUO-NEB) nebulizer solution 3 mL, 3 mL, Nebulization, TID - RT, Qiana Leslie MD, 3 mL at 06/01/17 0714  •  lactated ringers infusion, 9 mL/hr, Intravenous, Continuous, Samuel Simons MD  •  levothyroxine (SYNTHROID, LEVOTHROID) tablet 200 mcg, 200 mcg, Oral, Q AM, Michael Alvarez PA-C, 200 mcg at 06/01/17 0635  •  melatonin sublingual tablet 5 mg, 5 mg, Sublingual, Nightly PRN, Michael Alvarez PA-C, 5 mg at 05/28/17 2136  •  metoprolol tartrate (LOPRESSOR) tablet 50 mg, 50 mg, Oral, Q12H, Michael Alvarez PA-C, 50 mg at 06/01/17 0840  •  nitroglycerin (NITROSTAT) ointment 0.5 inch, 0.5 inch, Topical, Q6H PRN, Qiana Leslie MD, 0.5 inch at 05/31/17  "1503  •  ondansetron (ZOFRAN) tablet 4 mg, 4 mg, Oral, Q6H PRN **OR** ondansetron (ZOFRAN) injection 4 mg, 4 mg, Intravenous, Q6H PRN, Michael Alvarez PA-C  •  pantoprazole (PROTONIX) injection 40 mg, 40 mg, Intravenous, BID AC, MICHAELLE Walker-C, 40 mg at 06/01/17 0839  •  sodium chloride 0.9 % flush 1-10 mL, 1-10 mL, Intravenous, PRN, MICHAELLE Walker-C  •  sodium chloride 0.9 % flush 10 mL, 10 mL, Intravenous, PRN, Familia De Los Santos MD  •  vitamin C (ASCORBIC ACID) tablet 500 mg, 500 mg, Oral, Daily, MICHAELLE Walker-KELY, 500 mg at 06/01/17 0839    Assessment/Plan    75 yo was on plavix that was changed to effient who was readmitted for acute anemia.  Patient underwent EGD and at that point I found an angiodysplasia that was treated with argon plasma coagulator  1.  Patient's hemoglobin stable    2.  Patient has known polyp from October colonoscopy.  Daughter aware of this and had actually spoken to Dr. Sage about the polyp.  At first Dr. Sage had considered repeat colonoscopy however, he wanted a small bowel evaluation first.  This was negative.  He then decided that even if we were to look at colon he would strongly recommend NOT to remove polyp because this could cause another nidus for bleeding.  Daughter and patient demonstrated understanding and respect his decision.      3.  Anticoagulation:  Patient reportedly on plavix and had stroke which led to the change to effient.  Patient reports \"I do not want to take it\"  I urged her to reconsider because we were able to treat a potential lesion for bleeding/melena.  Patient to discuss hospitalist and PCP.  Daughter also tried to urge her mother to reconsider.      4.  Okay with advance diet    5.  Continue PPI    6.  Patient will need follow up appointment with Dr. Sage once discharged with consideration for pillcamera endoscopy that can be set up by Dr. Chu Vidales MD  06/01/17  12:34 PM                  "

## 2017-06-01 NOTE — PLAN OF CARE
Problem: Patient Care Overview (Adult)  Goal: Plan of Care Review  Outcome: Ongoing (interventions implemented as appropriate)    06/01/17 1035   Coping/Psychosocial Response Interventions   Plan Of Care Reviewed With patient   Patient Care Overview   Progress improving   Outcome Evaluation   Outcome Summary/Follow up Plan Dressing change performed. Wound presents with 50% slough. Applied Thera honey for autolytic debridement. Xeroform and silicone border dressing. See wound care orders for care needs. Will continue to follow at this time. Please contact [facility] if needs arise. Thanks

## 2017-06-01 NOTE — PROGRESS NOTES
HOSPITALIST DAILY PROGRESS NOTE    Chief Complaint: anemia    Subjective   SUBJECTIVE/OVERNIGHT EVENTS   Patient has had a good day.  She had a SBFT that is normal.  GI does not think she needs her polyp removed and hope that the treatment of the AVM fixes her GI bleed and her anemia is stable.  BP has still been high,started nitropaste today with decent results.  Family cannot remember when she last had effient if ever.   Review of Systems:  Gen-no fevers, no chills  CV-no chest pain, no palpitations  Resp-no cough, occ wheeze, and still short of breath  GI-no N/V/D, no abd pain    Objective   OBJECTIVE   I have reviewed the vital signs.  Vitals:    05/31/17 2042   BP: 148/65   Pulse: 61   Resp: 18   Temp: 97.9 °F (36.6 °C)   SpO2:        Physical Exam:  Patient is alert and talkative short of breath at rest sitting up in the bed  Neck is without mass or JVD  Heart is Reg wo murmur  Lungs have inspiratory crackles   Abd is soft without HSM or mass  MAEW no edema  Skin is without rash  Neurologic exam in nonfocal except her right hemiplegia and her staccato speech   Mood is appropriate    Results:  I have reviewed the labs, culture data, radiology results, and diagnostic studies.      Results from last 7 days  Lab Units 05/31/17  0543 05/30/17  0552 05/29/17  1554  05/29/17  0430   WBC 10*3/mm3 6.58 6.13  --   --  7.84   HEMOGLOBIN g/dL 10.2* 9.7* 10.0*  < > 9.9*   HEMATOCRIT % 33.0* 30.6* 31.8*  < > 31.6*   PLATELETS 10*3/mm3 229 200  --   --  203   < > = values in this interval not displayed.    Results from last 7 days  Lab Units 05/29/17  0430   SODIUM mmol/L 142   POTASSIUM mmol/L 4.3   CHLORIDE mmol/L 114*   TOTAL CO2 mmol/L 21.0   BUN mg/dL 27*   CREATININE mg/dL 1.40*   GLUCOSE mg/dL 136*   CALCIUM mg/dL 9.0       Radiology Results:  Imaging Results (last 24 hours)     Procedure Component Value Units Date/Time    FL Small Bowel Follow Through [[id number]] Collected:  05/31/17 1505     Updated:  05/31/17  6780    Narrative:       EXAMINATION: FL SMALL BOWEL FOLLOW THROUGH-     INDICATION: swallowing; D64.9-Anemia, unspecified;  K92.2-Gastrointestinal hemorrhage, unspecified; N18.9-Chronic kidney  disease, unspecified; D64.9-Anemia, unspecified; K92.1-Melena         TECHNIQUE: 14 seconds of fluoroscopic time was used for this exam. 4  associated images were saved.  imaging reveals a nonobstructive  bowel gas pattern. A single contrast study was performed using thin  barium. Images of the small bowel were obtained at 15 minutes, and 95  minutes.     COMPARISON: NONE     FINDINGS: 15 minute film shows contrast opacification of grossly  normal-appearing proximal, mid, and distal small bowel. Contrast is seen  reaching the colon in 95 minutes. There was no dilatation or other  evidence of obstruction. There was no fold thickening, filling defect,  or mucosal irregularity. There was normal peristalsis throughout the  small bowel. The terminal ileum was well defined and spot imaging, and  appeared within normal limits.          Impression:       Small bowel series appeared within normal limits.         This report was finalized on 5/31/2017 4:18 PM by Dr. Tan Mujica MD.           I have reviewed the medications.    Assessment/Plan   ASSESSMENT/PLAN    Principal Problem:    Symptomatic anemia  Active Problems:    Normochromic normocytic anemia    Chronic diastolic heart failure    Diabetes type 2, uncontrolled    HTN (hypertension)    Chronic kidney disease (CKD), stage III (moderate)    GERD (gastroesophageal reflux disease) with hx of gastritis     Disease of thyroid gland    GI bleed    Acute-on-chronic kidney injury    GI bleed.   - history of normochromic normocytic anemia. Baseline 8.5 to 9.0  - s/p Transfusion 4 units of RBC's  - - Repeat EGD reveal AVM but was not bleeding.Plan was for colonoscopy but she got a SBFT that is normal.  - GI most recently considering pill endoscopy, Dr. Vidales is back tomorrow.  following , per GI ok to give ASA, ultimatly neeeds repeat colon  - holding Effient      2. Acute on chronic Kidney Injury:  - baseline creatinine 1.5   - history of CKD stage III   3. Diastolic dysfunction:  - I think volume overloaded today, will diureses and hope to wean oxygen.will cont diuresis and improve BP control  - Last echo 4/2017 EF: 70%  Nitrates are helping could start PO nitrate tomorrow, BP has been aggressive Still need diuresis   4. DM2:  - uncontrolled. Monitor. Hold PO meds  - accu checks q 6. Sliding scale insulin.      DVT prophylaxis: mechanical compression Device  Code Status: full code  I expect patient to be discharged in: 2-3 days     Qiana Leslie MD  05/31/17  9:29 PM

## 2017-06-01 NOTE — PROGRESS NOTES
HOSPITALIST DAILY PROGRESS NOTE    Chief Complaint: anemia    Subjective   SUBJECTIVE/OVERNIGHT EVENTS   Physically, no specific complaint.  Denies any chest pain or palpitation.  No fever or chills.  No nausea, vomiting, diarrhea, abdominal pain.  Review of Systems:  Gen-no fevers, no chills  CV-no chest pain, no palpitations  Resp-no cough, occ wheeze, and still short of breath  GI-no N/V/D, no abd pain    Objective   OBJECTIVE   I have reviewed the vital signs.  Vitals:    06/01/17 0839   BP: 157/53   Pulse: 60   Resp:    Temp:    SpO2:        Physical Exam:  Patient is alert and talkative short of breath at rest sitting up in the bed  Neck is without mass or JVD  Heart is Reg wo murmur  Lungs Clear to auscultation bilaterally.   Abd is soft without HSM or mass  MAEW no edema  Skin is without rash  Neurologic exam in nonfocal except her right hemiplegia and her staccato speech   Mood is appropriate    Results:  I have reviewed the labs, culture data, radiology results, and diagnostic studies.      Results from last 7 days  Lab Units 06/01/17  0441 05/31/17  0543 05/30/17  0552   WBC 10*3/mm3 7.92 6.58 6.13   HEMOGLOBIN g/dL 10.6* 10.2* 9.7*   HEMATOCRIT % 34.1* 33.0* 30.6*   PLATELETS 10*3/mm3 226 229 200       Results from last 7 days  Lab Units 06/01/17  0441   SODIUM mmol/L 141   POTASSIUM mmol/L 3.7   CHLORIDE mmol/L 109   TOTAL CO2 mmol/L 28.0   BUN mg/dL 16   CREATININE mg/dL 1.40*   GLUCOSE mg/dL 157*   CALCIUM mg/dL 9.2       Radiology Results:  Imaging Results (last 24 hours)     Procedure Component Value Units Date/Time    FL Small Bowel Follow Through [[id number]] Collected:  05/31/17 1505     Updated:  05/31/17 1620    Narrative:       EXAMINATION: FL SMALL BOWEL FOLLOW THROUGH-     INDICATION: swallowing; D64.9-Anemia, unspecified;  K92.2-Gastrointestinal hemorrhage, unspecified; N18.9-Chronic kidney  disease, unspecified; D64.9-Anemia, unspecified; K92.1-Melena         TECHNIQUE: 14 seconds of  fluoroscopic time was used for this exam. 4  associated images were saved.  imaging reveals a nonobstructive  bowel gas pattern. A single contrast study was performed using thin  barium. Images of the small bowel were obtained at 15 minutes, and 95  minutes.     COMPARISON: NONE     FINDINGS: 15 minute film shows contrast opacification of grossly  normal-appearing proximal, mid, and distal small bowel. Contrast is seen  reaching the colon in 95 minutes. There was no dilatation or other  evidence of obstruction. There was no fold thickening, filling defect,  or mucosal irregularity. There was normal peristalsis throughout the  small bowel. The terminal ileum was well defined and spot imaging, and  appeared within normal limits.          Impression:       Small bowel series appeared within normal limits.         This report was finalized on 5/31/2017 4:18 PM by Dr. Tan Mujica MD.           I have reviewed the medications.    Assessment/Plan   ASSESSMENT/PLAN    Principal Problem:    Symptomatic anemia  Active Problems:    Normochromic normocytic anemia    Chronic diastolic heart failure    Diabetes type 2, uncontrolled    HTN (hypertension)    Chronic kidney disease (CKD), stage III (moderate)    GERD (gastroesophageal reflux disease) with hx of gastritis     Disease of thyroid gland    GI bleed    Acute-on-chronic kidney injury    GI bleed.   - history of normochromic normocytic anemia. Baseline 8.5 to 9.0  - s/p Transfusion 4 units of RBC's  - - Repeat EGD reveal AVM but was not bleeding.Plan was for colonoscopy but she got a SBFT that is normal.       2. Acute on chronic Kidney Injury:  - baseline creatinine 1.5   - history of CKD stage III    3. H/O  Diastolic dysfunction?    - Last echo 4/2017 EF: 70% and left ventricular function was reported normal.  Patient also had an echocardiogram done on 11/19/2016 and diastolic function is specifically mentioned as normal.     4. DM2:  - uncontrolled. Monitor. Hold  PO meds  - accu checks q 6. Sliding scale insulin.  PLAN:  - cont current care  _ labs in am    - home soon.      DVT prophylaxis: mechanical compression Device  Code Status: full code  I expect patient to be discharged in: 2-3 days     Peter Scott MD  06/01/17  2:31 PM

## 2017-06-02 LAB
GLUCOSE BLDC GLUCOMTR-MCNC: 157 MG/DL (ref 70–130)
GLUCOSE BLDC GLUCOMTR-MCNC: 270 MG/DL (ref 70–130)
GLUCOSE BLDC GLUCOMTR-MCNC: 298 MG/DL (ref 70–130)
GLUCOSE BLDC GLUCOMTR-MCNC: 325 MG/DL (ref 70–130)

## 2017-06-02 PROCEDURE — 63710000001 INSULIN DETEMIR PER 5 UNITS: Performed by: INTERNAL MEDICINE

## 2017-06-02 PROCEDURE — 82962 GLUCOSE BLOOD TEST: CPT

## 2017-06-02 PROCEDURE — 99232 SBSQ HOSP IP/OBS MODERATE 35: CPT | Performed by: INTERNAL MEDICINE

## 2017-06-02 PROCEDURE — 25010000002 FUROSEMIDE PER 20 MG: Performed by: INTERNAL MEDICINE

## 2017-06-02 PROCEDURE — 97530 THERAPEUTIC ACTIVITIES: CPT

## 2017-06-02 PROCEDURE — 97116 GAIT TRAINING THERAPY: CPT

## 2017-06-02 PROCEDURE — 94799 UNLISTED PULMONARY SVC/PX: CPT

## 2017-06-02 PROCEDURE — 97162 PT EVAL MOD COMPLEX 30 MIN: CPT

## 2017-06-02 PROCEDURE — 94640 AIRWAY INHALATION TREATMENT: CPT

## 2017-06-02 PROCEDURE — 97110 THERAPEUTIC EXERCISES: CPT

## 2017-06-02 PROCEDURE — 97167 OT EVAL HIGH COMPLEX 60 MIN: CPT

## 2017-06-02 RX ORDER — FUROSEMIDE 40 MG/1
40 TABLET ORAL DAILY
Status: DISCONTINUED | OUTPATIENT
Start: 2017-06-03 | End: 2017-06-05 | Stop reason: HOSPADM

## 2017-06-02 RX ADMIN — DOCUSATE SODIUM 100 MG: 100 CAPSULE, LIQUID FILLED ORAL at 08:35

## 2017-06-02 RX ADMIN — LEVOTHYROXINE SODIUM 200 MCG: 100 TABLET ORAL at 05:38

## 2017-06-02 RX ADMIN — INSULIN DETEMIR 18 UNITS: 100 INJECTION, SOLUTION SUBCUTANEOUS at 19:56

## 2017-06-02 RX ADMIN — IPRATROPIUM BROMIDE AND ALBUTEROL SULFATE 3 ML: .5; 3 SOLUTION RESPIRATORY (INHALATION) at 07:29

## 2017-06-02 RX ADMIN — METOPROLOL TARTRATE 50 MG: 50 TABLET, FILM COATED ORAL at 19:54

## 2017-06-02 RX ADMIN — ATORVASTATIN CALCIUM 80 MG: 40 TABLET, FILM COATED ORAL at 08:35

## 2017-06-02 RX ADMIN — FUROSEMIDE 40 MG: 10 INJECTION, SOLUTION INTRAMUSCULAR; INTRAVENOUS at 08:35

## 2017-06-02 RX ADMIN — AMLODIPINE BESYLATE 5 MG: 5 TABLET ORAL at 08:35

## 2017-06-02 RX ADMIN — PANTOPRAZOLE SODIUM 40 MG: 40 TABLET, DELAYED RELEASE ORAL at 05:38

## 2017-06-02 RX ADMIN — IPRATROPIUM BROMIDE AND ALBUTEROL SULFATE 3 ML: .5; 3 SOLUTION RESPIRATORY (INHALATION) at 19:46

## 2017-06-02 RX ADMIN — BACLOFEN 10 MG: 10 TABLET ORAL at 05:38

## 2017-06-02 RX ADMIN — DOCUSATE SODIUM 100 MG: 100 CAPSULE, LIQUID FILLED ORAL at 17:15

## 2017-06-02 RX ADMIN — IPRATROPIUM BROMIDE AND ALBUTEROL SULFATE 3 ML: .5; 3 SOLUTION RESPIRATORY (INHALATION) at 12:57

## 2017-06-02 RX ADMIN — OXYCODONE HYDROCHLORIDE AND ACETAMINOPHEN 500 MG: 500 TABLET ORAL at 08:35

## 2017-06-02 RX ADMIN — AMLODIPINE BESYLATE 5 MG: 5 TABLET ORAL at 19:55

## 2017-06-02 RX ADMIN — ASPIRIN 81 MG: 81 TABLET, COATED ORAL at 08:35

## 2017-06-02 RX ADMIN — METOPROLOL TARTRATE 50 MG: 50 TABLET, FILM COATED ORAL at 08:35

## 2017-06-02 RX ADMIN — BACLOFEN 10 MG: 10 TABLET ORAL at 13:25

## 2017-06-02 RX ADMIN — BACLOFEN 10 MG: 10 TABLET ORAL at 19:54

## 2017-06-02 RX ADMIN — ACETAMINOPHEN 650 MG: 325 TABLET, FILM COATED ORAL at 10:43

## 2017-06-02 RX ADMIN — GABAPENTIN 300 MG: 300 CAPSULE ORAL at 19:55

## 2017-06-02 RX ADMIN — ACETAMINOPHEN 650 MG: 325 TABLET, FILM COATED ORAL at 21:21

## 2017-06-02 NOTE — PLAN OF CARE
Problem: Pressure Ulcer Risk (Herman Scale) (Adult,Obstetrics,Pediatric)  Goal: Identify Related Risk Factors and Signs and Symptoms  Outcome: Ongoing (interventions implemented as appropriate)    06/02/17 0441   Pressure Ulcer Risk (Herman Scale)   Related Risk Factors (Pressure Ulcer Risk (Herman Scale)) age extremes;mobility impaired       Goal: Skin Integrity  Outcome: Ongoing (interventions implemented as appropriate)

## 2017-06-02 NOTE — PLAN OF CARE
Problem: Patient Care Overview (Adult)  Goal: Plan of Care Review  Outcome: Ongoing (interventions implemented as appropriate)    06/02/17 1158   Coping/Psychosocial Response Interventions   Plan Of Care Reviewed With patient   Outcome Evaluation   Outcome Summary/Follow up Plan IE completed this date. OT to follow for mobility, transfers and ADL performance deficits. Recommend rehab at SNF at d/c.         Problem: Inpatient Occupational Therapy  Goal: Transfer Training Goal 1 LTG- OT  Outcome: Ongoing (interventions implemented as appropriate)    06/02/17 1158   Transfer Training OT LTG   Transfer Training OT LTG, Time to Achieve by discharge   Transfer Training OT LTG, Activity Type toilet   Transfer Training OT LTG, [place] Level moderate assist (50% patient effort);2 person assist required;verbal cues required   Transfer Training OT LTG, Assist Device walker, homa       Goal: Toileting Goal LTG- OT  Outcome: Ongoing (interventions implemented as appropriate)    06/02/17 1158   Toileting OT LTG   Toileting Goal OT LTG, Time to Achieve by discharge   Toileting Goal OT LTG, [place] Level moderate assist (50% patient effort);verbal cues required       Goal: UB Dressing Goal LTG- OT  Outcome: Ongoing (interventions implemented as appropriate)    06/02/17 1158   UB Dressing OT LTG   UB Dressing Goal OT LTG, Time to Achieve by discharge   UB Dressing Goal OT LTG, [place] Level minimum assist (75% patient effort)   UB Dressing Goal OT LTG, Additional Goal Pt to demonstrate R UE homa-dressing       Goal: Functional Mobility Goal LTG- OT  Outcome: Ongoing (interventions implemented as appropriate)    06/02/17 1158   Functional Mobility OT LTG   Functional Mobility Goal OT LTG, Time to Achieve by discharge   Functional Mobility Goal OT LTG, [place] Level mod assist;x2   Functional Mobility Goal OT LTG, Assist Device homa walker   Functional Mobility Goal OT LTG, Distance to Achieve to the bathroom

## 2017-06-02 NOTE — PROGRESS NOTES
HOSPITALIST DAILY PROGRESS NOTE    Chief Complaint: anemia    Subjective   SUBJECTIVE/OVERNIGHT EVENTS   Physically, no specific complaint.  Denies any chest pain or palpitation.  No fever or chills.  No nausea, vomiting, diarrhea, abdominal pain.  Review of Systems:  Gen-no fevers, no chills  CV-no chest pain, no palpitations  Resp-no cough, occ wheeze, and still short of breath  GI-no N/V/D, no abd pain    Objective   OBJECTIVE   I have reviewed the vital signs.  Vitals:    06/02/17 0835   BP: 154/54   Pulse:    Resp:    Temp:    SpO2:        Physical Exam:  Patient is alert and talkative short of breath at rest sitting up in the bed  Neck is without mass or JVD  Heart is Reg wo murmur  Lungs Clear to auscultation bilaterally.   Abd is soft without HSM or mass  MAEW no edema  Skin is without rash  Neurologic exam in nonfocal except her right hemiplegia and her staccato speech   Mood is appropriate    Results:  I have reviewed the labs, culture data, radiology results, and diagnostic studies.      Results from last 7 days  Lab Units 06/01/17  0441 05/31/17  0543 05/30/17  0552   WBC 10*3/mm3 7.92 6.58 6.13   HEMOGLOBIN g/dL 10.6* 10.2* 9.7*   HEMATOCRIT % 34.1* 33.0* 30.6*   PLATELETS 10*3/mm3 226 229 200       Results from last 7 days  Lab Units 06/01/17  0441   SODIUM mmol/L 141   POTASSIUM mmol/L 3.7   CHLORIDE mmol/L 109   TOTAL CO2 mmol/L 28.0   BUN mg/dL 16   CREATININE mg/dL 1.40*   GLUCOSE mg/dL 157*   CALCIUM mg/dL 9.2       Radiology Results:  Imaging Results (last 24 hours)     ** No results found for the last 24 hours. **        I have reviewed the medications.    Assessment/Plan   ASSESSMENT/PLAN    Principal Problem:    Symptomatic anemia  Active Problems:    Normochromic normocytic anemia    Chronic diastolic heart failure    Diabetes type 2, uncontrolled    HTN (hypertension)    Chronic kidney disease (CKD), stage III (moderate)    GERD (gastroesophageal reflux disease) with hx of gastritis      Disease of thyroid gland    GI bleed    Acute-on-chronic kidney injury    GI bleed.   - history of normochromic normocytic anemia. Baseline 8.5 to 9.0  - s/p Transfusion 4 units of RBC's  - - Repeat EGD reveal AVM but was not bleeding.Plan was for colonoscopy but she got a SBFT that is normal. Next step pill camera endoscopy as outpatient.       2. Acute on chronic Kidney Injury:  - baseline creatinine 1.5   - history of CKD stage III    2' . Severe weakness.  Patient lives alone and at this point patient is not strong enough to be sent home.  Patient is to go to rehabilitation before home.    3. H/O  Diastolic dysfunction?    - Last echo 4/2017 EF: 70% and left ventricular function was reported normal.  Patient also had an echocardiogram done on 11/19/2016 and diastolic function is specifically mentioned as normal.     4. DM2:  - uncontrolled. Monitor. Hold PO meds  - accu checks q 6. Sliding scale insulin.  PLAN:  - cont current care  - pt/ot  - rehab placement.          DVT prophylaxis: mechanical compression Device  Code Status: full code  I expect patient to be discharged in: 2-3 days     Peter Scott MD  06/02/17  3:57 PM

## 2017-06-02 NOTE — PLAN OF CARE
Problem: Patient Care Overview (Adult)  Goal: Plan of Care Review  Outcome: Ongoing (interventions implemented as appropriate)    06/02/17 1216   Coping/Psychosocial Response Interventions   Plan Of Care Reviewed With patient   Patient Care Overview   Progress progress toward functional goals as expected   Outcome Evaluation   Outcome Summary/Follow up Plan Pt needs encouragement. R side weakness in E's and L UE pain plus decreased wt bearing L UE. Encouagement needed.         Problem: Inpatient Physical Therapy  Goal: Bed Mobility Goal LTG- PT  Outcome: Ongoing (interventions implemented as appropriate)    06/02/17 1216   Bed Mobility PT LTG   Bed Mobility PT LTG, Time to Achieve 2 wks   Bed Mobility PT LTG, Activity Type all bed mobility   Bed Mobility PT LTG, [place] Level minimum assist (75% patient effort)   Bed Mobility PT LTG, Outcome goal ongoing       Goal: Transfer Training Goal 1 LTG- PT  Outcome: Ongoing (interventions implemented as appropriate)    06/02/17 1216   Transfer Training PT LTG   Transfer Training PT LTG, Time to Achieve 2 wks   Transfer Training PT LTG, Activity Type all transfers   Transfer Training PT LTG, [place] Level 2 person assist required;moderate assist (50% patient effort)   Transfer Training PT LTG, Assist Device walker, rolling platform   Transfer Training PT LTG, Outcome goal ongoing       Goal: Gait Training Goal LTG- PT  Outcome: Ongoing (interventions implemented as appropriate)    06/02/17 1216   Gait Training PT LTG   Gait Training Goal PT LTG, Date Established 06/02/17   Gait Training Goal PT LTG, Time to Achieve 2 wks   Gait Training Goal PT LTG, [place] Level 2 person assist required;set up required;verbal cues required   Gait Training Goal PT LTG, Assist Device walker, rolling platform   Gait Training Goal PT LTG, Distance to Achieve 30   Gait Training Goal PT LTG, Outcome goal ongoing

## 2017-06-02 NOTE — THERAPY EVALUATION
Acute Care - Occupational Therapy Initial Evaluation  [facility]     Patient Name: Jennifer Oliveira  : 1940  MRN: [id number]  Today's Date: 2017  Onset of Illness/Injury or Date of Surgery Date: (P) 17  Date of Referral to OT: 17  Referring Physician: MD Scott (P)    Admit Date: 2017       ICD-10-CM ICD-9-CM   1. Symptomatic anemia D64.9 285.9   2. Gastrointestinal hemorrhage, unspecified gastrointestinal hemorrhage type K92.2 578.9   3. Chronic renal insufficiency, unspecified stage N18.9 585.9   4. Normochromic normocytic anemia D64.9 285.9   5. Gastrointestinal hemorrhage with melena K92.1 578.1   6. Impaired mobility and ADLs Z74.09 799.89   7. Impaired functional mobility, balance, gait, and endurance Z74.09 V49.89     Patient Active Problem List   Diagnosis   • Acute hypoxemic respiratory failure   • PAD (peripheral artery disease)   • Normochromic normocytic anemia   • Chronic diastolic heart failure   • Diabetes type 2, uncontrolled   • Lower extremity cellulitis   • Hypothyroid   • HTN (hypertension)   • Metabolic encephalopathy   • TIA (transient ischemic attack)   • Acute cystitis without hematuria   • Chronic kidney disease (CKD), stage III (moderate)   • GERD (gastroesophageal reflux disease) with hx of gastritis    • Stroke   • Hypertension   • Disease of thyroid gland   • Diastolic heart failure   • Diabetes mellitus   • CKD (chronic kidney disease), stage III   • Symptomatic anemia   • GI bleed   • Acute-on-chronic kidney injury     Past Medical History:   Diagnosis Date   • CKD (chronic kidney disease), stage III     baseline Cr 1.5-1.6   • Diabetes mellitus    • Diastolic heart failure     last LVEF 70%   • Disease of thyroid gland    • GERD (gastroesophageal reflux disease)    • Hypertension    • PAD (peripheral artery disease)     s/p right fem-pop bypass 2016   • Stroke     residual chronic right hemiplegia     Past Surgical History:   Procedure Laterality  Date   • CARPAL TUNNEL RELEASE     • CHOLECYSTECTOMY     • ENDOSCOPY N/A 5/15/2017    Procedure: ESOPHAGOGASTRODUODENOSCOPY;  Surgeon: Lizbet Vidales MD;  Location:  [facility];  Service:    • ENDOSCOPY N/A 5/30/2017    Procedure: ESOPHAGOGASTRODUODENOSCOPY;  Surgeon: Lizbet Vidales MD;  Location:  [facility];  Service:    • FEMORAL POPLITEAL BYPASS Right    • HYSTERECTOMY            OT ASSESSMENT FLOWSHEET (last 72 hours)      OT Evaluation       06/02/17 0905 06/02/17 0855 06/02/17 0830 06/02/17 0815 06/01/17 2045    Rehab Evaluation    Document Type (P)  evaluation  -BD evaluation  -TB       Subjective Information (P)  agree to therapy;complains of;pain  -BD agree to therapy;complains of;pain  -TB       Evaluation Not Performed   patient unavailable for evaluation   Pt eating breakfast requests OT return  -TB      Patient Effort, Rehab Treatment (P)  adequate  -BD adequate  -TB       Symptoms Noted During/After Treatment (P)  increased pain  -BD increased pain  -TB       Symptoms Noted Comment (P)  Pt w/ pain in non CVA wrist/arm. Difficult  -BD  Pt with h/o CVA with significant residulal R HP and current c/o acute pain L wrist/hand pain which together limit use of AD for mobility/transfers   -TB       General Information    Patient Profile Review (P)  yes  -BD yes  -TB       Onset of Illness/Injury or Date of Surgery Date (P)  05/26/17  -BD 05/26/17  -TB       Referring Physician (P)  MD Sonia  -BD Sonia  -TB       General Observations (P)  Pt supine in bed, O2, L wrist bandaged.   -BD Pt rec'vd supine in bed, finishing breakfast meal; supplemental O2; IV removed this morning  -TB       Pertinent History Of Current Problem (P)  Pt admitted to ED from PCP w acute hemoglobin drop, GI bleed. Pt w HX of CVA and R side weakness  -BD Pt to ED direct admit from PCP for acute hemoglobin drop; GI bleed  -TB       Precautions/Limitations (P)  fall precautions;oxygen therapy device and L/min  -BD fall  precautions;oxygen therapy device and L/min;other (see comments)   remote CVA with residual R HP; Acute L wrist/hand pain  -TB       Prior Level of Function (P)  independent:;all household mobility;transfer;bed mobility;mod assist:;dressing;bathing;home management   Pt with HH and home caregivers weekly.  -BD independent:;all household mobility;transfer;bed mobility;dressing;mod assist:;bathing;home management   Pt has caregivers and HH in place prior to admit  -TB       Equipment Currently Used at Home (P)  bath bench;oxygen;walker, homa;ramp   transport chair   -BD bath bench;oxygen;walker, homa  -TB       Plans/Goals Discussed With (P)  patient;agreed upon  -BD patient;agreed upon  -TB       Risks Reviewed (P)  patient:;LOB;increased discomfort  -BD patient:;LOB;increased discomfort  -TB       Benefits Reviewed (P)  patient:;improve function  -BD patient:;improve function;increase independence;increase knowledge  -TB       Barriers to Rehab (P)  medically complex;previous functional deficit  -BD medically complex;previous functional deficit  -TB       Living Environment    Lives With (P)  alone  -BD alone  -TB       Living Arrangements (P)  mobile home  -BD mobile home  -TB       Home Accessibility (P)  ramps present at home  -BD ramps present at home;tub/shower is not walk in  -TB       Living Environment Comment (P)  HH in place prior to hospitalization.  -BD Pt has HH aides for bathing and home mgmt  -TB       Clinical Impression    Date of Referral to OT  06/01/17  -TB       OT Diagnosis  Impaired mobility, transfer and ADL  -TB       Criteria for Skilled Therapeutic Interventions Met  yes;treatment indicated  -TB       Rehab Potential  fair, will monitor progress closely  -TB       Therapy Frequency  daily  -TB       Anticipated Equipment Needs At Discharge  wheelchair  -TB       Anticipated Discharge Disposition  skilled nursing facility  -TB       Vital Signs    Pre Systolic BP Rehab (P)  154  -  -TB        Pre Treatment Diastolic BP (P)  54  -BD 54  -TB       Pre Patient Position  Supine  -TB       Intra Patient Position  Standing  -TB       Post Patient Position  Supine  -TB       Pain Assessment    Pain Assessment (P)  [place]all[place] FACES  -BD Valles-Baker FACES  -TB       [place] FACES Pain Rating (P)  6  -BD 6  -TB       Pain Type (P)  Acute pain  -BD Acute pain  -TB       Pain Location (P)  Wrist  -BD Wrist  -TB       Pain Orientation (P)  Left  -BD Left  -TB       Pain Intervention(s) (P)  Repositioned  -BD Repositioned  -TB       Response to Interventions (P)  tolerated  -BD tolerated  -TB       Vision Assessment/Intervention    Visual Impairment (P)  WFL with corrective lenses  -BD WFL with corrective lenses  -TB       Cognitive Assessment/Intervention    Current Cognitive/Communication Assessment (P)  impaired  -BD impaired  -TB       Orientation Status (P)  oriented to;person;place;situation  -BD oriented to;person;place;situation  -TB       Follows Commands/Answers Questions (P)  able to follow single-step instructions;needs increased time;needs cueing;needs repetition  -BD able to follow single-step instructions;needs cueing;needs repetition;needs increased time;75% of the time  -TB       Personal Safety (P)  decreased awareness, need for assist;decreased awareness, need for safety;decreased insight to deficits  -BD decreased awareness, need for assist;decreased awareness, need for safety;decreased insight to deficits  -TB       Personal Safety Interventions (P)  fall prevention program maintained;gait belt;nonskid shoes/slippers when out of bed;supervised activity  -BD fall prevention program maintained;gait belt;nonskid shoes/slippers when out of bed;other (see comments)   exit alarms  -TB       Short/Long Term Memory  mild impairment, short term memory;mild impairment, long term memory  -TB       ROM (Range of Motion)    General ROM (P)  upper extremity range of motion deficits identified  -BD upper  "extremity range of motion deficits identified  -TB       General ROM Detail (P)  R UE hemiparesis. L UE limited due to wrist pain  -BD R UE hemiparesis; PROM at shoulder and elbow to WFL; hand is 3/4 closed - pt refuses splinting \"I threw the last one away\"; L wrist limited by pain from IV site \"that went bad\"  -TB       General UE Assessment    ROM (P)  wrist, left: UE ROM deficit  -BD wrist, left: UE ROM deficit;other (see comments);scapula, right: UE ROM deficit;shoulder, right: UE ROM deficit;wrist, right: UE ROM deficit   R hand deficit  -TB       MMT (Manual Muscle Testing)    General MMT Assessment (P)  lower extremity strength deficits identified  -BD upper extremity strength deficits identified  -TB       General MMT Assessment Detail  unable to formally assess L UE/hand due to acute pain  -TB       Muscle Tone Assessment    Muscle Tone Assessment  RUE  -TB  RUE  -SUKHJINEDR RUE  -EA    Right-Side Extremities Muscle Tone Assessment  moderately increased tone  -TB  flaccid  -SUKHJINDER flaccid  -EA    RUE Muscle Tone Assessment  moderately increased tone  -TB  flaccid  -SUKHJINDER flaccid  -EA    Bed Mobility, Assessment/Treatment    Bed Mobility, Assistive Device  draw sheet  -TB       Bed Mobility, Roll Left, [place]  maximum assist (25% patient effort);2 person assist required;verbal cues required  -TB       Bed Mobility, Roll Right, [place]  maximum assist (25% patient effort);2 person assist required;verbal cues required  -TB       Bed Mob, Supine to Sit, [place]  maximum assist (25% patient effort);2 person assist required;verbal cues required  -TB       Bed Mob, Sit to Supine, [place]  maximum assist (25% patient effort);2 person assist required;verbal cues required  -TB       Bed Mobility, Safety Issues  decreased use of arms for pushing/pulling;decreased use of legs for bridging/pushing  -TB       Bed Mobility, Impairments  strength decreased;muscle tone abnormal;motor control impaired;pain  -TB       " Transfer Assessment/Treatment    Transfers, Sit-Stand [place]  maximum assist (25% patient effort);2 person assist required;verbal cues required  -TB       Transfers, Stand-Sit [place]  maximum assist (25% patient effort);2 person assist required;verbal cues required  -TB       Transfers, Sit-Stand-Sit, Assist Device  elevated surface;platform walker  -TB       Transfer, Safety Issues  step length decreased;weight-shifting ability decreased;sequencing ability decreased;balance decreased during turns;loses balance backward;other (see comments)   Pt unable to tolerate pressure through L hand for AD use  -TB       Transfer, Impairments  strength decreased;impaired balance;motor control impaired;muscle tone abnormal;ROM decreased;pain  -TB       Functional Mobility    Functional Mobility- Ind. Level  maximum assist (25% patient effort);2 person assist required;verbal cues required  -TB       Functional Mobility- Device  platform walker  -TB       Functional Mobility- Safety Issues  step length decreased;weight-shifting ability decreased;sequencing ability decreased;balance decreased during turns;loses balance backward  -TB       Functional Mobility- Comment  Acute pain L wrist/hand limits use of AD  -TB       Upper Body Dressing Assessment/Training    UB Dressing Assess/Train, Position  sitting;edge of bed  -TB       UB Dressing Assess/Train, [place]  maximum assist (25% patient effort);verbal cues required  -TB       UB Dressing Assess/Train, Impairments  ROM decreased;muscle tone abnormal;strength decreased;motor control impaired;pain  -TB       UB Dressing Assess/Train, Comment  Pt able to verbalize sequencing for R homa-dressing  -TB       Lower Body Dressing Assessment/Training    LB Dressing Assess/Train, Clothing Type  donning:;slipper socks  -TB       LB Dressing Assess/Train, Position  sitting;edge of bed  -TB       LB Dressing Assess/Train, [place]  dependent (less than 25% patient effort)   -TB       LB Dressing Assess/Train, Impairments  ROM decreased;motor control impaired;pain;muscle tone abnormal  -TB       Balance Skills Training    Sitting-Level of Assistance  Contact guard  -TB       Sitting-Balance Support  Feet supported  -TB       Sitting-Balance Activities  Lateral lean;Forward lean;Reaching across midline  -TB       Standing-Level of Assistance  Maximum assistance;x2  -TB       Static Standing Balance Support  assistive device  -TB       Standing-Balance Activities  Weight Shift R-L  -TB       Therapy Exercises    Right Upper Extremity  AAROM:;PROM:;sitting;shoulder extension/flexion;elbow flexion/extension   Pt completes SROM; washcloth splint placed R hand   -TB       Left Upper Extremity  AROM:;shoulder extension/flexion;shoulder abduction/adduction;shoulder horizontal abd/add;shoulder ER/IR;elbow flexion/extension;pronation/supination;hand pumps  -TB       Fine Motor Coordination Training    Opposition  Right:;impaired;Left:;intact  -TB       Sensory Assessment/Intervention    Light Touch  LUE;RUE  -TB       LUE Light Touch  WNL  -TB       RUE Light Touch  mild impairment  -TB       Positioning and Restraints    Pre-Treatment Position  in bed  -TB       Post Treatment Position  bed  -TB       In Bed  notified nsg;supine;call light within reach;encouraged to call for assist;exit alarm on  -TB         06/01/17 0830 05/31/17 2000 05/31/17 0800 05/30/17 2000       Muscle Tone Assessment    Muscle Tone Assessment RUE  -SUKHJINDER RUE  -RA RUE  -LM RUE  -EA     Right-Side Extremities Muscle Tone Assessment flaccid  -SUKHJINDER flaccid  -RA flaccid  -LM flaccid  -EA     RUE Muscle Tone Assessment flaccid  -SUKHJINDER flaccid  -RA flaccid  -LM flaccid  -EA       User Key  (r) = Recorded By, (t) = Taken By, (c) = Cosigned By    Initials Name Effective Dates    TB Marifer Schofield, OT 06/22/15 -     EA Lauren Torres RN 06/16/16 -     SUKHJINDER Hallman RN 06/16/16 -     IRVING Pacheco RN 06/16/16 -      BEN Hernandez, PT 06/13/16 -     JYO Nation, RN 11/29/16 -            Occupational Therapy Education     Title: PT OT SLP Therapies (Done)     Topic: Occupational Therapy (Done)     Point: ADL training (Done)    Description: Instruct learner(s) on proper safety adaptation and remediation techniques during self care or transfers.   Instruct in proper use of assistive devices.    Learning Progress Summary    Learner Readiness Method Response Comment Documented by Status   Patient Acceptance E,D VU,NR Education initiated for benefits of therapy, role of OT and recommendation for rehab at d/c to support return to home with assist  06/02/17 1157 Done                      User Key     Initials Effective Dates Name Provider Type Discipline     06/22/15 -  Marifer Schofield OT Occupational Therapist OT                  OT Recommendation and Plan  Anticipated Equipment Needs At Discharge: wheelchair  Anticipated Discharge Disposition: skilled nursing facility  Therapy Frequency: daily  Plan of Care Review  Plan Of Care Reviewed With: patient  Outcome Summary/Follow up Plan: IE completed this date. OT to follow for mobility, transfers and ADL performance deficits. Recommend rehab at SNF at d/c.          OT Goals       06/02/17 1158          Transfer Training OT LTG    Transfer Training OT LTG, Time to Achieve by discharge  -TB      Transfer Training OT LTG, Activity Type toilet  -TB      Transfer Training OT LTG, [place] Level moderate assist (50% patient effort);2 person assist required;verbal cues required  -TB      Transfer Training OT LTG, Assist Device walker, homa  -TB      Toileting OT LTG    Toileting Goal OT LTG, Time to Achieve by discharge  -TB      Toileting Goal OT LTG, [place] Level moderate assist (50% patient effort);verbal cues required  -TB      UB Dressing OT LTG    UB Dressing Goal OT LTG, Time to Achieve by discharge  -TB      UB Dressing Goal OT LTG, [place] Level  minimum assist (75% patient effort)  -TB      UB Dressing Goal OT LTG, Additional Goal Pt to demonstrate R UE homa-dressing  -TB      Functional Mobility OT LTG    Functional Mobility Goal OT LTG, Time to Achieve by discharge  -TB      Functional Mobility Goal OT LTG, [place] Level mod assist;x2  -TB      Functional Mobility Goal OT LTG, Assist Device homa walker  -TB      Functional Mobility Goal OT LTG, Distance to Achieve to the bathroom  -TB        User Key  (r) = Recorded By, (t) = Taken By, (c) = Cosigned By    Initials Name Provider Type    TB Marifer Schofield OT Occupational Therapist                Outcome Measures       06/02/17 0855          How much help from another is currently needed...    Putting on and taking off regular lower body clothing? 1  -TB      Bathing (including washing, rinsing, and drying) 1  -TB      Toileting (which includes using toilet bed pan or urinal) 1  -TB      Putting on and taking off regular upper body clothing 2  -TB      Taking care of personal grooming (such as brushing teeth) 2  -TB      Eating meals 3  -TB      Score 10  -TB      Functional Assessment    Outcome Measure Options AM-PAC 6 Clicks Daily Activity (OT)  -TB        User Key  (r) = Recorded By, (t) = Taken By, (c) = Cosigned By    Initials Name Provider Type    TB Marifer Schofield OT Occupational Therapist          Time Calculation:   OT Start Time: 0855    Therapy Charges for Today     Code Description Service Date Service Provider Modifiers Qty    10493236431  OT EVAL HIGH COMPLEXITY 4 6/2/2017 Marifer Schofield OT GO 1    [id number]  OT THERAPEUTIC ACT EA 15 MIN 6/2/2017 Marifer Schofield OT GO 1               Marifer Schofield OT  6/2/2017

## 2017-06-02 NOTE — PROGRESS NOTES
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 Applied Thera honey for autolytic debridement.\"                Anthropometrics       06/02/17 1046    Anthropometrics (Special Considerations)    Height Used for Calculations 1.524 m (5')    Weight Used for Calculations 81.6 kg (179 lb 14.3 oz)            Labs/Tests/Procedures/Meds       06/02/17 1047    Labs/Tests/Procedures/Meds    Labs/Tests Review Reviewed                Nutrition Prescription Ordered       06/02/17 1047    Nutrition Prescription PO    Current PO Diet Regular              Evaluation of Received Nutrient/Fluid Intake       06/02/17 1047    PO Evaluation    Number of Days PO Intake Evaluated --   only PO recorded from 5/27 - 5/31    Number of Meals 3    % PO Intake 67%          Problem/Interventions:        Problem 1       06/02/17 1048    Nutrition Diagnoses Problem 1    Problem 1 Increased Nutrient Needs    Macronutrient Protein;Kcal    Etiology (related to) Medical Diagnosis   skin integrity    Signs/Symptoms (evidenced by) Other (comment)   wound on L hand                  Intervention Goal       06/02/17 1048    Intervention Goal    General Nutrition support treatment            Nutrition Intervention       06/02/17 1048    Nutrition Intervention    RD/Tech Action Care plan reviewd;Follow Tx progress;Supplement provided            Nutrition Prescription       " 06/02/17 1057    Nutrition Prescription PO    PO Prescription Begin/change supplement    Supplement Boost Glucose Control    Supplement Frequency 2 times a day    New PO Prescription Ordered? Yes            Education/Evaluation       06/02/17 1048    Monitor/Evaluation    Monitor Per protocol;PO intake;Skin status            Electronically signed by:  Tonya Evans  06/02/17 10:48 AM

## 2017-06-02 NOTE — PROGRESS NOTES
"[facility]     Chief Complaint:  anemia      Interval History: Patient feels pretty good.  Happy her hand dressing better.  Patient tolerating po and eating.  NO melena.  H/H stable      Objective     Vital Signs Blood pressure 154/54, pulse 58, temperature 97.9 °F (36.6 °C), temperature source Oral, resp. rate 16, height 60\" (152.4 cm), weight 179 lb 14.4 oz (81.6 kg), SpO2 97 %.    Physical Exam:   General Appearance: alert, appears stated age and cooperative  Head: normocephalic, without obvious abnormality and atraumatic  Lungs: clear to auscultation, respirations regular, respirations even and respirations unlabored  Abdomen: normal bowel sounds, no masses, no hepatomegaly, no splenomegaly, soft non-tender, no guarding and no rebound tenderness     Results Review:   I reviewed the patient's new clinical results.    LABS:      Lab Results (last 24 hours)     Procedure Component Value Units Date/Time    POC Glucose Fingerstick [[id number]]  (Abnormal) Collected:  06/01/17 2045    Specimen:  Blood Updated:  06/01/17 2046     Glucose 287 (H) mg/dL     Narrative:       Meter: [id number] : [id number] German Hobbs    POC Glucose Fingerstick [[id number]]  (Abnormal) Collected:  06/02/17 0717    Specimen:  Blood Updated:  06/02/17 0720     Glucose 157 (H) mg/dL     Narrative:       Meter: [id number] : [id number] Contreras Mariam    POC Glucose Fingerstick [[id number]]  (Abnormal) Collected:  06/02/17 1126    Specimen:  Blood Updated:  06/02/17 1128     Glucose 270 (H) mg/dL     Narrative:       Meter: [id number] : [id number] [employer]    POC Glucose Fingerstick [[id number]]  (Abnormal) Collected:  06/02/17 1636    Specimen:  Blood Updated:  06/02/17 1637     Glucose 298 (H) mg/dL     Narrative:       Meter: [id number] : [id number] Ben Becerra        RADIOLOGY:  Imaging Results (last 24 hours)     ** No results found for the last 24 hours. **            Current Facility-Administered Medications:   •  " acetaminophen (TYLENOL) tablet 650 mg, 650 mg, Oral, Q4H PRN, Michael Alvarez PA-C, 650 mg at 06/02/17 1043  •  amLODIPine (NORVASC) tablet 5 mg, 5 mg, Oral, Q12H, Qiana Leslie MD, 5 mg at 06/02/17 0835  •  aspirin EC tablet 81 mg, 81 mg, Oral, Daily, Tiffanie Noel DO, 81 mg at 06/02/17 0835  •  atorvastatin (LIPITOR) tablet 80 mg, 80 mg, Oral, Daily, Michael Alvarez PA-C, 80 mg at 06/02/17 0835  •  baclofen (LIORESAL) tablet 10 mg, 10 mg, Oral, Q8H, Snehal Goode MD, 10 mg at 06/02/17 1325  •  CloNIDine (CATAPRES) tablet 0.1 mg, 0.1 mg, Oral, Q6H PRN, Qiana Leslie MD, 0.1 mg at 05/31/17 1413  •  dextrose (D50W) solution 25 g, 25 g, Intravenous, Q15 Min PRN, Michael Alvarez PA-C  •  dextrose (GLUTOSE) oral gel 15 g, 15 g, Oral, Q15 Min PRN, Michael Alvarez PA-C  •  docusate sodium (COLACE) capsule 100 mg, 100 mg, Oral, BID, Michael Alvarez PA-C, 100 mg at 06/02/17 1715  •  [START ON 6/3/2017] furosemide (LASIX) tablet 40 mg, 40 mg, Oral, Daily, Peter Scott MD  •  gabapentin (NEURONTIN) capsule 300 mg, 300 mg, Oral, Nightly, Snehal Goode MD, 300 mg at 06/01/17 2130  •  glucagon (GLUCAGEN) injection 1 mg, 1 mg, Subcutaneous, Q15 Min PRN, Michael Alvarez PA-C  •  hydrALAZINE (APRESOLINE) injection 10 mg, 10 mg, Intravenous, Q6H PRN, Michael Alvarez PA-C, 10 mg at 05/31/17 0552  •  insulin detemir (LEVEMIR) injection 18 Units, 18 Units, Subcutaneous, Nightly, Snehal Goode MD, 18 Units at 06/01/17 2131  •  ipratropium-albuterol (DUO-NEB) nebulizer solution 3 mL, 3 mL, Nebulization, Q6H PRN, Qiana Leslie MD  •  ipratropium-albuterol (DUO-NEB) nebulizer solution 3 mL, 3 mL, Nebulization, TID - RT, Qiana Leslie MD, 3 mL at 06/02/17 1257  •  lactated ringers infusion, 9 mL/hr, Intravenous, Continuous, Samuel Simons MD  •  levothyroxine (SYNTHROID, LEVOTHROID) tablet 200 mcg, 200 mcg, Oral, Q AM, Michael Alvarez PA-C, 200 mcg at 06/02/17 0538  •  melatonin sublingual tablet 5  mg, 5 mg, Sublingual, Nightly PRN, Michael Alvarez PA-C, 5 mg at 05/28/17 2136  •  metoprolol tartrate (LOPRESSOR) tablet 50 mg, 50 mg, Oral, Q12H, Michael Alvarez PA-C, 50 mg at 06/02/17 0835  •  nitroglycerin (NITROSTAT) ointment 0.5 inch, 0.5 inch, Topical, Q6H PRN, Qiana Leslie MD, 0.5 inch at 05/31/17 1503  •  ondansetron (ZOFRAN) tablet 4 mg, 4 mg, Oral, Q6H PRN **OR** ondansetron (ZOFRAN) injection 4 mg, 4 mg, Intravenous, Q6H PRN, Michael Alvarez PA-C  •  pantoprazole (PROTONIX) EC tablet 40 mg, 40 mg, Oral, Q AM, Lizbet Vidales MD, 40 mg at 06/02/17 0538  •  sodium chloride 0.9 % flush 1-10 mL, 1-10 mL, Intravenous, PRN, Michael Alvarez PA-C  •  sodium chloride 0.9 % flush 10 mL, 10 mL, Intravenous, PRN, Familia De Los Santos MD  •  vitamin C (ASCORBIC ACID) tablet 500 mg, 500 mg, Oral, Daily, Michael Alvarez PA-C, 500 mg at 06/02/17 0835    Assessment/Plan    75 yo s/p EGD with AVM treated with argon plasma coagulator with known polyp from previous October colonoscopy by Dr. Sage.  Patient has had small bowel follow through that was negative.  Patient had been on plavix that was changed to effient for stroke.  1.  At this point, no plans for inpatient colonoscopy;  Patient stable and tolerating diet.  Discussed with Dr. Sage (primary GI)  2.  Would consider close cbc follow up upon discharge to make sure hemoglobin not trending downward and follow up with Dr. Sage to consider pillcam endoscopy  3.  Please call for questions      Lizbet Vidales MD  06/02/17  5:54 PM

## 2017-06-02 NOTE — PROGRESS NOTES
Continued Stay Note  [facility]     Patient Name: Jennifer Oliveira  MRN: [id number]  Today's Date: 6/2/2017    Admit Date: 5/26/2017          Discharge Plan       06/02/17 1117    Case Management/Social Work Plan    Plan [facility] eval    Patient/Family In Agreement With Plan yes    Additional Comments Spoke with PT, patient and patient's daughter. Patient was only able to take 5 steps with physical therapy today with max assist. Patient is agreeable to a [facility] referral, but is hesitant. Spoke with daughter. She is also agreeable.  Awaiting eval. Tonya Oates RN x [phone]              Discharge Codes     None        Expected Discharge Date and Time     Expected Discharge Date Expected Discharge Time    [place] 3, 2017             Tonya Oates RN

## 2017-06-02 NOTE — THERAPY EVALUATION
Acute Care - Physical Therapy Initial Evaluation  [facility]     Patient Name: Jennifer Oliveira  : 1940  MRN: [id number]  Today's Date: 2017   Onset of Illness/Injury or Date of Surgery Date: 17  Date of Referral to PT: 17  Referring Physician: MD Sonia      Admit Date: 2017     Visit Dx:    ICD-10-CM ICD-9-CM   1. Symptomatic anemia D64.9 285.9   2. Gastrointestinal hemorrhage, unspecified gastrointestinal hemorrhage type K92.2 578.9   3. Chronic renal insufficiency, unspecified stage N18.9 585.9   4. Normochromic normocytic anemia D64.9 285.9   5. Gastrointestinal hemorrhage with melena K92.1 578.1   6. Impaired mobility and ADLs Z74.09 799.89   7. Impaired functional mobility, balance, gait, and endurance Z74.09 V49.89     Patient Active Problem List   Diagnosis   • Acute hypoxemic respiratory failure   • PAD (peripheral artery disease)   • Normochromic normocytic anemia   • Chronic diastolic heart failure   • Diabetes type 2, uncontrolled   • Lower extremity cellulitis   • Hypothyroid   • HTN (hypertension)   • Metabolic encephalopathy   • TIA (transient ischemic attack)   • Acute cystitis without hematuria   • Chronic kidney disease (CKD), stage III (moderate)   • GERD (gastroesophageal reflux disease) with hx of gastritis    • Stroke   • Hypertension   • Disease of thyroid gland   • Diastolic heart failure   • Diabetes mellitus   • CKD (chronic kidney disease), stage III   • Symptomatic anemia   • GI bleed   • Acute-on-chronic kidney injury     Past Medical History:   Diagnosis Date   • CKD (chronic kidney disease), stage III     baseline Cr 1.5-1.6   • Diabetes mellitus    • Diastolic heart failure     last LVEF 70%   • Disease of thyroid gland    • GERD (gastroesophageal reflux disease)    • Hypertension    • PAD (peripheral artery disease)     s/p right fem-pop bypass 2016   • Stroke     residual chronic right hemiplegia     Past Surgical History:   Procedure Laterality  Date   • CARPAL TUNNEL RELEASE     • CHOLECYSTECTOMY     • ENDOSCOPY N/A 5/15/2017    Procedure: ESOPHAGOGASTRODUODENOSCOPY;  Surgeon: Lizbet Vidales MD;  Location:  [facility];  Service:    • ENDOSCOPY N/A 5/30/2017    Procedure: ESOPHAGOGASTRODUODENOSCOPY;  Surgeon: Lizbet Vidales MD;  Location:  [facility];  Service:    • FEMORAL POPLITEAL BYPASS Right    • HYSTERECTOMY            PT ASSESSMENT (last 72 hours)      PT Evaluation       06/02/17 0905 06/02/17 0855    Rehab Evaluation    Document Type evaluation  -BD evaluation  -TB    Subjective Information agree to therapy;complains of;pain  -BD agree to therapy;complains of;pain  -TB    Patient Effort, Rehab Treatment adequate  -BD adequate  -TB    Symptoms Noted During/After Treatment increased pain  -BD increased pain  -TB    Symptoms Noted Comment Pt w/ pain in non CVA wrist/arm. Difficult  -BD  Pt with h/o CVA with significant residulal R HP and current c/o acute pain L wrist/hand pain which together limit use of AD for mobility/transfers   -TB    General Information    Patient Profile Review yes  -BD yes  -TB    Onset of Illness/Injury or Date of Surgery Date 05/26/17  -BD 05/26/17  -TB    Referring Physician MD Sonia  -BD Sonia  -TB    General Observations Pt supine in bed, O2, L wrist bandaged.   -BD Pt rec'vd supine in bed, finishing breakfast meal; supplemental O2; IV removed this morning  -TB    Pertinent History Of Current Problem Pt admitted to ED from PCP w acute hemoglobin drop, GI bleed. Pt w HX of CVA and R side weakness  -BD Pt to ED direct admit from PCP for acute hemoglobin drop; GI bleed  -TB    Precautions/Limitations fall precautions;oxygen therapy device and L/min  -BD fall precautions;oxygen therapy device and L/min;other (see comments)   remote CVA with residual R HP; Acute L wrist/hand pain  -TB    Prior Level of Function independent:;all household mobility;transfer;bed mobility;mod assist:;dressing;bathing;home management    Pt with HH and home caregivers weekly.  -BD independent:;all household mobility;transfer;bed mobility;dressing;mod assist:;bathing;home management   Pt has caregivers and HH in place prior to admit  -TB    Equipment Currently Used at Home bath bench;oxygen;walker, homa;ramp   transport chair   -BD bath bench;oxygen;walker, homa  -TB    Plans/Goals Discussed With patient;agreed upon  -BD patient;agreed upon  -TB    Risks Reviewed patient:;LOB;increased discomfort  -BD patient:;LOB;increased discomfort  -TB    Benefits Reviewed patient:;improve function  -BD patient:;improve function;increase independence;increase knowledge  -TB    Barriers to Rehab medically complex;previous functional deficit  -BD medically complex;previous functional deficit  -TB    Living Environment    Lives With alone  -BD alone  -TB    Living Arrangements mobile home  -BD mobile home  -TB    Home Accessibility ramps present at home  -BD ramps present at home;tub/shower is not walk in  -TB    Living Environment Comment HH in place prior to hospitalization.  -BD Pt has HH aides for bathing and home mgmt  -TB    Clinical Impression    Date of Referral to PT 06/01/17  -BD     PT Diagnosis impaired functional mobility  -BD     Patient/Family Goals Statement to improve and return home  -BD     Criteria for Skilled Therapeutic Interventions Met yes  -BD     Pathology/Pathophysiology Noted (Describe Specifically for Each System) neuromuscular;musculoskeletal  -BD     Impairments Found (describe specific impairments) gait, locomotion, and balance  -BD     Vital Signs    Pre Systolic BP Rehab 154  -  -TB    Pre Treatment Diastolic BP 54  -BD 54  -TB    Pre Patient Position  Supine  -TB    Intra Patient Position  Standing  -TB    Post Patient Position  Supine  -TB    Pain Assessment    Pain Assessment Valles-Rosas FACES  -BD Valles-Baker FACES  -TB    Valles-Ros[place] FACES Pain Rating 6  -BD 6  -TB    Pain Type Acute pain  -BD Acute pain  -TB    Pain  "Location Wrist  -BD Wrist  -TB    Pain Orientation Left  -BD Left  -TB    Pain Intervention(s) Repositioned  -BD Repositioned  -TB    Response to Interventions tolerated  -BD tolerated  -TB    Vision Assessment/Intervention    Visual Impairment WFL with corrective lenses  -BD WFL with corrective lenses  -TB    Cognitive Assessment/Intervention    Current Cognitive/Communication Assessment impaired  -BD impaired  -TB    Orientation Status oriented to;person;place;situation  -BD oriented to;person;place;situation  -TB    Follows Commands/Answers Questions able to follow single-step instructions;needs increased time;needs cueing;needs repetition  -BD able to follow single-step instructions;needs cueing;needs repetition;needs increased time;75% of the time  -TB    Personal Safety decreased awareness, need for assist;decreased awareness, need for safety;decreased insight to deficits  -BD decreased awareness, need for assist;decreased awareness, need for safety;decreased insight to deficits  -TB    Personal Safety Interventions fall prevention program maintained;gait belt;nonskid shoes/slippers when out of bed;supervised activity  -BD fall prevention program maintained;gait belt;nonskid shoes/slippers when out of bed;other (see comments)   exit alarms  -TB    Short/Long Term Memory  mild impairment, short term memory;mild impairment, long term memory  -TB    ROM (Range of Motion)    General ROM upper extremity range of motion deficits identified  -BD upper extremity range of motion deficits identified  -TB    General ROM Detail R UE hemiparesis. L UE limited due to wrist pain  -BD R UE hemiparesis; PROM at shoulder and elbow to WFL; hand is 3/4 closed - pt refuses splinting \"I threw the last one away\"; L wrist limited by pain from IV site \"that went bad\"  -TB    General UE Assessment    ROM wrist, left: UE ROM deficit  -BD wrist, left: UE ROM deficit;other (see comments);scapula, right: UE ROM deficit;shoulder, right: UE " ROM deficit;wrist, right: UE ROM deficit   R hand deficit  -TB    MMT (Manual Muscle Testing)    General MMT Assessment lower extremity strength deficits identified  -BD upper extremity strength deficits identified  -TB    General MMT Assessment Detail B LE's decreased general strength. Residual R LE weakness >  -BD unable to formally assess L UE/hand due to acute pain  -TB    Muscle Tone Assessment    Muscle Tone Assessment  RUE  -TB    Right-Side Extremities Muscle Tone Assessment  moderately increased tone  -TB    RUE Muscle Tone Assessment  moderately increased tone  -TB    Bed Mobility, Assessment/Treatment    Bed Mobility, Assistive Device draw sheet  -BD draw sheet  -TB    Bed Mobility, Roll Left, [place] minimum assist (75% patient effort);2 person assist required;verbal cues required  -BD maximum assist (25% patient effort);2 person assist required;verbal cues required  -TB    Bed Mobility, Roll Right, [place] maximum assist (25% patient effort);2 person assist required;verbal cues required  -BD maximum assist (25% patient effort);2 person assist required;verbal cues required  -TB    Bed Mob, Supine to Sit, [place] maximum assist (25% patient effort);2 person assist required;verbal cues required  -BD maximum assist (25% patient effort);2 person assist required;verbal cues required  -TB    Bed Mob, Sit to Supine, [place] maximum assist (25% patient effort);2 person assist required;verbal cues required  -BD maximum assist (25% patient effort);2 person assist required;verbal cues required  -TB    Bed Mobility, Safety Issues decreased use of arms for pushing/pulling;decreased use of legs for bridging/pushing  -BD decreased use of arms for pushing/pulling;decreased use of legs for bridging/pushing  -TB    Bed Mobility, Impairments decreased flexibility;ROM decreased;strength decreased;impaired balance;pain  -BD strength decreased;muscle tone abnormal;motor control impaired;pain  -TB     Transfer Assessment/Treatment    Transfers, Sit-Stand [place] maximum assist (25% patient effort);2 person assist required;verbal cues required  -BD maximum assist (25% patient effort);2 person assist required;verbal cues required  -TB    Transfers, Stand-Sit [place] maximum assist (25% patient effort);2 person assist required;verbal cues required  -BD maximum assist (25% patient effort);2 person assist required;verbal cues required  -TB    Transfers, Sit-Stand-Sit, Assist Device elevated surface;platform walker  -BD elevated surface;platform walker  -TB    Transfer, Safety Issues balance decreased during turns;step length decreased;weight-shifting ability decreased;loses balance backward  -BD step length decreased;weight-shifting ability decreased;sequencing ability decreased;balance decreased during turns;loses balance backward;other (see comments)   Pt unable to tolerate pressure through L hand for AD use  -TB    Transfer, Impairments strength decreased;impaired balance;motor control impaired;ROM decreased;pain  -BD strength decreased;impaired balance;motor control impaired;muscle tone abnormal;ROM decreased;pain  -TB    Transfer, Comment Max assist x2. Slow processing.  -BD     Gait Assessment/Treatment    Gait, [place] Level 2 person assist required;maximum assist (25% patient effort);verbal cues required  -BD     Gait, Assistive Device rolling platform walker  -BD     Gait, Distance (Feet) 5  -BD     Gait, Gait Pattern Analysis swing-to gait  -BD     Gait, Gait Deviations domenic decreased;decreased heel strike;double stance time increased;forward flexed posture;swing-to-stance ratio decreased;step length decreased;stride length decreased;weight-shifting ability decreased  -BD     Gait, Maintain Weight Bearing Status assist to maintain weight bearing status;cues to maintain weight bearing status  -BD     Gait, Safety Issues balance decreased during turns;sequencing ability decreased;step length  decreased;weight-shifting ability decreased  -BD     Gait, Impairments decreased flexibility;ROM decreased;sensation decreased;strength decreased;impaired balance;coordination impaired;pain  -BD     Gait, Comment 2 assist and AD required.  -BD     Balance Skills Training    Sitting-Level of Assistance Close supervision  -BD Contact guard  -TB    Sitting-Balance Support  Feet supported  -TB    Sitting-Balance Activities  Lateral lean;Forward lean;Reaching across midline  -TB    Standing-Level of Assistance Close supervision;x2  -BD Maximum assistance;x2  -TB    Static Standing Balance Support Right upper extremity supported;Left upper extremity supported;assistive device  -BD assistive device  -TB    Standing-Balance Activities Weight Shift R-L   Marching steps to encourage wt shift  -BD Weight Shift R-L  -TB    Therapy Exercises    Right Lower Extremity AAROM:;ankle pumps/circles;hamstring stretch;hip ER;hip IR;glut sets  -BD     RLE Resistance manual resistance- minimal  -BD     Left Lower Extremity ankle pumps/circles;hip ER;hip IR;glut sets  -BD     Right Upper Extremity  AAROM:;PROM:;sitting;shoulder extension/flexion;elbow flexion/extension   Pt completes SROM; washcloth splint placed R hand   -TB    Left Upper Extremity  AROM:;shoulder extension/flexion;shoulder abduction/adduction;shoulder horizontal abd/add;shoulder ER/IR;elbow flexion/extension;pronation/supination;hand pumps  -TB    Fine Motor Coordination Training    Opposition  Right:;impaired;Left:;intact  -TB    Sensory Assessment/Intervention    Light Touch  LUE;RUE  -TB    LUE Light Touch  WNL  -TB    RUE Light Touch  mild impairment  -TB    Positioning and Restraints    Pre-Treatment Position in bed  -BD in bed  -TB    Post Treatment Position bed  -BD bed  -TB    In Bed notified nsg;supine;call light within reach;encouraged to call for assist;side rails up x2;exit alarm on  -BD notified nsg;supine;call light within reach;encouraged to call for  assist;exit alarm on  -TB      06/02/17 0830 06/02/17 0815    Rehab Evaluation    Evaluation Not Performed patient unavailable for evaluation   Pt eating breakfast requests OT return  -TB     Muscle Tone Assessment    Muscle Tone Assessment  RUE  -SUKHJINDER    Right-Side Extremities Muscle Tone Assessment  flaccid  -SUKHJINDER    RUE Muscle Tone Assessment  flaccid  -SUKHJINDER      06/01/17 2045 06/01/17 0830    Muscle Tone Assessment    Muscle Tone Assessment RUE  -EA RUE  -SUKHJINDER    Right-Side Extremities Muscle Tone Assessment flaccid  -EA flaccid  -SUKHJINDER    RUE Muscle Tone Assessment flaccid  -EA flaccid  -SUKHJINDER      05/31/17 2000 05/31/17 0800    Muscle Tone Assessment    Muscle Tone Assessment RUE  -RA RUE  -LM    Right-Side Extremities Muscle Tone Assessment flaccid  -RA flaccid  -LM    RUE Muscle Tone Assessment flaccid  -RA flaccid  -LM      05/30/17 2000       Muscle Tone Assessment    Muscle Tone Assessment RUE  -EA     Right-Side Extremities Muscle Tone Assessment flaccid  -EA     RUE Muscle Tone Assessment flaccid  -EA       User Key  (r) = Recorded By, (t) = Taken By, (c) = Cosigned By    Initials Name Provider Type    TB Marifer Schofield, OT Occupational Therapist    JUANIS Torres, RN Registered Nurse    SUKHJINDER Hallman, RN Registered Nurse    IRVING Pacheco, RN Registered Nurse    BEN Hernandez, PT Physical Therapist    JOY Nation, RN Registered Nurse          Physical Therapy Education     Title: PT OT SLP Therapies (Active)     Topic: Physical Therapy (Active)     Point: Mobility training (Active)    Learning Progress Summary    Learner Readiness Method Response Comment Documented by Status   Patient Acceptance E,D NR   06/02/17 1210 Active    Acceptance D NR   06/02/17 1208 Active               Point: Home exercise program (Active)    Learning Progress Summary    Learner Readiness Method Response Comment Documented by Status   Patient Acceptance E,D NR   06/02/17 1210 Active     Acceptance D NR  BD 06/02/17 1208 Active               Point: Body mechanics (Active)    Learning Progress Summary    Learner Readiness Method Response Comment Documented by Status   Patient Acceptance E,D NR  BD 06/02/17 1210 Active    Acceptance D NR  BD 06/02/17 1208 Active               Point: Precautions (Active)    Learning Progress Summary    Learner Readiness Method Response Comment Documented by Status   Patient Acceptance E,D NR  BD 06/02/17 1210 Active    Acceptance D NR  BD 06/02/17 1208 Active                      User Key     Initials Effective Dates Name Provider Type Discipline     06/13/16 -  Trudi Hernandez, PT Physical Therapist PT                PT Recommendation and Plan  Anticipated Equipment Needs At Discharge: platform walker  Anticipated Discharge Disposition: inpatient rehabilitation facility  PT Frequency: daily, per priority policy  Plan of Care Review  Plan Of Care Reviewed With: patient  Progress: progress toward functional goals as expected  Outcome Summary/Follow up Plan: Pt needs encouragement. R side weakness in E's and L UE pain plus decreased wt bearing L UE. Encouagement needed.          IP PT Goals       06/02/17 1216          Bed Mobility PT LTG    Bed Mobility PT LTG, Time to Achieve 2 wks  -BD      Bed Mobility PT LTG, Activity Type all bed mobility  -BD      Bed Mobility PT LTG, [place] Level minimum assist (75% patient effort)  -BD      Bed Mobility PT LTG, Outcome goal ongoing  -BD      Transfer Training PT LTG    Transfer Training PT LTG, Time to Achieve 2 wks  -BD      Transfer Training PT LTG, Activity Type all transfers  -BD      Transfer Training PT LTG, [place] Level 2 person assist required;moderate assist (50% patient effort)  -BD      Transfer Training PT LTG, Assist Device walker, rolling platform  -BD      Transfer Training PT LTG, Outcome goal ongoing  -BD      Gait Training PT LTG    Gait Training Goal PT LTG, Date Established 06/02/17  -       Gait Training Goal PT LTG, Time to Achieve 2 wks  -BD      Gait Training Goal PT LTG, [place] Level 2 person assist required;set up required;verbal cues required  -BD      Gait Training Goal PT LTG, Assist Device walker, rolling platform  -BD      Gait Training Goal PT LTG, Distance to Achieve 30  -BD      Gait Training Goal PT LTG, Outcome goal ongoing  -BD        User Key  (r) = Recorded By, (t) = Taken By, (c) = Cosigned By    Initials Name Provider Type    BEN Hernandez, ELO Physical Therapist                Outcome Measures       06/02/17 0905 06/02/17 0855       How much help from another person do you currently need...    Turning from your back to your side while in flat bed without using bedrails? 2  -BD      Moving from lying on back to sitting on the side of a flat bed without bedrails? 2  -BD      Moving to and from a bed to a chair (including a wheelchair)? 2  -BD      Standing up from a chair using your arms (e.g., wheelchair, bedside chair)? 2  -BD      Climbing 3-5 steps with a railing? 2  -BD      To walk in hospital room? 2  -BD      AM-PAC 6 Clicks Score 12  -BD      How much help from another is currently needed...    Putting on and taking off regular lower body clothing?  1  -TB     Bathing (including washing, rinsing, and drying)  1  -TB     Toileting (which includes using toilet bed pan or urinal)  1  -TB     Putting on and taking off regular upper body clothing  2  -TB     Taking care of personal grooming (such as brushing teeth)  2  -TB     Eating meals  3  -TB     Score  10  -TB     Functional Assessment    Outcome Measure Options AM-PAC 6 Clicks Basic Mobility (PT)  -BD AM-PAC 6 Clicks Daily Activity (OT)  -TB       User Key  (r) = Recorded By, (t) = Taken By, (c) = Cosigned By    Initials Name Provider Type    TB Marifer Schofield, OT Occupational Therapist    BEN Hernandez, PT Physical Therapist           Time Calculation:         PT Charges       06/02/17 5885           Time Calculation    Start Time 0905  -BD      PT Received On 06/02/17  -BD      PT Goal Re-Cert Due Date 06/12/17  -BD      Time Calculation- PT    Total Timed Code Minutes- PT 40 minute(s)  -BD        User Key  (r) = Recorded By, (t) = Taken By, (c) = Cosigned By    Initials Name Provider Type    BD Trudi Hernandez, PT Physical Therapist          Therapy Charges for Today     Code Description Service Date Service Provider Modifiers Qty    84940603307 HC GAIT TRAINING EA 15 MIN 6/2/2017 Trudi Hernandez, PT GP 1    79637623601 HC PT THER PROC EA 15 MIN 6/2/2017 Trudi Hernandez, PT GP 2    86519011775 HC PT EVAL MOD COMPLEXITY 2 6/2/2017 Trudi Hernandez, PT GP 1          PT G-Codes  Outcome Measure Options: AM-PAC 6 Clicks Basic Mobility (PT)      Trudi Hernandez, PT  6/2/2017

## 2017-06-03 LAB
ANION GAP SERPL CALCULATED.3IONS-SCNC: 6 MMOL/L (ref 3–11)
BUN BLD-MCNC: 32 MG/DL (ref 9–23)
BUN/CREAT SERPL: 22.9 (ref 7–25)
CALCIUM SPEC-SCNC: 9.1 MG/DL (ref 8.7–10.4)
CHLORIDE SERPL-SCNC: 104 MMOL/L (ref 99–109)
CO2 SERPL-SCNC: 28 MMOL/L (ref 20–31)
CREAT BLD-MCNC: 1.4 MG/DL (ref 0.6–1.3)
GFR SERPL CREATININE-BSD FRML MDRD: 37 ML/MIN/1.73
GLUCOSE BLD-MCNC: 198 MG/DL (ref 70–100)
GLUCOSE BLDC GLUCOMTR-MCNC: 227 MG/DL (ref 70–130)
GLUCOSE BLDC GLUCOMTR-MCNC: 303 MG/DL (ref 70–130)
GLUCOSE BLDC GLUCOMTR-MCNC: 377 MG/DL (ref 70–130)
GLUCOSE BLDC GLUCOMTR-MCNC: 408 MG/DL (ref 70–130)
GLUCOSE BLDC GLUCOMTR-MCNC: 414 MG/DL (ref 70–130)
GLUCOSE BLDC GLUCOMTR-MCNC: 477 MG/DL (ref 70–130)
GLUCOSE BLDC GLUCOMTR-MCNC: 494 MG/DL (ref 70–130)
HCT VFR BLD AUTO: 31.2 % (ref 34.5–44)
HGB BLD-MCNC: 9.6 G/DL (ref 11.5–15.5)
MAGNESIUM SERPL-MCNC: 2.1 MG/DL (ref 1.3–2.7)
PHOSPHATE SERPL-MCNC: 4.3 MG/DL (ref 2.4–5.1)
POTASSIUM BLD-SCNC: 4.3 MMOL/L (ref 3.5–5.5)
SODIUM BLD-SCNC: 138 MMOL/L (ref 132–146)

## 2017-06-03 PROCEDURE — 94640 AIRWAY INHALATION TREATMENT: CPT

## 2017-06-03 PROCEDURE — 94799 UNLISTED PULMONARY SVC/PX: CPT

## 2017-06-03 PROCEDURE — 83735 ASSAY OF MAGNESIUM: CPT | Performed by: INTERNAL MEDICINE

## 2017-06-03 PROCEDURE — 63710000001 INSULIN DETEMIR PER 5 UNITS: Performed by: INTERNAL MEDICINE

## 2017-06-03 PROCEDURE — 63710000001 INSULIN LISPRO (HUMAN) PER 5 UNITS: Performed by: INTERNAL MEDICINE

## 2017-06-03 PROCEDURE — 84100 ASSAY OF PHOSPHORUS: CPT | Performed by: INTERNAL MEDICINE

## 2017-06-03 PROCEDURE — 82962 GLUCOSE BLOOD TEST: CPT

## 2017-06-03 PROCEDURE — 80048 BASIC METABOLIC PNL TOTAL CA: CPT | Performed by: INTERNAL MEDICINE

## 2017-06-03 PROCEDURE — 85018 HEMOGLOBIN: CPT | Performed by: INTERNAL MEDICINE

## 2017-06-03 PROCEDURE — 99232 SBSQ HOSP IP/OBS MODERATE 35: CPT | Performed by: INTERNAL MEDICINE

## 2017-06-03 PROCEDURE — 85014 HEMATOCRIT: CPT | Performed by: INTERNAL MEDICINE

## 2017-06-03 RX ORDER — DEXTROSE MONOHYDRATE 25 G/50ML
25 INJECTION, SOLUTION INTRAVENOUS
Status: DISCONTINUED | OUTPATIENT
Start: 2017-06-03 | End: 2017-06-05 | Stop reason: HOSPADM

## 2017-06-03 RX ORDER — NICOTINE POLACRILEX 4 MG
15 LOZENGE BUCCAL
Status: DISCONTINUED | OUTPATIENT
Start: 2017-06-03 | End: 2017-06-05 | Stop reason: HOSPADM

## 2017-06-03 RX ADMIN — INSULIN DETEMIR 20 UNITS: 100 INJECTION, SOLUTION SUBCUTANEOUS at 21:00

## 2017-06-03 RX ADMIN — DOCUSATE SODIUM 100 MG: 100 CAPSULE, LIQUID FILLED ORAL at 17:33

## 2017-06-03 RX ADMIN — IPRATROPIUM BROMIDE AND ALBUTEROL SULFATE 3 ML: .5; 3 SOLUTION RESPIRATORY (INHALATION) at 19:18

## 2017-06-03 RX ADMIN — GABAPENTIN 300 MG: 300 CAPSULE ORAL at 21:33

## 2017-06-03 RX ADMIN — BACLOFEN 10 MG: 10 TABLET ORAL at 13:47

## 2017-06-03 RX ADMIN — METOPROLOL TARTRATE 50 MG: 50 TABLET, FILM COATED ORAL at 09:46

## 2017-06-03 RX ADMIN — OXYCODONE HYDROCHLORIDE AND ACETAMINOPHEN 500 MG: 500 TABLET ORAL at 09:46

## 2017-06-03 RX ADMIN — BACLOFEN 10 MG: 10 TABLET ORAL at 21:33

## 2017-06-03 RX ADMIN — INSULIN LISPRO 5 UNITS: 100 INJECTION, SOLUTION INTRAVENOUS; SUBCUTANEOUS at 17:33

## 2017-06-03 RX ADMIN — INSULIN LISPRO 5 UNITS: 100 INJECTION, SOLUTION INTRAVENOUS; SUBCUTANEOUS at 09:51

## 2017-06-03 RX ADMIN — AMLODIPINE BESYLATE 5 MG: 5 TABLET ORAL at 21:33

## 2017-06-03 RX ADMIN — LEVOTHYROXINE SODIUM 200 MCG: 100 TABLET ORAL at 06:05

## 2017-06-03 RX ADMIN — INSULIN LISPRO 5 UNITS: 100 INJECTION, SOLUTION INTRAVENOUS; SUBCUTANEOUS at 12:31

## 2017-06-03 RX ADMIN — FUROSEMIDE 40 MG: 40 TABLET ORAL at 09:51

## 2017-06-03 RX ADMIN — DOCUSATE SODIUM 100 MG: 100 CAPSULE, LIQUID FILLED ORAL at 09:46

## 2017-06-03 RX ADMIN — BACLOFEN 10 MG: 10 TABLET ORAL at 06:05

## 2017-06-03 RX ADMIN — INSULIN LISPRO 3 UNITS: 100 INJECTION, SOLUTION INTRAVENOUS; SUBCUTANEOUS at 09:51

## 2017-06-03 RX ADMIN — IPRATROPIUM BROMIDE AND ALBUTEROL SULFATE 3 ML: .5; 3 SOLUTION RESPIRATORY (INHALATION) at 09:05

## 2017-06-03 RX ADMIN — ASPIRIN 81 MG: 81 TABLET, COATED ORAL at 09:46

## 2017-06-03 RX ADMIN — INSULIN LISPRO 6 UNITS: 100 INJECTION, SOLUTION INTRAVENOUS; SUBCUTANEOUS at 21:33

## 2017-06-03 RX ADMIN — ATORVASTATIN CALCIUM 80 MG: 40 TABLET, FILM COATED ORAL at 09:46

## 2017-06-03 RX ADMIN — AMLODIPINE BESYLATE 5 MG: 5 TABLET ORAL at 09:46

## 2017-06-03 RX ADMIN — INSULIN LISPRO 7 UNITS: 100 INJECTION, SOLUTION INTRAVENOUS; SUBCUTANEOUS at 17:33

## 2017-06-03 RX ADMIN — METOPROLOL TARTRATE 50 MG: 50 TABLET, FILM COATED ORAL at 21:33

## 2017-06-03 RX ADMIN — IPRATROPIUM BROMIDE AND ALBUTEROL SULFATE 3 ML: .5; 3 SOLUTION RESPIRATORY (INHALATION) at 13:31

## 2017-06-03 RX ADMIN — CLONIDINE HYDROCHLORIDE 0.1 MG: 0.1 TABLET ORAL at 12:36

## 2017-06-03 RX ADMIN — PANTOPRAZOLE SODIUM 40 MG: 40 TABLET, DELAYED RELEASE ORAL at 06:05

## 2017-06-03 NOTE — PLAN OF CARE
Problem: Patient Care Overview (Adult)  Goal: Plan of Care Review  Outcome: Ongoing (interventions implemented as appropriate)    06/03/17 0435   Coping/Psychosocial Response Interventions   Plan Of Care Reviewed With patient   Patient Care Overview   Progress no change   Outcome Evaluation   Outcome Summary/Follow up Plan slept well, no changes overnight

## 2017-06-03 NOTE — PROGRESS NOTES
Continued Stay Note  [facility]     Patient Name: Jennifer Oliveira  MRN: [id number]  Today's Date: 6/3/2017    Admit Date: 5/26/2017          Discharge Plan       06/03/17 0833    Case Management/Social Work Plan    Additional Comments [facility] has a bed this weekend if ready.              Discharge Codes     None        Expected Discharge Date and Time     Expected Discharge Date Expected Discharge Time    [place] 3, 2017             Erika William RN

## 2017-06-03 NOTE — PLAN OF CARE
Problem: Patient Care Overview (Adult)  Goal: Plan of Care Review  Outcome: Ongoing (interventions implemented as appropriate)  Goal: Discharge Needs Assessment  Outcome: Ongoing (interventions implemented as appropriate)    Problem: Diabetes, Type 2 (Adult)  Goal: Signs and Symptoms of Listed Potential Problems Will be Absent or Manageable (Diabetes, Type 2)  Outcome: Ongoing (interventions implemented as appropriate)    Problem: Fall Risk (Adult)  Goal: Absence of Falls  Outcome: Ongoing (interventions implemented as appropriate)    Problem: Pressure Ulcer Risk (Herman Scale) (Adult,Obstetrics,Pediatric)  Goal: Identify Related Risk Factors and Signs and Symptoms  Outcome: Outcome(s) achieved Date Met:  06/03/17  Goal: Skin Integrity  Outcome: Ongoing (interventions implemented as appropriate)    Problem: GI Endoscopy (Adult)  Goal: Signs and Symptoms of Listed Potential Problems Will be Absent or Manageable (GI Endoscopy)  Outcome: Ongoing (interventions implemented as appropriate)

## 2017-06-03 NOTE — PROGRESS NOTES
HOSPITALIST DAILY PROGRESS NOTE    Chief Complaint: anemia    Subjective   SUBJECTIVE/OVERNIGHT EVENTS   Resting in bed no acute distress and is feeling bettert.  Denies any chest pain or palpitation.  No fever or chills.  No nausea, vomiting, diarrhea, abdominal pain.  Review of Systems:  Gen-no fevers, no chills  CV-no chest pain, no palpitations  Resp-no cough, occ wheeze, and still short of breath  GI-no N/V/D, no abd pain    Objective   OBJECTIVE   I have reviewed the vital signs.  Vitals:    06/03/17 1349   BP: 143/48   Pulse: 68   Resp: 16   Temp:    SpO2:        Physical Exam:  Patient is alert and talkative short of breath at rest sitting up in the bed  Neck is without mass or JVD  Heart is Reg wo murmur  Lungs Clear to auscultation bilaterally.   Abd is soft without HSM or mass  MAEW no edema  Skin is without rash  Neurologic exam in nonfocal except her right hemiplegia and her staccato speech   Mood is appropriate    Results:  I have reviewed the labs, culture data, radiology results, and diagnostic studies.      Results from last 7 days  Lab Units 06/03/17  0730 06/01/17  0441 05/31/17  0543 05/30/17  0552   WBC 10*3/mm3  --  7.92 6.58 6.13   HEMOGLOBIN g/dL 9.6* 10.6* 10.2* 9.7*   HEMATOCRIT % 31.2* 34.1* 33.0* 30.6*   PLATELETS 10*3/mm3  --  226 229 200       Results from last 7 days  Lab Units 06/03/17  0730   SODIUM mmol/L 138   POTASSIUM mmol/L 4.3   CHLORIDE mmol/L 104   TOTAL CO2 mmol/L 28.0   BUN mg/dL 32*   CREATININE mg/dL 1.40*   GLUCOSE mg/dL 198*   CALCIUM mg/dL 9.1       Radiology Results:  Imaging Results (last 24 hours)     ** No results found for the last 24 hours. **        I have reviewed the medications.    Assessment/Plan   ASSESSMENT/PLAN    Principal Problem:    Symptomatic anemia  Active Problems:    Normochromic normocytic anemia    Chronic diastolic heart failure    Diabetes type 2, uncontrolled    HTN (hypertension)    Chronic kidney disease (CKD), stage III (moderate)     GERD (gastroesophageal reflux disease) with hx of gastritis     Disease of thyroid gland    GI bleed    Acute-on-chronic kidney injury    GI bleed.   - history of normochromic normocytic anemia. Baseline 8.5 to 9.0  - s/p Transfusion 4 units of RBC's  - - Repeat EGD reveal AVM but was not bleeding.Plan was for colonoscopy but she got a SBFT that is normal. Next step pill camera endoscopy as outpatient.       2. Acute on chronic Kidney Injury:  - baseline creatinine 1.5   - history of CKD stage III    2' . Severe weakness.  Patient lives alone and at this point patient is not strong enough to be sent home.  Patient is to go to rehabilitation before home.    3. H/O  Diastolic dysfunction?    - Last echo 4/2017 EF: 70% and left ventricular function was reported normal.  Patient also had an echocardiogram done on 11/19/2016 and diastolic function is specifically mentioned as normal.     4. DM2:  - uncontrolled. Monitor. Hold PO meds  - accu checks q 6. Sliding scale insulin.  PLAN:  - cont current care  - rehab placement.          DVT prophylaxis: mechanical compression Device  Code Status: full code  I expect patient to be discharged in: 2-3 days     Peter Scott MD  06/03/17  2:20 PM

## 2017-06-04 LAB
GLUCOSE BLDC GLUCOMTR-MCNC: 215 MG/DL (ref 70–130)
GLUCOSE BLDC GLUCOMTR-MCNC: 249 MG/DL (ref 70–130)
GLUCOSE BLDC GLUCOMTR-MCNC: 258 MG/DL (ref 70–130)
GLUCOSE BLDC GLUCOMTR-MCNC: 303 MG/DL (ref 70–130)
GLUCOSE BLDC GLUCOMTR-MCNC: 323 MG/DL (ref 70–130)
GLUCOSE BLDC GLUCOMTR-MCNC: 458 MG/DL (ref 70–130)
HCT VFR BLD AUTO: 31.3 % (ref 34.5–44)
HGB BLD-MCNC: 9.6 G/DL (ref 11.5–15.5)

## 2017-06-04 PROCEDURE — 85014 HEMATOCRIT: CPT | Performed by: INTERNAL MEDICINE

## 2017-06-04 PROCEDURE — 94640 AIRWAY INHALATION TREATMENT: CPT

## 2017-06-04 PROCEDURE — 63710000001 INSULIN DETEMIR PER 5 UNITS: Performed by: INTERNAL MEDICINE

## 2017-06-04 PROCEDURE — 82962 GLUCOSE BLOOD TEST: CPT

## 2017-06-04 PROCEDURE — 85018 HEMOGLOBIN: CPT | Performed by: INTERNAL MEDICINE

## 2017-06-04 PROCEDURE — 99232 SBSQ HOSP IP/OBS MODERATE 35: CPT | Performed by: INTERNAL MEDICINE

## 2017-06-04 PROCEDURE — 94799 UNLISTED PULMONARY SVC/PX: CPT

## 2017-06-04 RX ORDER — LOSARTAN POTASSIUM 50 MG/1
50 TABLET ORAL DAILY
Status: DISCONTINUED | OUTPATIENT
Start: 2017-06-04 | End: 2017-06-05 | Stop reason: HOSPADM

## 2017-06-04 RX ORDER — IPRATROPIUM BROMIDE AND ALBUTEROL SULFATE 2.5; .5 MG/3ML; MG/3ML
3 SOLUTION RESPIRATORY (INHALATION) EVERY 6 HOURS PRN
Status: DISCONTINUED | OUTPATIENT
Start: 2017-06-04 | End: 2017-06-05 | Stop reason: HOSPADM

## 2017-06-04 RX ADMIN — IPRATROPIUM BROMIDE AND ALBUTEROL SULFATE 3 ML: .5; 3 SOLUTION RESPIRATORY (INHALATION) at 07:17

## 2017-06-04 RX ADMIN — INSULIN LISPRO 5 UNITS: 100 INJECTION, SOLUTION INTRAVENOUS; SUBCUTANEOUS at 11:51

## 2017-06-04 RX ADMIN — ATORVASTATIN CALCIUM 80 MG: 40 TABLET, FILM COATED ORAL at 08:33

## 2017-06-04 RX ADMIN — BACLOFEN 10 MG: 10 TABLET ORAL at 22:00

## 2017-06-04 RX ADMIN — METOPROLOL TARTRATE 50 MG: 50 TABLET, FILM COATED ORAL at 08:30

## 2017-06-04 RX ADMIN — INSULIN LISPRO 3 UNITS: 100 INJECTION, SOLUTION INTRAVENOUS; SUBCUTANEOUS at 20:41

## 2017-06-04 RX ADMIN — AMLODIPINE BESYLATE 5 MG: 5 TABLET ORAL at 20:18

## 2017-06-04 RX ADMIN — ASPIRIN 81 MG: 81 TABLET, COATED ORAL at 08:30

## 2017-06-04 RX ADMIN — INSULIN LISPRO 5 UNITS: 100 INJECTION, SOLUTION INTRAVENOUS; SUBCUTANEOUS at 17:03

## 2017-06-04 RX ADMIN — GABAPENTIN 300 MG: 300 CAPSULE ORAL at 20:18

## 2017-06-04 RX ADMIN — INSULIN LISPRO 4 UNITS: 100 INJECTION, SOLUTION INTRAVENOUS; SUBCUTANEOUS at 17:27

## 2017-06-04 RX ADMIN — BACLOFEN 10 MG: 10 TABLET ORAL at 13:45

## 2017-06-04 RX ADMIN — LEVOTHYROXINE SODIUM 200 MCG: 100 TABLET ORAL at 05:10

## 2017-06-04 RX ADMIN — LOSARTAN POTASSIUM 50 MG: 50 TABLET, FILM COATED ORAL at 12:04

## 2017-06-04 RX ADMIN — DOCUSATE SODIUM 100 MG: 100 CAPSULE, LIQUID FILLED ORAL at 08:30

## 2017-06-04 RX ADMIN — FUROSEMIDE 40 MG: 40 TABLET ORAL at 08:30

## 2017-06-04 RX ADMIN — INSULIN DETEMIR 20 UNITS: 100 INJECTION, SOLUTION SUBCUTANEOUS at 21:00

## 2017-06-04 RX ADMIN — INSULIN LISPRO 5 UNITS: 100 INJECTION, SOLUTION INTRAVENOUS; SUBCUTANEOUS at 08:30

## 2017-06-04 RX ADMIN — INSULIN LISPRO 5 UNITS: 100 INJECTION, SOLUTION INTRAVENOUS; SUBCUTANEOUS at 08:49

## 2017-06-04 RX ADMIN — METOPROLOL TARTRATE 50 MG: 50 TABLET, FILM COATED ORAL at 20:18

## 2017-06-04 RX ADMIN — BACLOFEN 10 MG: 10 TABLET ORAL at 05:10

## 2017-06-04 RX ADMIN — DOCUSATE SODIUM 100 MG: 100 CAPSULE, LIQUID FILLED ORAL at 17:04

## 2017-06-04 RX ADMIN — OXYCODONE HYDROCHLORIDE AND ACETAMINOPHEN 500 MG: 500 TABLET ORAL at 08:30

## 2017-06-04 RX ADMIN — PANTOPRAZOLE SODIUM 40 MG: 40 TABLET, DELAYED RELEASE ORAL at 05:10

## 2017-06-04 RX ADMIN — AMLODIPINE BESYLATE 5 MG: 5 TABLET ORAL at 08:30

## 2017-06-04 NOTE — PLAN OF CARE
Problem: Patient Care Overview (Adult)  Goal: Plan of Care Review  Outcome: Ongoing (interventions implemented as appropriate)    06/04/17 1626   Coping/Psychosocial Response Interventions   Plan Of Care Reviewed With patient   Patient Care Overview   Progress improving   Outcome Evaluation   Outcome Summary/Follow up Plan Pt is doing well. No complaints. Dressing changed on hand, wound is healing well. VSS.          Problem: Diabetes, Type 2 (Adult)  Goal: Signs and Symptoms of Listed Potential Problems Will be Absent or Manageable (Diabetes, Type 2)  Outcome: Ongoing (interventions implemented as appropriate)    06/04/17 1626   Diabetes, Type 2   Problems Assessed (Type 2 Diabetes) all   Problems Present (Type 2 Diabetes) impaired glycemic control         Problem: Fall Risk (Adult)  Goal: Absence of Falls  Outcome: Ongoing (interventions implemented as appropriate)    06/04/17 1626   Fall Risk (Adult)   Absence of Falls making progress toward outcome         Problem: Gastrointestinal Bleeding (Adult)  Goal: Signs and Symptoms of Listed Potential Problems Will be Absent or Manageable (Gastrointestinal Bleeding)  Outcome: Ongoing (interventions implemented as appropriate)    06/04/17 1626   Gastrointestinal Bleeding   Problems Assessed (GI Bleeding) all   Problems Present (GI Bleeding) none         Problem: Pressure Ulcer Risk (Herman Scale) (Adult,Obstetrics,Pediatric)  Goal: Skin Integrity  Outcome: Ongoing (interventions implemented as appropriate)    06/04/17 1626   Pressure Ulcer Risk (Herman Scale) (Adult,Obstetrics,Pediatric)   Skin Integrity making progress toward outcome         Problem: GI Endoscopy (Adult)  Goal: Signs and Symptoms of Listed Potential Problems Will be Absent or Manageable (GI Endoscopy)  Outcome: Ongoing (interventions implemented as appropriate)    06/04/17 1626   GI Endoscopy   Problems Assessed (GI Endoscopy) all   Problems Present (GI Endoscopy) none

## 2017-06-04 NOTE — PLAN OF CARE
Problem: GI Endoscopy (Adult)  Intervention: Monitor/Manage Procedure Recovery    05/30/17 1245 06/02/17 2200 06/04/17 0400   Coping/Psychosocial Interventions   Environmental Support --  calm environment promoted --    Activity   Activity Type --  --  activity adjusted per tolerance   Respiratory Interventions   Airway/Ventilation Management airway patency maintained --  --        Intervention: Prevent Ana-procedural Injury    06/04/17 0000   Positioning   Positioning semi-Fowlers   Head Of Bed (HOB) Position HOB at 30-45 degrees         Goal: Signs and Symptoms of Listed Potential Problems Will be Absent or Manageable (GI Endoscopy)  Outcome: Ongoing (interventions implemented as appropriate)    06/03/17 1624   GI Endoscopy   Problems Assessed (GI Endoscopy) all   Problems Present (GI Endoscopy) none

## 2017-06-04 NOTE — PROGRESS NOTES
HOSPITALIST DAILY PROGRESS NOTE    Chief Complaint: anemia    Subjective   SUBJECTIVE/OVERNIGHT EVENTS   No significant change since yesterday.  Resting in bed comfortably.  Denies any chest pain or palpitation.  No fever or chills.  No nausea, vomiting, diarrhea, abdominal pain.  Review of Systems:  Gen-no fevers, no chills  CV-no chest pain, no palpitations  Resp-no cough, occ wheeze, and still short of breath  GI-no N/V/D, no abd pain    Objective   OBJECTIVE   I have reviewed the vital signs.  Vitals:    06/04/17 0830   BP: 173/64   Pulse: 65   Resp: 16   Temp: 97.4 °F (36.3 °C)   SpO2: 99%       Physical Exam:  Patient is alert and talkative short of breath at rest sitting up in the bed  Neck is without mass or JVD  Heart is Reg wo murmur  Lungs Clear to auscultation bilaterally.   Abd is soft without HSM or mass  MAEW no edema  Skin is without rash  Neurologic exam in nonfocal except her right hemiplegia and her staccato speech   Mood is appropriate    Results:  I have reviewed the labs, culture data, radiology results, and diagnostic studies.      Results from last 7 days  Lab Units 06/04/17  0515 06/03/17  0730 06/01/17  0441 05/31/17  0543 05/30/17  0552   WBC 10*3/mm3  --   --  7.92 6.58 6.13   HEMOGLOBIN g/dL 9.6* 9.6* 10.6* 10.2* 9.7*   HEMATOCRIT % 31.3* 31.2* 34.1* 33.0* 30.6*   PLATELETS 10*3/mm3  --   --  226 229 200       Results from last 7 days  Lab Units 06/03/17  0730   SODIUM mmol/L 138   POTASSIUM mmol/L 4.3   CHLORIDE mmol/L 104   TOTAL CO2 mmol/L 28.0   BUN mg/dL 32*   CREATININE mg/dL 1.40*   GLUCOSE mg/dL 198*   CALCIUM mg/dL 9.1       Radiology Results:  Imaging Results (last 24 hours)     ** No results found for the last 24 hours. **        I have reviewed the medications.    Assessment/Plan   ASSESSMENT/PLAN    Principal Problem:    Symptomatic anemia  Active Problems:    Normochromic normocytic anemia    Chronic diastolic heart failure    Diabetes type 2, uncontrolled    HTN  (hypertension)    Chronic kidney disease (CKD), stage III (moderate)    GERD (gastroesophageal reflux disease) with hx of gastritis     Disease of thyroid gland    GI bleed    Acute-on-chronic kidney injury    GI bleed.   - history of normochromic normocytic anemia. Baseline 8.5 to 9.0  - s/p Transfusion 4 units of RBC's  - - Repeat EGD reveal AVM but was not bleeding.Plan was for colonoscopy but she got a SBFT that is normal. Next step pill camera endoscopy as outpatient.       2. Acute on chronic Kidney Injury:  - baseline creatinine 1.5   - history of CKD stage III    2' . Severe weakness.  Patient lives alone and at this point patient is not strong enough to be sent home.  Patient is to go to rehabilitation before home.    3. H/O  Diastolic dysfunction?    - Last echo 4/2017 EF: 70% and left ventricular function was reported normal.  Patient also had an echocardiogram done on 11/19/2016 and diastolic function is specifically mentioned as normal.     4. DM2:  - uncontrolled. Monitor. Hold PO meds  - accu checks q 6. Sliding scale insulin.  PLAN:  - cont current care  - if H/H remains stable affient should be restarted, if ok with GI.  - rehab placement.          DVT prophylaxis: mechanical compression Device  Code Status: full code  I expect patient to be discharged in: 2-3 days     Peter Scott MD  06/04/17  4:21 PM

## 2017-06-05 VITALS
DIASTOLIC BLOOD PRESSURE: 57 MMHG | BODY MASS INDEX: 34.96 KG/M2 | HEIGHT: 60 IN | SYSTOLIC BLOOD PRESSURE: 153 MMHG | TEMPERATURE: 97.9 F | OXYGEN SATURATION: 96 % | HEART RATE: 64 BPM | RESPIRATION RATE: 18 BRPM | WEIGHT: 178.1 LBS

## 2017-06-05 LAB
GLUCOSE BLDC GLUCOMTR-MCNC: 164 MG/DL (ref 70–130)
GLUCOSE BLDC GLUCOMTR-MCNC: 246 MG/DL (ref 70–130)
HCT VFR BLD AUTO: 32.3 % (ref 34.5–44)
HGB BLD-MCNC: 10.3 G/DL (ref 11.5–15.5)

## 2017-06-05 PROCEDURE — 99239 HOSP IP/OBS DSCHRG MGMT >30: CPT | Performed by: NURSE PRACTITIONER

## 2017-06-05 PROCEDURE — 82962 GLUCOSE BLOOD TEST: CPT

## 2017-06-05 PROCEDURE — 97530 THERAPEUTIC ACTIVITIES: CPT | Performed by: OCCUPATIONAL THERAPIST

## 2017-06-05 PROCEDURE — 97110 THERAPEUTIC EXERCISES: CPT

## 2017-06-05 PROCEDURE — 85014 HEMATOCRIT: CPT | Performed by: INTERNAL MEDICINE

## 2017-06-05 PROCEDURE — 85018 HEMOGLOBIN: CPT | Performed by: INTERNAL MEDICINE

## 2017-06-05 RX ORDER — PRASUGREL 5 MG/1
5 TABLET, FILM COATED ORAL DAILY
Status: DISCONTINUED | OUTPATIENT
Start: 2017-06-05 | End: 2017-06-05 | Stop reason: HOSPADM

## 2017-06-05 RX ORDER — CLOPIDOGREL BISULFATE 75 MG/1
75 TABLET ORAL DAILY
Qty: 30 TABLET | Refills: 0 | Status: SHIPPED | OUTPATIENT
Start: 2017-06-05 | End: 2017-09-24 | Stop reason: HOSPADM

## 2017-06-05 RX ADMIN — PANTOPRAZOLE SODIUM 40 MG: 40 TABLET, DELAYED RELEASE ORAL at 05:18

## 2017-06-05 RX ADMIN — INSULIN LISPRO 5 UNITS: 100 INJECTION, SOLUTION INTRAVENOUS; SUBCUTANEOUS at 11:28

## 2017-06-05 RX ADMIN — LEVOTHYROXINE SODIUM 200 MCG: 100 TABLET ORAL at 05:18

## 2017-06-05 RX ADMIN — INSULIN LISPRO 3 UNITS: 100 INJECTION, SOLUTION INTRAVENOUS; SUBCUTANEOUS at 11:28

## 2017-06-05 RX ADMIN — AMLODIPINE BESYLATE 5 MG: 5 TABLET ORAL at 08:29

## 2017-06-05 RX ADMIN — ASPIRIN 81 MG: 81 TABLET, COATED ORAL at 08:29

## 2017-06-05 RX ADMIN — OXYCODONE HYDROCHLORIDE AND ACETAMINOPHEN 500 MG: 500 TABLET ORAL at 08:29

## 2017-06-05 RX ADMIN — ATORVASTATIN CALCIUM 80 MG: 40 TABLET, FILM COATED ORAL at 08:29

## 2017-06-05 RX ADMIN — BACLOFEN 10 MG: 10 TABLET ORAL at 05:18

## 2017-06-05 RX ADMIN — LOSARTAN POTASSIUM 50 MG: 50 TABLET, FILM COATED ORAL at 08:29

## 2017-06-05 RX ADMIN — BACLOFEN 10 MG: 10 TABLET ORAL at 13:55

## 2017-06-05 RX ADMIN — INSULIN LISPRO 2 UNITS: 100 INJECTION, SOLUTION INTRAVENOUS; SUBCUTANEOUS at 08:30

## 2017-06-05 RX ADMIN — INSULIN LISPRO 5 UNITS: 100 INJECTION, SOLUTION INTRAVENOUS; SUBCUTANEOUS at 08:29

## 2017-06-05 RX ADMIN — METOPROLOL TARTRATE 50 MG: 50 TABLET, FILM COATED ORAL at 08:29

## 2017-06-05 RX ADMIN — FUROSEMIDE 40 MG: 40 TABLET ORAL at 08:29

## 2017-06-05 RX ADMIN — DOCUSATE SODIUM 100 MG: 100 CAPSULE, LIQUID FILLED ORAL at 08:29

## 2017-06-05 NOTE — DISCHARGE SUMMARY
"    [facility] Medicine Services  DISCHARGE SUMMARY       Date of Admission: 5/26/2017  Date of Discharge:  6/5/2017  Primary Care Physician: Samuel Buck MD  Consulting Physician(s)     Provider Relationship    Lizbet Vidales MD Consulting Physician    Babar Sage MD Consulting Physician          Discharge Diagnoses:  Active Hospital Problems (** Indicates Principal Problem)    Diagnosis Date Noted   • **Symptomatic anemia [D64.9] 05/26/2017   • GI bleed [K92.2] 05/26/2017   • Acute-on-chronic kidney injury [N17.9, N18.9] 05/26/2017   • Disease of thyroid gland [E07.9]    • Chronic kidney disease (CKD), stage III (moderate) [N18.3] 04/19/2017     - baseline Cr 1.5-1.6     • GERD (gastroesophageal reflux disease) with hx of gastritis  [K21.9] 04/19/2017   • Chronic diastolic heart failure [I50.32] 11/14/2016     - Last echo 11/2016 LVEF 70%, normal VHD     • Diabetes type 2, uncontrolled [E11.65] 11/14/2016   • HTN (hypertension) [I10] 11/14/2016   • Normochromic normocytic anemia [D64.9] 11/14/2016     - Baseline Hgb 9s  - Hx of C-scope with polyps 2016, EGD 2016 with gastritis         Resolved Hospital Problems    Diagnosis Date Noted Date Resolved   No resolved problems to display.       Presenting Problem/History of Present Illness  Symptomatic anemia [D64.9]       History of Present Illness on Day of Discharge:   Patient is awake lying in bed.  Denies any issues over night.  Denies any nausea, vomiting or diarrhea.  Tolerating PO.  Reports continuing to feel some fatigue.      Hospital Course  Ms. Oliveira is a 76 year old female with a past medical history significant for diastolic dysfunction, HTN, CKD3, DM2, hypothyroidism and GERD who presented to ED directly from PCP, Dr. Buck, on 5/26/17 with acute drop in Hemoglobin to 5.6. Last hemoglobin 5/19 was 8.5. She reported some generalized weakness and \"dark\" stool which she related to new iron supplement. No complaints of light " "headed-ness, dizziness, syncope, chest pain, melena/hematochezia or hematuria. Patient was discharged from this service 1 week prior after being admitted for similar presentation. She was transfused and Upper endoscopy was performed. EGD was unremarkable. Patient was discharged on iron supplement plus vitamin C and instructed to follow up with GI as outpatient. Of note, Patient was admitted to this service 3 weeks ago for acute CVA and uti. It was determined that she was resistant to Plavix and was subsequently started on Effient. Preseneds to [facility] for further evaluation and treatment.     Emergency Department Evaluation: Vitals stable. Blood glucose elevated to 364. BUN/Cr increased to 61 and 1.7 respectively. H/H decreased to 5.6 and 18.3 respectively.  Gastroenterology was consulted and recommended repeat EGD with follow through to small bowel. EGD was performed on 5/30/17 and was negative.  Recommended close followup of hemoglobin and with Dr. Sage to consider pillcam endoscopy.  Hemoglobin has continued to trend up during hospital course.  Patient will be transferred to [facility] facility today.  It was recommended that the patient restart Effient due to her resistance to plavix but patient refuses to do so, stating \"all hell broke lose after starting that medicine\".  I have discussed risks of restarting plavix; patient verbalizes understanding but continues to refuse Effient, wants to restart Plavix.       Procedures Performed  Procedure(s):  ESOPHAGOGASTRODUODENOSCOPY       Consults:   Consults     Date and Time Order Name Status Description    5/26/2017 2204 Inpatient Consult to Gastroenterology      5/14/2017 2306 Inpatient Consult to Gastroenterology Completed           Pertinent Test Results:  Component      Latest Ref Rng & Units 6/5/2017   Hemoglobin      11.5 - 15.5 g/dL 10.3 (L)     Component      Latest Ref Rng & Units 6/4/2017   Hemoglobin      11.5 - 15.5 g/dL 9.6 (L)   Hematocrit      " "34.5 - 44.0 % 31.3 (L)   Urine Culture   Order: [id number] - Reflex for Order [id number]   Status:  Final result   Visible to patient:  No (Not Released)   Specimen Information: Urine, Clean Catch; Urine        Culture   >100,000 CFU/mL Normal Urogenital [place]      Resulting Agency:  [facility]      Specimen Collected: 05/27/17  5:54 PM   Last Resulted: 05/29/17  7:53 AM              FL Small Bowel Follow Through  IMPRESSION:  Small bowel series appeared within normal limits.          This report was finalized on 5/31/2017 4:18 PM by Dr. Tan Mujica MD.      Chest x-ray  IMPRESSION:  Bilateral lower lung zone interstitial pulmonary opacities and questionable   air bronchograms, consider bronchitis versus early infectious infiltrate.      THIS DOCUMENT HAS BEEN ELECTRONICALLY SIGNED BY SUNI RODRÍGUEZ MD  Condition on Discharge:  Stable    Physical Exam on Discharge:/57  Pulse 64  Temp 97.9 °F (36.6 °C) (Oral)   Resp 18  Ht 60\" (152.4 cm)  Wt 178 lb 1.6 oz (80.8 kg)  SpO2 96%  BMI 34.78 kg/m2  Physical Exam   Constitutional: She appears well-developed and well-nourished. No distress.   HENT:   Head: Normocephalic.   Eyes: Pupils are equal, round, and reactive to light.   Neck: Normal range of motion.   Cardiovascular: Normal rate and regular rhythm.    Pulmonary/Chest: Effort normal and breath sounds normal.   Abdominal: Soft. Bowel sounds are normal. She exhibits no distension. There is no tenderness.   Neurological: She is alert.   Skin: Skin is warm and dry.   Psychiatric: She has a normal mood and affect. Her behavior is normal.         Discharge Disposition  Rehab Facility or Unit (DC - External)    Discharge Medications   Jennifer Oliveira   Home Medication Instructions KAMAR:[id number]    Printed on:06/05/17 1230   Medication Information                      amLODIPine (NORVASC) 10 MG tablet  Take 10 mg by mouth Every Night.             aspirin 81 MG chewable tablet  Chew 81 mg Daily.           "   baclofen (LIORESAL) 10 MG tablet  Take 10 mg by mouth 3 (Three) Times a Day.             clopidogrel (PLAVIX) 75 MG tablet  Take 1 tablet by mouth Daily.             ferrous sulfate 325 (65 FE) MG tablet  Take 1 tablet by mouth Daily With Breakfast.             furosemide (LASIX) 40 MG tablet  Take 1 tablet by mouth Daily.             gabapentin (NEURONTIN) 300 MG capsule  Take 900 mg by mouth every night at bedtime. Take 3 capsules at bedtime.              glimepiride (AMARYL) 4 MG tablet  Take 4 mg by mouth Every Morning Before Breakfast.             glimepiride (AMARYL) 4 MG tablet  Take 2 mg by mouth Every Evening.             insulin glargine (LANTUS) 100 UNIT/ML injection  Inject 40 Units under the skin Every Morning.             insulin glargine (LANTUS) 100 UNIT/ML injection  Inject 30 Units under the skin Every Evening.             levothyroxine (SYNTHROID, LEVOTHROID) 200 MCG tablet  Take 200 mcg by mouth Daily.             losartan (COZAAR) 50 MG tablet  Take 50 mg by mouth Daily.             metoprolol tartrate (LOPRESSOR) 50 MG tablet  Take 1 tablet by mouth Every 12 (Twelve) Hours.             pantoprazole (PROTONIX) 40 MG EC tablet  Take 40 mg by mouth Daily.             rosuvastatin (CRESTOR) 40 MG tablet  Take 1 tablet by mouth Daily.             vitamin C (VITAMIN C) 500 MG tablet  Take 1 tablet by mouth Daily.                 Discharge Diet:   Diet Instructions     Diet: Regular; Thin Liquids, No Restrictions       Discharge Diet:  Regular   Fluid Consistency:  Thin Liquids, No Restrictions                 Discharge Care Plan / Instructions:    Activity at Discharge:   Activity Instructions     Activity as Tolerated                     Follow-up Appointments  No future appointments.  Additional Instructions for the Follow-ups that You Need to Schedule     Additional Discharge Follow-Up (Specify Provider)    As directed    To:  follow up with Dr. Sage per his recommendations   Has the patient’s  follow-up appointment been scheduled and documented in the discharge navigator?:  Yes, documented in the appointment section       Additional Discharge Follow-Up (Specify Provider)    As directed    To:  Follow up with facility provider within 1-2 days.   Has the patient’s follow-up appointment been scheduled and documented in the discharge navigator?:  Patient to schedule, documented in the follow-up section       Discharge Follow-up with PCP    As directed    Follow Up Details:  Follow up with PCP within 1-2 weeks of facility discharge   Has the patient’s follow-up appointment been scheduled and documented in the discharge navigator?:  Yes, documented in the appointment section       Discharge Follow-up with Specified Provider    As directed    To:  Babar Sage MD   Follow Up:  2 Weeks   Follow Up Details:  follow up from anemia;  may need pill camera   Has the patient’s follow-up appointment been scheduled and documented in the discharge navigator?:  Yes, documented in the appointment section                    ARSLAN Bess 06/05/17 12:30 PM    Time: Discharge 35 min    Please note that portions of this note may have been completed with a voice recognition program. Efforts were made to edit the dictations, but occasionally words are mistranscribed.

## 2017-06-05 NOTE — PROGRESS NOTES
Continued Stay Note  [facility]     Patient Name: Jennifer Oliveira  MRN: [id number]  Today's Date: 6/5/2017    Admit Date: 5/26/2017          Discharge Plan       06/05/17 1313    Case Management/Social Work Plan    Plan d/c update    Patient/Family In Agreement With Plan other (see comments)    Additional Comments Pt is medically ready for d/c. CM has attempted to contact family re: transportation to [facility] and have left a msg. RN has contacted pt family and left a msg as well. Bed is ready and available on Spinal Cord unit at [facility]. Pt need to be there by 5:30. RN can call report too [phone] and fax d/c summary too [phone]. No other needs at this time. CM will continue to follow and attempt family member for  transport.               Discharge Codes     None        Expected Discharge Date and Time     Expected Discharge Date Expected Discharge Time    Jun 5, 2017             Tiffanie Hale RN

## 2017-06-05 NOTE — PLAN OF CARE
Problem: Patient Care Overview (Adult)  Goal: Plan of Care Review  Outcome: Ongoing (interventions implemented as appropriate)    06/05/17 1133   Coping/Psychosocial Response Interventions   Plan Of Care Reviewed With patient   Patient Care Overview   Progress progress toward functional goals as expected   Outcome Evaluation   Outcome Summary/Follow up Plan Increased independence with t/f and with gait. Achieved transfer goal. Patient has been d/c to [facility].          Problem: Inpatient Physical Therapy  Goal: Bed Mobility Goal LTG- PT  Outcome: Unable to achieve outcome(s) by discharge Date Met:  06/05/17 06/05/17 1133   Bed Mobility PT LTG   Bed Mobility PT LTG, Date Established 06/02/17   Bed Mobility PT LTG, Time to Achieve 2 wks   Bed Mobility PT LTG, Activity Type all bed mobility   Bed Mobility PT LTG, [place] Level minimum assist (75% patient effort)   Bed Mobility PT LTG, Date Goal Reviewed 06/05/17   Bed Mobility PT LTG, Outcome goal not met   Bed Mobility PT LTG, Reason Goal Not Met discharged from facility       Goal: Transfer Training Goal 1 LTG- PT  Outcome: Outcome(s) achieved Date Met:  06/05/17 06/05/17 1133   Transfer Training PT LTG   Transfer Training PT LTG, Date Established 06/02/17   Transfer Training PT LTG, Time to Achieve 2 wks   Transfer Training PT LTG, Activity Type all transfers   Transfer Training PT LTG, [place] Level moderate assist (50% patient effort);2 person assist required   Transfer Training PT LTG, Assist Device walker, rolling platform   Transfer Training PT LTG, Date Goal Reviewed 06/05/17   Transfer Training PT LTG, Outcome goal met       Goal: Gait Training Goal LTG- PT  Outcome: Unable to achieve outcome(s) by discharge Date Met:  06/05/17 06/05/17 1133   Gait Training PT LTG   Gait Training Goal PT LTG, Date Established 06/02/17   Gait Training Goal PT LTG, Time to Achieve 2 wks   Gait Training Goal PT LTG, [place] Level verbal cues required;set up  required;moderate assist (50% patient effort);2 person assist required   Gait Training Goal PT LTG, Assist Device walker, rolling platform   Gait Training Goal PT LTG, Distance to Achieve 30 feet   Gait Training Goal PT LTG, Date Goal Reviewed 06/05/17   Gait Training Goal PT LTG, Outcome goal not met   Gait Training Goal PT LTG, Reason Goal Not Met discharged from facility

## 2017-06-05 NOTE — PLAN OF CARE
Problem: Pressure Ulcer Risk (Herman Scale) (Adult,Obstetrics,Pediatric)  Intervention: Promote/Optimize Nutrition    06/03/17 0600   Hygiene Care Assistance   Oral Care other (see comments)  (dentures in )       Intervention: Prevent/Manage Excess Moisture    06/04/17 1000 06/04/17 1400 06/05/17 0000   Hygiene Care Assistance   Perineal Care --  protective cream applied --    Hygiene Care   Bathing/Skin Care bath, partial;linen changed --  --    Skin Interventions   Skin Protection --  --  adhesive use limited;skin to device areas padded;skin to skin areas padded;hydrocolloids used;incontinence pads utilized         Goal: Skin Integrity  Outcome: Ongoing (interventions implemented as appropriate)    06/04/17 1626   Pressure Ulcer Risk (Herman Scale) (Adult,Obstetrics,Pediatric)   Skin Integrity making progress toward outcome

## 2017-06-05 NOTE — THERAPY DISCHARGE NOTE
Acute Care - Physical Therapy Treatment Note/Discharge   [place]     Patient Name: Jennifer Oliveira  : 1940  MRN: [id number]  Today's Date: 2017  Onset of Illness/Injury or Date of Surgery Date: 17  Date of Referral to PT: 17  Referring Physician: MD Sonia    Admit Date: 2017    Visit Dx:    ICD-10-CM ICD-9-CM   1. Symptomatic anemia D64.9 285.9   2. Gastrointestinal hemorrhage, unspecified gastrointestinal hemorrhage type K92.2 578.9   3. Chronic renal insufficiency, unspecified stage N18.9 585.9   4. Normochromic normocytic anemia D64.9 285.9   5. Gastrointestinal hemorrhage with melena K92.1 578.1   6. Impaired mobility and ADLs Z74.09 799.89   7. Impaired functional mobility, balance, gait, and endurance Z74.09 V49.89     Patient Active Problem List   Diagnosis   • Acute hypoxemic respiratory failure   • PAD (peripheral artery disease)   • Normochromic normocytic anemia   • Chronic diastolic heart failure   • Diabetes type 2, uncontrolled   • Lower extremity cellulitis   • Hypothyroid   • HTN (hypertension)   • Metabolic encephalopathy   • TIA (transient ischemic attack)   • Acute cystitis without hematuria   • Chronic kidney disease (CKD), stage III (moderate)   • GERD (gastroesophageal reflux disease) with hx of gastritis    • Stroke   • Hypertension   • Disease of thyroid gland   • Diastolic heart failure   • Diabetes mellitus   • CKD (chronic kidney disease), stage III   • Symptomatic anemia   • GI bleed   • Acute-on-chronic kidney injury       Physical Therapy Education     Title: PT OT SLP Therapies (Done)     Topic: Physical Therapy (Done)     Point: Mobility training (Done)    Learning Progress Summary    Learner Readiness Method Response Comment Documented by Status   Patient Acceptance E,VISHAL JAIMES 17 1519 Done    Acceptance EELIAS DU   17 1926 Done    Acceptance E,D NR  BD 17 1210 Active    Acceptance D NR  BD 17 1208 Active                Point: Home exercise program (Done)    Learning Progress Summary    Learner Readiness Method Response Comment Documented by Status   Patient Acceptance E,D VU,NR  LR 06/05/17 1519 Done    Acceptance E,TB VU,DU   06/02/17 1926 Done    Acceptance E,D NR   06/02/17 1210 Active    Acceptance D NR  BD 06/02/17 1208 Active               Point: Body mechanics (Done)    Learning Progress Summary    Learner Readiness Method Response Comment Documented by Status   Patient Acceptance E,D VU,NR  LR 06/05/17 1519 Done    Acceptance E,TB VU,DU   06/02/17 1926 Done    Acceptance E,D NR   06/02/17 1210 Active    Acceptance D NR   06/02/17 1208 Active               Point: Precautions (Done)    Learning Progress Summary    Learner Readiness Method Response Comment Documented by Status   Patient Acceptance E,D VU,NR  LR 06/05/17 1519 Done    Acceptance E,TB VU,DU   06/02/17 1926 Done    Acceptance E,D NR   06/02/17 1210 Active    Acceptance D NR   06/02/17 1208 Active                      User Key     Initials Effective Dates Name Provider Type Discipline    LR 06/19/15 -  Tonya Mcgraw, PT Physical Therapist PT     06/16/16 -  Azul Bloom, RN Registered Nurse Nurse     06/13/16 -  Trudi Hernandez, PT Physical Therapist PT                    IP PT Goals       06/05/17 1133 06/02/17 1216       Bed Mobility PT LTG    Bed Mobility PT LTG, Date Established 06/02/17  -LR      Bed Mobility PT LTG, Time to Achieve 2 wks  -LR 2 wks  -BD     Bed Mobility PT LTG, Activity Type all bed mobility  -LR all bed mobility  -BD     Bed Mobility PT LTG, [place] Level minimum assist (75% patient effort)  -LR minimum assist (75% patient effort)  -BD     Bed Mobility PT LTG, Date Goal Reviewed 06/05/17  -LR      Bed Mobility PT LTG, Outcome goal not met  -LR goal ongoing  -BD     Bed Mobility PT LTG, Reason Goal Not Met discharged from facility  -LR      Transfer Training PT LTG    Transfer Training PT LTG,  Date Established 06/02/17  -LR      Transfer Training PT LTG, Time to Achieve 2 wks  -LR 2 wks  -BD     Transfer Training PT LTG, Activity Type all transfers  -LR all transfers  -BD     Transfer Training PT LTG, [place] Level moderate assist (50% patient effort);2 person assist required  -LR 2 person assist required;moderate assist (50% patient effort)  -BD     Transfer Training PT LTG, Assist Device walker, rolling platform  -LR walker, rolling platform  -BD     Transfer Training PT  LTG, Date Goal Reviewed 06/05/17  -LR      Transfer Training PT LTG, Outcome goal met  -LR goal ongoing  -BD     Transfer Training PT LTG, Reason Goal Not Met --  -LR      Gait Training PT LTG    Gait Training Goal PT LTG, Date Established 06/02/17  -LR 06/02/17  -BD     Gait Training Goal PT LTG, Time to Achieve 2 wks  -LR 2 wks  -BD     Gait Training Goal PT LTG, [place] Level verbal cues required;set up required;moderate assist (50% patient effort);2 person assist required  -LR 2 person assist required;set up required;verbal cues required  -BD     Gait Training Goal PT LTG, Assist Device walker, rolling platform  -LR walker, rolling platform  -BD     Gait Training Goal PT LTG, Distance to Achieve 30 feet  -LR 30  -BD     Gait Training Goal PT LTG, Date Goal Reviewed 06/05/17  -LR      Gait Training Goal PT LTG, Outcome goal not met  -LR goal ongoing  -BD     Gait Training Goal PT LTG, Reason Goal Not Met discharged from facility  -LR        User Key  (r) = Recorded By, (t) = Taken By, (c) = Cosigned By    Initials Name Provider Type    LR Tonya Mcgraw, PT Physical Therapist    BD Trudi Hernandez, PT Physical Therapist              Adult Rehabilitation Note       06/05/17 1134 06/05/17 1133       Rehab Assessment/Intervention    Discipline occupational therapist  -AR physical therapist  -LR     Document Type  therapy note (daily note);discharge summary  -LR     Subjective Information  agree to therapy;complains  of;pain  -LR     Patient Effort, Rehab Treatment  good  -LR     Symptoms Noted During/After Treatment  fatigue  -LR     Precautions/Limitations fall precautions;other (see comments)   R sided residual weakness from prior CVA  -AR fall precautions;other (see comments)   Residual R sided weakness  -LR     Recorded by [AR] Norah Baez, OT [LR] Tonya Mcgraw, PT     Pain Assessment    Pain Assessment No/denies pain  -AR No/denies pain  -LR     Recorded by [AR] Norah Baez OT [LR] Tonya Mcgraw, PT     Cognitive Assessment/Intervention    Current Cognitive/Communication Assessment functional  -AR functional  -LR     Orientation Status oriented x 4  -AR oriented x 4;required verbal cueing (specifiy in comments)  -LR     Follows Commands/Answers Questions 100% of the time;able to follow multi-step instructions  -% of the time;able to follow single-step instructions;needs cueing  -LR     Personal Safety WNL/WFL  -AR WNL/WFL  -LR     Personal Safety Interventions fall prevention program maintained  -AR      Recorded by [AR] Norah Baez, OT [LR] Tonya Mcgraw, PT     Bed Mobility, Assessment/Treatment    Bed Mobility, Assistive Device bed rails;head of bed elevated;draw sheet  -AR bed rails;head of bed elevated;draw sheet  -LR     Bed Mobility, Roll Left, [place] maximum assist (25% patient effort)  -AR      Bed Mobility, Roll Right, [place] minimum assist (75% patient effort)  -AR      Bed Mobility, Scoot/Bridge, [place] maximum assist (25% patient effort);2 person assist required;verbal cues required  -AR      Bed Mob, Supine to Sit, [place] maximum assist (25% patient effort);2 person assist required;verbal cues required  -AR verbal cues required;maximum assist (25% patient effort);2 person assist required  -LR     Bed Mob, Sit to Supine, [place] maximum assist (25% patient effort);2 person assist required;verbal cues required  -AR      Bed  Mobility, Safety Issues  decreased use of arms for pushing/pulling;decreased use of legs for bridging/pushing  -LR     Bed Mobility, Impairments ROM decreased;strength decreased;other (see comments)   RUE/RLE weakness  -AR ROM decreased;strength decreased;pain  -LR     Bed Mobility, Comment  Verbal cues to move LEs towards EOB and to push up from bed to raise trunk into sitting. Patient unable to assist at all with R side or to scoot hips towards EOB. Required max assist x 2 with draw sheet to sit up. Max of 2 with assist at trunk and with LEs to return to supine.   -LR     Recorded by [AR] Norah Baez OT [LR] Tonya Mcgraw, PT     Transfer Assessment/Treatment    Transfers, Sit-Stand [place] verbal cues required;minimum assist (75% patient effort);2 person assist required  -AR verbal cues required;minimum assist (75% patient effort);2 person assist required  -LR     Transfers, Stand-Sit [place] verbal cues required;minimum assist (75% patient effort);2 person assist required  -AR verbal cues required;minimum assist (75% patient effort);2 person assist required  -LR     Transfers, Sit-Stand-Sit, Assist Device homa walker;elevated surface  -AR homa walker  -LR     Toilet Transfer, [place] minimum assist (75% patient effort);2 person assist required;verbal cues required  -AR verbal cues required;minimum assist (75% patient effort);2 person assist required  -LR     Toilet Transfer, Assistive Device hmoa walker;bedside commode without drop arms  -AR bedside commode without drop arms;homa walker  -LR     Transfer, Safety Issues  sequencing ability decreased;step length decreased;weight-shifting ability decreased  -LR     Transfer, Impairments strength decreased;impaired balance  -AR ROM decreased;strength decreased;impaired balance  -LR     Transfer, Comment  Verbal cues to push up from bed to stand and to reach back for bed to lower into sitting. Assisted from bed to BSC and from BSC back  to bed.   -LR     Recorded by [AR] Norah Baez OT [LR] Tonya Mcgraw, PT     Gait Assessment/Treatment    Gait, [place] Level  verbal cues required;minimum assist (75% patient effort);2 person assist required  -LR     Gait, Assistive Device  homa walker  -LR     Gait, Distance (Feet)  6  -LR     Gait, Gait Deviations  right:;decreased heel strike;step length decreased;toe-to-floor clearance decreased;weight-shifting ability decreased  -LR     Gait, Safety Issues  sequencing ability decreased;step length decreased;weight-shifting ability decreased  -LR     Gait, Impairments  ROM decreased;strength decreased;impaired balance  -LR     Gait, Comment  Patient took steps from bed to BSC and then from BSC to chair. No LOB with steps. Patient independently moved hemiwalker. Cues for weight shifting at times to allow for advancement of R LE. Gait limited by fatigue and weakness.   -LR     Recorded by  [LR] Tonya Mcgraw, PT     ADL Assessment/Intervention    Additional Documentation Self-Feeding Assessment/Training (Group)  -AR      Recorded by [AR] Norah Baez OT      Upper Body Dressing Assessment/Training    UB Dressing Assess/Train, Clothing Type donning:;doffing:;hospital gown  -AR      UB Dressing Assess/Train, Assist Device homa technique  -AR      UB Dressing Assess/Train, Position edge of bed  -AR      UB Dressing Assess/Train, [place] moderate assist (50% patient effort)  -AR      Recorded by [AR] Norah Baez OT      Lower Body Dressing Assessment/Training    LB Dressing Assess/Train, Clothing Type donning:;slipper socks  -AR      LB Dressing Assess/Train, Position supine  -AR      LB Dressing Assess/Train, [place] maximum assist (25% patient effort)  -AR      Recorded by [AR] Norah Baez OT      Toileting Assessment/Training    Toileting Assess/Train, Assistive Device bedside commode;other (see comments)  -AR      Toileting Assess/Train, Position  standing   homa-walker  -AR      Toileting Assess/Train, Indepen Level dependent (less than 25% patient effort)   post-toilet hygiene and clothing mgmt  -AR      Recorded by [AR] Norah Baez OT      Self-Feeding Assessment/Training    Self-Feeding Assess/Train, Position supported sitting  -AR      Self-Feeding Assess/Train, [place] set up required  -AR      Recorded by [AR] Norah Baez OT      Balance Skills Training    Sitting-Level of Assistance  Contact guard  -LR     Sitting-Balance Support  Left upper extremity supported;Right upper extremity supported;Feet supported  -LR     Sitting-Balance Activities  Right UE Weight Bearing;Left UE Weight Bearing;Trunk control activities  -LR     Standing-Level of Assistance  Contact guard;x2  -LR     Static Standing Balance Support  Right upper extremity supported;Left upper extremity supported;assistive device  -LR     Standing-Balance Activities  Weight Shift R-L  -LR     Recorded by  [LR] Tonya Mcgraw, ELO     Therapy Exercises    Right Upper Extremity PROM:;10 reps;supine;elbow flexion/extension;hand pumps;shoulder extension/flexion  -AR      Left Upper Extremity AROM:;10 reps;supine;elbow flexion/extension;hand pumps;shoulder extension/flexion  -AR      Recorded by [AR] Norah Baez OT      Positioning and Restraints    Pre-Treatment Position in bed  -AR in bed  -LR     Post Treatment Position bed  -AR bed  -LR     In Bed supine;call light within reach;encouraged to call for assist;exit alarm on;notified nsg;RUE elevated;LUE elevated;heels elevated  -AR notified nsg;supine;call light within reach;encouraged to call for assist;side rails up x2  -LR     Recorded by [AR] Norah Baez, OT [LR] Tonya Mcgraw, PT       User Key  (r) = Recorded By, (t) = Taken By, (c) = Cosigned By    Initials Name Effective Dates    LR Tonya Mcgraw, PT 06/19/15 -     AR Norah Baez OT 06/22/15 -           PT Recommendation  and Plan  Anticipated Equipment Needs At Discharge: platform walker  Anticipated Discharge Disposition: inpatient rehabilitation facility  PT Frequency: daily, per priority policy  Plan of Care Review  Plan Of Care Reviewed With: patient  Progress: progress toward functional goals as expected  Outcome Summary/Follow up Plan: Increased independence with t/f and with gait. Achieved transfer goal. Patient has been d/c to [facility].           Outcome Measures       06/05/17 1134 06/05/17 1133       How much help from another person do you currently need...    Turning from your back to your side while in flat bed without using bedrails?  2  -LR     Moving from lying on back to sitting on the side of a flat bed without bedrails?  2  -LR     Moving to and from a bed to a chair (including a wheelchair)?  3  -LR     Standing up from a chair using your arms (e.g., wheelchair, bedside chair)?  3  -LR     Climbing 3-5 steps with a railing?  1  -LR     To walk in hospital room?  3  -LR     AM-PAC 6 Clicks Score  14  -LR     How much help from another is currently needed...    Putting on and taking off regular lower body clothing? 2  -AR      Bathing (including washing, rinsing, and drying) 2  -AR      Toileting (which includes using toilet bed pan or urinal) 1  -AR      Putting on and taking off regular upper body clothing 2  -AR      Taking care of personal grooming (such as brushing teeth) 3  -AR      Eating meals 3  -AR      Score 13  -AR      Functional Assessment    Outcome Measure Options AM-PAC 6 Clicks Daily Activity (OT)  -AR AM-PAC 6 Clicks Basic Mobility (PT)  -LR       User Key  (r) = Recorded By, (t) = Taken By, (c) = Cosigned By    Initials Name Provider Type    LR Tonya Mcgraw, PT Physical Therapist    AR Norah Baez, OT Occupational Therapist           Time Calculation:         PT Charges       06/05/17 1522          Time Calculation    Start Time 1133  -LR      PT Received On 06/05/17  -LR      PT  Goal Re-Cert Due Date 06/12/17  -LR      Time Calculation- PT    Total Timed Code Minutes- PT 33 minute(s)   co-treat, split time  -LR        User Key  (r) = Recorded By, (t) = Taken By, (c) = Cosigned By    Initials Name Provider Type    LR Tonya Mcgraw, PT Physical Therapist          Therapy Charges for Today     Code Description Service Date Service Provider Modifiers Qty    [id number] HC PT THER PROC EA 15 MIN 6/5/2017 Tonya Mcgraw, PT GP 1          PT G-Codes  Outcome Measure Options: AM-PAC 6 Clicks Daily Activity (OT)    PT Discharge Summary  Anticipated Discharge Disposition: inpatient rehabilitation facility  Reason for Discharge: Discharge from facility  Outcomes Achieved: Patient able to partially acheive established goals  Discharge Destination: Inpatient rehabilitation facility    Tonya Mcgraw, PT  6/5/2017

## 2017-06-05 NOTE — PLAN OF CARE
Problem: Patient Care Overview (Adult)  Goal: Plan of Care Review  Outcome: Ongoing (interventions implemented as appropriate)    06/05/17 1408   Coping/Psychosocial Response Interventions   Plan Of Care Reviewed With patient   Patient Care Overview   Progress improving   Outcome Evaluation   Outcome Summary/Follow up Plan Pt achieved transfer goal. No c/o L wrist pain this date. Recommend IPR.          Problem: Inpatient Occupational Therapy  Goal: Transfer Training Goal 1 LTG- OT  Outcome: Outcome(s) achieved Date Met:  06/05/17 06/02/17 1158 06/05/17 1408   Transfer Training OT LTG   Transfer Training OT LTG, Time to Achieve by discharge --    Transfer Training OT LTG, Activity Type toilet --    Transfer Training OT LTG, [place] Level moderate assist (50% patient effort);2 person assist required;verbal cues required --    Transfer Training OT LTG, Assist Device walker, homa --    Transfer Training OT LTG, Outcome --  goal met       Goal: Toileting Goal LTG- OT  Outcome: Ongoing (interventions implemented as appropriate)    06/02/17 1158 06/05/17 1408   Toileting OT LTG   Toileting Goal OT LTG, Time to Achieve by discharge --    Toileting Goal OT LTG, [place] Level moderate assist (50% patient effort);verbal cues required --    Toileting Goal OT LTG, Outcome --  goal ongoing       Goal: UB Dressing Goal LTG- OT  Outcome: Ongoing (interventions implemented as appropriate)    06/02/17 1158 06/05/17 1408   UB Dressing OT LTG   UB Dressing Goal OT LTG, Time to Achieve by discharge --    UB Dressing Goal OT LTG, [place] Level minimum assist (75% patient effort) --    UB Dressing Goal OT LTG, Additional Goal Pt to demonstrate R UE homa-dressing --    UB Dressing Goal OT LTG, Outcome --  goal ongoing       Goal: Functional Mobility Goal LTG- OT  Outcome: Ongoing (interventions implemented as appropriate)    06/02/17 1158 06/05/17 1408   Functional Mobility OT LTG   Functional Mobility Goal OT LTG, Time  to Achieve by discharge --    Functional Mobility Goal OT LTG, [place] Level mod assist;x2 --    Functional Mobility Goal OT LTG, Assist Device homa walker --    Functional Mobility Goal OT LTG, Distance to Achieve to the bathroom --    Functional Mobility Goal OT LTG, Outcome --  goal ongoing

## 2017-06-05 NOTE — THERAPY TREATMENT NOTE
Acute Care - Occupational Therapy Treatment Note  [facility]     Patient Name: Jennifer Oliveira  : 1940  MRN: [id number]  Today's Date: 2017  Onset of Illness/Injury or Date of Surgery Date: 17  Date of Referral to OT: 17  Referring Physician: MD Sonia      Admit Date: 2017    Visit Dx:     ICD-10-CM ICD-9-CM   1. Symptomatic anemia D64.9 285.9   2. Gastrointestinal hemorrhage, unspecified gastrointestinal hemorrhage type K92.2 578.9   3. Chronic renal insufficiency, unspecified stage N18.9 585.9   4. Normochromic normocytic anemia D64.9 285.9   5. Gastrointestinal hemorrhage with melena K92.1 578.1   6. Impaired mobility and ADLs Z74.09 799.89   7. Impaired functional mobility, balance, gait, and endurance Z74.09 V49.89     Patient Active Problem List   Diagnosis   • Acute hypoxemic respiratory failure   • PAD (peripheral artery disease)   • Normochromic normocytic anemia   • Chronic diastolic heart failure   • Diabetes type 2, uncontrolled   • Lower extremity cellulitis   • Hypothyroid   • HTN (hypertension)   • Metabolic encephalopathy   • TIA (transient ischemic attack)   • Acute cystitis without hematuria   • Chronic kidney disease (CKD), stage III (moderate)   • GERD (gastroesophageal reflux disease) with hx of gastritis    • Stroke   • Hypertension   • Disease of thyroid gland   • Diastolic heart failure   • Diabetes mellitus   • CKD (chronic kidney disease), stage III   • Symptomatic anemia   • GI bleed   • Acute-on-chronic kidney injury             Adult Rehabilitation Note       17 1134 17 1133       Rehab Assessment/Intervention    Discipline occupational therapist  -AR physical therapist  -LR     Document Type  therapy note (daily note);discharge summary  -LR     Subjective Information  agree to therapy;complains of;pain  -LR     Patient Effort, Rehab Treatment  good  -LR     Symptoms Noted During/After Treatment  fatigue  -LR     Precautions/Limitations  fall precautions;other (see comments)   R sided residual weakness from prior CVA  -AR fall precautions;swallowing precautions   Residual R sided weakness  -LR     Recorded by [AR] Norah Baez, OT [LR] Tonya Mcgraw, PT     Pain Assessment    Pain Assessment No/denies pain  -AR 0-10  -LR     Recorded by [AR] Norah Baez OT [LR] Tonya Mcgraw, PT     Cognitive Assessment/Intervention    Current Cognitive/Communication Assessment functional  -AR functional  -LR     Orientation Status oriented x 4  -AR oriented x 4;required verbal cueing (specifiy in comments)  -LR     Follows Commands/Answers Questions 100% of the time;able to follow multi-step instructions  -% of the time;able to follow single-step instructions;needs cueing  -LR     Personal Safety WNL/WFL  -AR WNL/WFL  -LR     Personal Safety Interventions fall prevention program maintained  -AR      Recorded by [AR] Norah Baez, OT [LR] Tonya Mcgraw, PT     Bed Mobility, Assessment/Treatment    Bed Mobility, Assistive Device bed rails;head of bed elevated;draw sheet  -AR bed rails;head of bed elevated;draw sheet  -LR     Bed Mobility, Roll Left, [place] maximum assist (25% patient effort)  -AR      Bed Mobility, Roll Right, [place] minimum assist (75% patient effort)  -AR      Bed Mobility, Scoot/Bridge, [place] maximum assist (25% patient effort);2 person assist required;verbal cues required  -AR      Bed Mob, Supine to Sit, [place] maximum assist (25% patient effort);2 person assist required;verbal cues required  -AR verbal cues required;maximum assist (25% patient effort);2 person assist required  -LR     Bed Mob, Sit to Supine, [place] maximum assist (25% patient effort);2 person assist required;verbal cues required  -AR      Bed Mobility, Safety Issues  decreased use of arms for pushing/pulling;decreased use of legs for bridging/pushing  -LR     Bed Mobility, Impairments ROM  decreased;strength decreased;other (see comments)   RUE/RLE weakness  -AR ROM decreased;strength decreased;pain  -LR     Recorded by [AR] Norah Baez OT [LR] Tonya Mcgraw, PT     Transfer Assessment/Treatment    Transfers, Sit-Stand [place] verbal cues required;minimum assist (75% patient effort);2 person assist required  -AR verbal cues required;minimum assist (75% patient effort);2 person assist required  -LR     Transfers, Stand-Sit [place] verbal cues required;minimum assist (75% patient effort);2 person assist required  -AR verbal cues required;minimum assist (75% patient effort);2 person assist required  -LR     Transfers, Sit-Stand-Sit, Assist Device homa walker;elevated surface  -AR homa walker  -LR     Toilet Transfer, [place] minimum assist (75% patient effort);2 person assist required;verbal cues required  -AR      Toilet Transfer, Assistive Device homa walker;bedside commode without drop arms  -AR      Transfer, Impairments strength decreased;impaired balance  -AR      Recorded by [AR] Norah Baez OT [LR] Tonya Mcgraw, PT     ADL Assessment/Intervention    Additional Documentation Self-Feeding Assessment/Training (Group)  -AR      Recorded by [AR] Norah Baez OT      Upper Body Dressing Assessment/Training    UB Dressing Assess/Train, Clothing Type donning:;doffing:;hospital gown  -AR      UB Dressing Assess/Train, Assist Device homa technique  -AR      UB Dressing Assess/Train, Position edge of bed  -AR      UB Dressing Assess/Train, [place] moderate assist (50% patient effort)  -AR      Recorded by [AR] Norah Baez OT      Lower Body Dressing Assessment/Training    LB Dressing Assess/Train, Clothing Type donning:;slipper socks  -AR      LB Dressing Assess/Train, Position supine  -AR      LB Dressing Assess/Train, [place] maximum assist (25% patient effort)  -AR      Recorded by [AR] Norah Baez OT      Toileting  Assessment/Training    Toileting Assess/Train, Assistive Device bedside commode;other (see comments)  -AR      Toileting Assess/Train, Position standing   homa-walker  -AR      Toileting Assess/Train, Indepen Level dependent (less than 25% patient effort)   post-toilet hygiene and clothing mgmt  -AR      Recorded by [AR] Norah Baez OT      Self-Feeding Assessment/Training    Self-Feeding Assess/Train, Position supported sitting  -AR      Self-Feeding Assess/Train, [place] set up required  -AR      Recorded by [AR] Norah Baez OT      Therapy Exercises    Right Upper Extremity PROM:;10 reps;supine;elbow flexion/extension;hand pumps;shoulder extension/flexion  -AR      Left Upper Extremity AROM:;10 reps;supine;elbow flexion/extension;hand pumps;shoulder extension/flexion  -AR      Recorded by [AR] Norah Baez OT      Positioning and Restraints    Pre-Treatment Position in bed  -AR      Post Treatment Position bed  -AR      In Bed supine;call light within reach;encouraged to call for assist;exit alarm on;notified nsg;RUE elevated;LUE elevated;heels elevated  -AR      Recorded by [AR] Norah Baez OT        User Key  (r) = Recorded By, (t) = Taken By, (c) = Cosigned By    Initials Name Effective Dates    LR Tonya Mcgraw, PT 06/19/15 -     AR Norah Baez OT 06/22/15 -                 OT Goals       06/05/17 1408 06/02/17 1158       Transfer Training OT LTG    Transfer Training OT LTG, Time to Achieve  by discharge  -TB     Transfer Training OT LTG, Activity Type  toilet  -TB     Transfer Training OT LTG, [place] Level  moderate assist (50% patient effort);2 person assist required;verbal cues required  -TB     Transfer Training OT LTG, Assist Device  walker, homa  -TB     Transfer Training OT LTG, Outcome goal met  -AR      Toileting OT LTG    Toileting Goal OT LTG, Time to Achieve  by discharge  -TB     Toileting Goal OT LTG, [place] Level  moderate assist  (50% patient effort);verbal cues required  -TB     Toileting Goal OT LTG, Outcome goal ongoing  -AR      UB Dressing OT LTG    UB Dressing Goal OT LTG, Time to Achieve  by discharge  -TB     UB Dressing Goal OT LTG, [place] Level  minimum assist (75% patient effort)  -TB     UB Dressing Goal OT LTG, Additional Goal  Pt to demonstrate R UE homa-dressing  -TB     UB Dressing Goal OT LTG, Outcome goal ongoing  -AR      Functional Mobility OT LTG    Functional Mobility Goal OT LTG, Time to Achieve  by discharge  -TB     Functional Mobility Goal OT LTG, [place] Level  mod assist;x2  -TB     Functional Mobility Goal OT LTG, Assist Device  homa walker  -TB     Functional Mobility Goal OT LTG, Distance to Achieve  to the bathroom  -TB     Functional Mobility Goal OT LTG, Outcome goal ongoing  -AR        User Key  (r) = Recorded By, (t) = Taken By, (c) = Cosigned By    Initials Name Provider Type    TB Marifer Schofield, OT Occupational Therapist    SHABNAM Baez, OT Occupational Therapist          Occupational Therapy Education     Title: PT OT SLP Therapies (Done)     Topic: Occupational Therapy (Done)     Point: ADL training (Done)    Description: Instruct learner(s) on proper safety adaptation and remediation techniques during self care or transfers.   Instruct in proper use of assistive devices.    Learning Progress Summary    Learner Readiness Method Response Comment Documented by Status   Patient Eager ELIAS SCHOFIELD D VU, NR Reviewed bed mobility, transfer training, ALD retraining, NMR AR 06/05/17 1407 Done    Acceptance ELIAS SCHOFIELD DU   06/02/17 1926 Done    Acceptance ELORRI NR   06/02/17 1210 Active    Acceptance D NR   06/02/17 1208 Active    Acceptance ELORRI NR Education initiated for benefits of therapy, role of OT and recommendation for rehab at d/c to support return to home with assist  06/02/17 1157 Done                      User Key     Initials Effective Dates Name Provider Type Discipline     TB 06/22/15 -  Marifer Schofield OT Occupational Therapist OT    AR 06/22/15 -  Norah Baez OT Occupational Therapist OT     06/16/16 -  Azul Bloom, RN Registered Nurse Nurse     06/13/16 -  Trudi Hernandez, ELO Physical Therapist PT                  OT Recommendation and Plan  Anticipated Equipment Needs At Discharge: wheelchair  Anticipated Discharge Disposition: skilled nursing facility  Therapy Frequency: daily  Plan of Care Review  Plan Of Care Reviewed With: patient  Progress: improving  Outcome Summary/Follow up Plan: Pt achieved transfer goal. No c/o L wrist pain this date. Recommend IPR.         Outcome Measures       06/05/17 1134          How much help from another is currently needed...    Putting on and taking off regular lower body clothing? 2  -AR      Bathing (including washing, rinsing, and drying) 2  -AR      Toileting (which includes using toilet bed pan or urinal) 1  -AR      Putting on and taking off regular upper body clothing 2  -AR      Taking care of personal grooming (such as brushing teeth) 3  -AR      Eating meals 3  -AR      Score 13  -AR      Functional Assessment    Outcome Measure Options AM-PAC 6 Clicks Daily Activity (OT)  -AR        User Key  (r) = Recorded By, (t) = Taken By, (c) = Cosigned By    Initials Name Provider Type    AR Norah Baez OT Occupational Therapist           Time Calculation:         Time Calculation- OT       06/05/17 1410          Time Calculation- OT    OT Start Time 1134  -AR      Total Timed Code Minutes- OT 24 minute(s)  -AR      OT Received On 06/05/17  -AR      OT Goal Re-Cert Due Date 06/12/17  -AR        User Key  (r) = Recorded By, (t) = Taken By, (c) = Cosigned By    Initials Name Provider Type    SHABNAM Baez OT Occupational Therapist           Therapy Charges for Today     Code Description Service Date Service Provider Modifiers Qty    [id number]  OT THERAPEUTIC ACT EA 15 MIN 6/5/2017 Norah Paige  Sasha, OT GO 2               Norah Baez, OT  6/5/2017

## 2017-06-07 NOTE — THERAPY DISCHARGE NOTE
Acute Care - Occupational Therapy Discharge Summary  [facility]     Patient Name: Jennifer Oliveira  : 1940  MRN: [id number]    Today's Date: 2017  Onset of Illness/Injury or Date of Surgery Date: 17    Date of Referral to OT: 17  Referring Physician: MD Sonia      Admit Date: 2017        OT Recommendation and Plan    Visit Dx:    ICD-10-CM ICD-9-CM   1. Symptomatic anemia D64.9 285.9   2. Gastrointestinal hemorrhage, unspecified gastrointestinal hemorrhage type K92.2 578.9   3. Chronic renal insufficiency, unspecified stage N18.9 585.9   4. Normochromic normocytic anemia D64.9 285.9   5. Gastrointestinal hemorrhage with melena K92.1 578.1   6. Impaired mobility and ADLs Z74.09 799.89   7. Impaired functional mobility, balance, gait, and endurance Z74.09 V49.89                     OT Goals       17 1408 17 1158       Transfer Training OT LTG    Transfer Training OT LTG, Time to Achieve  by discharge  -TB     Transfer Training OT LTG, Activity Type  toilet  -TB     Transfer Training OT LTG, [place] Level  moderate assist (50% patient effort);2 person assist required;verbal cues required  -TB     Transfer Training OT LTG, Assist Device  walker, homa  -TB     Transfer Training OT LTG, Outcome goal met  -AR      Toileting OT LTG    Toileting Goal OT LTG, Time to Achieve  by discharge  -TB     Toileting Goal OT LTG, [place] Level  moderate assist (50% patient effort);verbal cues required  -TB     Toileting Goal OT LTG, Outcome goal ongoing  -AR      UB Dressing OT LTG    UB Dressing Goal OT LTG, Time to Achieve  by discharge  -TB     UB Dressing Goal OT LTG, [place] Level  minimum assist (75% patient effort)  -TB     UB Dressing Goal OT LTG, Additional Goal  Pt to demonstrate R UE homa-dressing  -TB     UB Dressing Goal OT LTG, Outcome goal ongoing  -AR      Functional Mobility OT LTG    Functional Mobility Goal OT LTG, Time to Achieve  by discharge  -TB      Functional Mobility Goal OT LTG, [place] Level  mod assist;x2  -TB     Functional Mobility Goal OT LTG, Assist Device  homa walker  -TB     Functional Mobility Goal OT LTG, Distance to Achieve  to the bathroom  -TB     Functional Mobility Goal OT LTG, Outcome goal ongoing  -AR        User Key  (r) = Recorded By, (t) = Taken By, (c) = Cosigned By    Initials Name Provider Type    TB Marifer Schofield, OT Occupational Therapist    AR Norah Baez, OT Occupational Therapist                Outcome Measures       06/05/17 1134 06/05/17 1133       How much help from another person do you currently need...    Turning from your back to your side while in flat bed without using bedrails?  2  -LR     Moving from lying on back to sitting on the side of a flat bed without bedrails?  2  -LR     Moving to and from a bed to a chair (including a wheelchair)?  3  -LR     Standing up from a chair using your arms (e.g., wheelchair, bedside chair)?  3  -LR     Climbing 3-5 steps with a railing?  1  -LR     To walk in hospital room?  3  -LR     AM-PAC 6 Clicks Score  14  -LR     How much help from another is currently needed...    Putting on and taking off regular lower body clothing? 2  -AR      Bathing (including washing, rinsing, and drying) 2  -AR      Toileting (which includes using toilet bed pan or urinal) 1  -AR      Putting on and taking off regular upper body clothing 2  -AR      Taking care of personal grooming (such as brushing teeth) 3  -AR      Eating meals 3  -AR      Score 13  -AR      Functional Assessment    Outcome Measure Options AM-PAC 6 Clicks Daily Activity (OT)  -AR AM-PAC 6 Clicks Basic Mobility (PT)  -LR       User Key  (r) = Recorded By, (t) = Taken By, (c) = Cosigned By    Initials Name Provider Type    LR Tonya Mcgraw, PT Physical Therapist    SHABNAM Baez, OT Occupational Therapist              OT Discharge Summary  Reason for Discharge: Discharge from facility  Outcomes  Achieved: Refer to plan of care for updates on goals achieved  Discharge Destination: Inpatient rehabilitation facility      Magalie Trinh OT  6/7/2017

## 2017-08-10 ENCOUNTER — OUTSIDE FACILITY SERVICE (OUTPATIENT)
Dept: HOSPITALIST | Facility: HOSPITAL | Age: 77
End: 2017-08-10

## 2017-08-21 ENCOUNTER — HOSPITAL ENCOUNTER (EMERGENCY)
Facility: HOSPITAL | Age: 77
Discharge: HOME | End: 2017-08-21
Attending: EMERGENCY MEDICINE | Admitting: EMERGENCY MEDICINE

## 2017-08-21 ENCOUNTER — APPOINTMENT (OUTPATIENT)
Dept: GENERAL RADIOLOGY | Facility: HOSPITAL | Age: 77
End: 2017-08-21

## 2017-08-21 VITALS
RESPIRATION RATE: 18 BRPM | OXYGEN SATURATION: 93 % | HEIGHT: 60 IN | DIASTOLIC BLOOD PRESSURE: 57 MMHG | HEART RATE: 72 BPM | TEMPERATURE: 98.4 F | BODY MASS INDEX: 33.38 KG/M2 | SYSTOLIC BLOOD PRESSURE: 172 MMHG | WEIGHT: 170 LBS

## 2017-08-21 DIAGNOSIS — R73.9 HYPERGLYCEMIA: ICD-10-CM

## 2017-08-21 DIAGNOSIS — E86.0 DEHYDRATION: Primary | ICD-10-CM

## 2017-08-21 DIAGNOSIS — D64.9 ANEMIA, UNSPECIFIED TYPE: ICD-10-CM

## 2017-08-21 LAB
ABO GROUP BLD: NORMAL
ALBUMIN SERPL-MCNC: 3.8 G/DL (ref 3.2–4.8)
ALBUMIN/GLOB SERPL: 1.3 G/DL (ref 1.5–2.5)
ALP SERPL-CCNC: 72 U/L (ref 25–100)
ALT SERPL W P-5'-P-CCNC: 24 U/L (ref 7–40)
ANION GAP SERPL CALCULATED.3IONS-SCNC: 5 MMOL/L (ref 3–11)
AST SERPL-CCNC: 16 U/L (ref 0–33)
BASOPHILS # BLD AUTO: 0.05 10*3/MM3 (ref 0–0.2)
BASOPHILS NFR BLD AUTO: 0.9 % (ref 0–1)
BILIRUB SERPL-MCNC: 0.2 MG/DL (ref 0.3–1.2)
BLD GP AB SCN SERPL QL: NEGATIVE
BUN BLD-MCNC: 43 MG/DL (ref 9–23)
BUN/CREAT SERPL: 19.5 (ref 7–25)
CALCIUM SPEC-SCNC: 9.1 MG/DL (ref 8.7–10.4)
CHLORIDE SERPL-SCNC: 108 MMOL/L (ref 99–109)
CO2 SERPL-SCNC: 27 MMOL/L (ref 20–31)
CREAT BLD-MCNC: 2.2 MG/DL (ref 0.6–1.3)
DEPRECATED RDW RBC AUTO: 53.9 FL (ref 37–54)
DEVELOPER EXPIRATION DATE: NORMAL
DEVELOPER LOT NUMBER: NORMAL
EOSINOPHIL # BLD AUTO: 0.33 10*3/MM3 (ref 0–0.3)
EOSINOPHIL NFR BLD AUTO: 5.8 % (ref 0–3)
ERYTHROCYTE [DISTWIDTH] IN BLOOD BY AUTOMATED COUNT: 15.6 % (ref 11.3–14.5)
EXPIRATION DATE: NORMAL
FECAL OCCULT BLOOD SCREEN, POC: NEGATIVE
GFR SERPL CREATININE-BSD FRML MDRD: 22 ML/MIN/1.73
GLOBULIN UR ELPH-MCNC: 3 GM/DL
GLUCOSE BLD-MCNC: 351 MG/DL (ref 70–100)
HCT VFR BLD AUTO: 25.4 % (ref 34.5–44)
HGB BLD-MCNC: 7.9 G/DL (ref 11.5–15.5)
IMM GRANULOCYTES # BLD: 0.03 10*3/MM3 (ref 0–0.03)
IMM GRANULOCYTES NFR BLD: 0.5 % (ref 0–0.6)
INR PPP: 1
LYMPHOCYTES # BLD AUTO: 0.99 10*3/MM3 (ref 0.6–4.8)
LYMPHOCYTES NFR BLD AUTO: 17.4 % (ref 24–44)
Lab: NORMAL
MCH RBC QN AUTO: 29.6 PG (ref 27–31)
MCHC RBC AUTO-ENTMCNC: 31.1 G/DL (ref 32–36)
MCV RBC AUTO: 95.1 FL (ref 80–99)
MONOCYTES # BLD AUTO: 0.49 10*3/MM3 (ref 0–1)
MONOCYTES NFR BLD AUTO: 8.6 % (ref 0–12)
NEGATIVE CONTROL: NEGATIVE
NEUTROPHILS # BLD AUTO: 3.8 10*3/MM3 (ref 1.5–8.3)
NEUTROPHILS NFR BLD AUTO: 66.8 % (ref 41–71)
PLATELET # BLD AUTO: 209 10*3/MM3 (ref 150–450)
PMV BLD AUTO: 10.2 FL (ref 6–12)
POSITIVE CONTROL: POSITIVE
POTASSIUM BLD-SCNC: 4.2 MMOL/L (ref 3.5–5.5)
PROT SERPL-MCNC: 6.8 G/DL (ref 5.7–8.2)
PROTHROMBIN TIME: 10.9 SECONDS (ref 9.6–11.5)
RBC # BLD AUTO: 2.67 10*6/MM3 (ref 3.89–5.14)
RH BLD: POSITIVE
SODIUM BLD-SCNC: 140 MMOL/L (ref 132–146)
TROPONIN I SERPL-MCNC: 0.01 NG/ML
WBC NRBC COR # BLD: 5.69 10*3/MM3 (ref 3.5–10.8)

## 2017-08-21 PROCEDURE — 86901 BLOOD TYPING SEROLOGIC RH(D): CPT | Performed by: EMERGENCY MEDICINE

## 2017-08-21 PROCEDURE — 86900 BLOOD TYPING SEROLOGIC ABO: CPT | Performed by: EMERGENCY MEDICINE

## 2017-08-21 PROCEDURE — 86900 BLOOD TYPING SEROLOGIC ABO: CPT

## 2017-08-21 PROCEDURE — 71020 HC CHEST PA AND LATERAL: CPT

## 2017-08-21 PROCEDURE — 80053 COMPREHEN METABOLIC PANEL: CPT | Performed by: EMERGENCY MEDICINE

## 2017-08-21 PROCEDURE — 85025 COMPLETE CBC W/AUTO DIFF WBC: CPT | Performed by: EMERGENCY MEDICINE

## 2017-08-21 PROCEDURE — 93005 ELECTROCARDIOGRAM TRACING: CPT | Performed by: EMERGENCY MEDICINE

## 2017-08-21 PROCEDURE — P9016 RBC LEUKOCYTES REDUCED: HCPCS

## 2017-08-21 PROCEDURE — 99283 EMERGENCY DEPT VISIT LOW MDM: CPT

## 2017-08-21 PROCEDURE — 86850 RBC ANTIBODY SCREEN: CPT | Performed by: EMERGENCY MEDICINE

## 2017-08-21 PROCEDURE — 85610 PROTHROMBIN TIME: CPT | Performed by: EMERGENCY MEDICINE

## 2017-08-21 PROCEDURE — 36415 COLL VENOUS BLD VENIPUNCTURE: CPT

## 2017-08-21 PROCEDURE — 36430 TRANSFUSION BLD/BLD COMPNT: CPT

## 2017-08-21 PROCEDURE — 84484 ASSAY OF TROPONIN QUANT: CPT | Performed by: EMERGENCY MEDICINE

## 2017-08-21 PROCEDURE — 86920 COMPATIBILITY TEST SPIN: CPT

## 2017-08-21 RX ORDER — SODIUM CHLORIDE 0.9 % (FLUSH) 0.9 %
10 SYRINGE (ML) INJECTION AS NEEDED
Status: DISCONTINUED | OUTPATIENT
Start: 2017-08-21 | End: 2017-08-21 | Stop reason: HOSPADM

## 2017-08-21 NOTE — ED PROVIDER NOTES
"Subjective   HPI Comments: 77-year-old female presents for evaluation of \"abnormal labs.\"  She is on Plavix and has a history of prior GI bleeds.  She went to see her primary care physician 3 days ago as her daughter felt that she had less energy than baseline and labs revealed a hemoglobin of 7.8 (down from 10+ one month ago).  The results were called to her today and she was advised to come to the emergency department for potential transfusion.  Patient denies any rectal bleeding or dark stools.  She is currently without complaint and asymptomatic.  She denies any abdominal pain, chest pain, palpitations, fevers, or systemic symptoms.    Patient is a 77 y.o. female presenting with general illness.   History provided by:  Patient and relative  Illness   Location:  Abnormal lab  Quality:  Low H&H   Severity:  Moderate  Onset quality:  Gradual  Timing:  Constant  Progression:  Worsening  Chronicity:  New  Context:  Chronically worsening H&H over the last few months. 10.1 in June, down to 7.8 today.   Relieved by:  Nothing  Worsened by:  Nothing  Ineffective treatments:  None tried  Associated symptoms: no chest pain, no fever, no nausea and no vomiting    Risk factors:  Histoy of GI bleeds      Review of Systems   Constitutional: Negative for fever.   Cardiovascular: Negative for chest pain and palpitations.   Gastrointestinal: Negative for blood in stool, nausea and vomiting.   Neurological: Negative for weakness.   All other systems reviewed and are negative.      Past Medical History:   Diagnosis Date   • CKD (chronic kidney disease), stage III     baseline Cr 1.5-1.6   • Diabetes mellitus    • Diastolic heart failure     last LVEF 70%   • Disease of thyroid gland    • GERD (gastroesophageal reflux disease)    • Hypertension    • PAD (peripheral artery disease)     s/p right fem-pop bypass 7/2016   • Stroke     residual chronic right hemiplegia       Allergies   Allergen Reactions   • Erythromycin Rash       Past " Surgical History:   Procedure Laterality Date   • CARPAL TUNNEL RELEASE     • CHOLECYSTECTOMY     • ENDOSCOPY N/A 5/15/2017    Procedure: ESOPHAGOGASTRODUODENOSCOPY;  Surgeon: Lizbet Vidales MD;  Location:  [facility];  Service:    • ENDOSCOPY N/A 5/30/2017    Procedure: ESOPHAGOGASTRODUODENOSCOPY;  Surgeon: Lizbet Vidales MD;  Location:  [facility];  Service:    • FEMORAL POPLITEAL BYPASS Right    • HYSTERECTOMY         Family History   Problem Relation Age of Onset   • Diabetes Mother    • Heart disease Father        Social History     Social History   • Marital status: Single     Spouse name: N/A   • Number of children: N/A   • Years of education: N/A     Social History Main Topics   • Smoking status: Former Smoker     Packs/day: 1.00     Years: 45.00     Quit date: 2009   • Smokeless tobacco: Never Used   • Alcohol use No   • Drug use: No   • Sexual activity: No     Other Topics Concern   • None     Social History Narrative    Lives alone in [place]. [employer] and two daughters who assist her.          Objective   Physical Exam   Constitutional: She is oriented to person, place, and time. She appears well-developed and well-nourished. No distress.   Well-appearing female in NAD   HENT:   Head: Normocephalic and atraumatic.   Mouth/Throat: Oropharynx is clear and moist.   No MM lesions   Eyes: EOM are normal. Pupils are equal, round, and reactive to light.   Neck: Normal range of motion. Neck supple. No JVD present.   Cardiovascular: Normal rate, regular rhythm and normal heart sounds.  Exam reveals no gallop and no friction rub.    No murmur heard.  Pulmonary/Chest: Effort normal and breath sounds normal. No respiratory distress. She has no wheezes. She has no rales.   Abdominal: Soft. Bowel sounds are normal. She exhibits no distension and no mass. There is no tenderness. There is no rebound and no guarding.   Musculoskeletal: Normal range of motion. She exhibits no edema.   Neurological: She is alert  "and oriented to person, place, and time. No cranial nerve deficit. Coordination normal.   Normal gait   Skin: Skin is warm and dry. No rash noted. She is not diaphoretic. No erythema.   Psychiatric: She has a normal mood and affect. Judgment and thought content normal.   Nursing note and vitals reviewed.      Procedures         ED Course  ED Course   Comment By Time   77-year-old female sent from primary care physician's office for evaluation of \"abnormal labs.\"  She was noted to have a hemoglobin of 7.8 (down from 10+ one month ago) and was advised to come to the emergency department today for potential transfusion.  In the ED, patient well-appearing and asymptomatic.  Nonsurgical abdominal exam and unremarkable GUILLERMINA with no BRBPR.  Fecal occult blood test negative.  We'll obtain an EKG, chest x-ray, and labs and reevaluate. Vasiliy Osborne MD 08/21 1545   Labs remarkable for hemoglobin of 7.9 and mildly elevated BUN/creatinine compared to baseline (consistent with prerenal KRIS).  We'll give gentle IV fluids and 1 unit of packed red blood cells for patient's symptomatic anemia. Vasiliy Osborne MD 08/21 2647   Upon reevaluation, patient improved.  No indication for admission at this time.  Reassured and counseled regarding symptomatic treatment.  She will follow-up with her primary care physician within one week and may potentially need an EGD or colonoscopy in the near future to determine the etiology of her chronic anemia.  She has an established gastroenterologist and will follow up within the next 2 weeks.  Agreeable with plan and given appropriate return precautions. Vasiliy Osborne MD 08/21 7396     Recent Results (from the past 24 hour(s))   POCT Occult Blood, stool    Collection Time: 08/21/17  2:05 PM   Result Value Ref Range    Fecal Occult Blood Negative Negative    Lot Number [id number]  10L     Expiration Date 1/20     DEVELOPER LOT NUMBER [id number]     DEVELOPER EXPIRATION DATE 2020-5     Positive " Control Positive Positive    Negative Control Negative Negative   Comprehensive Metabolic Panel    Collection Time: 08/21/17  2:25 PM   Result Value Ref Range    Glucose 351 (H) 70 - 100 mg/dL    BUN 43 (H) 9 - 23 mg/dL    Creatinine 2.20 (H) 0.60 - 1.30 mg/dL    Sodium 140 132 - 146 mmol/L    Potassium 4.2 3.5 - 5.5 mmol/L    Chloride 108 99 - 109 mmol/L    CO2 27.0 20.0 - 31.0 mmol/L    Calcium 9.1 8.7 - 10.4 mg/dL    Total Protein 6.8 5.7 - 8.2 g/dL    Albumin 3.80 3.20 - 4.80 g/dL    ALT (SGPT) 24 7 - 40 U/L    AST (SGOT) 16 0 - 33 U/L    Alkaline Phosphatase 72 25 - 100 U/L    Total Bilirubin 0.2 (L) 0.3 - 1.2 mg/dL    eGFR Non African Amer 22 (L) >60 mL/min/1.73    Globulin 3.0 gm/dL    A/G Ratio 1.3 (L) 1.5 - 2.5 g/dL    BUN/Creatinine Ratio 19.5 7.0 - 25.0    Anion Gap 5.0 3.0 - 11.0 mmol/L   Protime-INR    Collection Time: 08/21/17  2:25 PM   Result Value Ref Range    Protime 10.9 9.6 - 11.5 Seconds    INR 1.00    Troponin    Collection Time: 08/21/17  2:25 PM   Result Value Ref Range    Troponin I 0.009 <=0.039 ng/mL   Type & Screen    Collection Time: 08/21/17  2:59 PM   Result Value Ref Range    ABO Type A     RH type Positive     Antibody Screen Negative    CBC Auto Differential    Collection Time: 08/21/17  2:59 PM   Result Value Ref Range    WBC 5.69 3.50 - 10.80 10*3/mm3    RBC 2.67 (L) 3.89 - 5.14 10*6/mm3    Hemoglobin 7.9 (L) 11.5 - 15.5 g/dL    Hematocrit 25.4 (L) 34.5 - 44.0 %    MCV 95.1 80.0 - 99.0 fL    MCH 29.6 27.0 - 31.0 pg    MCHC 31.1 (L) 32.0 - 36.0 g/dL    RDW 15.6 (H) 11.3 - 14.5 %    RDW-SD 53.9 37.0 - 54.0 fl    MPV 10.2 6.0 - 12.0 fL    Platelets 209 150 - 450 10*3/mm3    Neutrophil % 66.8 41.0 - 71.0 %    Lymphocyte % 17.4 (L) 24.0 - 44.0 %    Monocyte % 8.6 0.0 - 12.0 %    Eosinophil % 5.8 (H) 0.0 - 3.0 %    Basophil % 0.9 0.0 - 1.0 %    Immature Grans % 0.5 0.0 - 0.6 %    Neutrophils, Absolute 3.80 1.50 - 8.30 10*3/mm3    Lymphocytes, Absolute 0.99 0.60 - 4.80 10*3/mm3     Monocytes, Absolute 0.49 0.00 - 1.00 10*3/mm3    Eosinophils, Absolute 0.33 (H) 0.00 - 0.30 10*3/mm3    Basophils, Absolute 0.05 0.00 - 0.20 10*3/mm3    Immature Grans, Absolute 0.03 0.00 - 0.03 10*3/mm3   Prepare RBC, 1 Units    Collection Time: 08/21/17  4:51 PM   Result Value Ref Range    Product Code J0054W44     Unit Number [id number]     UNIT  ABO A     UNIT  RH POS     CROSSMATCH 1 INTERPRETATION Compatible     Dispense Status IS     Blood Type APOS     Blood Expiration Date 454216127563     Blood Type Barcode 6200      Note: In addition to lab results from this visit, the labs listed above may include labs taken at another facility or during a different encounter within the last 24 hours. Please correlate lab times with ED admission and discharge times for further clarification of the services performed during this visit.    XR Chest 2 View   Final Result   Chronic change; no active disease.       D:  08/21/2017   E:  08/21/2017       This report was finalized on 8/21/2017 4:08 PM by Dr. Tan Mujica MD.            Vitals:    08/21/17 1615 08/21/17 1619 08/21/17 1658 08/21/17 1704   BP:   162/59 175/63   Pulse:   74 70   Resp:   18 18   Temp:   98.2 °F (36.8 °C) 98.2 °F (36.8 °C)   TempSrc:   Oral    SpO2: 95% 95% 95%    Weight:       Height:         Medications   sodium chloride 0.9 % flush 10 mL (not administered)   sodium chloride 0.9 % bolus 500 mL (not administered)     ECG/EMG Results (last 24 hours)     ** No results found for the last 24 hours. **                   Recent Results (from the past 24 hour(s))   POCT Occult Blood, stool    Collection Time: 08/21/17  2:05 PM   Result Value Ref Range    Fecal Occult Blood Negative Negative    Lot Number [id number]  10L     Expiration Date 1/20     DEVELOPER LOT NUMBER [id number]     DEVELOPER EXPIRATION DATE 2020-5     Positive Control Positive Positive    Negative Control Negative Negative   Comprehensive Metabolic Panel    Collection Time: 08/21/17  2:25 PM    Result Value Ref Range    Glucose 351 (H) 70 - 100 mg/dL    BUN 43 (H) 9 - 23 mg/dL    Creatinine 2.20 (H) 0.60 - 1.30 mg/dL    Sodium 140 132 - 146 mmol/L    Potassium 4.2 3.5 - 5.5 mmol/L    Chloride 108 99 - 109 mmol/L    CO2 27.0 20.0 - 31.0 mmol/L    Calcium 9.1 8.7 - 10.4 mg/dL    Total Protein 6.8 5.7 - 8.2 g/dL    Albumin 3.80 3.20 - 4.80 g/dL    ALT (SGPT) 24 7 - 40 U/L    AST (SGOT) 16 0 - 33 U/L    Alkaline Phosphatase 72 25 - 100 U/L    Total Bilirubin 0.2 (L) 0.3 - 1.2 mg/dL    eGFR Non African Amer 22 (L) >60 mL/min/1.73    Globulin 3.0 gm/dL    A/G Ratio 1.3 (L) 1.5 - 2.5 g/dL    BUN/Creatinine Ratio 19.5 7.0 - 25.0    Anion Gap 5.0 3.0 - 11.0 mmol/L   Protime-INR    Collection Time: 08/21/17  2:25 PM   Result Value Ref Range    Protime 10.9 9.6 - 11.5 Seconds    INR 1.00    Troponin    Collection Time: 08/21/17  2:25 PM   Result Value Ref Range    Troponin I 0.009 <=0.039 ng/mL   Type & Screen    Collection Time: 08/21/17  2:59 PM   Result Value Ref Range    ABO Type A     RH type Positive     Antibody Screen Negative    CBC Auto Differential    Collection Time: 08/21/17  2:59 PM   Result Value Ref Range    WBC 5.69 3.50 - 10.80 10*3/mm3    RBC 2.67 (L) 3.89 - 5.14 10*6/mm3    Hemoglobin 7.9 (L) 11.5 - 15.5 g/dL    Hematocrit 25.4 (L) 34.5 - 44.0 %    MCV 95.1 80.0 - 99.0 fL    MCH 29.6 27.0 - 31.0 pg    MCHC 31.1 (L) 32.0 - 36.0 g/dL    RDW 15.6 (H) 11.3 - 14.5 %    RDW-SD 53.9 37.0 - 54.0 fl    MPV 10.2 6.0 - 12.0 fL    Platelets 209 150 - 450 10*3/mm3    Neutrophil % 66.8 41.0 - 71.0 %    Lymphocyte % 17.4 (L) 24.0 - 44.0 %    Monocyte % 8.6 0.0 - 12.0 %    Eosinophil % 5.8 (H) 0.0 - 3.0 %    Basophil % 0.9 0.0 - 1.0 %    Immature Grans % 0.5 0.0 - 0.6 %    Neutrophils, Absolute 3.80 1.50 - 8.30 10*3/mm3    Lymphocytes, Absolute 0.99 0.60 - 4.80 10*3/mm3    Monocytes, Absolute 0.49 0.00 - 1.00 10*3/mm3    Eosinophils, Absolute 0.33 (H) 0.00 - 0.30 10*3/mm3    Basophils, Absolute 0.05 0.00 - 0.20  10*3/mm3    Immature Grans, Absolute 0.03 0.00 - 0.03 10*3/mm3   Prepare RBC, 1 Units    Collection Time: 08/21/17  4:51 PM   Result Value Ref Range    Product Code I4022S68     Unit Number [id number]     UNIT  ABO A     UNIT  RH POS     CROSSMATCH 1 INTERPRETATION Compatible     Dispense Status IS     Blood Type APOS     Blood Expiration Date 201709052359     Blood Type Barcode 6200      Note: In addition to lab results from this visit, the labs listed above may include labs taken at another facility or during a different encounter within the last 24 hours. Please correlate lab times with ED admission and discharge times for further clarification of the services performed during this visit.    XR Chest 2 View   Final Result   Chronic change; no active disease.       D:  08/21/2017   E:  08/21/2017       This report was finalized on 8/21/2017 4:08 PM by Dr. Tan Mujica MD.            Vitals:    08/21/17 1615 08/21/17 1619 08/21/17 1658 08/21/17 1704   BP:   162/59 175/63   Pulse:   74 70   Resp:   18 18   Temp:   98.2 °F (36.8 °C) 98.2 °F (36.8 °C)   TempSrc:   Oral    SpO2: 95% 95% 95%    Weight:       Height:         Medications   sodium chloride 0.9 % flush 10 mL (not administered)   sodium chloride 0.9 % bolus 500 mL (not administered)     ECG/EMG Results (last 24 hours)     Procedure Component Value Units Date/Time    ECG 12 Lead [[id number]] Collected:  08/21/17 1437     Updated:  08/21/17 1440            MDM    Final diagnoses:   Dehydration   Hyperglycemia   Anemia, unspecified type       Documentation assistance provided by gertrudis Buenrostro.  Information recorded by the scribe was done at my direction and has been verified and validated by me.     Yuni Buenrostro  08/21/17 1400       Yuni Buenrostro  08/21/17 1411       Yuni Buenrostro  08/21/17 1741       Vasiliy Osborne MD  08/21/17 8595

## 2017-08-22 LAB
ABO + RH BLD: NORMAL
BH BB BLOOD EXPIRATION DATE: NORMAL
BH BB BLOOD TYPE BARCODE: 6200
BH BB DISPENSE STATUS: NORMAL
BH BB PRODUCT CODE: NORMAL
BH BB UNIT NUMBER: NORMAL
CROSSMATCH INTERPRETATION: NORMAL
UNIT  ABO: NORMAL
UNIT  RH: NORMAL

## 2017-09-05 ENCOUNTER — HOSPITAL ENCOUNTER (INPATIENT)
Facility: HOSPITAL | Age: 77
LOS: 19 days | Discharge: NURSING, REHAB OR LONG-TERM CARE | End: 2017-09-24
Attending: EMERGENCY MEDICINE | Admitting: HOSPITALIST

## 2017-09-05 ENCOUNTER — APPOINTMENT (OUTPATIENT)
Dept: GENERAL RADIOLOGY | Facility: HOSPITAL | Age: 77
End: 2017-09-05

## 2017-09-05 DIAGNOSIS — J18.9 PNEUMONIA OF RIGHT LOWER LOBE DUE TO INFECTIOUS ORGANISM: Primary | ICD-10-CM

## 2017-09-05 DIAGNOSIS — Z74.09 IMPAIRED FUNCTIONAL MOBILITY, BALANCE, GAIT, AND ENDURANCE: ICD-10-CM

## 2017-09-05 DIAGNOSIS — Z74.09 IMPAIRED MOBILITY AND ADLS: ICD-10-CM

## 2017-09-05 DIAGNOSIS — Z78.9 IMPAIRED MOBILITY AND ADLS: ICD-10-CM

## 2017-09-05 PROBLEM — R50.9 FEVER: Status: ACTIVE | Noted: 2017-09-05

## 2017-09-05 PROBLEM — E87.5 HYPERKALEMIA: Status: ACTIVE | Noted: 2017-09-05

## 2017-09-05 PROBLEM — R09.02 HYPOXIA: Status: ACTIVE | Noted: 2017-09-05

## 2017-09-05 PROBLEM — R11.2 NAUSEA AND VOMITING: Status: ACTIVE | Noted: 2017-09-05

## 2017-09-05 PROBLEM — R53.1 WEAKNESS: Status: ACTIVE | Noted: 2017-09-05

## 2017-09-05 PROBLEM — R77.8 ELEVATED TROPONIN: Status: ACTIVE | Noted: 2017-09-05

## 2017-09-05 LAB
ALBUMIN SERPL-MCNC: 3.7 G/DL (ref 3.2–4.8)
ALBUMIN/GLOB SERPL: 1.2 G/DL (ref 1.5–2.5)
ALP SERPL-CCNC: 68 U/L (ref 25–100)
ALT SERPL W P-5'-P-CCNC: 28 U/L (ref 7–40)
AMORPH URATE CRY URNS QL MICRO: ABNORMAL /HPF
ANION GAP SERPL CALCULATED.3IONS-SCNC: 6 MMOL/L (ref 3–11)
AST SERPL-CCNC: 51 U/L (ref 0–33)
BACTERIA UR QL AUTO: ABNORMAL /HPF
BASOPHILS # BLD AUTO: 0.03 10*3/MM3 (ref 0–0.2)
BASOPHILS NFR BLD AUTO: 0.4 % (ref 0–1)
BILIRUB SERPL-MCNC: 0.2 MG/DL (ref 0.3–1.2)
BILIRUB UR QL STRIP: NEGATIVE
BNP SERPL-MCNC: 336 PG/ML (ref 0–100)
BUN BLD-MCNC: 43 MG/DL (ref 9–23)
BUN/CREAT SERPL: 19.5 (ref 7–25)
CALCIUM SPEC-SCNC: 8.7 MG/DL (ref 8.7–10.4)
CHLORIDE SERPL-SCNC: 108 MMOL/L (ref 99–109)
CLARITY UR: ABNORMAL
CO2 SERPL-SCNC: 26 MMOL/L (ref 20–31)
COLOR UR: YELLOW
CREAT BLD-MCNC: 2.2 MG/DL (ref 0.6–1.3)
D-LACTATE SERPL-SCNC: 0.8 MMOL/L (ref 0.5–2)
DEPRECATED RDW RBC AUTO: 49.3 FL (ref 37–54)
EOSINOPHIL # BLD AUTO: 0.02 10*3/MM3 (ref 0–0.3)
EOSINOPHIL NFR BLD AUTO: 0.2 % (ref 0–3)
ERYTHROCYTE [DISTWIDTH] IN BLOOD BY AUTOMATED COUNT: 14.3 % (ref 11.3–14.5)
GFR SERPL CREATININE-BSD FRML MDRD: 22 ML/MIN/1.73
GLOBULIN UR ELPH-MCNC: 3.2 GM/DL
GLUCOSE BLD-MCNC: 57 MG/DL (ref 70–100)
GLUCOSE BLDC GLUCOMTR-MCNC: 63 MG/DL (ref 70–130)
GLUCOSE BLDC GLUCOMTR-MCNC: 95 MG/DL (ref 70–130)
GLUCOSE UR STRIP-MCNC: NEGATIVE MG/DL
HCT VFR BLD AUTO: 30.4 % (ref 34.5–44)
HGB BLD-MCNC: 9.4 G/DL (ref 11.5–15.5)
HGB UR QL STRIP.AUTO: ABNORMAL
HOLD SPECIMEN: NORMAL
HYALINE CASTS UR QL AUTO: ABNORMAL /LPF
IMM GRANULOCYTES # BLD: 0.02 10*3/MM3 (ref 0–0.03)
IMM GRANULOCYTES NFR BLD: 0.2 % (ref 0–0.6)
KETONES UR QL STRIP: NEGATIVE
LEUKOCYTE ESTERASE UR QL STRIP.AUTO: NEGATIVE
LYMPHOCYTES # BLD AUTO: 1.06 10*3/MM3 (ref 0.6–4.8)
LYMPHOCYTES NFR BLD AUTO: 12.9 % (ref 24–44)
MCH RBC QN AUTO: 29 PG (ref 27–31)
MCHC RBC AUTO-ENTMCNC: 30.9 G/DL (ref 32–36)
MCV RBC AUTO: 93.8 FL (ref 80–99)
MONOCYTES # BLD AUTO: 0.78 10*3/MM3 (ref 0–1)
MONOCYTES NFR BLD AUTO: 9.5 % (ref 0–12)
NEUTROPHILS # BLD AUTO: 6.3 10*3/MM3 (ref 1.5–8.3)
NEUTROPHILS NFR BLD AUTO: 76.8 % (ref 41–71)
NITRITE UR QL STRIP: NEGATIVE
PH UR STRIP.AUTO: 6 [PH] (ref 5–8)
PLATELET # BLD AUTO: 226 10*3/MM3 (ref 150–450)
PMV BLD AUTO: 9.9 FL (ref 6–12)
POTASSIUM BLD-SCNC: 4.4 MMOL/L (ref 3.5–5.5)
PROT SERPL-MCNC: 6.9 G/DL (ref 5.7–8.2)
PROT UR QL STRIP: ABNORMAL
RBC # BLD AUTO: 3.24 10*6/MM3 (ref 3.89–5.14)
RBC # UR: ABNORMAL /HPF
REF LAB TEST METHOD: ABNORMAL
SODIUM BLD-SCNC: 140 MMOL/L (ref 132–146)
SP GR UR STRIP: 1.02 (ref 1–1.03)
SQUAMOUS #/AREA URNS HPF: ABNORMAL /HPF
TROPONIN I SERPL-MCNC: 0.05 NG/ML (ref 0–0.07)
TROPONIN I SERPL-MCNC: 0.07 NG/ML
TROPONIN I SERPL-MCNC: 0.09 NG/ML
UROBILINOGEN UR QL STRIP: ABNORMAL
WBC NRBC COR # BLD: 8.21 10*3/MM3 (ref 3.5–10.8)
WBC UR QL AUTO: ABNORMAL /HPF
WHOLE BLOOD HOLD SPECIMEN: NORMAL
WHOLE BLOOD HOLD SPECIMEN: NORMAL

## 2017-09-05 PROCEDURE — 94640 AIRWAY INHALATION TREATMENT: CPT

## 2017-09-05 PROCEDURE — 92610 EVALUATE SWALLOWING FUNCTION: CPT

## 2017-09-05 PROCEDURE — 83605 ASSAY OF LACTIC ACID: CPT | Performed by: EMERGENCY MEDICINE

## 2017-09-05 PROCEDURE — 85025 COMPLETE CBC W/AUTO DIFF WBC: CPT

## 2017-09-05 PROCEDURE — 84484 ASSAY OF TROPONIN QUANT: CPT | Performed by: NURSE PRACTITIONER

## 2017-09-05 PROCEDURE — 25010000002 PIPERACILLIN-TAZOBACTAM: Performed by: NURSE PRACTITIONER

## 2017-09-05 PROCEDURE — 94799 UNLISTED PULMONARY SVC/PX: CPT

## 2017-09-05 PROCEDURE — 99285 EMERGENCY DEPT VISIT HI MDM: CPT

## 2017-09-05 PROCEDURE — 25810000003 SODIUM CHLORIDE 0.9 % WITH KCL 20 MEQ 20-0.9 MEQ/L-% SOLUTION: Performed by: NURSE PRACTITIONER

## 2017-09-05 PROCEDURE — 93005 ELECTROCARDIOGRAM TRACING: CPT | Performed by: INTERNAL MEDICINE

## 2017-09-05 PROCEDURE — 84484 ASSAY OF TROPONIN QUANT: CPT | Performed by: EMERGENCY MEDICINE

## 2017-09-05 PROCEDURE — 87040 BLOOD CULTURE FOR BACTERIA: CPT

## 2017-09-05 PROCEDURE — 71010 HC CHEST PA OR AP: CPT

## 2017-09-05 PROCEDURE — 93005 ELECTROCARDIOGRAM TRACING: CPT

## 2017-09-05 PROCEDURE — 25010000002 LEVOFLOXACIN PER 250 MG: Performed by: EMERGENCY MEDICINE

## 2017-09-05 PROCEDURE — 84484 ASSAY OF TROPONIN QUANT: CPT

## 2017-09-05 PROCEDURE — 81001 URINALYSIS AUTO W/SCOPE: CPT | Performed by: EMERGENCY MEDICINE

## 2017-09-05 PROCEDURE — 80053 COMPREHEN METABOLIC PANEL: CPT

## 2017-09-05 PROCEDURE — 99223 1ST HOSP IP/OBS HIGH 75: CPT | Performed by: INTERNAL MEDICINE

## 2017-09-05 PROCEDURE — 93010 ELECTROCARDIOGRAM REPORT: CPT | Performed by: INTERNAL MEDICINE

## 2017-09-05 PROCEDURE — 82962 GLUCOSE BLOOD TEST: CPT

## 2017-09-05 PROCEDURE — 83880 ASSAY OF NATRIURETIC PEPTIDE: CPT

## 2017-09-05 RX ORDER — DEXTROSE MONOHYDRATE 25 G/50ML
25 INJECTION, SOLUTION INTRAVENOUS
Status: DISCONTINUED | OUTPATIENT
Start: 2017-09-05 | End: 2017-09-24 | Stop reason: HOSPADM

## 2017-09-05 RX ORDER — FERROUS SULFATE 325(65) MG
325 TABLET ORAL
Status: DISCONTINUED | OUTPATIENT
Start: 2017-09-06 | End: 2017-09-12

## 2017-09-05 RX ORDER — SODIUM CHLORIDE 0.9 % (FLUSH) 0.9 %
10 SYRINGE (ML) INJECTION AS NEEDED
Status: DISCONTINUED | OUTPATIENT
Start: 2017-09-05 | End: 2017-09-24 | Stop reason: HOSPADM

## 2017-09-05 RX ORDER — ATORVASTATIN CALCIUM 10 MG/1
10 TABLET, FILM COATED ORAL DAILY
Status: DISCONTINUED | OUTPATIENT
Start: 2017-09-05 | End: 2017-09-24 | Stop reason: HOSPADM

## 2017-09-05 RX ORDER — BACLOFEN 10 MG/1
10 TABLET ORAL 3 TIMES DAILY
Status: DISCONTINUED | OUTPATIENT
Start: 2017-09-05 | End: 2017-09-24 | Stop reason: HOSPADM

## 2017-09-05 RX ORDER — LEVOFLOXACIN 5 MG/ML
750 INJECTION, SOLUTION INTRAVENOUS
Status: DISCONTINUED | OUTPATIENT
Start: 2017-09-07 | End: 2017-09-12

## 2017-09-05 RX ORDER — ACETAMINOPHEN 325 MG/1
650 TABLET ORAL EVERY 4 HOURS PRN
Status: DISCONTINUED | OUTPATIENT
Start: 2017-09-05 | End: 2017-09-24 | Stop reason: HOSPADM

## 2017-09-05 RX ORDER — LEVOTHYROXINE SODIUM 0.1 MG/1
200 TABLET ORAL DAILY
Status: DISCONTINUED | OUTPATIENT
Start: 2017-09-06 | End: 2017-09-24 | Stop reason: HOSPADM

## 2017-09-05 RX ORDER — PANTOPRAZOLE SODIUM 40 MG/1
40 TABLET, DELAYED RELEASE ORAL DAILY
Status: DISCONTINUED | OUTPATIENT
Start: 2017-09-06 | End: 2017-09-21

## 2017-09-05 RX ORDER — ASCORBIC ACID 500 MG
500 TABLET ORAL
Status: DISCONTINUED | OUTPATIENT
Start: 2017-09-06 | End: 2017-09-24 | Stop reason: HOSPADM

## 2017-09-05 RX ORDER — NICOTINE POLACRILEX 4 MG
15 LOZENGE BUCCAL
Status: DISCONTINUED | OUTPATIENT
Start: 2017-09-05 | End: 2017-09-24 | Stop reason: HOSPADM

## 2017-09-05 RX ORDER — ONDANSETRON 2 MG/ML
4 INJECTION INTRAMUSCULAR; INTRAVENOUS EVERY 6 HOURS PRN
Status: DISCONTINUED | OUTPATIENT
Start: 2017-09-05 | End: 2017-09-24 | Stop reason: HOSPADM

## 2017-09-05 RX ORDER — ALBUTEROL SULFATE 2.5 MG/3ML
2.5 SOLUTION RESPIRATORY (INHALATION) EVERY 4 HOURS PRN
Status: DISCONTINUED | OUTPATIENT
Start: 2017-09-05 | End: 2017-09-24 | Stop reason: HOSPADM

## 2017-09-05 RX ORDER — PRAVASTATIN SODIUM 40 MG
40 TABLET ORAL DAILY
COMMUNITY

## 2017-09-05 RX ORDER — LEVOFLOXACIN 5 MG/ML
750 INJECTION, SOLUTION INTRAVENOUS ONCE
Status: COMPLETED | OUTPATIENT
Start: 2017-09-05 | End: 2017-09-05

## 2017-09-05 RX ORDER — IPRATROPIUM BROMIDE AND ALBUTEROL SULFATE 2.5; .5 MG/3ML; MG/3ML
3 SOLUTION RESPIRATORY (INHALATION)
Status: DISCONTINUED | OUTPATIENT
Start: 2017-09-05 | End: 2017-09-19

## 2017-09-05 RX ORDER — ASPIRIN 81 MG/1
81 TABLET, CHEWABLE ORAL DAILY
Status: DISCONTINUED | OUTPATIENT
Start: 2017-09-06 | End: 2017-09-24 | Stop reason: HOSPADM

## 2017-09-05 RX ORDER — SODIUM CHLORIDE AND POTASSIUM CHLORIDE 150; 900 MG/100ML; MG/100ML
100 INJECTION, SOLUTION INTRAVENOUS CONTINUOUS
Status: DISCONTINUED | OUTPATIENT
Start: 2017-09-05 | End: 2017-09-07

## 2017-09-05 RX ORDER — HEPARIN SODIUM 5000 [USP'U]/ML
5000 INJECTION, SOLUTION INTRAVENOUS; SUBCUTANEOUS EVERY 12 HOURS SCHEDULED
Status: DISCONTINUED | OUTPATIENT
Start: 2017-09-05 | End: 2017-09-05

## 2017-09-05 RX ORDER — METOPROLOL TARTRATE 50 MG/1
50 TABLET, FILM COATED ORAL EVERY 12 HOURS SCHEDULED
Status: DISCONTINUED | OUTPATIENT
Start: 2017-09-05 | End: 2017-09-14

## 2017-09-05 RX ORDER — GABAPENTIN 300 MG/1
900 CAPSULE ORAL NIGHTLY
Status: DISCONTINUED | OUTPATIENT
Start: 2017-09-05 | End: 2017-09-24 | Stop reason: HOSPADM

## 2017-09-05 RX ORDER — SODIUM CHLORIDE 0.9 % (FLUSH) 0.9 %
1-10 SYRINGE (ML) INJECTION AS NEEDED
Status: DISCONTINUED | OUTPATIENT
Start: 2017-09-05 | End: 2017-09-24 | Stop reason: HOSPADM

## 2017-09-05 RX ORDER — CLOPIDOGREL BISULFATE 75 MG/1
75 TABLET ORAL DAILY
Status: DISCONTINUED | OUTPATIENT
Start: 2017-09-06 | End: 2017-09-21

## 2017-09-05 RX ORDER — GLIMEPIRIDE 2 MG/1
2 TABLET ORAL EVERY EVENING
COMMUNITY
End: 2017-09-24 | Stop reason: HOSPADM

## 2017-09-05 RX ORDER — AMLODIPINE BESYLATE 10 MG/1
10 TABLET ORAL NIGHTLY
Status: DISCONTINUED | OUTPATIENT
Start: 2017-09-05 | End: 2017-09-18

## 2017-09-05 RX ORDER — ONDANSETRON 4 MG/1
4 TABLET, FILM COATED ORAL EVERY 6 HOURS PRN
Status: DISCONTINUED | OUTPATIENT
Start: 2017-09-05 | End: 2017-09-24 | Stop reason: HOSPADM

## 2017-09-05 RX ORDER — INSULIN GLARGINE 100 [IU]/ML
30 INJECTION, SOLUTION SUBCUTANEOUS NIGHTLY
COMMUNITY
End: 2017-09-24 | Stop reason: HOSPADM

## 2017-09-05 RX ADMIN — ATORVASTATIN CALCIUM 10 MG: 10 TABLET, FILM COATED ORAL at 20:03

## 2017-09-05 RX ADMIN — AMLODIPINE BESYLATE 10 MG: 10 TABLET ORAL at 20:03

## 2017-09-05 RX ADMIN — BACLOFEN 10 MG: 10 TABLET ORAL at 21:10

## 2017-09-05 RX ADMIN — PIPERACILLIN SODIUM,TAZOBACTAM SODIUM 3.38 G: 3; .375 INJECTION, POWDER, FOR SOLUTION INTRAVENOUS at 21:47

## 2017-09-05 RX ADMIN — IPRATROPIUM BROMIDE AND ALBUTEROL SULFATE 3 ML: .5; 3 SOLUTION RESPIRATORY (INHALATION) at 20:27

## 2017-09-05 RX ADMIN — LEVOFLOXACIN 750 MG: 5 INJECTION, SOLUTION INTRAVENOUS at 13:51

## 2017-09-05 RX ADMIN — IPRATROPIUM BROMIDE AND ALBUTEROL SULFATE 3 ML: .5; 3 SOLUTION RESPIRATORY (INHALATION) at 15:56

## 2017-09-05 RX ADMIN — GABAPENTIN 900 MG: 300 CAPSULE ORAL at 20:03

## 2017-09-05 RX ADMIN — POTASSIUM CHLORIDE AND SODIUM CHLORIDE 100 ML/HR: 900; 150 INJECTION, SOLUTION INTRAVENOUS at 16:41

## 2017-09-05 RX ADMIN — METOPROLOL TARTRATE 50 MG: 50 TABLET, FILM COATED ORAL at 20:03

## 2017-09-05 RX ADMIN — BACLOFEN 10 MG: 10 TABLET ORAL at 16:41

## 2017-09-05 RX ADMIN — PIPERACILLIN SODIUM,TAZOBACTAM SODIUM 3.38 G: 3; .375 INJECTION, POWDER, FOR SOLUTION INTRAVENOUS at 16:44

## 2017-09-05 NOTE — ED PROVIDER NOTES
Subjective   HPI Comments: Jennifer Oliveira is a 77 y.o.female who was recently diagnosed at [facility] with community acquired pneumonia and a UTI. She was prescribed Levaquin but has not had it filled since her discharge. She denies fever at this time and did not have a fever while hospitalized in [place]. She is on 2L of oxygen at home. This was increased to 4L at [place].     She had a CVA in 2013 and has experienced chronic, generalized weakness and pain in the left hip that is worse with ambulation. She came to the ED today after developing weakness that is worse than baseline. She denies chills, dysuria, or myalgias. Other than a persistent, intermittent cough over the last two days, there are no other acute complaints at this time.      Patient is a 77 y.o. female presenting with weakness.   History provided by:  Patient  Weakness - Generalized   Severity:  Moderate  Onset quality:  Gradual  Duration:  1 day  Timing:  Constant  Progression:  Worsening  Chronicity:  Chronic  Relieved by:  Nothing  Worsened by:  Nothing  Ineffective treatments:  None tried  Associated symptoms: arthralgias (left hip) and cough    Associated symptoms: no abdominal pain, no chest pain, no diarrhea, no dizziness, no dysuria, no fever, no headaches, no nausea, no shortness of breath and no vomiting    Cough:     Cough characteristics:  Non-productive    Sputum characteristics:  Unable to specify    Severity:  Moderate    Onset quality:  Gradual    Duration:  2 days    Timing:  Intermittent    Progression:  Unchanged    Chronicity:  New  Risk factors: diabetes        Review of Systems   Constitutional: Negative for chills and fever.   HENT: Negative for congestion, rhinorrhea, sore throat and trouble swallowing.    Respiratory: Positive for cough. Negative for shortness of breath.    Cardiovascular: Negative for chest pain.   Gastrointestinal: Negative for abdominal pain, diarrhea, nausea and vomiting.   Genitourinary:  Negative for dysuria.   Musculoskeletal: Positive for arthralgias (left hip). Negative for back pain and neck pain.   Neurological: Positive for weakness. Negative for dizziness and headaches.   All other systems reviewed and are negative.      Past Medical History:   Diagnosis Date   • CKD (chronic kidney disease), stage III     baseline Cr 1.5-1.6   • Coronary artery disease    • Diabetes mellitus    • Diastolic heart failure     last LVEF 70%   • Disease of thyroid gland    • GERD (gastroesophageal reflux disease)    • Hypertension    • PAD (peripheral artery disease)     s/p right fem-pop bypass 7/2016   • Stroke     residual chronic right hemiplegia       Allergies   Allergen Reactions   • Erythromycin Rash       Past Surgical History:   Procedure Laterality Date   • CARPAL TUNNEL RELEASE     • CHOLECYSTECTOMY     • ENDOSCOPY N/A 5/15/2017    Procedure: ESOPHAGOGASTRODUODENOSCOPY;  Surgeon: Lizbet Vidales MD;  Location:  [facility];  Service:    • ENDOSCOPY N/A 5/30/2017    Procedure: ESOPHAGOGASTRODUODENOSCOPY;  Surgeon: Lizbet Vidales MD;  Location:  [facility];  Service:    • FEMORAL POPLITEAL BYPASS Right    • HYSTERECTOMY         Family History   Problem Relation Age of Onset   • Diabetes Mother    • Heart disease Father        Social History     Social History   • Marital status: Single     Spouse name: N/A   • Number of children: N/A   • Years of education: N/A     Social History Main Topics   • Smoking status: Former Smoker     Packs/day: 1.00     Years: 45.00     Quit date: 2009   • Smokeless tobacco: Never Used   • Alcohol use No   • Drug use: No   • Sexual activity: No     Other Topics Concern   • None     Social History Narrative    Lives alone in [place]. [employer] and two daughters who assist her.          Objective   Physical Exam   Constitutional: She is oriented to person, place, and time. She appears well-developed and well-nourished. No distress.   HENT:   Head: Normocephalic and  atraumatic.   Nose: Nose normal.   Mouth/Throat: Oropharynx is clear and moist. Mucous membranes are dry.   Slightly dry mucus membranes.   Eyes: Conjunctivae are normal. No scleral icterus.   Neck: Normal range of motion. Neck supple.   Cardiovascular: Normal rate, regular rhythm and normal heart sounds.    No murmur heard.  Pulmonary/Chest: Effort normal. No accessory muscle usage. No respiratory distress. She has no wheezes. She has rhonchi. She has rales.   Course rales and rhonchi in the mid lung fields, right greater than left. No accessory muscle use, retractions, or wheezes   Abdominal: Soft. Bowel sounds are normal. She exhibits no mass. There is no tenderness. There is no rebound and no guarding.   Musculoskeletal: Normal range of motion. She exhibits no edema.   Neurological: She is alert and oriented to person, place, and time.   Skin: Skin is warm and dry. No erythema. There is pallor.   Pale, warm, and dry.   Psychiatric: She has a normal mood and affect. Her behavior is normal.   Nursing note and vitals reviewed.      Procedures         ED Course  ED Course   Comment By Time   Dr. Alston is at the bedside reevaluating the patient. Yuni Buenrostro 09/05 2643   Dr. Alston discussed the case in detail with the hospitalist who will admit. Yuni Buenrostro 09/05 7074     Recent Results (from the past 24 hour(s))   Comprehensive Metabolic Panel    Collection Time: 09/05/17 11:46 AM   Result Value Ref Range    Glucose 57 (L) 70 - 100 mg/dL    BUN 43 (H) 9 - 23 mg/dL    Creatinine 2.20 (H) 0.60 - 1.30 mg/dL    Sodium 140 132 - 146 mmol/L    Potassium 4.4 3.5 - 5.5 mmol/L    Chloride 108 99 - 109 mmol/L    CO2 26.0 20.0 - 31.0 mmol/L    Calcium 8.7 8.7 - 10.4 mg/dL    Total Protein 6.9 5.7 - 8.2 g/dL    Albumin 3.70 3.20 - 4.80 g/dL    ALT (SGPT) 28 7 - 40 U/L    AST (SGOT) 51 (H) 0 - 33 U/L    Alkaline Phosphatase 68 25 - 100 U/L    Total Bilirubin 0.2 (L) 0.3 - 1.2 mg/dL    eGFR Non African Amer 22 (L)  >60 mL/min/1.73    Globulin 3.2 gm/dL    A/G Ratio 1.2 (L) 1.5 - 2.5 g/dL    BUN/Creatinine Ratio 19.5 7.0 - 25.0    Anion Gap 6.0 3.0 - 11.0 mmol/L   BNP    Collection Time: 09/05/17 11:46 AM   Result Value Ref Range    .0 (H) 0.0 - 100.0 pg/mL   Light Blue Top    Collection Time: 09/05/17 11:46 AM   Result Value Ref Range    Extra Tube hold for add-on    Green Top (Gel)    Collection Time: 09/05/17 11:46 AM   Result Value Ref Range    Extra Tube Hold for add-ons.    Lavender Top    Collection Time: 09/05/17 11:46 AM   Result Value Ref Range    Extra Tube hold for add-on    CBC Auto Differential    Collection Time: 09/05/17 11:46 AM   Result Value Ref Range    WBC 8.21 3.50 - 10.80 10*3/mm3    RBC 3.24 (L) 3.89 - 5.14 10*6/mm3    Hemoglobin 9.4 (L) 11.5 - 15.5 g/dL    Hematocrit 30.4 (L) 34.5 - 44.0 %    MCV 93.8 80.0 - 99.0 fL    MCH 29.0 27.0 - 31.0 pg    MCHC 30.9 (L) 32.0 - 36.0 g/dL    RDW 14.3 11.3 - 14.5 %    RDW-SD 49.3 37.0 - 54.0 fl    MPV 9.9 6.0 - 12.0 fL    Platelets 226 150 - 450 10*3/mm3    Neutrophil % 76.8 (H) 41.0 - 71.0 %    Lymphocyte % 12.9 (L) 24.0 - 44.0 %    Monocyte % 9.5 0.0 - 12.0 %    Eosinophil % 0.2 0.0 - 3.0 %    Basophil % 0.4 0.0 - 1.0 %    Immature Grans % 0.2 0.0 - 0.6 %    Neutrophils, Absolute 6.30 1.50 - 8.30 10*3/mm3    Lymphocytes, Absolute 1.06 0.60 - 4.80 10*3/mm3    Monocytes, Absolute 0.78 0.00 - 1.00 10*3/mm3    Eosinophils, Absolute 0.02 0.00 - 0.30 10*3/mm3    Basophils, Absolute 0.03 0.00 - 0.20 10*3/mm3    Immature Grans, Absolute 0.02 0.00 - 0.03 10*3/mm3   Troponin    Collection Time: 09/05/17 11:46 AM   Result Value Ref Range    Troponin I 0.085 (H) <=0.039 ng/mL   Urinalysis With / Culture If Indicated    Collection Time: 09/05/17 12:23 PM   Result Value Ref Range    Color, UA Yellow Yellow, Straw    Appearance, UA Cloudy (A) Clear    pH, UA 6.0 5.0 - 8.0    Specific Gravity, UA 1.017 1.001 - 1.030    Glucose, UA Negative Negative    Ketones, UA Negative  Negative    Bilirubin, UA Negative Negative    Blood, UA Moderate (2+) (A) Negative    Protein, UA >=300 mg/dL (3+) (A) Negative    Leuk Esterase, UA Negative Negative    Nitrite, UA Negative Negative    Urobilinogen, UA 0.2 E.U./dL 0.2 - 1.0 E.U./dL   Urinalysis, Microscopic Only    Collection Time: 09/05/17 12:23 PM   Result Value Ref Range    RBC, UA 0-2 None Seen, 0-2 /HPF    WBC, UA 0-2 (A) None Seen /HPF    Bacteria, UA None Seen None Seen, Trace /HPF    Squamous Epithelial Cells, UA 3-6 (A) None Seen, 0-2 /HPF    Hyaline Casts, UA 0-6 0 - 6 /LPF    Amorphous Crystals, UA Small/1+ None Seen /HPF    Methodology Manual Light Microscopy    Lactic Acid, Plasma    Collection Time: 09/05/17 12:55 PM   Result Value Ref Range    Lactate 0.8 0.5 - 2.0 mmol/L   POC Troponin, Rapid    Collection Time: 09/05/17  2:35 PM   Result Value Ref Range    Troponin I 0.05 0.00 - 0.07 ng/mL   POC Glucose Fingerstick    Collection Time: 09/05/17  4:27 PM   Result Value Ref Range    Glucose 63 (L) 70 - 130 mg/dL     Note: In addition to lab results from this visit, the labs listed above may include labs taken at another facility or during a different encounter within the last 24 hours. Please correlate lab times with ED admission and discharge times for further clarification of the services performed during this visit.    XR Chest 1 View   Final Result   Cardiac size borderline enlarged with trace pulmonary   vascular congestion and bibasilar opacities with confluent area right   lower lobe medially concerning for acute pneumonia.       D:  09/05/2017   E:  09/05/2017       This report was finalized on 9/5/2017 2:28 PM by Dr. Jass Martinez.            Vitals:    09/05/17 1400 09/05/17 1445 09/05/17 1556 09/05/17 1602   BP: 125/58 165/59  148/88   BP Location:       Patient Position:       Pulse: 74 78 77 72   Resp:    18   Temp:       TempSrc:       SpO2: 92% 94% 96% 97%   Weight:       Height:         Medications   sodium chloride  0.9 % flush 10 mL (not administered)   sodium chloride 0.9 % flush 1-10 mL (not administered)   sodium chloride 0.9 % with KCl 20 mEq/L infusion (100 mL/hr Intravenous New Bag 9/5/17 1641)   levoFLOXacin (LEVAQUIN) 750 mg/150 mL D5W (premix) (LEVAQUIN) 750 mg (not administered)   piperacillin-tazobactam (ZOSYN) 3.375 g/100 mL 0.9% NS IVPB (mbp) (3.375 g Intravenous New Bag 9/5/17 1644)   acetaminophen (TYLENOL) tablet 650 mg (not administered)   ipratropium-albuterol (DUO-NEB) nebulizer solution 3 mL (3 mL Nebulization Given 9/5/17 1556)   albuterol (PROVENTIL) nebulizer solution 0.083% 2.5 mg/3mL (not administered)   ondansetron (ZOFRAN) tablet 4 mg (not administered)     Or   ondansetron (ZOFRAN) injection 4 mg (not administered)   dextrose (GLUTOSE) oral gel 15 g (not administered)   dextrose (D50W) solution 25 g (not administered)   glucagon (GLUCAGEN) injection 1 mg (not administered)   insulin lispro (humaLOG) injection 0-7 Units (0 Units Subcutaneous Not Given 9/5/17 1740)   Pharmacy Meds to Bed Consult ( Does not apply Canceled Entry 9/5/17 1619)   amLODIPine (NORVASC) tablet 10 mg (not administered)   aspirin chewable tablet 81 mg (not administered)   baclofen (LIORESAL) tablet 10 mg (10 mg Oral Given 9/5/17 1641)   clopidogrel (PLAVIX) tablet 75 mg (not administered)   ferrous sulfate tablet 325 mg (not administered)   gabapentin (NEURONTIN) capsule 900 mg (not administered)   levothyroxine (SYNTHROID, LEVOTHROID) tablet 200 mcg (not administered)   metoprolol tartrate (LOPRESSOR) tablet 50 mg (not administered)   pantoprazole (PROTONIX) EC tablet 40 mg (not administered)   atorvastatin (LIPITOR) tablet 10 mg (not administered)   vitamin C (ASCORBIC ACID) tablet 500 mg (not administered)   Pharmacy Meds to Bed Consult (not administered)   levoFLOXacin (LEVAQUIN) 750 mg/150 mL D5W (premix) (LEVAQUIN) 750 mg (0 mg Intravenous Stopped 9/5/17 1602)     ECG/EMG Results (last 24 hours)     Procedure Component  Value Units Date/Time    ECG 12 Lead [[id number]] Collected:  09/05/17 1119     Updated:  09/05/17 1208                       MDM    Final diagnoses:   Pneumonia of right lower lobe due to infectious organism       Documentation assistance provided by gertrudis Buenrostro.  Information recorded by the scribe was done at my direction and has been verified and validated by me.     Yuni Buenrostro  09/05/17 1420       Yuni Buenrostro  09/05/17 1607       Yuni Buenrostro  09/05/17 1659       Yuni Buenrostro  09/05/17 1924       Luis Alston MD  09/06/17 1800

## 2017-09-05 NOTE — THERAPY EVALUATION
Acute Care - Speech Language Pathology   Swallow Initial Evaluation  [facility]   Clinical Swallow Evaluation     Patient Name: Jennifer Oliveira  : 1940  MRN: [id number]  Today's Date: 2017               Admit Date: 2017    Visit Dx:   No diagnosis found.  Patient Active Problem List   Diagnosis   • Acute hypoxemic respiratory failure   • PAD (peripheral artery disease)   • Normochromic normocytic anemia   • Chronic diastolic heart failure   • Diabetes type 2, uncontrolled   • Lower extremity cellulitis   • Hypothyroid   • HTN (hypertension)   • Metabolic encephalopathy   • TIA (transient ischemic attack)   • Acute cystitis without hematuria   • Chronic kidney disease (CKD), stage III (moderate)   • GERD (gastroesophageal reflux disease) with hx of gastritis    • h/o stroke with right hemiplegia   • Hypertension   • Disease of thyroid gland   • Diastolic heart failure   • Diabetes mellitus   • CKD (chronic kidney disease), stage III   • Symptomatic anemia   • GI bleed   • Acute-on-chronic kidney injury   • Nausea and vomiting   • Right lower lobe pneumonia   • Weakness   • Pneumonia   • Elevated troponin   • Fever   • Hypoxia     Past Medical History:   Diagnosis Date   • CKD (chronic kidney disease), stage III     baseline Cr 1.5-1.6   • Coronary artery disease    • Diabetes mellitus    • Diastolic heart failure     last LVEF 70%   • Disease of thyroid gland    • GERD (gastroesophageal reflux disease)    • Hypertension    • PAD (peripheral artery disease)     s/p right fem-pop bypass 2016   • Stroke     residual chronic right hemiplegia     Past Surgical History:   Procedure Laterality Date   • CARPAL TUNNEL RELEASE     • CHOLECYSTECTOMY     • ENDOSCOPY N/A 5/15/2017    Procedure: ESOPHAGOGASTRODUODENOSCOPY;  Surgeon: Lizbet Vidales MD;  Location: [facility] END[facility];  Service:    • ENDOSCOPY N/A 2017    Procedure: ESOPHAGOGASTRODUODENOSCOPY;  Surgeon: Lizbet Vidales MD;  Location: [facility]  ENDOSCOPY;  Service:    • FEMORAL POPLITEAL BYPASS Right    • HYSTERECTOMY            SWALLOW EVALUATION (last 72 hours)      Swallow Evaluation       09/05/17 1500                Rehab Evaluation    Document Type evaluation  -SM        Subjective Information no complaints  -SM        General Information    Patient Profile Review yes  -SM        Subjective Patient Observations Alert and cooperative  -        Pertinent History Of Current Problem RLL PNA. Hx CVA with R homa  -SM        Current Diet Limitations regular solid;thin liquids  -SM        Prior Level of Function- Communication functional in all spheres  -        Prior Level of Function- Swallowing safe, efficient swallowing in all situations  -        Plans/Goals Discussed With patient;agreed upon  -        Barriers to Rehab none identified  -        Clinical Impression    Patient's Goals For Discharge --   Imrpove strength  -SM        SLP Diet Recommendation regular textures;thin liquids  -        Recommended Feeding/Eating Techniques maintain upright posture during/after eating for 30 mins  -        SLP Rec. for Method of Medication Administration meds whole with thin liquid  -SM        Pain Assessment    Pain Assessment No/denies pain  -        Cognitive Assessment/Intervention    Current Cognitive/Communication Assessment functional  -        Orientation Status oriented x 4  -        Follows Commands/Answers Questions 100% of the time  -        Oral Motor Structure and Function    Oral Musculature General Assessment oral labial impairment;lingual impairment  -        Oral Labial Strength and Mobility right labial droop;other (see comments)   baseline from CVA  -        Lingual Strength and Mobility reduced ROM;reduced strength bilaterally   mild reduced  -        Clinical Swallow Exam    Oral Phase Results intact oral phase without signs of dysfunction  -        Pharyngeal Phase Results no signs/symptoms of pharyngeal  impairment  -        Summary of Clinical Exam Dysphagia Eval completed.  Pt showing no s/s aspiration with any PO trials.  Do note likely recurrent PNA.  Clinically, swallow looks strong.  If any continued concern for silent aspiration risk, please order for Modified Barium Swallow.  For now: Regular diet, thin liquids, no SLP needs.  Thanks  -          User Key  (r) = Recorded By, (t) = Taken By, (c) = Cosigned By    Initials Name Effective Dates    DONTE Lincoln MS CCC-SLP 06/22/15 -         EDUCATION  The patient has been educated in the following areas:   Dysphagia (Swallowing Impairment).    SLP Recommendation and Plan     SLP Diet Recommendation: regular textures, thin liquids  Recommended Feeding/Eating Techniques: maintain upright posture during/after eating for 30 mins  SLP Rec. for Method of Medication Administration: meds whole with thin liquid        Plan of Care Review  Plan Of Care Reviewed With: patient  Outcome Summary/Follow up Plan: Dysphagia Eval completed.  Pt showing no s/s aspiration with any PO trials.  Do note likely recurrent PNA.  Clinically, swallow looks strong.  If any continued concern for silent aspiration risk, please order for Modified Barium Swallow.  For now: Regular diet, thin liquids, no SLP needs.  Thanks             Time Calculation:         Time Calculation- SLP       09/05/17 1546          Time Calculation- SLP    SLP Start Time 1500  -      SLP Received On 09/05/17  -        User Key  (r) = Recorded By, (t) = Taken By, (c) = Cosigned By    Initials Name Provider Type    DONTE Lincoln MS CCC-SLP Speech and Language Pathologist          Therapy Charges for Today     Code Description Service Date Service Provider Modifiers Qty    [id number] HC ST EVAL ORAL PHARYNG SWALLOW 3 9/5/2017 Sita Lincoln MS CCC-SLP GN 1               Sita Lincoln MS CCC-SLP  9/5/2017

## 2017-09-05 NOTE — PLAN OF CARE
Problem: Patient Care Overview (Adult)  Goal: Plan of Care Review  Outcome: Ongoing (interventions implemented as appropriate)    09/05/17 2718   Coping/Psychosocial Response Interventions   Plan Of Care Reviewed With patient   Outcome Evaluation   Outcome Summary/Follow up Plan Dysphagia Eval completed. Pt showing no s/s aspiration with any PO trials. Do note likely recurrent PNA. Clinically, swallow looks strong. If any continued concern for silent aspiration risk, please order for Modified Barium Swallow. For now: Regular diet, thin liquids, no SLP needs. Thanks

## 2017-09-05 NOTE — PROGRESS NOTES
Discharge Planning Assessment  [facility]     Patient Name: Jennifer Oliveira  MRN: [id number]  Today's Date: 9/5/2017    Admit Date: 9/5/2017          Discharge Needs Assessment       09/05/17 1533    Living Environment    Lives With alone    Living Arrangements mobile home    Transportation Available family or friend will provide    Living Environment Comment Pt previously at [facility], and [facility] before that.     Living Environment    Provides Primary Care For no one, unable/limited ability to care for self    Primary Care Provided By child(lucho) (specify)    Quality Of Family Relationships supportive;helpful;involved    Able to Return to Prior Living Arrangements other (see comments)    Living Arrangement Comments Family stated pt may benefit from SNF, TBD    Discharge Needs Assessment    Concerns To Be Addressed discharge planning concerns    Readmission Within The Last 30 Days no previous admission in last 30 days    Community Agency Name(S) Has previously been at [facility], [facility], Has used [facility] in the past.    Equipment Currently Used at Home bath bench;oxygen;ramp;other (see comments)   Transport chair, Nocturnal Oxygen @ 2L/NC provided by [facility]    Discharge Disposition still a patient    Discharge Contact Information if Applicable [phone]            Discharge Plan       09/05/17 1541    Case Management/Social Work Plan    Plan TBD    Patient/Family In Agreement With Plan yes    Additional Comments Spoke with pt. and daughter at . Pt lives alone in [facility]. Pt. needs assist with ADL's and is currently non-ambulatory for mobility. Pt has a rolling walker, transport chair, bath bench, nocturnal oxygen at 2L/NC provided by [employer] and no other DME or home health currently. CM confirmed with Fabi at [facility] ([phone]),pt is not current with .  Pt plan is to be determined, and to transport home with family, when medically ready. Case Management will follow and assist with any discharge  planning needs.        Discharge Placement     No information found                Demographic Summary       09/05/17 1528    Referral Information    Arrived From home or self-care    Reason For Consult discharge planning    Contact Information    Permission Granted to Share Information With family/designee    Comments Stacey Gonzales (Power of ) Home Phone: [phone], Work Phone: [phone]; Maryana Oliveira (Daughter) Mobile Phone: [phone]; Florentin Waggoner (Friend) Home Phone: [phone]    Primary Care Physician Information    Name JARAD DESAI            Functional Status       09/05/17 1530    Functional Status Prior    Ambulation 4-->completely dependent   pt is currently non-ambulatory    Transferring 4-->completely dependent    Toileting 3-->assistive equipment and person    Bathing 3-->assistive equipment and person    Dressing 3-->assistive equipment and person    Eating 0-->independent    Communication 0-->understands/communicates without difficulty    Swallowing 0-->swallows foods/liquids without difficulty    IADL    Medications assistive person    Meal Preparation assistive person    Housekeeping assistive person    Laundry assistive person    Shopping assistive person    Oral Care independent    Employment/Financial    Employment/Finance Comments Healthcare and medication coverage: Medicare and [employer]            Psychosocial     None            Abuse/Neglect     None            Legal     None            Substance Abuse     None            Patient Forms     None          Yoselin Monreal RN

## 2017-09-05 NOTE — PLAN OF CARE
Problem: Patient Care Overview (Adult)  Goal: Plan of Care Review  Outcome: Ongoing (interventions implemented as appropriate)    09/05/17 8890   Outcome Evaluation   Outcome Summary/Follow up Plan received pt from ED. no complaints of pain. vss. incontinent. pt is weak and has not walked in days. right sided weakness        Goal: Adult Individualization and Mutuality  Outcome: Ongoing (interventions implemented as appropriate)  Goal: Discharge Needs Assessment  Outcome: Ongoing (interventions implemented as appropriate)    Problem: Pneumonia (Adult)  Goal: Signs and Symptoms of Listed Potential Problems Will be Absent or Manageable (Pneumonia)  Outcome: Ongoing (interventions implemented as appropriate)

## 2017-09-06 LAB
ALBUMIN SERPL-MCNC: 3 G/DL (ref 3.2–4.8)
ALBUMIN/GLOB SERPL: 1.1 G/DL (ref 1.5–2.5)
ALP SERPL-CCNC: 54 U/L (ref 25–100)
ALT SERPL W P-5'-P-CCNC: 19 U/L (ref 7–40)
ANION GAP SERPL CALCULATED.3IONS-SCNC: 8 MMOL/L (ref 3–11)
AST SERPL-CCNC: 28 U/L (ref 0–33)
BASOPHILS # BLD AUTO: 0.03 10*3/MM3 (ref 0–0.2)
BASOPHILS NFR BLD AUTO: 0.5 % (ref 0–1)
BILIRUB SERPL-MCNC: 0.3 MG/DL (ref 0.3–1.2)
BUN BLD-MCNC: 32 MG/DL (ref 9–23)
BUN/CREAT SERPL: 16 (ref 7–25)
CALCIUM SPEC-SCNC: 7.9 MG/DL (ref 8.7–10.4)
CHLORIDE SERPL-SCNC: 106 MMOL/L (ref 99–109)
CO2 SERPL-SCNC: 22 MMOL/L (ref 20–31)
CREAT BLD-MCNC: 2 MG/DL (ref 0.6–1.3)
DEPRECATED RDW RBC AUTO: 50.6 FL (ref 37–54)
EOSINOPHIL # BLD AUTO: 0.01 10*3/MM3 (ref 0–0.3)
EOSINOPHIL NFR BLD AUTO: 0.2 % (ref 0–3)
ERYTHROCYTE [DISTWIDTH] IN BLOOD BY AUTOMATED COUNT: 14.4 % (ref 11.3–14.5)
GFR SERPL CREATININE-BSD FRML MDRD: 24 ML/MIN/1.73
GLOBULIN UR ELPH-MCNC: 2.7 GM/DL
GLUCOSE BLD-MCNC: 64 MG/DL (ref 70–100)
GLUCOSE BLDC GLUCOMTR-MCNC: 145 MG/DL (ref 70–130)
GLUCOSE BLDC GLUCOMTR-MCNC: 171 MG/DL (ref 70–130)
GLUCOSE BLDC GLUCOMTR-MCNC: 199 MG/DL (ref 70–130)
GLUCOSE BLDC GLUCOMTR-MCNC: 75 MG/DL (ref 70–130)
HBA1C MFR BLD: 7.3 % (ref 4.8–5.6)
HCT VFR BLD AUTO: 26.8 % (ref 34.5–44)
HGB BLD-MCNC: 8.3 G/DL (ref 11.5–15.5)
IMM GRANULOCYTES # BLD: 0.01 10*3/MM3 (ref 0–0.03)
IMM GRANULOCYTES NFR BLD: 0.2 % (ref 0–0.6)
LYMPHOCYTES # BLD AUTO: 1.14 10*3/MM3 (ref 0.6–4.8)
LYMPHOCYTES NFR BLD AUTO: 18.2 % (ref 24–44)
MCH RBC QN AUTO: 29.5 PG (ref 27–31)
MCHC RBC AUTO-ENTMCNC: 31 G/DL (ref 32–36)
MCV RBC AUTO: 95.4 FL (ref 80–99)
MONOCYTES # BLD AUTO: 0.73 10*3/MM3 (ref 0–1)
MONOCYTES NFR BLD AUTO: 11.6 % (ref 0–12)
NEUTROPHILS # BLD AUTO: 4.35 10*3/MM3 (ref 1.5–8.3)
NEUTROPHILS NFR BLD AUTO: 69.3 % (ref 41–71)
PLATELET # BLD AUTO: 171 10*3/MM3 (ref 150–450)
PMV BLD AUTO: 9.8 FL (ref 6–12)
POTASSIUM BLD-SCNC: 4.2 MMOL/L (ref 3.5–5.5)
PROT SERPL-MCNC: 5.7 G/DL (ref 5.7–8.2)
RBC # BLD AUTO: 2.81 10*6/MM3 (ref 3.89–5.14)
SODIUM BLD-SCNC: 136 MMOL/L (ref 132–146)
TSH SERPL DL<=0.05 MIU/L-ACNC: 1.19 MIU/ML (ref 0.35–5.35)
WBC NRBC COR # BLD: 6.27 10*3/MM3 (ref 3.5–10.8)

## 2017-09-06 PROCEDURE — 82962 GLUCOSE BLOOD TEST: CPT

## 2017-09-06 PROCEDURE — 25810000003 SODIUM CHLORIDE 0.9 % WITH KCL 20 MEQ 20-0.9 MEQ/L-% SOLUTION: Performed by: NURSE PRACTITIONER

## 2017-09-06 PROCEDURE — 84443 ASSAY THYROID STIM HORMONE: CPT | Performed by: NURSE PRACTITIONER

## 2017-09-06 PROCEDURE — 83036 HEMOGLOBIN GLYCOSYLATED A1C: CPT | Performed by: NURSE PRACTITIONER

## 2017-09-06 PROCEDURE — 80053 COMPREHEN METABOLIC PANEL: CPT | Performed by: NURSE PRACTITIONER

## 2017-09-06 PROCEDURE — 85025 COMPLETE CBC W/AUTO DIFF WBC: CPT | Performed by: NURSE PRACTITIONER

## 2017-09-06 PROCEDURE — 97162 PT EVAL MOD COMPLEX 30 MIN: CPT

## 2017-09-06 PROCEDURE — 99233 SBSQ HOSP IP/OBS HIGH 50: CPT | Performed by: FAMILY MEDICINE

## 2017-09-06 PROCEDURE — 97165 OT EVAL LOW COMPLEX 30 MIN: CPT

## 2017-09-06 PROCEDURE — 93005 ELECTROCARDIOGRAM TRACING: CPT | Performed by: NURSE PRACTITIONER

## 2017-09-06 PROCEDURE — 97110 THERAPEUTIC EXERCISES: CPT

## 2017-09-06 PROCEDURE — 94760 N-INVAS EAR/PLS OXIMETRY 1: CPT

## 2017-09-06 PROCEDURE — 94799 UNLISTED PULMONARY SVC/PX: CPT

## 2017-09-06 PROCEDURE — 63710000001 INSULIN LISPRO (HUMAN) PER 5 UNITS: Performed by: NURSE PRACTITIONER

## 2017-09-06 PROCEDURE — 25010000002 PIPERACILLIN-TAZOBACTAM: Performed by: NURSE PRACTITIONER

## 2017-09-06 PROCEDURE — 93010 ELECTROCARDIOGRAM REPORT: CPT | Performed by: INTERNAL MEDICINE

## 2017-09-06 PROCEDURE — 94640 AIRWAY INHALATION TREATMENT: CPT

## 2017-09-06 RX ORDER — MELATONIN
1000 DAILY
COMMUNITY

## 2017-09-06 RX ADMIN — PIPERACILLIN SODIUM,TAZOBACTAM SODIUM 3.38 G: 3; .375 INJECTION, POWDER, FOR SOLUTION INTRAVENOUS at 03:45

## 2017-09-06 RX ADMIN — ACETAMINOPHEN 650 MG: 325 TABLET, FILM COATED ORAL at 05:55

## 2017-09-06 RX ADMIN — PANTOPRAZOLE SODIUM 40 MG: 40 TABLET, DELAYED RELEASE ORAL at 05:54

## 2017-09-06 RX ADMIN — IPRATROPIUM BROMIDE AND ALBUTEROL SULFATE 3 ML: .5; 3 SOLUTION RESPIRATORY (INHALATION) at 10:59

## 2017-09-06 RX ADMIN — IPRATROPIUM BROMIDE AND ALBUTEROL SULFATE 3 ML: .5; 3 SOLUTION RESPIRATORY (INHALATION) at 19:56

## 2017-09-06 RX ADMIN — IPRATROPIUM BROMIDE AND ALBUTEROL SULFATE 3 ML: .5; 3 SOLUTION RESPIRATORY (INHALATION) at 15:33

## 2017-09-06 RX ADMIN — Medication 325 MG: at 09:38

## 2017-09-06 RX ADMIN — METOPROLOL TARTRATE 50 MG: 50 TABLET, FILM COATED ORAL at 09:39

## 2017-09-06 RX ADMIN — PIPERACILLIN SODIUM,TAZOBACTAM SODIUM 3.38 G: 3; .375 INJECTION, POWDER, FOR SOLUTION INTRAVENOUS at 09:37

## 2017-09-06 RX ADMIN — PIPERACILLIN SODIUM,TAZOBACTAM SODIUM 3.38 G: 3; .375 INJECTION, POWDER, FOR SOLUTION INTRAVENOUS at 15:22

## 2017-09-06 RX ADMIN — BACLOFEN 10 MG: 10 TABLET ORAL at 15:21

## 2017-09-06 RX ADMIN — ACETAMINOPHEN 650 MG: 325 TABLET, FILM COATED ORAL at 20:33

## 2017-09-06 RX ADMIN — AMLODIPINE BESYLATE 10 MG: 10 TABLET ORAL at 20:33

## 2017-09-06 RX ADMIN — POTASSIUM CHLORIDE AND SODIUM CHLORIDE 100 ML/HR: 900; 150 INJECTION, SOLUTION INTRAVENOUS at 17:55

## 2017-09-06 RX ADMIN — INSULIN LISPRO 2 UNITS: 100 INJECTION, SOLUTION INTRAVENOUS; SUBCUTANEOUS at 20:33

## 2017-09-06 RX ADMIN — CLOPIDOGREL BISULFATE 75 MG: 75 TABLET ORAL at 09:38

## 2017-09-06 RX ADMIN — BACLOFEN 10 MG: 10 TABLET ORAL at 20:33

## 2017-09-06 RX ADMIN — METOPROLOL TARTRATE 50 MG: 50 TABLET, FILM COATED ORAL at 20:33

## 2017-09-06 RX ADMIN — IPRATROPIUM BROMIDE AND ALBUTEROL SULFATE 3 ML: .5; 3 SOLUTION RESPIRATORY (INHALATION) at 00:22

## 2017-09-06 RX ADMIN — ATORVASTATIN CALCIUM 10 MG: 10 TABLET, FILM COATED ORAL at 20:33

## 2017-09-06 RX ADMIN — LEVOTHYROXINE SODIUM 200 MCG: 100 TABLET ORAL at 05:53

## 2017-09-06 RX ADMIN — OXYCODONE HYDROCHLORIDE AND ACETAMINOPHEN 500 MG: 500 TABLET ORAL at 09:38

## 2017-09-06 RX ADMIN — IPRATROPIUM BROMIDE AND ALBUTEROL SULFATE 3 ML: .5; 3 SOLUTION RESPIRATORY (INHALATION) at 23:47

## 2017-09-06 RX ADMIN — IPRATROPIUM BROMIDE AND ALBUTEROL SULFATE 3 ML: .5; 3 SOLUTION RESPIRATORY (INHALATION) at 07:05

## 2017-09-06 RX ADMIN — ACETAMINOPHEN 650 MG: 325 TABLET, FILM COATED ORAL at 15:20

## 2017-09-06 RX ADMIN — BACLOFEN 10 MG: 10 TABLET ORAL at 05:53

## 2017-09-06 RX ADMIN — IPRATROPIUM BROMIDE AND ALBUTEROL SULFATE 3 ML: .5; 3 SOLUTION RESPIRATORY (INHALATION) at 04:30

## 2017-09-06 RX ADMIN — GABAPENTIN 900 MG: 300 CAPSULE ORAL at 20:34

## 2017-09-06 RX ADMIN — ASPIRIN 81 MG 81 MG: 81 TABLET ORAL at 09:38

## 2017-09-06 RX ADMIN — PIPERACILLIN SODIUM,TAZOBACTAM SODIUM 3.38 G: 3; .375 INJECTION, POWDER, FOR SOLUTION INTRAVENOUS at 20:32

## 2017-09-06 NOTE — PROGRESS NOTES
Continued Stay Note  [facility]     Patient Name: Jennifer Oliveira  MRN: [id number]  Today's Date: 9/6/2017    Admit Date: 9/5/2017          Discharge Plan       09/06/17 1151    Case Management/Social Work Plan    Plan TBD    Patient/Family In Agreement With Plan yes    Additional Comments SW spoke to pt at bedside in regards to d/c planning as PT and OT have both recommended pt go to rehab.  Pt stated she wants to go home at d/c.  Pt stated if she has to go to rehab, she wants to go back to [facility] as she has been there previously.  Pt requested she be given another day to work with PT/OT and see if she feels better.  SW had a long discussion with the concern of her going home if she is not mobile at all, which today she was only able to stand and pivot.  Pt stated she agrees she cannot go home in this type of condition but is hopeful she will get better during her hospital stay.  CM will continue to follow.              Discharge Codes     None        Expected Discharge Date and Time     Expected Discharge Date Expected Discharge Time    Sep 8, 2017             SIMONE Izaguirre

## 2017-09-06 NOTE — PROGRESS NOTES
Pharmacy note levofloxacin  Levofloxacin for pneumonia   Patient age: 77; weight: 77 kg; height: 63 in; scr: 2.2 mg/dl; est crcl: 20 ml/min  Will dose levofloxacin @ 750 mg iv q48h for crcl of 20-50 ml/min  Will continue to monitor patient for status change  Joel Nation, H

## 2017-09-06 NOTE — THERAPY EVALUATION
Acute Care - Occupational Therapy Initial Evaluation  [facility]     Patient Name: Jennifer Oliveira  : 1940  MRN: [id number]  Today's Date: 2017  Onset of Illness/Injury or Date of Surgery Date: 17  Date of Referral to OT: 17  Referring Physician: ARSLAN Gonzales    Admit Date: 2017       ICD-10-CM ICD-9-CM   1. Pneumonia of right lower lobe due to infectious organism J18.9 483.8   2. Impaired mobility and ADLs Z74.09 799.89   3. Impaired functional mobility, balance, gait, and endurance Z74.09 V49.89     Patient Active Problem List   Diagnosis   • Acute hypoxemic respiratory failure   • PAD (peripheral artery disease)   • Normochromic normocytic anemia   • Chronic diastolic heart failure   • Diabetes type 2, uncontrolled   • Lower extremity cellulitis   • Hypothyroid   • HTN (hypertension)   • Metabolic encephalopathy   • TIA (transient ischemic attack)   • Acute cystitis without hematuria   • Chronic kidney disease (CKD), stage III (moderate)   • GERD (gastroesophageal reflux disease) with hx of gastritis    • h/o stroke with right hemiplegia   • Hypertension   • Disease of thyroid gland   • Diastolic heart failure   • Type 2 diabetes mellitus   • CKD (chronic kidney disease), stage III   • Symptomatic anemia   • GI bleed   • Acute-on-chronic kidney injury   • Nausea and vomiting   • Right lower lobe pneumonia   • Weakness   • Pneumonia   • Hypoxia   • Hyperkalemia     Past Medical History:   Diagnosis Date   • CKD (chronic kidney disease), stage III     baseline Cr 1.5-1.6   • Coronary artery disease    • Diabetes mellitus    • Diastolic heart failure     last LVEF 70%   • Disease of thyroid gland    • GERD (gastroesophageal reflux disease)    • Hypertension    • PAD (peripheral artery disease)     s/p right fem-pop bypass 2016   • Stroke     residual chronic right hemiplegia     Past Surgical History:   Procedure Laterality Date   • CARPAL TUNNEL RELEASE     • CHOLECYSTECTOMY     •  "ENDOSCOPY N/A 5/15/2017    Procedure: ESOPHAGOGASTRODUODENOSCOPY;  Surgeon: Lizbet Vidales MD;  Location:  [facility];  Service:    • ENDOSCOPY N/A 5/30/2017    Procedure: ESOPHAGOGASTRODUODENOSCOPY;  Surgeon: Lizbet Vidales MD;  Location:  [facility];  Service:    • FEMORAL POPLITEAL BYPASS Right    • HYSTERECTOMY            OT ASSESSMENT FLOWSHEET (last 72 hours)      OT Evaluation       09/06/17 0841 09/06/17 0835 09/05/17 2005 09/05/17 1800 09/05/17 1533    Rehab Evaluation    Document Type evaluation  -CL evaluation  -DM       Subjective Information agree to therapy;complains of;weakness  -CL agree to therapy;complains of;weakness   has ref.OOB w/nsg;to callDtr.to bring hemiWx,shoes,RLE brace  -DM       Patient Effort, Rehab Treatment good  -CL good  -DM       Symptoms Noted During/After Treatment none  -CL dizziness;fatigue;increased pain  -DM       Symptoms Noted Comment  \"woozy\" w/EOB,but subsided;issued waff.cush,chairAlarm,td socks,lift sling  -DM       General Information    Patient Profile Review yes  -CL yes  -DM       Onset of Illness/Injury or Date of Surgery Date 09/05/17  -CL 09/05/17  -DM       Referring Physician ARSLAN Gonzales  -ARSLAN Blum  -TETE       General Observations  CHRISTIAN'S in bed,finishing meal;tele,IV,O2,scuds,waff leroy,exit alarm; RUE in homa posturing   -DM       Pertinent History Of Current Problem Pt presented to [facility]SH ED, dx w/ CAP and DC'd home. Pt developed worsening cough and severe weakness, so presented to [facility] ED. Pt found to have CAP w/ RLL infiltrate. PMH: CVA w/ residual RHP.   -CL (P)  went to [place] ER w/ SOB;Dx: CAP;GIVEN Rx.for Levaquin & d/c'd home;pharm closed; sympt incr. overnight, w/high grade fever, N/V, cough, weakness, inabil. to stand, decr. appetite, & o2 needing incr. from 2 L to 4 L; to [facility] ER, & adm.;h/o CVA 2013 W/resid. R HP;amb w/ homa wx indep @ home;fell 2-3 mos ago on buttocks(\"slipped on dog's pee pad\")   -DM       Precautions/Limitations " fall precautions;other (see comments)   previous CVA w/ residual RHP  -CL (P)  fall precautions;oxygen therapy device and L/min;other (see comments)   fell 2-3 mos.ago; h/oCVA w/resid.R HP  -DM       Prior Level of Function independent:;all household mobility;transfer;feeding;dressing;cooking;mod assist:;bathing  -CL (P)  independent:;all household mobility;community mobility;gait;transfer;bed mobility;dressing;min assist:;ADL's;bathing;home management;shopping;dependent:;cleaning;driving;yard work   indepGt w/hemiWx;dtr washes underarms,does laund,dvg;hiredYd  -DM       Equipment Currently Used at Home bath bench;walker, homa;wheelchair;ramp  -CL (P)  oxygen;power chair, (recliner lift);bath bench;ramp;walker, homa;other (see comments)   transport chair;not using 02 till recently  -DM   bath bench;oxygen;ramp;other (see comments)   Transport chair, Nocturnal Oxygen @ 2L/NC provided by [facility]    Plans/Goals Discussed With patient;agreed upon  -CL (P)  patient;agreed upon  -DM       Risks Reviewed patient:;LOB;nausea/vomiting;dizziness;increased discomfort;change in vital signs;lines disloged  -CL (P)  patient:;LOB;dizziness;increased discomfort;change in vital signs;lines disloged  -DM       Benefits Reviewed patient:;improve function;increase independence;increase strength;increase balance;decrease pain;increase knowledge  -CL (P)  patient:;improve function;increase independence;increase strength;increase balance;decrease pain;increase knowledge  -DM       Barriers to Rehab medically complex;previous functional deficit  -CL (P)  previous functional deficit  -DM       Living Environment    Lives With alone  -CL (P)  alone;other (see comments)   dtrs check on pt(1isPOA);grson availPRN;Friend empties trash  -DM   alone  -ST    Living Arrangements mobile home  -CL (P)  mobile home  -DM   mobile home  -ST    Home Accessibility tub/shower is not walk in;ramps present at home  -CL (P)  ramps present at  home;tub/shower is not walk in  -DM       Stair Railings at Home  (P)  none  -DM       Type of Financial/Environmental Concern  (P)  none  -DM       Transportation Available  (P)  car;family or friend will provide  -DM   family or friend will provide  -ST    Living Environment Comment PTA pt ambulated short household distances w/ homa-walker and used transport chair for long distances. Pt had HH aid assist w/ bathing.   -CL (P)  [facility]:cleans trailer;s/p CVA,had Rehab [facility],then [facility],then [facility] & nsg  -DM   Pt previously at [facility], and [facility] before that.   -    Clinical Impression    Date of Referral to OT 09/05/17  -CL        OT Diagnosis Decreased independence in ADLs.   -CL        Patient/Family Goals Statement Return to [facility].   -CL        Criteria for Skilled Therapeutic Interventions Met yes;treatment indicated  -CL        Rehab Potential good, to achieve stated therapy goals  -CL        Therapy Frequency daily  -CL        Anticipated Equipment Needs At Discharge --   TBA further  -CL        Anticipated Discharge Disposition skilled nursing facility  -CL        Functional Level Prior    Ambulation    1-->assistive equipment  -BF     Transferring    1-->assistive equipment  -BF     Toileting    1-->assistive equipment  -BF     Bathing    2-->assistive person  -BF     Dressing    2-->assistive person  -BF     Eating    0-->independent  -BF     Communication    0-->understands/communicates without difficulty  -BF     Swallowing    0-->swallows foods/liquids without difficulty  -BF     Prior Functional Level Comment    2  -BF     Vital Signs    Pre Systolic BP Rehab --   WISAM, RN cleared for tx.   -CL (P)  122  -DM       Pre Treatment Diastolic BP  (P)  53  -DM       Post Systolic BP Rehab  (P)  139  -DM       Post Treatment Diastolic BP  (P)  61  -DM       Pretreatment Heart Rate (beats/min)  (P)  84  -DM       Intratreatment Heart Rate (beats/min)  (P)  88  -DM       Posttreatment Heart Rate (beats/min)   (P)  86  -DM       Pre SpO2 (%)  (P)  94  -DM       O2 Delivery Pre Treatment  (P)  supplemental O2   4 L  -DM       Intra SpO2 (%)  (P)  93  -DM       O2 Delivery Intra Treatment  (P)  supplemental O2  -DM       Post SpO2 (%)  (P)  95  -DM       O2 Delivery Post Treatment  (P)  supplemental O2  -DM       Pre Patient Position  (P)  Supine  -DM       Intra Patient Position  (P)  Standing  -DM       Post Patient Position  (P)  Sitting  -DM       Pain Assessment    Pain Assessment 0-10  -CL (P)  0-10  -DM       Pain Score 0   10/10 R knee w/ weight-bearing  -CL (P)  0   10/10 R knee during standing/WB activ.  -DM       Post Pain Score 0  -CL (P)  0  -DM       Pain Type Acute pain  -CL (P)  Acute pain  -DM       Pain Location Knee  -CL (P)  Knee  -DM       Pain Orientation Right  -CL (P)  Right  -DM       Pain Descriptors  (P)  Aching  -DM       Pain Frequency  (P)  Constant/continuous  -DM       Patient's Stated Pain Goal  (P)  No pain  -DM       Pain Intervention(s) Repositioned;Ambulation/increased activity  -CL (P)  Repositioned;Ambulation/increased activity  -DM       Response to Interventions Tolerated.   -CL (P)  tolerated  -DM       Cognitive Assessment/Intervention    Current Cognitive/Communication Assessment functional  -CL (P)  functional  -DM       Orientation Status oriented x 4  -CL (P)  oriented x 4  -DM       Follows Commands/Answers Questions 100% of the time;needs cueing;needs repetition  -CL (P)  100% of the time;able to follow single-step instructions;needs cueing;needs increased time;needs repetition  -DM       Personal Safety mild impairment;decreased awareness, need for assist;decreased awareness, need for safety  -CL (P)  mild impairment;decreased awareness, need for assist;decreased awareness, need for safety;decreased insight to deficits  -DM       Personal Safety Interventions fall prevention program maintained;gait belt;nonskid shoes/slippers when out of bed  -CL (P)  fall prevention program  maintained;gait belt;nonskid shoes/slippers when out of bed  -DM       ROM (Range of Motion)    General ROM  (P)  lower extremity range of motion deficits identified   no act.mvmtRLEexcept min.knee ext w/LAQ;Lhip [place].25%(arthrit  -DM       General ROM Detail RUE shldr FE ~60, elbow/grasp WFL. LUE grossly WFL.   -CL        MMT (Manual Muscle Testing)    General MMT Assessment  (P)  lower extremity strength deficits identified  -DM       General MMT Assessment Detail RUE grasp/wrist/supination/elbow/shldr FE 0/5, slight shldr shrug. LUE grossly 4-/5.   -CL        Muscle Tone Assessment    Muscle Tone Assessment   Bilateral Upper Extremities;Bilateral Lower Extremities;RUE;RLE  -JS      RUE Muscle Tone Assessment   flaccid  -JS      Bilateral Upper Extremities Muscle Tone Assessment   mildly decreased tone  -JS      RLE Muscle Tone Assessment   severely decreased tone  -JS      Bed Mobility, Assessment/Treatment    Bed Mobility, Assistive Device bed rails;head of bed elevated  -CL        Bed Mob, Supine to Sit, [place] maximum assist (25% patient effort);2 person assist required;verbal cues required  -CL        Bed Mobility, Comment VCs for HP/sequencing.   -CL        Transfer Assessment/Treatment    Transfers, Bed-Chair [place] maximum assist (25% patient effort);2 person assist required;verbal cues required  -CL        Transfers, Sit-Stand [place] maximum assist (25% patient effort);2 person assist required;verbal cues required  -CL        Transfers, Stand-Sit [place] maximum assist (25% patient effort);2 person assist required;verbal cues required  -CL        Transfer, Comment Pt stood x2 reps. First rep pt stood from EOB w/ BUE support and B feet/knee blocked, pt reached ~75% upright and required R knee blocked entire time d/t buckling. Pt then stood second rep w/ Max Ax3 w/ third person behind pt to guide hips to chair, pt performed a stand pivot t/f, R knee blocked throughout. .   -CL         Functional Mobility    Functional Mobility- Ind. Level unable to perform;not appropriate to assess  -CL        Lower Body Dressing Assessment/Training    LB Dressing Assess/Train, Clothing Type donning:;socks  -CL        LB Dressing Assess/Train, Position supine  -CL        LB Dressing Assess/Train, [place] dependent (less than 25% patient effort)  -CL        Motor Skills/Interventions    Additional Documentation Balance Skills Training (Group)  -CL        Balance Skills Training    Sitting-Level of Assistance Contact guard  -CL        Sitting-Balance Support Left upper extremity supported;Feet supported  -CL        Sitting-Balance Activities Reaching for objects;Trunk control activities;Forward lean  -CL        Standing-Level of Assistance Maximum assistance;x2  -CL        Static Standing Balance Support Right upper extremity supported;Left upper extremity supported  -CL        Standing-Balance Activities Weight Shift R-L;Weight Shift A-P  -CL        Therapy Exercises    Right Upper Extremity PROM:;10 reps;elbow flexion/extension;hand pumps;pronation/supination;shoulder extension/flexion;shoulder ER/IR   wrist F/E  -CL        Sensory Assessment/Intervention    Light Touch LUE;RUE  -CL        LUE Light Touch WNL  -CL        RUE Light Touch WNL  -CL        General Therapy Interventions    Planned Therapy Interventions adaptive equipment training;ADL retraining;balance training;bed mobility training;energy conservation;home exercise program;transfer training  -CL        Positioning and Restraints    Pre-Treatment Position in bed  -CL        Post Treatment Position chair  -CL        In Chair notified nsg;reclined;call light within reach;encouraged to call for assist;exit alarm on;with PT;RUE elevated;waffle cushion;on mechanical lift sling;legs elevated  -CL          09/05/17 1530 09/05/17 1500             Rehab Evaluation    Document Type  evaluation  -SM       Subjective Information  no complaints  -SM        Functional Level Prior    Ambulation 4-->completely dependent   pt is currently non-ambulatory  -ST        Transferring 4-->completely dependent  -ST        Toileting 3-->assistive equipment and person  -ST        Bathing 3-->assistive equipment and person  -ST        Dressing 3-->assistive equipment and person  -ST        Eating 0-->independent  -ST        Communication 0-->understands/communicates without difficulty  -ST        Swallowing 0-->swallows foods/liquids without difficulty  -ST        Pain Assessment    Pain Assessment  No/denies pain  -SM       Cognitive Assessment/Intervention    Current Cognitive/Communication Assessment  functional  -SM       Orientation Status  oriented x 4  -SM       Follows Commands/Answers Questions  100% of the time  -SM         User Key  (r) = Recorded By, (t) = Taken By, (c) = Cosigned By    Initials Name Effective Dates    DM Lou Mcgrath, PT 06/19/15 -     SM Sita Lincoln, MS CCC-SLP 06/22/15 -     ST Yoselin Monreal RN 03/03/16 -     BF Tawnya Ziegler RN 06/22/17 -     CL Trinidad Nguyen OT 06/08/16 -     WILLIAM Contreras RN 08/09/16 -            Occupational Therapy Education     Title: PT OT SLP Therapies (Active)     Topic: Occupational Therapy (Active)     Point: ADL training (Active)    Description: Instruct learner(s) on proper safety adaptation and remediation techniques during self care or transfers.   Instruct in proper use of assistive devices.    Learning Progress Summary    Learner Readiness Method Response Comment Documented by Status   Patient Acceptance E,D NR Pt educated on appropriate safety precautions, t/f techniques, positioning, and benefits of therapy. CL 09/06/17 1018 Active               Point: Precautions (Active)    Description: Instruct learner(s) on prescribed precautions during self-care and functional transfers.    Learning Progress Summary    Learner Readiness Method Response Comment Documented by Status   Patient Acceptance E,D  NR Pt educated on appropriate safety precautions, t/f techniques, positioning, and benefits of therapy.  09/06/17 1018 Active               Point: Body mechanics (Active)    Description: Instruct learner(s) on proper positioning and spine alignment during self-care, functional mobility activities and/or exercises.    Learning Progress Summary    Learner Readiness Method Response Comment Documented by Status   Patient Acceptance E,D NR Pt educated on appropriate safety precautions, t/f techniques, positioning, and benefits of therapy.  09/06/17 1018 Active                      User Key     Initials Effective Dates Name Provider Type Discipline     06/08/16 -  Trinidad Nguyen OT Occupational Therapist OT                  OT Recommendation and Plan  Anticipated Equipment Needs At Discharge:  (TBA further)  Anticipated Discharge Disposition: skilled nursing facility  Planned Therapy Interventions: adaptive equipment training, ADL retraining, balance training, bed mobility training, energy conservation, home exercise program, transfer training  Therapy Frequency: daily  Plan of Care Review  Plan Of Care Reviewed With: patient  Progress: progress toward functional goals as expected  Outcome Summary/Follow up Plan: Pt presents w/ significant weakness and decreased balance from baseline. Pt Max Ax3 to stand/pivot from bed/chair, limited d/t RHP and R knee buckling. Recommend pt DC to SNF. Recommend cont skilled IPOT intervention.           OT Goals       09/06/17 1019          Bed Mobility OT LTG    Bed Mobility OT LTG, Date Established 09/06/17  -CL      Bed Mobility OT LTG, Time to Achieve by discharge  -CL      Bed Mobility OT LTG, Activity Type roll left/roll right;supine to sit/sit to supine  -CL      Bed Mobility OT LTG, [place] Level moderate assist (50% patient effort);2 person assist required  -CL      Bed Mobility OT LTG, Additional Goal AAD  -CL      Transfer Training OT LTG    Transfer Training OT LTG,  Date Established 09/06/17  -CL      Transfer Training OT LTG, Time to Achieve by discharge  -CL      Transfer Training OT LTG, Activity Type toilet;bed to chair /chair to bed;sit to stand/stand to sit  -CL      Transfer Training OT LTG, [place] Level moderate assist (50% patient effort);2 person assist required  -CL      Transfer Training OT LTG, Additional Goal AAD  -CL      Patient Education OT LTG    Patient Education OT LTG, Date Established 09/06/17  -CL      Patient Education OT LTG, Time to Achieve by discharge  -CL      Patient Education OT LTG, Education Type written program;HEP;posture/body mechanics;1 hand/homa technique;home safety;adaptive equipment mgmt;energy conservation;adaptive breathing  -CL      Patient Education OT LTG, Education Understanding verbalizes understanding  -CL      UB Dressing OT LTG    UB Dressing Goal OT LTG, Date Established 09/06/17  -CL      UB Dressing Goal OT LTG, Time to Achieve by discharge  -CL      UB Dressing Goal OT LTG, Activity Type Utilizing homa-dressing technique  -CL      UB Dressing Goal OT LTG, [place] Level moderate assist (50% patient effort)  -CL      UB Dressing Goal OT LTG, Additional Goal AAD  -CL        User Key  (r) = Recorded By, (t) = Taken By, (c) = Cosigned By    Initials Name Provider Type    CL Trinidad Nguyen OT Occupational Therapist                Outcome Measures       09/06/17 0924 09/06/17 0841       How much help from another is currently needed...    Putting on and taking off regular lower body clothing?  1  -CL     Bathing (including washing, rinsing, and drying)  1  -CL     Toileting (which includes using toilet bed pan or urinal)  1  -CL     Putting on and taking off regular upper body clothing  2  -CL     Taking care of personal grooming (such as brushing teeth)  2  -CL     Eating meals  3  -CL     Score  10  -CL     Functional Assessment    Outcome Measure Options --  -CL AM-PAC 6 Clicks Daily Activity (OT)  -CL       User Key   (r) = Recorded By, (t) = Taken By, (c) = Cosigned By    Initials Name Provider Type    CL Trinidad Nguyen OT Occupational Therapist          Time Calculation:   OT Start Time: 0841    Therapy Charges for Today     Code Description Service Date Service Provider Modifiers Qty    [id number]  OT EVAL LOW COMPLEXITY 4 9/6/2017 Trinidad Nguyen OT GO 1               Trinidad Nguyen OT  9/6/2017

## 2017-09-06 NOTE — THERAPY EVALUATION
Acute Care - Physical Therapy Initial Evaluation  [facility]     Patient Name: Jennifer Oliveira  : 1940  MRN: [id number]  Today's Date: 2017   Onset of Illness/Injury or Date of Surgery Date: 17  Date of Referral to PT: 17  Referring Physician: ARSLAN Gonzales      Admit Date: 2017     Visit Dx:    ICD-10-CM ICD-9-CM   1. Pneumonia of right lower lobe due to infectious organism J18.9 483.8   2. Impaired mobility and ADLs Z74.09 799.89   3. Impaired functional mobility, balance, gait, and endurance Z74.09 V49.89     Patient Active Problem List   Diagnosis   • Acute hypoxemic respiratory failure   • PAD (peripheral artery disease)   • Normochromic normocytic anemia   • Chronic diastolic heart failure   • Diabetes type 2, uncontrolled   • Lower extremity cellulitis   • Hypothyroid   • HTN (hypertension)   • Metabolic encephalopathy   • TIA (transient ischemic attack)   • Acute cystitis without hematuria   • Chronic kidney disease (CKD), stage III (moderate)   • GERD (gastroesophageal reflux disease) with hx of gastritis    • h/o stroke with right hemiplegia   • Hypertension   • Disease of thyroid gland   • Diastolic heart failure   • Type 2 diabetes mellitus   • CKD (chronic kidney disease), stage III   • Symptomatic anemia   • GI bleed   • Acute-on-chronic kidney injury   • Nausea and vomiting   • Right lower lobe pneumonia   • Weakness   • Pneumonia   • Hypoxia   • Hyperkalemia     Past Medical History:   Diagnosis Date   • CKD (chronic kidney disease), stage III     baseline Cr 1.5-1.6   • Coronary artery disease    • Diabetes mellitus    • Diastolic heart failure     last LVEF 70%   • Disease of thyroid gland    • GERD (gastroesophageal reflux disease)    • Hypertension    • PAD (peripheral artery disease)     s/p right fem-pop bypass 2016   • Stroke     residual chronic right hemiplegia     Past Surgical History:   Procedure Laterality Date   • CARPAL TUNNEL RELEASE     •  "CHOLECYSTECTOMY     • ENDOSCOPY N/A 5/15/2017    Procedure: ESOPHAGOGASTRODUODENOSCOPY;  Surgeon: Lizbet Vidales MD;  Location:  [facility];  Service:    • ENDOSCOPY N/A 5/30/2017    Procedure: ESOPHAGOGASTRODUODENOSCOPY;  Surgeon: Lizbet Vidales MD;  Location:  [facility];  Service:    • FEMORAL POPLITEAL BYPASS Right    • HYSTERECTOMY            PT ASSESSMENT (last 72 hours)      PT Evaluation       09/06/17 0841 09/06/17 0835    Rehab Evaluation    Document Type evaluation  -CL evaluation  -DM    Subjective Information agree to therapy;complains of;weakness  -CL agree to therapy;complains of;weakness   has ref.OOB w/nsg;to callDtr.to bring hemiWx,shoes,RLE brace  -DM    Patient Effort, Rehab Treatment good  -CL good  -DM    Symptoms Noted During/After Treatment none  -CL dizziness;fatigue;increased pain  -DM    Symptoms Noted Comment  \"woozy\" w/EOB,but subsided;issued waff.cush,chairAlarm,td socks,lift sling  -DM    General Information    Patient Profile Review yes  -CL yes  -DM    Onset of Illness/Injury or Date of Surgery Date 09/05/17  -CL 09/05/17  -DM    Referring Physician ARSLAN Gonzales  -ARSLAN Blum  -TETE    General Observations  CHRISTIAN'S in bed,finishing meal;tele,IV,O2,scuds,waff leroy,exit alarm; RUE in homa posturing   -DM    Pertinent History Of Current Problem Pt presented to [facility] ED, dx w/ CAP and DC'd home. Pt developed worsening cough and severe weakness, so presented to [facility] ED. Pt found to have CAP w/ RLL infiltrate. PMH: CVA w/ residual RHP.   -CL went to [facility] ER w/ SOB;Dx: CAP;GIVEN Rx.for Levaquin & d/c'd home;pharm closed; sympt incr. overnight, w/high grade fever, N/V, cough, weakness, inabil. to stand, decr. appetite, & o2 needing incr. from 2 L to 4 L; to [facility] ER, & adm.;h/o CVA 2013 W/resid. R HP;amb w/ homa wx indep @ home;fell 2-3 mos ago on buttocks(\"slipped on dog's pee pad\")   -DM    Precautions/Limitations fall precautions;other (see comments)   previous CVA w/ residual " RHP  -CL fall precautions;oxygen therapy device and L/min;other (see comments)   fell 2-3 mos.ago; h/oCVA w/resid.R HP  -DM    Prior Level of Function independent:;all household mobility;transfer;feeding;dressing;cooking;mod assist:;bathing  -CL independent:;all household mobility;community mobility;gait;transfer;bed mobility;dressing;min assist:;ADL's;bathing;home management;shopping;dependent:;cleaning;driving;yard work   indepGt w/hemiWx;dtr washes underarms,does laund,dvg;hiredYd  -DM    Equipment Currently Used at Home bath bench;walker, homa;wheelchair;ramp  -CL oxygen;power chair, (recliner lift);bath bench;ramp;walker, homa;other (see comments)   transport chair;not using 02 till recently  -DM    Plans/Goals Discussed With patient;agreed upon  -CL patient;agreed upon  -DM    Risks Reviewed patient:;LOB;nausea/vomiting;dizziness;increased discomfort;change in vital signs;lines disloged  -CL patient:;LOB;dizziness;increased discomfort;change in vital signs;lines disloged  -DM    Benefits Reviewed patient:;improve function;increase independence;increase strength;increase balance;decrease pain;increase knowledge  -CL patient:;improve function;increase independence;increase strength;increase balance;decrease pain;increase knowledge  -DM    Barriers to Rehab medically complex;previous functional deficit  -CL previous functional deficit  -DM    Living Environment    Lives With alone  -CL alone;other (see comments)   dtrs check on pt(1isPOA);grson availPRN;Friend empties trash  -DM    Living Arrangements mobile home  -CL mobile home  -DM    Home Accessibility tub/shower is not walk in;ramps present at home  -CL ramps present at home;tub/shower is not walk in  -DM    Stair Railings at Home  none  -DM    Type of Financial/Environmental Concern  none  -DM    Transportation Available  car;family or friend will provide  -DM    Living Environment Comment PTA pt ambulated short household distances w/ homa-walker and used  transport chair for long distances. Pt had HH aid assist w/ bathing.   -CL [facility]:cleans trailer;s/p CVA,had Rehab CHR,then LCP,then [facility] HHPT & nsg  -DM    Clinical Impression    Date of Referral to PT  09/05/17  -DM    PT Diagnosis  impaired funct mobil  -DM    Criteria for Skilled Therapeutic Interventions Met  yes;treatment indicated  -DM    Pathology/Pathophysiology Noted (Describe Specifically for Each System)  pulmonary  -DM    Impairments Found (describe specific impairments)  gait, locomotion, and balance  -DM    Rehab Potential  good, to achieve stated therapy goals  -DM    Vital Signs    Pre Systolic BP Rehab --   VSS, RN cleared for tx.   -  -DM    Pre Treatment Diastolic BP  53  -DM    Post Systolic BP Rehab  139  -DM    Post Treatment Diastolic BP  61  -DM    Pretreatment Heart Rate (beats/min)  84  -DM    Intratreatment Heart Rate (beats/min)  88  -DM    Posttreatment Heart Rate (beats/min)  86  -DM    Pre SpO2 (%)  94  -DM    O2 Delivery Pre Treatment  supplemental O2   4 L  -DM    Intra SpO2 (%)  93  -DM    O2 Delivery Intra Treatment  supplemental O2  -DM    Post SpO2 (%)  95  -DM    O2 Delivery Post Treatment  supplemental O2  -DM    Pre Patient Position  Supine  -DM    Intra Patient Position  Standing  -DM    Post Patient Position  Sitting  -DM    Pain Assessment    Pain Assessment 0-10  -CL 0-10  -DM    Pain Score 0   10/10 R knee w/ weight-bearing  -CL 0   10/10 R knee during standing/WB activ.  -DM    Post Pain Score 0  -CL 0  -DM    Pain Type Acute pain  -CL Acute pain  -DM    Pain Location Knee  -CL Knee  -DM    Pain Orientation Right  -CL Right  -DM    Pain Descriptors  Aching  -DM    Pain Frequency  Constant/continuous  -DM    Patient's Stated Pain Goal  No pain  -DM    Pain Intervention(s) Repositioned;Ambulation/increased activity  -CL Repositioned;Ambulation/increased activity  -DM    Response to Interventions Tolerated.   -CL tolerated  -DM    Cognitive  Assessment/Intervention    Current Cognitive/Communication Assessment functional  -CL functional  -DM    Orientation Status oriented x 4  -CL oriented x 4  -DM    Follows Commands/Answers Questions 100% of the time;needs cueing;needs repetition  -% of the time;able to follow single-step instructions;needs cueing;needs increased time;needs repetition  -DM    Personal Safety mild impairment;decreased awareness, need for assist;decreased awareness, need for safety  -CL mild impairment;decreased awareness, need for assist;decreased awareness, need for safety;decreased insight to deficits  -DM    Personal Safety Interventions fall prevention program maintained;gait belt;nonskid shoes/slippers when out of bed  -CL fall prevention program maintained;gait belt;nonskid shoes/slippers when out of bed  -DM    ROM (Range of Motion)    General ROM  lower extremity range of motion deficits identified   no act.mvmtRLEexcept min.knee ext w/LAQ;Lhip [place].25%(arthrit  -DM    General ROM Detail RUE shldr FE ~60, elbow/grasp WFL. LUE grossly WFL.   -CL     MMT (Manual Muscle Testing)    General MMT Assessment  lower extremity strength deficits identified   0/5 RLE (traceRquad);L Hip/knee 3- to 4-/5  -DM    General MMT Assessment Detail RUE grasp/wrist/supination/elbow/shldr FE 0/5, slight shldr shrug. LUE grossly 4-/5.   -CL     Bed Mobility, Assessment/Treatment    Bed Mobility, Assistive Device bed rails;head of bed elevated  -CL bed rails;head of bed elevated;draw sheet  -DM    Bed Mob, Supine to Sit, [place] maximum assist (25% patient effort);2 person assist required;verbal cues required  -CL maximum assist (25% patient effort);2 person assist required;verbal cues required   C/O R knee pain,dizziness EOB;did L AP's,PLB Ex;sympt.eased  -DM    Bed Mobility, Safety Issues  decreased use of arms for pushing/pulling;decreased use of legs for bridging/pushing  -DM    Bed Mobility, Impairments  strength decreased;impaired  balance;motor control impaired;pain  -DM    Bed Mobility, Comment VCs for HP/sequencing.   -CL cues for hand placement;unable to move R UE or LE to assist  -DM    Transfer Assessment/Treatment    Transfers, Bed-Chair [place] maximum assist (25% patient effort);2 person assist required;verbal cues required  -CL maximum assist (25% patient effort);2 person assist required;verbal cues required   stood x 2 fromEOB,W/5min.sit rest EOB(Rknee pain 10/10)  -DM    Transfers, Bed-Chair-Bed, Assist Device  other (see comments)   gt belt;BUE supp.;nsg.helped w/2nd attempt(max 3 asst)  -DM    Transfers, Sit-Stand [place] maximum assist (25% patient effort);2 person assist required;verbal cues required  -CL maximum assist (25% patient effort);2 person assist required;verbal cues required   R knee stabilized(buckling);BUE supp.  -DM    Transfers, Stand-Sit [place] maximum assist (25% patient effort);2 person assist required;verbal cues required  -CL maximum assist (25% patient effort);2 person assist required;verbal cues required  -DM    Transfers, Sit-Stand-Sit, Assist Device  other (see comments)   gt belt;UE supp.  -DM    Transfer, Safety Issues  weight-shifting ability decreased;loses balance backward;knees buckling  -DM    Transfer, Impairments  strength decreased;impaired balance;motor control impaired;pain  -DM    Transfer, Comment Pt stood x2 reps. First rep pt stood from EOB w/ BUE support and B feet/knee blocked, pt reached ~75% upright and required R knee blocked entire time d/t buckling. Pt then stood second rep w/ Max Ax3 w/ third person behind pt to guide hips to chair, pt performed a stand pivot t/f, R knee blocked throughout. .   -CL severe R knee pain/instabil.limiting mansoor.of B trials;achieved partial stance 1st trial,then full stance & SPT on LLE to recliner at FOB on 2nd trial (nsg behind,P.T./O.T. in front)   -DM    Gait Assessment/Treatment    Gait, [place] Level  unable to perform  -DM     Motor Skills/Interventions    Additional Documentation Balance Skills Training (Group)  -CL Balance Skills Training (Group)  -DM    Balance Skills Training    Sitting-Level of Assistance Contact guard  -CL Contact guard  -DM    Sitting-Balance Support Left upper extremity supported;Feet supported  -CL Left upper extremity supported;No upper extremity supported;Feet supported  -DM    Sitting-Balance Activities Reaching for objects;Trunk control activities;Forward lean  -CL Trunk control activities;Reaching for objects  -DM    Sitting # of Minutes  9   2 sessions EOB( 5 + 4 min.)  -DM    Standing-Level of Assistance Maximum assistance;x2  -CL Maximum assistance;x2  -DM    Static Standing Balance Support Right upper extremity supported;Left upper extremity supported  -CL Right upper extremity supported;Left upper extremity supported   KNEES Stabilized  -DM    Standing-Balance Activities Weight Shift R-L;Weight Shift A-P  -CL Weight Shift A-P;Weight Shift R-L  -DM    Standing Balance # of Minutes  --   2 trials: 10 + 5 sec.  -DM    Therapy Exercises    Right Lower Extremity  PROM:;10 reps;sitting;ankle pumps/circles;calf stretch;hamstring stretch;heel slides;hip abduction/adduction;hip ER;hip IR  -DM    Left Lower Extremity  AROM:;10 reps;sitting;ankle pumps/circles;glut sets;heel slides;hip abduction/adduction;hip ER;hip flexion;hip IR;quad sets;SAQ   HS sets  -DM    Right Upper Extremity PROM:;10 reps;elbow flexion/extension;hand pumps;pronation/supination;shoulder extension/flexion;shoulder ER/IR   wrist F/E  -CL     Sensory Assessment/Intervention    Light Touch LUE;RUE  -CL     LUE Light Touch WNL  -CL     RUE Light Touch WNL  -CL     Positioning and Restraints    Pre-Treatment Position in bed  -CL in bed  -DM    Post Treatment Position chair  -CL chair  -DM    In Chair notified nsg;reclined;call light within reach;encouraged to call for assist;exit alarm on;with PT;RUE elevated;waffle cushion;on mechanical lift  sling;legs elevated  -CL notified nsg;reclined;call light within reach;encouraged to call for assist;exit alarm on;RUE elevated;compression device;waffle cushion;legs elevated   extra pillows provided for support  -DM      09/05/17 2005 09/05/17 1533    General Information    Equipment Currently Used at Home  bath bench;oxygen;ramp;other (see comments)   Transport chair, Nocturnal Oxygen @ 2L/NC provided by [facility]  -    Living Environment    Lives With  alone  -    Living Arrangements  mobile [place]  -    Transportation Available  family or friend will provide  -    Living Environment Comment  Pt [facility] at [facility], and P before that.   -ST    Muscle Tone Assessment    Muscle Tone Assessment Bilateral Upper Extremities;Bilateral Lower Extremities;RUE;RLE  -JS     RUE Muscle Tone Assessment flaccid  -JS     Bilateral Upper Extremities Muscle Tone Assessment mildly decreased tone  -JS     RLE Muscle Tone Assessment severely decreased tone  -JS       09/05/17 1500       Rehab Evaluation    Document Type evaluation  -SM     Subjective Information no complaints  -SM     Pain Assessment    Pain Assessment No/denies pain  -SM     Cognitive Assessment/Intervention    Current Cognitive/Communication Assessment functional  -SM     Orientation Status oriented x 4  -SM     Follows Commands/Answers Questions 100% of the time  -SM       User Key  (r) = Recorded By, (t) = Taken By, (c) = Cosigned By    Initials Name Provider Type    TETE Mcgrath, PT Physical Therapist    SM Sita Lincoln MS CCC-SLP Speech and Language Pathologist    ST Yoselin Monreal, RN Case Manager    CL Trinidad Nguyen, OT Occupational Therapist    WILLIAM Contreras, RN Registered Nurse          Physical Therapy Education     Title: PT OT SLP Therapies (Active)     Topic: Physical Therapy (Active)     Point: Mobility training (Active)    Learning Progress Summary    Learner Readiness Method Response Comment Documented by Status   Patient Eager  "E,D NR  DM 09/06/17 1036 Active               Point: Home exercise program (Active)    Learning Progress Summary    Learner Readiness Method Response Comment Documented by Status   Patient Eager LORRI SCHOFIELD NR  DM 09/06/17 1036 Active               Point: Body mechanics (Active)    Learning Progress Summary    Learner Readiness Method Response Comment Documented by Status   Patient LORRI Munoz NR  DM 09/06/17 1036 Active               Point: Precautions (Active)    Learning Progress Summary    Learner Readiness Method Response Comment Documented by Status   Patient [place] LORRI SCHOFIELD NR  DM 09/06/17 1036 Active                      User Key     Initials Effective Dates Name Provider Type Discipline    DM 06/19/15 -  Lou Mcgrath, PT Physical Therapist PT                PT Recommendation and Plan  Anticipated Discharge Disposition: inpatient rehabilitation facility (pulm rehab)  PT Frequency: daily  Plan of Care Review  Plan Of Care Reviewed With: patient  Progress: progress towards functional goals is fair  Outcome Summary/Follow up Plan: Presents w/ evolving sympt, incl. decr.K+ and HGB (decr. from 9.4 yest to 8.3 today), impaired coord/bal./ strength, severe R knee pain/instabil. w/ WB attempts & SPT, & dizziness w/ transit. mvmts; able to perform 2 STS trials, & SPT to chair w/ max 3 asst; recommend use of pt's RLE brace,shoes & homa wx from home(dtr to bring; pt ref. R wx or plat.R wx d/t fear of \"wheels\");recommend S.T. rehab to achieve all goals & ret. to PLOF at home (lives alone)          IP PT Goals       09/06/17 1037          Bed Mobility PT LTG    Bed Mobility PT LTG, Date Established 09/06/17  -DM      Bed Mobility PT LTG, Time to Achieve 1 wk  -DM      Bed Mobility PT LTG, Activity Type all bed mobility  -DM      Bed Mobility PT LTG, [place] Level moderate assist (50% patient effort);2 person assist required  -DM      Transfer Training PT LTG    Transfer Training PT LTG, Date Established 09/06/17  -DM      " Transfer Training PT LTG, Time to Achieve 1 wk  -DM      Transfer Training PT LTG, Activity Type bed to chair /chair to bed;sit to stand/stand to sit  -DM      Transfer Training PT LTG, [place] Level maximum assist (25% patient effort);2 person assist required  -DM      Transfer Training PT LTG, Assist Device walker, homa  -DM      Gait Training PT LTG    Gait Training Goal PT LTG, Date Established 09/06/17  -DM      Gait Training Goal PT LTG, Time to Achieve 1 wk  -DM      Gait Training Goal PT LTG, [place] Level maximum assist (25% patient effort);2 person assist required   stable VS; RLE brace  -DM      Gait Training Goal PT LTG, Assist Device walker, homa  -DM      Gait Training Goal PT LTG, Distance to Achieve 3  -DM        User Key  (r) = Recorded By, (t) = Taken By, (c) = Cosigned By    Initials Name Provider Type    DM Lou Mcgrath, PT Physical Therapist                Outcome Measures       09/06/17 0924 09/06/17 0841 09/06/17 0835    How much help from another person do you currently need...    Turning from your back to your side while in flat bed without using bedrails?   2  -DM    Moving from lying on back to sitting on the side of a flat bed without bedrails?   2  -DM    Moving to and from a bed to a chair (including a wheelchair)?   2  -DM    Standing up from a chair using your arms (e.g., wheelchair, bedside chair)?   2  -DM    Climbing 3-5 steps with a railing?   1  -DM    To walk in hospital room?   1  -DM    AM-PAC 6 Clicks Score   10  -DM    How much help from another is currently needed...    Putting on and taking off regular lower body clothing?  1  -CL     Bathing (including washing, rinsing, and drying)  1  -CL     Toileting (which includes using toilet bed pan or urinal)  1  -CL     Putting on and taking off regular upper body clothing  2  -CL     Taking care of personal grooming (such as brushing teeth)  2  -CL     Eating meals  3  -CL     Score  10  -CL     Functional  Assessment    Outcome Measure Options --  -CL AM-PAC 6 Clicks Daily Activity (OT)  -CL AM-PAC 6 Clicks Basic Mobility (PT)  -DM      User Key  (r) = Recorded By, (t) = Taken By, (c) = Cosigned By    Initials Name Provider Type    TETE Mcgrath, PT Physical Therapist    CL Trinidad Nguyen, OT Occupational Therapist           Time Calculation:         PT Charges       09/06/17 1045          Time Calculation    Start Time 0835  -DM      PT Received On 09/06/17  -DM      PT Goal Re-Cert Due Date 09/16/17  -DM      Time Calculation- PT    Total Timed Code Minutes- PT 52 minute(s)  -DM        User Key  (r) = Recorded By, (t) = Taken By, (c) = Cosigned By    Initials Name Provider Type    TETE Mcgrath, PT Physical Therapist          Therapy Charges for Today     Code Description Service Date Service Provider Modifiers Qty    45254628164 HC PT EVAL MOD COMPLEXITY 4 9/6/2017 Lou Mcgrath, PT GP 1    72050507355 HC PT THER PROC EA 15 MIN 9/6/2017 Lou Mcgrath, PT GP 3          PT G-Codes  Outcome Measure Options: AM-PAC 6 Clicks Daily Activity (OT)      Lou Mcgrath, PT  9/6/2017

## 2017-09-06 NOTE — PLAN OF CARE
Problem: Patient Care Overview (Adult)  Goal: Plan of Care Review  Outcome: Ongoing (interventions implemented as appropriate)    09/06/17 1019   Coping/Psychosocial Response Interventions   Plan Of Care Reviewed With patient   Outcome Evaluation   Outcome Summary/Follow up Plan Pt presents w/ significant weakness and decreased balance from baseline. Pt Max Ax3 to stand/pivot from bed/chair, limited d/t RHP and R knee buckling. Recommend pt DC to SNF. Recommend cont skilled IPOT intervention.    Patient Care Overview   Progress progress toward functional goals as expected         Problem: Inpatient Occupational Therapy  Goal: Bed Mobility Goal LTG- OT  Outcome: Ongoing (interventions implemented as appropriate)    09/06/17 1019   Bed Mobility OT LTG   Bed Mobility OT LTG, Date Established 09/06/17   Bed Mobility OT LTG, Time to Achieve by discharge   Bed Mobility OT LTG, Activity Type roll left/roll right;supine to sit/sit to supine   Bed Mobility OT LTG, [place] Level moderate assist (50% patient effort);2 person assist required   Bed Mobility OT LTG, Additional Goal AAD       Goal: Transfer Training Goal 1 LTG- OT  Outcome: Ongoing (interventions implemented as appropriate)    09/06/17 1019   Transfer Training OT LTG   Transfer Training OT LTG, Date Established 09/06/17   Transfer Training OT LTG, Time to Achieve by discharge   Transfer Training OT LTG, Activity Type toilet;bed to chair /chair to bed;sit to stand/stand to sit   Transfer Training OT LTG, [place] Level moderate assist (50% patient effort);2 person assist required   Transfer Training OT LTG, Additional Goal AAD       Goal: Patient Education Goal LTG- OT  Outcome: Ongoing (interventions implemented as appropriate)    09/06/17 1019   Patient Education OT LTG   Patient Education OT LTG, Date Established 09/06/17   Patient Education OT LTG, Time to Achieve by discharge   Patient Education OT LTG, Education Type written program;HEP;posture/body  mechanics;1 hand/homa technique;home safety;adaptive equipment mgmt;energy conservation;adaptive breathing   Patient Education OT LTG, Education Understanding verbalizes understanding       Goal: UB Dressing Goal LTG- OT  Outcome: Ongoing (interventions implemented as appropriate)    09/06/17 1019   UB Dressing OT LTG   UB Dressing Goal OT LTG, Date Established 09/06/17   UB Dressing Goal OT LTG, Time to Achieve by discharge   UB Dressing Goal OT LTG, Activity Type Utilizing homa-dressing technique   UB Dressing Goal OT LTG, [place] Level moderate assist (50% patient effort)   UB Dressing Goal OT LTG, Additional Goal AAD

## 2017-09-06 NOTE — PLAN OF CARE
"Problem: Patient Care Overview (Adult)  Goal: Plan of Care Review  Outcome: Ongoing (interventions implemented as appropriate)    09/06/17 1037   Coping/Psychosocial Response Interventions   Plan Of Care Reviewed With patient   Outcome Evaluation   Outcome Summary/Follow up Plan Presents w/ evolving sympt, incl. decr.K+ and HGB (decr. from 9.4 yest to 8.3 today), impaired coord/bal./ strength, severe R knee pain/instabil. w/ WB attempts & SPT, & dizziness w/ transit. mvmts; able to perform 2 STS trials, & SPT to chair w/ max 3 asst; recommend use of pt's RLE brace,shoes & homa wx from home(dtr to bring; pt ref. R wx or plat.R wx d/t fear of \"wheels\");recommend S.T. rehab to achieve all goals & ret. to PLOF at home (lives alone)   Patient Care Overview   Progress progress towards functional goals is fair         Problem: Inpatient Physical Therapy  Goal: Bed Mobility Goal LTG- PT  Outcome: Ongoing (interventions implemented as appropriate)    09/06/17 1037   Bed Mobility PT LTG   Bed Mobility PT LTG, Date Established 09/06/17   Bed Mobility PT LTG, Time to Achieve 1 wk   Bed Mobility PT LTG, Activity Type all bed mobility   Bed Mobility PT LTG, [place] Level moderate assist (50% patient effort);2 person assist required       Goal: Transfer Training Goal 1 LTG- PT  Outcome: Ongoing (interventions implemented as appropriate)    09/06/17 1037   Transfer Training PT LTG   Transfer Training PT LTG, Date Established 09/06/17   Transfer Training PT LTG, Time to Achieve 1 wk   Transfer Training PT LTG, Activity Type bed to chair /chair to bed;sit to stand/stand to sit   Transfer Training PT LTG, [place] Level maximum assist (25% patient effort);2 person assist required   Transfer Training PT LTG, Assist Device walker, homa       Goal: Gait Training Goal LTG- PT  Outcome: Ongoing (interventions implemented as appropriate)    09/06/17 1037   Gait Training PT LTG   Gait Training Goal PT LTG, Date Established 09/06/17 "   Gait Training Goal PT LTG, Time to Achieve 1 wk   Gait Training Goal PT LTG, [place] Level maximum assist (25% patient effort);2 person assist required  (stable VS; RLE brace)   Gait Training Goal PT LTG, Assist Device walker, homa   Gait Training Goal PT LTG, Distance to Achieve 3

## 2017-09-06 NOTE — PROGRESS NOTES
"    [facility] Medicine Services  PROGRESS NOTE      Date of Admission: 2017  Length of Stay: 1  Primary Care Physician: Samuel Buck MD    Patient Name: Jennifer Oliveira  : 1940  MRN: [id number]    Subjective     CC:  Pneumonia, weakness    History of Present Illness :   Feels imprved today but still much more weak than her baseline.  When seen in late am was up in chair.  No SOA at rest but still with mild RODRÍGUEZ.  Usually wears 2L O2, currently on 3L sat'ing well.     Review of Systems   Constitutional: Positive for fatigue and fever.   Respiratory: Positive for shortness of breath.    Cardiovascular: Negative for chest pain.   Neurological: Positive for weakness.    :    Otherwise complete ROS is negative except as mentioned in the HPI.      Objective     Vital Signs: /61 (BP Location: Left arm, Patient Position: Lying)  Pulse 79  Temp (!) 100.6 °F (38.1 °C)  Resp 18  Ht 63\" (160 cm)  Wt 170 lb (77.1 kg)  SpO2 92%  BMI 30.11 kg/m2     Physical Exam :  Constitutional: no acute distress, awake, alert, mildly ill appearing   HENT: NCAT, mucous membranes dry  Respiratory: mid right axillary area rales, respiratory effort poor  Cardiovascular: RRR, no murmur  Gastrointestinal: Positive bowel sounds, soft, nontender, nondistended  Musculoskeletal: No bilateral ankle edema  Psychiatric: oriented x 3, appropriate affect, cooperative  Neurologic: Strength symmetric in all extremities, CN grossly in tact to confrontation, speech is clear  Derm: no rashes        Results Reviewed:  I have personally reviewed current lab, radiology, and data and agree.      Results from last 7 days  Lab Units 17  0517  1146   WBC 10*3/mm3 6.27 8.21   HEMOGLOBIN g/dL 8.3* 9.4*   HEMATOCRIT % 26.8* 30.4*   PLATELETS 10*3/mm3 171 226       Results from last 7 days  Lab Units 17  0517  1146   SODIUM mmol/L 136  --  140   POTASSIUM mmol/L 4.2  --  4.4 "   CHLORIDE mmol/L 106  --  108   CO2 mmol/L 22.0  --  26.0   BUN mg/dL 32*  --  43*   CREATININE mg/dL 2.00*  --  2.20*   GLUCOSE mg/dL 64*  --  57*   CALCIUM mg/dL 7.9*  --  8.7   ALT (SGPT) U/L 19  --  28   AST (SGOT) U/L 28  --  51*   TROPONIN I ng/mL  --  0.073* 0.085*     BNP   Date Value Ref Range Status   09/05/2017 336.0 (H) 0.0 - 100.0 pg/mL Final     No results found for: PHART  Blood Culture   Date Value Ref Range Status   09/05/2017 No growth at 24 hours  Preliminary   09/05/2017 No growth at 24 hours  Preliminary       Imaging Results (last 24 hours)     ** No results found for the last 24 hours. **            I have reviewed the medications.      Assessment / Plan     Assessment & Plan :  Hospital Problem List     * (Principal)Right lower lobe pneumonia    Normochromic normocytic anemia    Overview Addendum 9/5/2017  3:01 PM by ARSLAN Montalvo     - Baseline Hgb 9s  - Hx of C-scope with polyps 2016, EGD 2016 with gastritis   - EGD 5/14/17 and 5/26/17 with symptomatic anemia         Chronic diastolic heart failure    Overview Signed 4/19/2017  1:21 PM by Samuel Dela Cruz MD     - Last echo 11/2016 LVEF 70%, normal VHD         HTN (hypertension)    Chronic kidney disease (CKD), stage III (moderate)    Overview Signed 4/19/2017  1:14 PM by Samuel Dela Cruz MD     - baseline Cr 1.5-1.6         h/o stroke with right hemiplegia    Overview Signed 5/14/2017 10:24 PM by Mariam Murray, RN EXTENDER     residual chronic right hemiplegia         Disease of thyroid gland    Type 2 diabetes mellitus    KRIS (acute kidney injury)    Nausea and vomiting    Weakness    Hypoxia               Brief Hospital Course to date:  Jennifer Oliveira is a 77 y.o. 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 Symptoms progressed to high grade fever, worsening cough and severe weakness (unable to stand) N/V within 24 hrs.  Also had to increase baseline home O2 from 2L to 4L.  Presented to [facility] ED found to have RLL pna with fever and KRIS:      Plan:    - on Levaquin and Zosyn for both CAP and aspiration coverage given RLL location  - PT/OT seeing given extremely weak at presentation  - fever noted again this afternoon.  BCx neg so far.  Will need to be afebrile with neg BCx's for 48hrs before medically ready for d/c.   - KRIS resolving with rehydration and better PO intake with antiemetics. Baseline creat ~1.4          Disposition: I expect the patient to be discharged home with  PT vs may need skillled rehab, would be medically ready ~9/8 if remains afebrile 48hrs and BCx remain negative.    Malia Lorenzo MD  09/06/17  7:17 PM

## 2017-09-07 ENCOUNTER — APPOINTMENT (OUTPATIENT)
Dept: GENERAL RADIOLOGY | Facility: HOSPITAL | Age: 77
End: 2017-09-07

## 2017-09-07 PROBLEM — J96.01 ACUTE RESPIRATORY FAILURE WITH HYPOXIA (HCC): Status: ACTIVE | Noted: 2017-09-05

## 2017-09-07 LAB
ANION GAP SERPL CALCULATED.3IONS-SCNC: 9 MMOL/L (ref 3–11)
BNP SERPL-MCNC: 306 PG/ML (ref 0–100)
BUN BLD-MCNC: 29 MG/DL (ref 9–23)
BUN/CREAT SERPL: 14.5 (ref 7–25)
CALCIUM SPEC-SCNC: 7.8 MG/DL (ref 8.7–10.4)
CHLORIDE SERPL-SCNC: 107 MMOL/L (ref 99–109)
CO2 SERPL-SCNC: 20 MMOL/L (ref 20–31)
CREAT BLD-MCNC: 2 MG/DL (ref 0.6–1.3)
DEPRECATED RDW RBC AUTO: 50 FL (ref 37–54)
ERYTHROCYTE [DISTWIDTH] IN BLOOD BY AUTOMATED COUNT: 14.4 % (ref 11.3–14.5)
GFR SERPL CREATININE-BSD FRML MDRD: 24 ML/MIN/1.73
GLUCOSE BLD-MCNC: 146 MG/DL (ref 70–100)
GLUCOSE BLDC GLUCOMTR-MCNC: 164 MG/DL (ref 70–130)
GLUCOSE BLDC GLUCOMTR-MCNC: 230 MG/DL (ref 70–130)
GLUCOSE BLDC GLUCOMTR-MCNC: 316 MG/DL (ref 70–130)
GLUCOSE BLDC GLUCOMTR-MCNC: 393 MG/DL (ref 70–130)
HCT VFR BLD AUTO: 25.7 % (ref 34.5–44)
HGB BLD-MCNC: 8 G/DL (ref 11.5–15.5)
MCH RBC QN AUTO: 29.4 PG (ref 27–31)
MCHC RBC AUTO-ENTMCNC: 31.1 G/DL (ref 32–36)
MCV RBC AUTO: 94.5 FL (ref 80–99)
PLATELET # BLD AUTO: 174 10*3/MM3 (ref 150–450)
PMV BLD AUTO: 10.1 FL (ref 6–12)
POTASSIUM BLD-SCNC: 4.6 MMOL/L (ref 3.5–5.5)
RBC # BLD AUTO: 2.72 10*6/MM3 (ref 3.89–5.14)
SODIUM BLD-SCNC: 136 MMOL/L (ref 132–146)
WBC NRBC COR # BLD: 6.14 10*3/MM3 (ref 3.5–10.8)

## 2017-09-07 PROCEDURE — 94799 UNLISTED PULMONARY SVC/PX: CPT

## 2017-09-07 PROCEDURE — 80048 BASIC METABOLIC PNL TOTAL CA: CPT | Performed by: FAMILY MEDICINE

## 2017-09-07 PROCEDURE — 25010000002 PIPERACILLIN-TAZOBACTAM: Performed by: NURSE PRACTITIONER

## 2017-09-07 PROCEDURE — 71010 HC CHEST PA OR AP: CPT

## 2017-09-07 PROCEDURE — 25810000003 SODIUM CHLORIDE 0.9 % WITH KCL 20 MEQ 20-0.9 MEQ/L-% SOLUTION: Performed by: NURSE PRACTITIONER

## 2017-09-07 PROCEDURE — 94640 AIRWAY INHALATION TREATMENT: CPT

## 2017-09-07 PROCEDURE — 85027 COMPLETE CBC AUTOMATED: CPT | Performed by: FAMILY MEDICINE

## 2017-09-07 PROCEDURE — 25010000002 FUROSEMIDE PER 20 MG: Performed by: FAMILY MEDICINE

## 2017-09-07 PROCEDURE — 82962 GLUCOSE BLOOD TEST: CPT

## 2017-09-07 PROCEDURE — 25010000002 LEVOFLOXACIN PER 250 MG: Performed by: NURSE PRACTITIONER

## 2017-09-07 PROCEDURE — 94760 N-INVAS EAR/PLS OXIMETRY 1: CPT

## 2017-09-07 PROCEDURE — 97530 THERAPEUTIC ACTIVITIES: CPT

## 2017-09-07 PROCEDURE — 83880 ASSAY OF NATRIURETIC PEPTIDE: CPT | Performed by: NURSE PRACTITIONER

## 2017-09-07 PROCEDURE — 25010000002 METHYLPREDNISOLONE PER 125 MG: Performed by: FAMILY MEDICINE

## 2017-09-07 PROCEDURE — 99233 SBSQ HOSP IP/OBS HIGH 50: CPT | Performed by: FAMILY MEDICINE

## 2017-09-07 PROCEDURE — 25010000002 PIPERACILLIN-TAZOBACTAM

## 2017-09-07 RX ORDER — BUDESONIDE 0.5 MG/2ML
0.5 INHALANT ORAL
Status: DISCONTINUED | OUTPATIENT
Start: 2017-09-07 | End: 2017-09-19

## 2017-09-07 RX ORDER — METHYLPREDNISOLONE SODIUM SUCCINATE 125 MG/2ML
60 INJECTION, POWDER, LYOPHILIZED, FOR SOLUTION INTRAMUSCULAR; INTRAVENOUS EVERY 8 HOURS
Status: DISCONTINUED | OUTPATIENT
Start: 2017-09-08 | End: 2017-09-09

## 2017-09-07 RX ORDER — IPRATROPIUM BROMIDE AND ALBUTEROL SULFATE 2.5; .5 MG/3ML; MG/3ML
3 SOLUTION RESPIRATORY (INHALATION) EVERY 4 HOURS PRN
Status: DISCONTINUED | OUTPATIENT
Start: 2017-09-07 | End: 2017-09-23

## 2017-09-07 RX ORDER — METHYLPREDNISOLONE SODIUM SUCCINATE 125 MG/2ML
60 INJECTION, POWDER, LYOPHILIZED, FOR SOLUTION INTRAMUSCULAR; INTRAVENOUS EVERY 12 HOURS
Status: DISCONTINUED | OUTPATIENT
Start: 2017-09-07 | End: 2017-09-07

## 2017-09-07 RX ORDER — METHYLPREDNISOLONE SODIUM SUCCINATE 125 MG/2ML
125 INJECTION, POWDER, LYOPHILIZED, FOR SOLUTION INTRAMUSCULAR; INTRAVENOUS ONCE
Status: COMPLETED | OUTPATIENT
Start: 2017-09-07 | End: 2017-09-07

## 2017-09-07 RX ORDER — FUROSEMIDE 10 MG/ML
40 INJECTION INTRAMUSCULAR; INTRAVENOUS ONCE
Status: COMPLETED | OUTPATIENT
Start: 2017-09-07 | End: 2017-09-07

## 2017-09-07 RX ADMIN — OXYCODONE HYDROCHLORIDE AND ACETAMINOPHEN 500 MG: 500 TABLET ORAL at 09:24

## 2017-09-07 RX ADMIN — ACETAMINOPHEN 650 MG: 325 TABLET, FILM COATED ORAL at 00:16

## 2017-09-07 RX ADMIN — IPRATROPIUM BROMIDE AND ALBUTEROL SULFATE 3 ML: .5; 3 SOLUTION RESPIRATORY (INHALATION) at 10:38

## 2017-09-07 RX ADMIN — BACLOFEN 10 MG: 10 TABLET ORAL at 05:26

## 2017-09-07 RX ADMIN — GABAPENTIN 900 MG: 300 CAPSULE ORAL at 20:52

## 2017-09-07 RX ADMIN — INSULIN LISPRO 5 UNITS: 100 INJECTION, SOLUTION INTRAVENOUS; SUBCUTANEOUS at 17:49

## 2017-09-07 RX ADMIN — PIPERACILLIN SODIUM,TAZOBACTAM SODIUM 3.38 G: 3; .375 INJECTION, POWDER, FOR SOLUTION INTRAVENOUS at 05:25

## 2017-09-07 RX ADMIN — METOPROLOL TARTRATE 50 MG: 50 TABLET, FILM COATED ORAL at 09:25

## 2017-09-07 RX ADMIN — BUDESONIDE 0.5 MG: 0.5 INHALANT RESPIRATORY (INHALATION) at 21:37

## 2017-09-07 RX ADMIN — FUROSEMIDE 40 MG: 10 INJECTION, SOLUTION INTRAMUSCULAR; INTRAVENOUS at 10:28

## 2017-09-07 RX ADMIN — INSULIN LISPRO 2 UNITS: 100 INJECTION, SOLUTION INTRAVENOUS; SUBCUTANEOUS at 09:25

## 2017-09-07 RX ADMIN — TAZOBACTAM SODIUM AND PIPERACILLIN SODIUM 3.38 G: 375; 3 INJECTION, SOLUTION INTRAVENOUS at 22:09

## 2017-09-07 RX ADMIN — ACETAMINOPHEN 650 MG: 325 TABLET, FILM COATED ORAL at 04:04

## 2017-09-07 RX ADMIN — INSULIN LISPRO 3 UNITS: 100 INJECTION, SOLUTION INTRAVENOUS; SUBCUTANEOUS at 12:33

## 2017-09-07 RX ADMIN — PANTOPRAZOLE SODIUM 40 MG: 40 TABLET, DELAYED RELEASE ORAL at 05:26

## 2017-09-07 RX ADMIN — IPRATROPIUM BROMIDE AND ALBUTEROL SULFATE 3 ML: .5; 3 SOLUTION RESPIRATORY (INHALATION) at 11:56

## 2017-09-07 RX ADMIN — IPRATROPIUM BROMIDE AND ALBUTEROL SULFATE 3 ML: .5; 3 SOLUTION RESPIRATORY (INHALATION) at 16:04

## 2017-09-07 RX ADMIN — INSULIN LISPRO 6 UNITS: 100 INJECTION, SOLUTION INTRAVENOUS; SUBCUTANEOUS at 20:55

## 2017-09-07 RX ADMIN — TAZOBACTAM SODIUM AND PIPERACILLIN SODIUM 3.38 G: 375; 3 INJECTION, SOLUTION INTRAVENOUS at 20:55

## 2017-09-07 RX ADMIN — Medication 325 MG: at 09:25

## 2017-09-07 RX ADMIN — IPRATROPIUM BROMIDE AND ALBUTEROL SULFATE 3 ML: .5; 3 SOLUTION RESPIRATORY (INHALATION) at 06:36

## 2017-09-07 RX ADMIN — IPRATROPIUM BROMIDE AND ALBUTEROL SULFATE 3 ML: .5; 3 SOLUTION RESPIRATORY (INHALATION) at 03:23

## 2017-09-07 RX ADMIN — LEVOFLOXACIN 750 MG: 750 INJECTION, SOLUTION INTRAVENOUS at 12:33

## 2017-09-07 RX ADMIN — BACLOFEN 10 MG: 10 TABLET ORAL at 20:53

## 2017-09-07 RX ADMIN — METHYLPREDNISOLONE SODIUM SUCCINATE 60 MG: 125 INJECTION, POWDER, FOR SOLUTION INTRAMUSCULAR; INTRAVENOUS at 18:03

## 2017-09-07 RX ADMIN — METHYLPREDNISOLONE SODIUM SUCCINATE 125 MG: 125 INJECTION, POWDER, FOR SOLUTION INTRAMUSCULAR; INTRAVENOUS at 10:28

## 2017-09-07 RX ADMIN — LEVOTHYROXINE SODIUM 200 MCG: 100 TABLET ORAL at 05:26

## 2017-09-07 RX ADMIN — IPRATROPIUM BROMIDE AND ALBUTEROL SULFATE 3 ML: .5; 3 SOLUTION RESPIRATORY (INHALATION) at 21:37

## 2017-09-07 RX ADMIN — PIPERACILLIN SODIUM,TAZOBACTAM SODIUM 3.38 G: 3; .375 INJECTION, POWDER, FOR SOLUTION INTRAVENOUS at 09:29

## 2017-09-07 RX ADMIN — ASPIRIN 81 MG 81 MG: 81 TABLET ORAL at 09:25

## 2017-09-07 RX ADMIN — METOPROLOL TARTRATE 50 MG: 50 TABLET, FILM COATED ORAL at 20:53

## 2017-09-07 RX ADMIN — CLOPIDOGREL BISULFATE 75 MG: 75 TABLET ORAL at 09:25

## 2017-09-07 RX ADMIN — PIPERACILLIN SODIUM,TAZOBACTAM SODIUM 3.38 G: 3; .375 INJECTION, POWDER, FOR SOLUTION INTRAVENOUS at 22:09

## 2017-09-07 RX ADMIN — POTASSIUM CHLORIDE AND SODIUM CHLORIDE 100 ML/HR: 900; 150 INJECTION, SOLUTION INTRAVENOUS at 04:26

## 2017-09-07 RX ADMIN — BACLOFEN 10 MG: 10 TABLET ORAL at 15:40

## 2017-09-07 RX ADMIN — PIPERACILLIN SODIUM,TAZOBACTAM SODIUM 3.38 G: 3; .375 INJECTION, POWDER, FOR SOLUTION INTRAVENOUS at 15:40

## 2017-09-07 RX ADMIN — AMLODIPINE BESYLATE 10 MG: 10 TABLET ORAL at 20:53

## 2017-09-07 RX ADMIN — ATORVASTATIN CALCIUM 10 MG: 10 TABLET, FILM COATED ORAL at 20:52

## 2017-09-07 NOTE — PROGRESS NOTES
"    [facility] Medicine Services  PROGRESS NOTE      Date of Admission: 2017  Length of Stay: 2  Primary Care Physician: Samuel Buck MD    Patient Name: Jennifer Oliveira  : 1940  MRN: [id number]    Subjective     CC:  Pneumonia, weakness    History of Present Illness :   Overnight and early morning had worsened SOA and hypoxia. Now on Hi-Flow O2.  Feels weak and ill.  Denies CP.     Review of Systems   Respiratory: Positive for shortness of breath.         Otherwise complete ROS is negative except as mentioned in the HPI.      Objective     Vital Signs: /60 (BP Location: Left arm, Patient Position: Lying)  Pulse 72  Temp 99.6 °F (37.6 °C) (Oral)   Resp 24  Ht 63\" (160 cm)  Wt 170 lb (77.1 kg)  SpO2 90%  BMI 30.11 kg/m2     Physical Exam :  Constitutional: no acute distress, awake, alert, ill appearing   HENT: NCAT, mucous membranes dry  Respiratory: mid right axillary area rales, decreased bases, respiratory effort poor.  On Hi-Flow O2.  Cardiovascular: RRR, no murmur  Gastrointestinal: Positive bowel sounds, soft, nontender, nondistended  Musculoskeletal: No bilateral ankle edema.  No Jane's sign or TTP of BLE.  Psychiatric: oriented x 3, appropriate affect, cooperative  Neurologic: Strength symmetric in all extremities, CN grossly in tact to confrontation, speech is clear  Derm: no rashes        Results Reviewed:  I have personally reviewed current lab, radiology, and data and agree.      Results from last 7 days  Lab Units 17  0617  1146   WBC 10*3/mm3 6.14 6.27 8.21   HEMOGLOBIN g/dL 8.0* 8.3* 9.4*   HEMATOCRIT % 25.7* 26.8* 30.4*   PLATELETS 10*3/mm3 174 171 226       Results from last 7 days  Lab Units 17  0629 17  1146   SODIUM mmol/L 136 136  --  140   POTASSIUM mmol/L 4.6 4.2  --  4.4   CHLORIDE mmol/L 107 106  --  108   CO2 mmol/L 20.0 22.0  --  26.0   BUN mg/dL 29* 32*  --  43* "   CREATININE mg/dL 2.00* 2.00*  --  2.20*   GLUCOSE mg/dL 146* 64*  --  57*   CALCIUM mg/dL 7.8* 7.9*  --  8.7   ALT (SGPT) U/L  --  19  --  28   AST (SGOT) U/L  --  28  --  51*   TROPONIN I ng/mL  --   --  0.073* 0.085*     BNP   Date Value Ref Range Status   09/07/2017 306.0 (H) 0.0 - 100.0 pg/mL Final     No results found for: PHART  Blood Culture   Date Value Ref Range Status   09/05/2017 No growth at 24 hours  Preliminary   09/05/2017 No growth at 24 hours  Preliminary       Imaging Results (last 24 hours)     Procedure Component Value Units Date/Time    XR Chest 1 View [[id number]]  (Abnormal) Collected:  09/07/17 0336     Updated:  09/07/17 0524    Narrative:       EXAM:    XR Chest, 1 View    CLINICAL HISTORY:    77 years old, female; Pneumonia, unspecified organism; Other reduced   mobility; Other reduced mobility; Signs and symptoms; Dyspnea; Additional info:   Repeat/pneumonia/dyspnea. Admitted for pna    TECHNIQUE:    Frontal view of the chest.    COMPARISON:    CR - XR CHEST 1 VW 9/5/2017 12:13:00 PM    FINDINGS:    Moderate RIGHT, small LEFT pleural effusion and associated   atelectasis/consolidation in the lung bases.  Cardiomegaly with pulmonary   vascular congestion.  Calcification and unfolding of the aortic arch.       Impression:         Small to moderate bilateral pleural effusion and associated   atelectasis/consolidation in the lung bases.         Possibility of associated CHF with pulmonary edema cannot be excluded.    Recommend followup to complete resolution.       THIS DOCUMENT HAS BEEN ELECTRONICALLY SIGNED BY PEMA MARTINEZ MD            I have reviewed the medications.      Assessment / Plan     Assessment & Plan :  Hospital Problem List     * (Principal)Right lower lobe pneumonia    Normochromic normocytic anemia    Overview Addendum 9/5/2017  3:01 PM by ARSLAN Montalvo     - Baseline Hgb 9s  - Hx of C-scope with polyps 2016, EGD 2016 with gastritis   - EGD 5/14/17 and 5/26/17 with  symptomatic anemia         Chronic diastolic heart failure    Overview Signed 4/19/2017  1:21 PM by Samuel Dela Cruz MD     - Last echo 11/2016 LVEF 70%, normal VHD         HTN (hypertension)    Chronic kidney disease (CKD), stage III (moderate)    Overview Signed 4/19/2017  1:14 PM by Samuel Dela Cruz MD     - baseline Cr 1.5-1.6         h/o stroke with right hemiplegia    Overview Signed 5/14/2017 10:24 PM by Mariam Murray, RN EXTENDER     residual chronic right hemiplegia         Disease of thyroid gland    Type 2 diabetes mellitus    KRIS (acute kidney injury)    Nausea and vomiting    Weakness    Acute respiratory failure with hypoxia               Brief Hospital Course to date:  Jennifer Oliveira is a 77 y.o. 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 Symptoms progressed to high grade fever, worsening cough and severe weakness (unable to stand) N/V within 24 hrs.  Also had to increase baseline home O2 from 2L to 4L.  Presented to [facility] ED found to have RLL pna with fever and KRIS:      Plan:    - on Levaquin and Zosyn for both CAP and aspiration coverage given RLL location.  Fever noted again overnight ~0400 (had been on abx ~36hrs).  If has another fever or if not improving starting from this point forward will ask ID vs Pulm to see for additional recc's.   - Repeat CXR today with new pulm vasc congestion which may account for worsened hypoxia/SOA overnight.  IVF's stopped, given IV Lasix x1. ECHO in April with normal EF, BNP = 306 currently, suspect pulm vasc overload related to illness.  - currently on Hi-Flow O2, wean as tolerates  - added solumedrol 9/7/17 due to worsened resp status and hypoxia  -  continue duo-nebs, added Pulmicort nebs 9/7/17 (45 pack yr smoking hx, quit in 2009)  - KRIS improved after rehydration (IVF's now stopped due to pulm vasc congestion) and better PO intake with antiemetics. S.p Lasix today so will repeat am BMP.  Baseline creat ~1.4  - PT/OT seeing given extremely weak at presentation           Disposition: I expect the patient to be discharged home with  PT vs may need skillled rehab, currently still actively ill and on Hi-flow O2 likely not medically ready until early next week.    Malia Lorenzo MD  09/07/17  4:43 PM

## 2017-09-07 NOTE — THERAPY TREATMENT NOTE
Acute Care - Occupational Therapy Treatment Note  [facility]     Patient Name: Jennifer Oliveira  : 1940  MRN: [id number]  Today's Date: 2017  Onset of Illness/Injury or Date of Surgery Date: 17  Date of Referral to OT: 17  Referring Physician: ARSLAN Gonzales      Admit Date: 2017    Visit Dx:     ICD-10-CM ICD-9-CM   1. Pneumonia of right lower lobe due to infectious organism J18.9 483.8   2. Impaired mobility and ADLs Z74.09 799.89   3. Impaired functional mobility, balance, gait, and endurance Z74.09 V49.89     Patient Active Problem List   Diagnosis   • Acute hypoxemic respiratory failure   • PAD (peripheral artery disease)   • Normochromic normocytic anemia   • Chronic diastolic heart failure   • Diabetes type 2, uncontrolled   • Lower extremity cellulitis   • Hypothyroid   • HTN (hypertension)   • Metabolic encephalopathy   • TIA (transient ischemic attack)   • Acute cystitis without hematuria   • Chronic kidney disease (CKD), stage III (moderate)   • GERD (gastroesophageal reflux disease) with hx of gastritis    • h/o stroke with right hemiplegia   • Hypertension   • Disease of thyroid gland   • Diastolic heart failure   • Type 2 diabetes mellitus   • CKD (chronic kidney disease), stage III   • Symptomatic anemia   • GI bleed   • KRIS (acute kidney injury)   • Nausea and vomiting   • Right lower lobe pneumonia   • Weakness   • Pneumonia   • Hypoxia   • Hyperkalemia             Adult Rehabilitation Note       17 1402          Rehab Assessment/Intervention    Discipline occupational therapist  -CL      Document Type therapy note (daily note)  -CL      Subjective Information agree to therapy;complains of;weakness;fatigue  -CL      Patient Effort, Rehab Treatment poor  -CL      Symptoms Noted During/After Treatment fatigue  -CL      Symptoms Noted Comment Pt lethargic/fatigued, RN aware.   -CL      Precautions/Limitations fall precautions;oxygen therapy device and L/min;other (see  comments)   h/o CVA w/ residual PHP, Hi-Randolph NC  -CL      Specific Treatment Considerations Pt declined EOB or OOB activity, agreed to in-bed ther-ex.   -CL      Recorded by [CL] Trinidad Nguyen OT      Vital Signs    Pre Systolic BP Rehab 164  -CL      Pre Treatment Diastolic BP 60  -CL      Pretreatment Heart Rate (beats/min) 78  -CL      Posttreatment Heart Rate (beats/min) 78  -CL      Pre SpO2 (%) 92  -CL      O2 Delivery Pre Treatment supplemental O2   Hi-Randolph  -CL      Post SpO2 (%) 93  -CL      O2 Delivery Post Treatment supplemental O2  -CL      Pre Patient Position Supine  -CL      Intra Patient Position Supine  -CL      Post Patient Position Supine  -CL      Recorded by [CL] Trinidad Nguyen OT      Cognitive Assessment/Intervention    Current Cognitive/Communication Assessment functional  -CL      Orientation Status oriented to;person  -CL      Follows Commands/Answers Questions 50% of the time;75% of the time;needs cueing;needs increased time;needs repetition  -CL      Personal Safety mild impairment;decreased awareness, need for assist;decreased awareness, need for safety  -CL      Personal Safety Interventions fall prevention program maintained;supervised activity;nonskid shoes/slippers when out of bed  -CL      Recorded by [CL] Trinidad Nguyen OT      Bed Mobility, Assessment/Treatment    Bed Mobility, Comment Pt declined.   -CL      Recorded by [CL] Trinidad Nguyen OT      Transfer Assessment/Treatment    Transfer, Comment Pt declined.   -CL      Recorded by [CL] Trinidad Nguyen OT      Therapy Exercises    Right Upper Extremity PROM:;10 reps;elbow flexion/extension;hand pumps;pronation/supination;shoulder extension/flexion;shoulder ER/IR   wrist F/E  -CL      Left Upper Extremity 10 reps;elbow flexion/extension;hand pumps;pronation/supination;shoulder extension/flexion;shoulder ER/IR;AAROM:;AROM:   wrist F/E  -CL      Recorded by [CL] Trinidad Nguyen OT      Positioning and Restraints    Pre-Treatment Position in bed   -CL      Post Treatment Position bed  -CL      In Bed notified nsg;fowlers;call light within reach;encouraged to call for assist;exit alarm on;RUE elevated;LUE elevated  -CL      Recorded by [CL] Trinidad Nguyen OT        User Key  (r) = Recorded By, (t) = Taken By, (c) = Cosigned By    Initials Name Effective Dates    CL Trinidad Nguyen OT 06/08/16 -                 OT Goals       09/07/17 1439 09/06/17 1019       Bed Mobility OT LTG    Bed Mobility OT LTG, Date Established  09/06/17  -CL     Bed Mobility OT LTG, Time to Achieve  by discharge  -CL     Bed Mobility OT LTG, Activity Type  roll left/roll right;supine to sit/sit to supine  -CL     Bed Mobility OT LTG, [place] Level  moderate assist (50% patient effort);2 person assist required  -CL     Bed Mobility OT LTG, Additional Goal  AAD  -CL     Bed Mobility OT LTG, Outcome goal ongoing  -CL      Transfer Training OT LTG    Transfer Training OT LTG, Date Established  09/06/17  -CL     Transfer Training OT LTG, Time to Achieve  by discharge  -CL     Transfer Training OT LTG, Activity Type  toilet;bed to chair /chair to bed;sit to stand/stand to sit  -CL     Transfer Training OT LTG, [place] Level  moderate assist (50% patient effort);2 person assist required  -CL     Transfer Training OT LTG, Additional Goal  AAD  -CL     Transfer Training OT LTG, Outcome goal ongoing  -CL      Patient Education OT LTG    Patient Education OT LTG, Date Established  09/06/17  -CL     Patient Education OT LTG, Time to Achieve  by discharge  -CL     Patient Education OT LTG, Education Type  written program;HEP;posture/body mechanics;1 hand/homa technique;home safety;adaptive equipment mgmt;energy conservation;adaptive breathing  -CL     Patient Education OT LTG, Education Understanding  verbalizes understanding  -CL     Patient Education OT LTG Outcome goal ongoing  -CL      UB Dressing OT LTG    UB Dressing Goal OT LTG, Date Established  09/06/17  -CL     UB Dressing Goal OT  LTG, Time to Achieve  by discharge  -CL     UB Dressing Goal OT LTG, Activity Type  Utilizing homa-dressing technique  -CL     UB Dressing Goal OT LTG, [place] Level  moderate assist (50% patient effort)  -CL     UB Dressing Goal OT LTG, Additional Goal  AAD  -CL     UB Dressing Goal OT LTG, Outcome goal ongoing  -CL        User Key  (r) = Recorded By, (t) = Taken By, (c) = Cosigned By    Initials Name Provider Type    CASEY Nguyen, OT Occupational Therapist          Occupational Therapy Education     Title: PT OT SLP Therapies (Active)     Topic: Occupational Therapy (Active)     Point: ADL training (Active)    Description: Instruct learner(s) on proper safety adaptation and remediation techniques during self care or transfers.   Instruct in proper use of assistive devices.    Learning Progress Summary    Learner Readiness Method Response Comment Documented by Status   Patient Acceptance E,D NR Pt educated on positioning and BUE HEP.  09/07/17 1438 Active    Acceptance E,D NR Pt educated on appropriate safety precautions, t/f techniques, positioning, and benefits of therapy.  09/06/17 1018 Active               Point: Home exercise program (Active)    Description: Instruct learner(s) on appropriate technique for monitoring, assisting and/or progressing therapeutic exercises/activities.    Learning Progress Summary    Learner Readiness Method Response Comment Documented by Status   Patient Acceptance E,D NR Pt educated on positioning and BUE HEP.  09/07/17 1438 Active               Point: Precautions (Active)    Description: Instruct learner(s) on prescribed precautions during self-care and functional transfers.    Learning Progress Summary    Learner Readiness Method Response Comment Documented by Status   Patient Acceptance E,D NR Pt educated on positioning and BUE HEP.  09/07/17 1438 Active    Acceptance E,D NR Pt educated on appropriate safety precautions, t/f techniques, positioning, and benefits  of therapy.  09/06/17 1018 Active               Point: Body mechanics (Active)    Description: Instruct learner(s) on proper positioning and spine alignment during self-care, functional mobility activities and/or exercises.    Learning Progress Summary    Learner Readiness Method Response Comment Documented by Status   Patient Acceptance E,D NR Pt educated on appropriate safety precautions, t/f techniques, positioning, and benefits of therapy.  09/06/17 1018 Active                      User Key     Initials Effective Dates Name Provider Type Discipline     06/08/16 -  Trinidad Nguyen OT Occupational Therapist OT                  OT Recommendation and Plan  Anticipated Equipment Needs At Discharge:  (TBA further)  Anticipated Discharge Disposition: skilled nursing facility  Planned Therapy Interventions: adaptive equipment training, ADL retraining, balance training, bed mobility training, energy conservation, home exercise program, transfer training  Therapy Frequency: daily  Plan of Care Review  Plan Of Care Reviewed With: patient  Progress: progress toward functional goals is gradual  Outcome Summary/Follow up Plan: Pt session limited d/t pt c/o fatigue and declining OOB and EOB activity. Pt agreed to in-bed ther-ex only, though d/t lethargy pt demo limited participation. Recommend cont skilled IPOT POC.         Outcome Measures       09/07/17 1402 09/06/17 0924 09/06/17 0841    How much help from another is currently needed...    Putting on and taking off regular lower body clothing? 1  -CL  1  -CL    Bathing (including washing, rinsing, and drying) 1  -CL  1  -CL    Toileting (which includes using toilet bed pan or urinal) 1  -CL  1  -CL    Putting on and taking off regular upper body clothing 2  -CL  2  -CL    Taking care of personal grooming (such as brushing teeth) 2  -CL  2  -CL    Eating meals 2  -CL  3  -CL    Score 9  -CL  10  -CL    Functional Assessment    Outcome Measure Options AM-PAC 6 Clicks Daily  Activity (OT)  -CL --  -CL AM-PAC 6 Clicks Daily Activity (OT)  -CL      09/06/17 0835          How much help from another person do you currently need...    Turning from your back to your side while in flat bed without using bedrails? 2  -DM      Moving from lying on back to sitting on the side of a flat bed without bedrails? 2  -DM      Moving to and from a bed to a chair (including a wheelchair)? 2  -DM      Standing up from a chair using your arms (e.g., wheelchair, bedside chair)? 2  -DM      Climbing 3-5 steps with a railing? 1  -DM      To walk in hospital room? 1  -DM      AM-PAC 6 Clicks Score 10  -DM      Functional Assessment    Outcome Measure Options AM-PAC 6 Clicks Basic Mobility (PT)  -DM        User Key  (r) = Recorded By, (t) = Taken By, (c) = Cosigned By    Initials Name Provider Type    DM Lou Mcgrath, PT Physical Therapist    CL Trinidad Nguyen OT Occupational Therapist           Time Calculation:         Time Calculation- OT       09/07/17 1441          Time Calculation- OT    OT Start Time 1402  -CL      Total Timed Code Minutes- OT 10 minute(s)  -CL      OT Received On 09/07/17  -CL      OT Goal Re-Cert Due Date 09/16/17  -CL        User Key  (r) = Recorded By, (t) = Taken By, (c) = Cosigned By    Initials Name Provider Type    CL Trinidad Nguyen OT Occupational Therapist           Therapy Charges for Today     Code Description Service Date Service Provider Modifiers Qty    [id number]  OT EVAL LOW COMPLEXITY 4 9/6/2017 Trinidad Nguyen OT GO 1    [id number]  OT THERAPEUTIC ACT EA 15 MIN 9/7/2017 Trinidad Nguyen OT GO 1               Trinidad Nguyen OT  9/7/2017

## 2017-09-07 NOTE — PLAN OF CARE
Problem: Patient Care Overview (Adult)  Goal: Plan of Care Review  Outcome: Ongoing (interventions implemented as appropriate)    09/07/17 0543   Coping/Psychosocial Response Interventions   Plan Of Care Reviewed With patient   Outcome Evaluation   Outcome Summary/Follow up Plan Pt is stable, had temp (100.5 to 99.4) through out the night - Tylenol 650mg given x3, uncovered legs and applied ice packs. Pt was on 5ltrsNC until 03:30 this am, when pt's sats dropped to 76% - pt placed on high flow nasal canula, pt was in no apparent distress, answered questions appropriately. Order for Cxr, BNP and flutter valve recieved. Pt is resting comfortably now.    Patient Care Overview   Progress no change       Goal: Discharge Needs Assessment  Outcome: Ongoing (interventions implemented as appropriate)    09/05/17 1533 09/06/17 0835   Discharge Needs Assessment   Concerns To Be Addressed discharge planning concerns --    Discharge Disposition still a patient --    Living Environment   Transportation Available --  car;family or friend will provide         Problem: Pneumonia (Adult)  Goal: Signs and Symptoms of Listed Potential Problems Will be Absent or Manageable (Pneumonia)  Outcome: Ongoing (interventions implemented as appropriate)    09/07/17 0543   Pneumonia   Problems Assessed (Pneumonia) all   Problems Present (Pneumonia) fluid/electrolyte imbalance;respiratory compromise         Problem: Infection, Risk/Actual (Adult)  Goal: Identify Related Risk Factors and Signs and Symptoms  Outcome: Ongoing (interventions implemented as appropriate)    09/07/17 0543   Infection, Risk/Actual   Infection, Risk/Actual: Related Risk Factors chronic illness/condition;exposure to microbes   Signs and Symptoms (Infection, Risk/Actual) body temperature changes;diaphoresis;edema;lab value changes;malaise;respiratory rate increase;weakness;cultures positive       Goal: Infection Prevention/Resolution  Outcome: Ongoing (interventions  implemented as appropriate)    09/07/17 0543   Infection, Risk/Actual (Adult)   Infection Prevention/Resolution making progress toward outcome         Problem: Fall Risk (Adult)  Goal: Identify Related Risk Factors and Signs and Symptoms  Outcome: Ongoing (interventions implemented as appropriate)    09/07/17 0543   Fall Risk   Fall Risk: Related Risk Factors age-related changes;fatigue/slow reaction;gait/mobility problems;history of falls;polypharmacy;sensory deficits   Fall Risk: Signs and Symptoms presence of risk factors       Goal: Absence of Falls  Outcome: Ongoing (interventions implemented as appropriate)    09/07/17 0543   Fall Risk (Adult)   Absence of Falls achieves outcome

## 2017-09-07 NOTE — PLAN OF CARE
Problem: Patient Care Overview (Adult)  Goal: Plan of Care Review  Outcome: Ongoing (interventions implemented as appropriate)    09/07/17 1439   Coping/Psychosocial Response Interventions   Plan Of Care Reviewed With patient   Outcome Evaluation   Outcome Summary/Follow up Plan Pt session limited d/t pt c/o fatigued and declined OOB and EOB activity. Pt agreed to in-bed ther-ex only, though d/t lethargy pt demo limited participation. Recommend cont skilled IPOT POC.    Patient Care Overview   Progress progress toward functional goals is gradual         09/07/17 1439   Coping/Psychosocial Response Interventions   Plan Of Care Reviewed With patient   Outcome Evaluation   Outcome Summary/Follow up Plan Pt session limited d/t pt c/o fatigue and declined OOB and EOB activity. Pt agreed to in-bed ther-ex only, though d/t lethargy pt demo limited participation. Recommend cont skilled IPOT POC.    Patient Care Overview   Progress progress toward functional goals is gradual         09/07/17 1439   Coping/Psychosocial Response Interventions   Plan Of Care Reviewed With patient   Outcome Evaluation   Outcome Summary/Follow up Plan Pt session limited d/t pt c/o fatigue and declining OOB and EOB activity. Pt agreed to in-bed ther-ex only, though d/t lethargy pt demo limited participation. Recommend cont skilled IPOT POC.    Patient Care Overview   Progress progress toward functional goals is gradual         Problem: Inpatient Occupational Therapy  Goal: Bed Mobility Goal LTG- OT  Outcome: Ongoing (interventions implemented as appropriate)    09/06/17 1019 09/07/17 1439   Bed Mobility OT LTG   Bed Mobility OT LTG, Date Established 09/06/17 --    Bed Mobility OT LTG, Time to Achieve by discharge --    Bed Mobility OT LTG, Activity Type roll left/roll right;supine to sit/sit to supine --    Bed Mobility OT LTG, [place] Level moderate assist (50% patient effort);2 person assist required --    Bed Mobility OT LTG, Additional  Goal AAD --    Bed Mobility OT LTG, Outcome --  goal ongoing       Goal: Transfer Training Goal 1 LTG- OT  Outcome: Ongoing (interventions implemented as appropriate)    09/06/17 1019 09/07/17 1439   Transfer Training OT LTG   Transfer Training OT LTG, Date Established 09/06/17 --    Transfer Training OT LTG, Time to Achieve by discharge --    Transfer Training OT LTG, Activity Type toilet;bed to chair /chair to bed;sit to stand/stand to sit --    Transfer Training OT LTG, [place] Level moderate assist (50% patient effort);2 person assist required --    Transfer Training OT LTG, Additional Goal AAD --    Transfer Training OT LTG, Outcome --  goal ongoing       Goal: Patient Education Goal LTG- OT  Outcome: Ongoing (interventions implemented as appropriate)    09/06/17 1019 09/07/17 1439   Patient Education OT LTG   Patient Education OT LTG, Date Established 09/06/17 --    Patient Education OT LTG, Time to Achieve by discharge --    Patient Education OT LTG, Education Type written program;HEP;posture/body mechanics;1 hand/homa technique;home safety;adaptive equipment mgmt;energy conservation;adaptive breathing --    Patient Education OT LTG, Education Understanding verbalizes understanding --    Patient Education OT LTG Outcome --  goal ongoing       Goal: UB Dressing Goal LTG- OT  Outcome: Ongoing (interventions implemented as appropriate)    09/06/17 1019 09/07/17 1439   UB Dressing OT LTG   UB Dressing Goal OT LTG, Date Established 09/06/17 --    UB Dressing Goal OT LTG, Time to Achieve by discharge --    UB Dressing Goal OT LTG, Activity Type Utilizing homa-dressing technique --    UB Dressing Goal OT LTG, [place] Level moderate assist (50% patient effort) --    UB Dressing Goal OT LTG, Additional Goal AAD --    UB Dressing Goal OT LTG, Outcome --  goal ongoing

## 2017-09-08 LAB
ANION GAP SERPL CALCULATED.3IONS-SCNC: 8 MMOL/L (ref 3–11)
BUN BLD-MCNC: 36 MG/DL (ref 9–23)
BUN/CREAT SERPL: 16.4 (ref 7–25)
CALCIUM SPEC-SCNC: 8.5 MG/DL (ref 8.7–10.4)
CHLORIDE SERPL-SCNC: 108 MMOL/L (ref 99–109)
CO2 SERPL-SCNC: 19 MMOL/L (ref 20–31)
CREAT BLD-MCNC: 2.2 MG/DL (ref 0.6–1.3)
DEPRECATED RDW RBC AUTO: 47.4 FL (ref 37–54)
ERYTHROCYTE [DISTWIDTH] IN BLOOD BY AUTOMATED COUNT: 14 % (ref 11.3–14.5)
GFR SERPL CREATININE-BSD FRML MDRD: 22 ML/MIN/1.73
GLUCOSE BLD-MCNC: 324 MG/DL (ref 70–100)
GLUCOSE BLDC GLUCOMTR-MCNC: 366 MG/DL (ref 70–130)
GLUCOSE BLDC GLUCOMTR-MCNC: 396 MG/DL (ref 70–130)
GLUCOSE BLDC GLUCOMTR-MCNC: 401 MG/DL (ref 70–130)
GLUCOSE BLDC GLUCOMTR-MCNC: 413 MG/DL (ref 70–130)
GLUCOSE BLDC GLUCOMTR-MCNC: 416 MG/DL (ref 70–130)
GLUCOSE BLDC GLUCOMTR-MCNC: 460 MG/DL (ref 70–130)
HCT VFR BLD AUTO: 27.9 % (ref 34.5–44)
HGB BLD-MCNC: 8.9 G/DL (ref 11.5–15.5)
MCH RBC QN AUTO: 29.5 PG (ref 27–31)
MCHC RBC AUTO-ENTMCNC: 31.9 G/DL (ref 32–36)
MCV RBC AUTO: 92.4 FL (ref 80–99)
PLATELET # BLD AUTO: 172 10*3/MM3 (ref 150–450)
PMV BLD AUTO: 10.2 FL (ref 6–12)
POTASSIUM BLD-SCNC: 4.1 MMOL/L (ref 3.5–5.5)
RBC # BLD AUTO: 3.02 10*6/MM3 (ref 3.89–5.14)
SODIUM BLD-SCNC: 135 MMOL/L (ref 132–146)
WBC NRBC COR # BLD: 5.04 10*3/MM3 (ref 3.5–10.8)

## 2017-09-08 PROCEDURE — 94799 UNLISTED PULMONARY SVC/PX: CPT

## 2017-09-08 PROCEDURE — 25010000002 METHYLPREDNISOLONE PER 125 MG: Performed by: FAMILY MEDICINE

## 2017-09-08 PROCEDURE — 97110 THERAPEUTIC EXERCISES: CPT

## 2017-09-08 PROCEDURE — 63710000001 INSULIN DETEMIR PER 5 UNITS: Performed by: FAMILY MEDICINE

## 2017-09-08 PROCEDURE — 85027 COMPLETE CBC AUTOMATED: CPT | Performed by: FAMILY MEDICINE

## 2017-09-08 PROCEDURE — 25010000002 PIPERACILLIN SOD-TAZOBACTAM PER 1 G

## 2017-09-08 PROCEDURE — 94760 N-INVAS EAR/PLS OXIMETRY 1: CPT

## 2017-09-08 PROCEDURE — 82962 GLUCOSE BLOOD TEST: CPT

## 2017-09-08 PROCEDURE — 80048 BASIC METABOLIC PNL TOTAL CA: CPT | Performed by: FAMILY MEDICINE

## 2017-09-08 PROCEDURE — 99233 SBSQ HOSP IP/OBS HIGH 50: CPT | Performed by: FAMILY MEDICINE

## 2017-09-08 RX ORDER — SACCHAROMYCES BOULARDII 250 MG
250 CAPSULE ORAL 2 TIMES DAILY
Status: DISCONTINUED | OUTPATIENT
Start: 2017-09-08 | End: 2017-09-09

## 2017-09-08 RX ADMIN — BUDESONIDE 0.5 MG: 0.5 INHALANT RESPIRATORY (INHALATION) at 20:02

## 2017-09-08 RX ADMIN — INSULIN LISPRO 6 UNITS: 100 INJECTION, SOLUTION INTRAVENOUS; SUBCUTANEOUS at 21:32

## 2017-09-08 RX ADMIN — BACLOFEN 10 MG: 10 TABLET ORAL at 21:32

## 2017-09-08 RX ADMIN — OXYCODONE HYDROCHLORIDE AND ACETAMINOPHEN 500 MG: 500 TABLET ORAL at 09:32

## 2017-09-08 RX ADMIN — AMLODIPINE BESYLATE 10 MG: 10 TABLET ORAL at 21:31

## 2017-09-08 RX ADMIN — IPRATROPIUM BROMIDE AND ALBUTEROL SULFATE 3 ML: .5; 3 SOLUTION RESPIRATORY (INHALATION) at 16:03

## 2017-09-08 RX ADMIN — INSULIN LISPRO 7 UNITS: 100 INJECTION, SOLUTION INTRAVENOUS; SUBCUTANEOUS at 17:51

## 2017-09-08 RX ADMIN — BACLOFEN 10 MG: 10 TABLET ORAL at 05:18

## 2017-09-08 RX ADMIN — TAZOBACTAM SODIUM AND PIPERACILLIN SODIUM 3.38 G: 375; 3 INJECTION, SOLUTION INTRAVENOUS at 15:33

## 2017-09-08 RX ADMIN — INSULIN DETEMIR 20 UNITS: 100 INJECTION, SOLUTION SUBCUTANEOUS at 09:39

## 2017-09-08 RX ADMIN — METOPROLOL TARTRATE 50 MG: 50 TABLET, FILM COATED ORAL at 09:32

## 2017-09-08 RX ADMIN — IPRATROPIUM BROMIDE AND ALBUTEROL SULFATE 3 ML: .5; 3 SOLUTION RESPIRATORY (INHALATION) at 03:23

## 2017-09-08 RX ADMIN — IPRATROPIUM BROMIDE AND ALBUTEROL SULFATE 3 ML: .5; 3 SOLUTION RESPIRATORY (INHALATION) at 23:45

## 2017-09-08 RX ADMIN — ASPIRIN 81 MG 81 MG: 81 TABLET ORAL at 09:32

## 2017-09-08 RX ADMIN — IPRATROPIUM BROMIDE AND ALBUTEROL SULFATE 3 ML: .5; 3 SOLUTION RESPIRATORY (INHALATION) at 20:02

## 2017-09-08 RX ADMIN — METHYLPREDNISOLONE SODIUM SUCCINATE 60 MG: 125 INJECTION, POWDER, FOR SOLUTION INTRAMUSCULAR; INTRAVENOUS at 17:51

## 2017-09-08 RX ADMIN — BACLOFEN 10 MG: 10 TABLET ORAL at 13:24

## 2017-09-08 RX ADMIN — LEVOTHYROXINE SODIUM 200 MCG: 100 TABLET ORAL at 05:18

## 2017-09-08 RX ADMIN — TAZOBACTAM SODIUM AND PIPERACILLIN SODIUM 3.38 G: 375; 3 INJECTION, SOLUTION INTRAVENOUS at 05:18

## 2017-09-08 RX ADMIN — IPRATROPIUM BROMIDE AND ALBUTEROL SULFATE 3 ML: .5; 3 SOLUTION RESPIRATORY (INHALATION) at 12:52

## 2017-09-08 RX ADMIN — METHYLPREDNISOLONE SODIUM SUCCINATE 60 MG: 125 INJECTION, POWDER, FOR SOLUTION INTRAMUSCULAR; INTRAVENOUS at 09:34

## 2017-09-08 RX ADMIN — INSULIN DETEMIR 20 UNITS: 100 INJECTION, SOLUTION SUBCUTANEOUS at 21:32

## 2017-09-08 RX ADMIN — IPRATROPIUM BROMIDE AND ALBUTEROL SULFATE 3 ML: .5; 3 SOLUTION RESPIRATORY (INHALATION) at 08:34

## 2017-09-08 RX ADMIN — IPRATROPIUM BROMIDE AND ALBUTEROL SULFATE 3 ML: .5; 3 SOLUTION RESPIRATORY (INHALATION) at 00:49

## 2017-09-08 RX ADMIN — ATORVASTATIN CALCIUM 10 MG: 10 TABLET, FILM COATED ORAL at 21:31

## 2017-09-08 RX ADMIN — GABAPENTIN 900 MG: 300 CAPSULE ORAL at 21:31

## 2017-09-08 RX ADMIN — METHYLPREDNISOLONE SODIUM SUCCINATE 60 MG: 125 INJECTION, POWDER, FOR SOLUTION INTRAMUSCULAR; INTRAVENOUS at 02:28

## 2017-09-08 RX ADMIN — BUDESONIDE 0.5 MG: 0.5 INHALANT RESPIRATORY (INHALATION) at 08:34

## 2017-09-08 RX ADMIN — Medication 325 MG: at 09:32

## 2017-09-08 RX ADMIN — PANTOPRAZOLE SODIUM 40 MG: 40 TABLET, DELAYED RELEASE ORAL at 05:18

## 2017-09-08 RX ADMIN — TAZOBACTAM SODIUM AND PIPERACILLIN SODIUM 3.38 G: 375; 3 INJECTION, SOLUTION INTRAVENOUS at 09:33

## 2017-09-08 RX ADMIN — METOPROLOL TARTRATE 50 MG: 50 TABLET, FILM COATED ORAL at 21:32

## 2017-09-08 RX ADMIN — INSULIN LISPRO 6 UNITS: 100 INJECTION, SOLUTION INTRAVENOUS; SUBCUTANEOUS at 09:32

## 2017-09-08 RX ADMIN — CLOPIDOGREL BISULFATE 75 MG: 75 TABLET ORAL at 09:32

## 2017-09-08 RX ADMIN — TAZOBACTAM SODIUM AND PIPERACILLIN SODIUM 3.38 G: 375; 3 INJECTION, SOLUTION INTRAVENOUS at 21:31

## 2017-09-08 RX ADMIN — INSULIN LISPRO 7 UNITS: 100 INJECTION, SOLUTION INTRAVENOUS; SUBCUTANEOUS at 11:50

## 2017-09-08 RX ADMIN — Medication 250 MG: at 17:51

## 2017-09-08 RX ADMIN — Medication 250 MG: at 09:32

## 2017-09-08 NOTE — THERAPY TREATMENT NOTE
Acute Care - Physical Therapy Treatment Note  [facility]     Patient Name: Jennifer Oliveira  : 1940  MRN: [id number]  Today's Date: 2017  Onset of Illness/Injury or Date of Surgery Date: 17  Date of Referral to PT: 17  Referring Physician: ARSLAN Gonzales    Admit Date: 2017    Visit Dx:    ICD-10-CM ICD-9-CM   1. Pneumonia of right lower lobe due to infectious organism J18.9 483.8   2. Impaired mobility and ADLs Z74.09 799.89   3. Impaired functional mobility, balance, gait, and endurance Z74.09 V49.89     Patient Active Problem List   Diagnosis   • Acute hypoxemic respiratory failure   • PAD (peripheral artery disease)   • Normochromic normocytic anemia   • Chronic diastolic heart failure   • Diabetes type 2, uncontrolled   • Lower extremity cellulitis   • Hypothyroid   • HTN (hypertension)   • Metabolic encephalopathy   • TIA (transient ischemic attack)   • Acute cystitis without hematuria   • Chronic kidney disease (CKD), stage III (moderate)   • GERD (gastroesophageal reflux disease) with hx of gastritis    • h/o stroke with right hemiplegia   • Hypertension   • Disease of thyroid gland   • Diastolic heart failure   • Type 2 diabetes mellitus   • CKD (chronic kidney disease), stage III   • Symptomatic anemia   • GI bleed   • KRIS (acute kidney injury)   • Nausea and vomiting   • Right lower lobe pneumonia   • Weakness   • Pneumonia   • Acute respiratory failure with hypoxia   • Hyperkalemia               Adult Rehabilitation Note       17 1540 17 1402       Rehab Assessment/Intervention    Discipline physical therapist  -SJ occupational therapist  -CL     Document Type  therapy note (daily note)  -CL     Subjective Information  agree to therapy;complains of;weakness;fatigue  -CL     Patient Effort, Rehab Treatment adequate  -SJ poor  -CL     Symptoms Noted During/After Treatment none  -SJ fatigue  -CL     Symptoms Noted Comment  Pt lethargic/fatigued, RN aware.   -CL      Precautions/Limitations fall precautions;oxygen therapy device and L/min   R-sided weakness  -SJ fall precautions;oxygen therapy device and L/min;other (see comments)   h/o CVA w/ residual PHP, Hi-Randolph NC  -CL     Specific Treatment Considerations  Pt declined EOB or OOB activity, agreed to in-bed ther-ex.   -CL     Recorded by [SJ] Winter Nur PT [CL] Trinidad Nguyen OT     Vital Signs    Pre Systolic BP Rehab 144  -  -CL     Pre Treatment Diastolic BP 69  -SJ 60  -CL     Pretreatment Heart Rate (beats/min) 71  -SJ 78  -CL     Posttreatment Heart Rate (beats/min)  78  -CL     Pre SpO2 (%) 91  -SJ 92  -CL     O2 Delivery Pre Treatment supplemental O2  -SJ supplemental O2   Hi-Randolph  -CL     Post SpO2 (%)  93  -CL     O2 Delivery Post Treatment  supplemental O2  -CL     Pre Patient Position  Supine  -CL     Intra Patient Position  Supine  -CL     Post Patient Position  Supine  -CL     Recorded by [SJ] Winter Nur, PT [CL] Trinidad Nguyen OT     Pain Assessment    Pain Assessment No/denies pain  -SJ      Recorded by [SJ] Winter Nur PT      Cognitive Assessment/Intervention    Current Cognitive/Communication Assessment functional  -SJ functional  -CL     Orientation Status oriented to;person;situation  -SJ oriented to;person  -CL     Follows Commands/Answers Questions 75% of the time;able to follow single-step instructions;needs cueing;needs increased time  -SJ 50% of the time;75% of the time;needs cueing;needs increased time;needs repetition  -CL     Personal Safety mild impairment;decreased awareness, need for assist  -SJ mild impairment;decreased awareness, need for assist;decreased awareness, need for safety  -CL     Personal Safety Interventions fall prevention program maintained;gait belt;muscle strengthening facilitated;nonskid shoes/slippers when out of bed  -SJ fall prevention program maintained;supervised activity;nonskid shoes/slippers when out of bed  -CL     Recorded by [SJ] Winter Nur, ELO  [CL] Trinidad Nguyen OT     Bed Mobility, Assessment/Treatment    Bed Mobility, Comment pt declined  - Pt declined.   -CL     Recorded by [SJ] Winter Nur PT [CL] Trinidad Nguyen OT     Transfer Assessment/Treatment    Transfer, Comment pt declined  -SJ Pt declined.   -CL     Recorded by [SJ] Winter Nur, PT [CL] Trinidad Nguyen OT     Therapy Exercises    Right Lower Extremity PROM:;sitting;ankle pumps/circles;calf stretch;hamstring stretch;heel slides;hip abduction/adduction;hip ER;hip IR;20 reps  -SJ      Left Lower Extremity AROM:;sitting;ankle pumps/circles;glut sets;heel slides;hip abduction/adduction;hip ER;hip flexion;hip IR;quad sets;SAQ;20 reps  -SJ      Right Upper Extremity  PROM:;10 reps;elbow flexion/extension;hand pumps;pronation/supination;shoulder extension/flexion;shoulder ER/IR   wrist F/E  -CL     Left Upper Extremity 10 reps;elbow flexion/extension;hand pumps;pronation/supination;shoulder extension/flexion;shoulder ER/IR;AAROM:;AROM:  -SJ 10 reps;elbow flexion/extension;hand pumps;pronation/supination;shoulder extension/flexion;shoulder ER/IR;AAROM:;AROM:   wrist F/E  -CL     Recorded by [SJ] Winter Nur, PT [CL] Trinidad Nguyen OT     Positioning and Restraints    Pre-Treatment Position in bed  -SJ in bed  -CL     Post Treatment Position bed  - bed  -CL     In Bed fowlers;call light within reach;encouraged to call for assist;with family/caregiver;with nsg;heels elevated  - notified nsg;fowlers;call light within reach;encouraged to call for assist;exit alarm on;RUE elevated;LUE elevated  -CL     Recorded by [SJ] Winter Nur PT [CL] Trinidad Nguyen OT       User Key  (r) = Recorded By, (t) = Taken By, (c) = Cosigned By    Initials Name Effective Dates     Winter Nur, PT 06/19/15 -     CL Trinidad Nguyen OT 06/08/16 -                 IP PT Goals       09/08/17 1604 09/06/17 1037       Bed Mobility PT LTG    Bed Mobility PT LTG, Date Established  09/06/17  -DM     Bed Mobility PT LTG,  Time to Achieve  1 wk  -DM     Bed Mobility PT LTG, Activity Type  all bed mobility  -DM     Bed Mobility PT LTG, [place] Level  moderate assist (50% patient effort);2 person assist required  -DM     Bed Mobility PT LTG, Outcome goal ongoing  -SJ      Transfer Training PT LTG    Transfer Training PT LTG, Date Established  09/06/17  -DM     Transfer Training PT LTG, Time to Achieve  1 wk  -DM     Transfer Training PT LTG, Activity Type  bed to chair /chair to bed;sit to stand/stand to sit  -DM     Transfer Training PT LTG, [place] Level  maximum assist (25% patient effort);2 person assist required  -DM     Transfer Training PT LTG, Assist Device  walker, homa  -DM     Transfer Training PT LTG, Outcome goal ongoing  -SJ      Gait Training PT LTG    Gait Training Goal PT LTG, Date Established  09/06/17  -DM     Gait Training Goal PT LTG, Time to Achieve  1 wk  -DM     Gait Training Goal PT LTG, [place] Level  maximum assist (25% patient effort);2 person assist required   stable VS; RLE brace  -DM     Gait Training Goal PT LTG, Assist Device  walker, homa  -DM     Gait Training Goal PT LTG, Distance to Achieve  3  -DM     Gait Training Goal PT LTG, Outcome goal ongoing  -SJ        User Key  (r) = Recorded By, (t) = Taken By, (c) = Cosigned By    Initials Name Provider Type    EMMA Nur, PT Physical Therapist    TETE Mcgrath, PT Physical Therapist          Physical Therapy Education     Title: PT OT SLP Therapies (Active)     Topic: Physical Therapy (Active)     Point: Mobility training (Active)    Learning Progress Summary    Learner Readiness Method Response Comment Documented by Status   Patient Acceptance E,D NR  EMMA 09/08/17 1605 LORRI Hirsch 09/06/17 1036 Active               Point: Home exercise program (Active)    Learning Progress Summary    Learner Readiness Method Response Comment Documented by Status   Patient Acceptance CHANDUD NR  EMMA 09/08/17 1605 LORRI Hirsch  NR  DM 09/06/17 1036 Active               Point: Body mechanics (Active)    Learning Progress Summary    Learner Readiness Method Response Comment Documented by Status   Patient Acceptance E,D NR   09/08/17 1605 Active    Eager E,D NR  DM 09/06/17 1036 Active               Point: Precautions (Active)    Learning Progress Summary    Learner Readiness Method Response Comment Documented by Status   Patient Acceptance E,D NR   09/08/17 1605 Active    Eager E,D NR  DM 09/06/17 1036 Active                      User Key     Initials Effective Dates Name Provider Type Discipline     06/19/15 -  Winter Nur, PT Physical Therapist PT     06/19/15 -  Lou Mcgrath, PT Physical Therapist PT                    PT Recommendation and Plan  Anticipated Discharge Disposition: inpatient rehabilitation facility (pul rehab)  PT Frequency: daily  Plan of Care Review  Plan Of Care Reviewed With: patient  Progress: progress toward functional goals is gradual  Outcome Summary/Follow up Plan: Pt requested bed therex only due to frequent diarrhea. Vitals stable. Progress mobility as mansoor.          Outcome Measures       09/08/17 1540 09/07/17 1402 09/06/17 0924    How much help from another person do you currently need...    Turning from your back to your side while in flat bed without using bedrails? 2  -SJ      Moving from lying on back to sitting on the side of a flat bed without bedrails? 2  -SJ      Moving to and from a bed to a chair (including a wheelchair)? 2  -SJ      Standing up from a chair using your arms (e.g., wheelchair, bedside chair)? 2  -SJ      Climbing 3-5 steps with a railing? 1  -SJ      To walk in hospital room? 1  -SJ      AM-PAC 6 Clicks Score 10  -SJ      How much help from another is currently needed...    Putting on and taking off regular lower body clothing?  1  -CL     Bathing (including washing, rinsing, and drying)  1  -CL     Toileting (which includes using toilet bed pan or urinal)  1  -CL      Putting on and taking off regular upper body clothing  2  -CL     Taking care of personal grooming (such as brushing teeth)  2  -CL     Eating meals  2  -CL     Score  9  -CL     Functional Assessment    Outcome Measure Options AM-PAC 6 Clicks Basic Mobility (PT)  -SJ AM-PAC 6 Clicks Daily Activity (OT)  -CL --  -CL      09/06/17 0841 09/06/17 0835       How much help from another person do you currently need...    Turning from your back to your side while in flat bed without using bedrails?  2  -DM     Moving from lying on back to sitting on the side of a flat bed without bedrails?  2  -DM     Moving to and from a bed to a chair (including a wheelchair)?  2  -DM     Standing up from a chair using your arms (e.g., wheelchair, bedside chair)?  2  -DM     Climbing 3-5 steps with a railing?  1  -DM     To walk in hospital room?  1  -DM     AM-PAC 6 Clicks Score  10  -DM     How much help from another is currently needed...    Putting on and taking off regular lower body clothing? 1  -CL      Bathing (including washing, rinsing, and drying) 1  -CL      Toileting (which includes using toilet bed pan or urinal) 1  -CL      Putting on and taking off regular upper body clothing 2  -CL      Taking care of personal grooming (such as brushing teeth) 2  -CL      Eating meals 3  -CL      Score 10  -CL      Functional Assessment    Outcome Measure Options AM-PAC 6 Clicks Daily Activity (OT)  -CL AM-PAC 6 Clicks Basic Mobility (PT)  -DM       User Key  (r) = Recorded By, (t) = Taken By, (c) = Cosigned By    Initials Name Provider Type    EMMA Nur, PT Physical Therapist    DM Lou Mcgrath, PT Physical Therapist    CL Trinidad Nguyen, OT Occupational Therapist           Time Calculation:         PT Charges       09/08/17 1605          Time Calculation    Start Time 1540  -      PT Received On 09/08/17  -      PT Goal Re-Cert Due Date 09/16/17  -      Time Calculation- PT    Total Timed Code Minutes- PT 15 minute(s)   -EMMA        User Key  (r) = Recorded By, (t) = Taken By, (c) = Cosigned By    Initials Name Provider Type     Winter Nur PT Physical Therapist          Therapy Charges for Today     Code Description Service Date Service Provider Modifiers Qty    01577239607 HC PT THER PROC EA 15 MIN 9/8/2017 Winter Nur PT GP 1          PT G-Codes  Outcome Measure Options: AM-PAC 6 Clicks Basic Mobility (PT)    Winter Nur PT  9/8/2017

## 2017-09-08 NOTE — PLAN OF CARE
Problem: Patient Care Overview (Adult)  Goal: Plan of Care Review  Outcome: Ongoing (interventions implemented as appropriate)    09/08/17 0447   Coping/Psychosocial Response Interventions   Plan Of Care Reviewed With patient   Outcome Evaluation   Outcome Summary/Follow up Plan Pt's vitals stable. Pt had 2 loose stools - order for Florastor recieved. Pt had not voided by 02:00 - bladder scan showed 644ml - order for straight cath received - 900ml out - pt tolerated . Pt remains on high flow nasal canula - no respiratory distress noted. Pt had no other complaints or events through the night.    Patient Care Overview   Progress improving       Goal: Discharge Needs Assessment  Outcome: Ongoing (interventions implemented as appropriate)    09/05/17 1533   Discharge Needs Assessment   Concerns To Be Addressed discharge planning concerns   Discharge Disposition still a patient         Problem: Pneumonia (Adult)  Goal: Signs and Symptoms of Listed Potential Problems Will be Absent or Manageable (Pneumonia)  Outcome: Ongoing (interventions implemented as appropriate)    09/08/17 0447   Pneumonia   Problems Assessed (Pneumonia) all   Problems Present (Pneumonia) fluid/electrolyte imbalance         Problem: Infection, Risk/Actual (Adult)  Goal: Identify Related Risk Factors and Signs and Symptoms  Outcome: Ongoing (interventions implemented as appropriate)    09/07/17 0543   Infection, Risk/Actual   Infection, Risk/Actual: Related Risk Factors chronic illness/condition;exposure to microbes   Signs and Symptoms (Infection, Risk/Actual) body temperature changes;diaphoresis;edema;lab value changes;malaise;respiratory rate increase;weakness;cultures positive       Goal: Infection Prevention/Resolution  Outcome: Ongoing (interventions implemented as appropriate)    09/08/17 0447   Infection, Risk/Actual (Adult)   Infection Prevention/Resolution making progress toward outcome         Problem: Fall Risk (Adult)  Goal: Identify  Related Risk Factors and Signs and Symptoms  Outcome: Ongoing (interventions implemented as appropriate)    09/07/17 0543   Fall Risk   Fall Risk: Related Risk Factors age-related changes;fatigue/slow reaction;gait/mobility problems;history of falls;polypharmacy;sensory deficits   Fall Risk: Signs and Symptoms presence of risk factors       Goal: Absence of Falls  Outcome: Ongoing (interventions implemented as appropriate)    09/08/17 0447   Fall Risk (Adult)   Absence of Falls achieves outcome

## 2017-09-08 NOTE — PLAN OF CARE
Problem: Patient Care Overview (Adult)  Goal: Plan of Care Review  Outcome: Ongoing (interventions implemented as appropriate)    09/08/17 1604   Coping/Psychosocial Response Interventions   Plan Of Care Reviewed With patient   Outcome Evaluation   Outcome Summary/Follow up Plan Pt requested bed therex only due to frequent diarrhea. Vitals stable. Progress mobility as mansoor.   Patient Care Overview   Progress progress toward functional goals is gradual         Problem: Inpatient Physical Therapy  Goal: Bed Mobility Goal LTG- PT  Outcome: Ongoing (interventions implemented as appropriate)    09/06/17 1037 09/08/17 1604   Bed Mobility PT LTG   Bed Mobility PT LTG, Date Established 09/06/17 --    Bed Mobility PT LTG, Time to Achieve 1 wk --    Bed Mobility PT LTG, Activity Type all bed mobility --    Bed Mobility PT LTG, [place] Level moderate assist (50% patient effort);2 person assist required --    Bed Mobility PT LTG, Outcome --  goal ongoing       Goal: Transfer Training Goal 1 LTG- PT  Outcome: Ongoing (interventions implemented as appropriate)    09/06/17 1037 09/08/17 1604   Transfer Training PT LTG   Transfer Training PT LTG, Date Established 09/06/17 --    Transfer Training PT LTG, Time to Achieve 1 wk --    Transfer Training PT LTG, Activity Type bed to chair /chair to bed;sit to stand/stand to sit --    Transfer Training PT LTG, [place] Level maximum assist (25% patient effort);2 person assist required --    Transfer Training PT LTG, Assist Device walker, homa --    Transfer Training PT LTG, Outcome --  goal ongoing       Goal: Gait Training Goal LTG- PT  Outcome: Ongoing (interventions implemented as appropriate)    09/06/17 1037 09/08/17 1604   Gait Training PT LTG   Gait Training Goal PT LTG, Date Established 09/06/17 --    Gait Training Goal PT LTG, Time to Achieve 1 wk --    Gait Training Goal PT LTG, [place] Level maximum assist (25% patient effort);2 person assist required  (stable VS;  RLE brace) --    Gait Training Goal PT LTG, Assist Device walker, homa --    Gait Training Goal PT LTG, Distance to Achieve 3 --    Gait Training Goal PT LTG, Outcome --  goal ongoing

## 2017-09-08 NOTE — PROGRESS NOTES
"    [facility] Medicine Services  PROGRESS NOTE      Date of Admission: 2017  Length of Stay: 3  Primary Care Physician: Samuel Buck MD    Patient Name: Jennifer Oliveira  : 1940  MRN: [id number]    Subjective     CC:  Pneumonia, weakness    History of Present Illness :   Feels improved today, sitting up in bed visiting with family.  Less SOA. No new complaints.     Review of Systems   Respiratory: Positive for shortness of breath.         Otherwise complete ROS is negative except as mentioned in the HPI.      Objective     Vital Signs: /58 (BP Location: Left arm, Patient Position: Lying)  Pulse 73  Temp 98.7 °F (37.1 °C) (Oral)   Resp 18  Ht 63\" (160 cm)  Wt 170 lb (77.1 kg)  SpO2 96%  BMI 30.11 kg/m2     Physical Exam :  Constitutional: no acute distress, awake, alert, ill appearing   HENT: NCAT, mucous membranes dry  Respiratory: mid right axillary area rales decreasing and overall moving good air, decreased bases, respiratory effort poor.  On Hi-Flow O2.  Cardiovascular: RRR, no murmur  Gastrointestinal: Positive bowel sounds, soft, nontender, nondistended  Musculoskeletal: No bilateral ankle edema.  No Jane's sign or TTP of BLE.  Psychiatric: oriented x 3, appropriate affect, cooperative  Neurologic: Strength symmetric in all extremities, CN grossly in tact to confrontation, speech is clear  Derm: no rashes        Results Reviewed:  I have personally reviewed current lab, radiology, and data and agree.      Results from last 7 days  Lab Units 17   WBC 10*3/mm3 5.04 6.14 6.27   HEMOGLOBIN g/dL 8.9* 8.0* 8.3*   HEMATOCRIT % 27.9* 25.7* 26.8*   PLATELETS 10*3/mm3 172 174 171       Results from last 7 days  Lab Units 17  0617  0517  2117 17  1146   SODIUM mmol/L 135 136 136  --  140   POTASSIUM mmol/L 4.1 4.6 4.2  --  4.4   CHLORIDE mmol/L 108 107 106  --  108   CO2 mmol/L 19.0* " 20.0 22.0  --  26.0   BUN mg/dL 36* 29* 32*  --  43*   CREATININE mg/dL 2.20* 2.00* 2.00*  --  2.20*   GLUCOSE mg/dL 324* 146* 64*  --  57*   CALCIUM mg/dL 8.5* 7.8* 7.9*  --  8.7   ALT (SGPT) U/L  --   --  19  --  28   AST (SGOT) U/L  --   --  28  --  51*   TROPONIN I ng/mL  --   --   --  0.073* 0.085*     BNP   Date Value Ref Range Status   09/07/2017 306.0 (H) 0.0 - 100.0 pg/mL Final     No results found for: PHART  Blood Culture   Date Value Ref Range Status   09/05/2017 No growth at 24 hours  Preliminary   09/05/2017 No growth at 24 hours  Preliminary       Imaging Results (last 24 hours)     ** No results found for the last 24 hours. **            I have reviewed the medications.      Assessment / Plan     Assessment & Plan :  Hospital Problem List     * (Principal)Right lower lobe pneumonia    Normochromic normocytic anemia    Overview Addendum 9/5/2017  3:01 PM by ARSLAN Montalvo     - Baseline Hgb 9s  - Hx of C-scope with polyps 2016, EGD 2016 with gastritis   - EGD 5/14/17 and 5/26/17 with symptomatic anemia         Chronic diastolic heart failure    Overview Signed 4/19/2017  1:21 PM by Samuel Dela Cruz MD     - Last echo 11/2016 LVEF 70%, normal VHD         HTN (hypertension)    Chronic kidney disease (CKD), stage III (moderate)    Overview Signed 4/19/2017  1:14 PM by Samuel Dela Cruz MD     - baseline Cr 1.5-1.6         h/o stroke with right hemiplegia    Overview Signed 5/14/2017 10:24 PM by Mariam Murray, RN EXTENDER     residual chronic right hemiplegia         Disease of thyroid gland    Type 2 diabetes mellitus    KRIS (acute kidney injury)    Nausea and vomiting    Weakness    Acute respiratory failure with hypoxia               Brief Hospital Course to date:  Jennifer Oliveira is a 77 y.o. female with PMH significant for previous heavy smoker, CVA/ TIA with residual right sided hemiparesis, PAD s/p fem- pop 2016, HTN, T2DM, hypothyroid, recent GIB requiring multiple transfusions that  was seen 9/4/17 at [facility] ED and diagnosed with CAP, started on Levaquin but unable to  prescription due to pharmacy being closed. Symptoms progressed to high grade fever, worsening cough and severe weakness (unable to stand) N/V within 24 hrs.  Also had to increase baseline home O2 from 2L to 4L.  Presented to [facility] ED found to have RLL pna with fever and KRIS:      Plan:    - on Levaquin and Zosyn for both CAP and aspiration coverage given RLL location.  Last fever 9/7/17 ~0400 (had been on abx ~36hrs).  If has another fever or if not improving starting from this point forward consider CT chest (creatinine likely willlimit ability to use contrast) and ID vs Pulm to see for additional recc's.  However today is improved compared to prior so may be turning the corner.  - Repeat CXR 9/7/17 with new pulm vasc congestion which likely accounted for worsened hypoxia/SOA overnight.  IVF's stopped, given IV Lasix x1. ECHO in April with normal EF, BNP = 306 currently. Repeat CXR in AM.  - currently on Hi-Flow O2, wean as tolerates  - added solumedrol 9/7/17 due to worsened resp status and hypoxia,   - continue duo-nebs, added Pulmicort nebs 9/7/17 (45 pack yr smoking hx, quit in 2009)  - KRIS improved after rehydration (IVF's now stopped due to pulm vasc congestion) and better PO intake with antiemetics. S/p Lasix 9/7/17 with slight bump in creatinine.  Continue to monitor.  HOLDING home daily Lasix dose and giving prn only for now but consider adding back based on tomorrow AM CXR if continued pulm vasc congestion and resp status not improving with steroids/abx.  Baseline creat ~1.4  - Basal Levemir at lower doses than home dose for now due to poor PO intake, SSI  - PT/OT seeing given extremely weak at presentation           Disposition: I expect the patient to be discharged home with  PT vs may need skillled rehab, currently still actively ill and on Hi-flow O2 likely not medically ready until early next  week.    Malia Lorenzo MD  09/08/17  8:40 AM

## 2017-09-08 NOTE — PLAN OF CARE
Problem: Patient Care Overview (Adult)  Goal: Plan of Care Review    09/08/17 1513   Coping/Psychosocial Response Interventions   Plan Of Care Reviewed With patient;family   Outcome Evaluation   Outcome Summary/Follow up Plan Pt vitals stable. Pt remains on high flow nasal canula - no respiratory distress noted. Pt is having nose irritaion from nasual canula with some bloody tissues noted. Pt nor family has any complaints right now.   Patient Care Overview   Progress progress toward functional goals is gradual       Goal: Discharge Needs Assessment    09/05/17 1533 09/06/17 0835 09/06/17 0841   Discharge Needs Assessment   Concerns To Be Addressed --  --  --    Readmission Within The Last 30 Days --  --  --    Community Agency Name(S) Has previously been at [facility], [facility], Has used [facility] in the past. --  --    Current Discharge Risk --  --  --    Discharge Disposition --  --  --    Current Health   Anticipated Changes Related to Illness --  --  --    Living Environment   Transportation Available --  car;family or friend will provide --    Self-Care   Equipment Currently Used at Home --  --  bath bench;walker, homa;wheelchair;ramp     09/08/17 1513   Discharge Needs Assessment   Concerns To Be Addressed adjustment to diagnosis/illness concerns;basic needs concerns;discharge planning concerns;home safety concerns   Readmission Within The Last 30 Days no previous admission in last 30 days   Community Agency Name(S) --    Current Discharge Risk dependent with mobility/activities of daily living;physical impairment   Discharge Disposition still a patient   Current Health   Anticipated Changes Related to Illness inability to care for self   Living Environment        09/05/17 1533 09/06/17 0835 09/06/17 0841   Discharge Needs Assessment   Concerns To Be Addressed --  --  --    Readmission Within The Last 30 Days --  --  --    Community Agency Name(S) [facility], Has used [facility] in the past. --  --    Current Discharge Risk --  --  --    Discharge Disposition --  --  --    Current Health   Anticipated Changes Related to Illness --  --  --    Living Environment   Transportation Available --  car;family or friend will provide --    Self-Care   Equipment Currently Used at Home --  --  bath bench;walker, homa;wheelchair;ramp     09/08/17 1513   Discharge Needs Assessment   Concerns To Be Addressed adjustment to diagnosis/illness concerns;basic needs concerns;discharge planning concerns;home safety concerns   Readmission Within The Last 30 Days no previous admission in last 30 days   Community Agency Name(S) --    Current Discharge Risk dependent with mobility/activities of daily living;physical impairment;chronically ill   Discharge Disposition still a patient   Current Health   Anticipated Changes Related to Illness inability to care for self   Living Environment   Transportation Available --    Self-Care   Equipment Currently Used at Home --    Transportation Available --    Self-Care   Equipment Currently Used at Home --          Problem: Pneumonia (Adult)  Goal: Signs and Symptoms of Listed Potential Problems Will be Absent or Manageable (Pneumonia)    09/08/17 1513   Pneumonia   Problems Assessed (Pneumonia) all   Problems Present (Pneumonia) fluid/electrolyte imbalance         Problem: Infection, Risk/Actual (Adult)  Goal: Identify Related Risk Factors and Signs and Symptoms    09/08/17 1513   Infection, Risk/Actual   Infection, Risk/Actual: Related Risk Factors chronic illness/condition;exposure to microbes;tissue perfusion altered;sleep disturbance   Signs and Symptoms (Infection, Risk/Actual) blood glucose changes;edema;weakness       Goal: Infection Prevention/Resolution    09/08/17 1513   Infection, Risk/Actual (Adult)   Infection Prevention/Resolution making progress toward outcome         Problem: Fall Risk (Adult)  Goal: Identify Related Risk Factors and Signs and  Symptoms    09/07/17 0543   Fall Risk   Fall Risk: Related Risk Factors age-related changes;fatigue/slow reaction;gait/mobility problems;history of falls;polypharmacy;sensory deficits   Fall Risk: Signs and Symptoms presence of risk factors       Goal: Absence of Falls    09/08/17 1513   Fall Risk (Adult)   Absence of Falls achieves outcome

## 2017-09-08 NOTE — PROGRESS NOTES
Continued Stay Note  [facility]     Patient Name: Jennifer Oliveira  MRN: [id number]  Today's Date: 9/8/2017    Admit Date: 9/5/2017          Discharge Plan       09/08/17 1512    Case Management/Social Work Plan    Plan Update    Patient/Family In Agreement With Plan yes    Additional Comments SW spoke to pt at bedside and her family.  Pt stated she would like to speak to her daughter who is her POA this weekend but would like a referral sent to [facility]; SW called this referral to Xiomara with [facility].  Pt stated she wants to talk to her daughter and may want a referral sent to [facility] [place] also because her daughter lives in [place] and this would be closer to her. SW explained to pt  that someone would follow-up with her Monday to see if she wanted an additional referral sent to [facility] in [place].  CM continuing to follow for d/c plan.              Discharge Codes     None        Expected Discharge Date and Time     Expected Discharge Date Expected Discharge Time    Sep 8, 2017             SIMONE Izaguirre

## 2017-09-09 ENCOUNTER — APPOINTMENT (OUTPATIENT)
Dept: GENERAL RADIOLOGY | Facility: HOSPITAL | Age: 77
End: 2017-09-09

## 2017-09-09 LAB
ANION GAP SERPL CALCULATED.3IONS-SCNC: 5 MMOL/L (ref 3–11)
BUN BLD-MCNC: 47 MG/DL (ref 9–23)
BUN/CREAT SERPL: 22.4 (ref 7–25)
CALCIUM SPEC-SCNC: 8.8 MG/DL (ref 8.7–10.4)
CHLORIDE SERPL-SCNC: 109 MMOL/L (ref 99–109)
CO2 SERPL-SCNC: 22 MMOL/L (ref 20–31)
CREAT BLD-MCNC: 2.1 MG/DL (ref 0.6–1.3)
GFR SERPL CREATININE-BSD FRML MDRD: 23 ML/MIN/1.73
GLUCOSE BLD-MCNC: 228 MG/DL (ref 70–100)
GLUCOSE BLDC GLUCOMTR-MCNC: 249 MG/DL (ref 70–130)
GLUCOSE BLDC GLUCOMTR-MCNC: 299 MG/DL (ref 70–130)
GLUCOSE BLDC GLUCOMTR-MCNC: 362 MG/DL (ref 70–130)
GLUCOSE BLDC GLUCOMTR-MCNC: 391 MG/DL (ref 70–130)
POTASSIUM BLD-SCNC: 3.9 MMOL/L (ref 3.5–5.5)
SODIUM BLD-SCNC: 136 MMOL/L (ref 132–146)

## 2017-09-09 PROCEDURE — 94799 UNLISTED PULMONARY SVC/PX: CPT

## 2017-09-09 PROCEDURE — 82962 GLUCOSE BLOOD TEST: CPT

## 2017-09-09 PROCEDURE — 25010000002 METHYLPREDNISOLONE PER 125 MG: Performed by: FAMILY MEDICINE

## 2017-09-09 PROCEDURE — 71010 HC CHEST PA OR AP: CPT

## 2017-09-09 PROCEDURE — 63710000001 INSULIN DETEMIR PER 5 UNITS: Performed by: FAMILY MEDICINE

## 2017-09-09 PROCEDURE — 99233 SBSQ HOSP IP/OBS HIGH 50: CPT | Performed by: HOSPITALIST

## 2017-09-09 PROCEDURE — 80048 BASIC METABOLIC PNL TOTAL CA: CPT | Performed by: FAMILY MEDICINE

## 2017-09-09 PROCEDURE — 87070 CULTURE OTHR SPECIMN AEROBIC: CPT | Performed by: HOSPITALIST

## 2017-09-09 PROCEDURE — 63710000001 PREDNISONE PER 1 MG: Performed by: HOSPITALIST

## 2017-09-09 PROCEDURE — 94760 N-INVAS EAR/PLS OXIMETRY 1: CPT

## 2017-09-09 PROCEDURE — 87205 SMEAR GRAM STAIN: CPT | Performed by: HOSPITALIST

## 2017-09-09 PROCEDURE — 63710000001 INSULIN DETEMIR PER 5 UNITS: Performed by: HOSPITALIST

## 2017-09-09 PROCEDURE — 25010000002 PIPERACILLIN SOD-TAZOBACTAM PER 1 G

## 2017-09-09 PROCEDURE — 25010000002 LEVOFLOXACIN PER 250 MG: Performed by: NURSE PRACTITIONER

## 2017-09-09 PROCEDURE — 63710000001 PREDNISONE PER 5 MG: Performed by: HOSPITALIST

## 2017-09-09 RX ORDER — FUROSEMIDE 40 MG/1
40 TABLET ORAL DAILY
Status: DISCONTINUED | OUTPATIENT
Start: 2017-09-09 | End: 2017-09-10

## 2017-09-09 RX ORDER — SACCHAROMYCES BOULARDII 250 MG
250 CAPSULE ORAL 2 TIMES DAILY
Status: DISCONTINUED | OUTPATIENT
Start: 2017-09-09 | End: 2017-09-24 | Stop reason: HOSPADM

## 2017-09-09 RX ORDER — GUAIFENESIN 600 MG/1
600 TABLET, EXTENDED RELEASE ORAL EVERY 12 HOURS SCHEDULED
Status: DISCONTINUED | OUTPATIENT
Start: 2017-09-09 | End: 2017-09-24 | Stop reason: HOSPADM

## 2017-09-09 RX ADMIN — TAZOBACTAM SODIUM AND PIPERACILLIN SODIUM 3.38 G: 375; 3 INJECTION, SOLUTION INTRAVENOUS at 03:05

## 2017-09-09 RX ADMIN — METOPROLOL TARTRATE 50 MG: 50 TABLET, FILM COATED ORAL at 21:46

## 2017-09-09 RX ADMIN — Medication 250 MG: at 17:17

## 2017-09-09 RX ADMIN — INSULIN DETEMIR 25 UNITS: 100 INJECTION, SOLUTION SUBCUTANEOUS at 21:45

## 2017-09-09 RX ADMIN — INSULIN LISPRO 3 UNITS: 100 INJECTION, SOLUTION INTRAVENOUS; SUBCUTANEOUS at 08:49

## 2017-09-09 RX ADMIN — PANTOPRAZOLE SODIUM 40 MG: 40 TABLET, DELAYED RELEASE ORAL at 06:28

## 2017-09-09 RX ADMIN — INSULIN LISPRO 4 UNITS: 100 INJECTION, SOLUTION INTRAVENOUS; SUBCUTANEOUS at 12:05

## 2017-09-09 RX ADMIN — GUAIFENESIN 600 MG: 600 TABLET, EXTENDED RELEASE ORAL at 21:47

## 2017-09-09 RX ADMIN — TAZOBACTAM SODIUM AND PIPERACILLIN SODIUM 3.38 G: 375; 3 INJECTION, SOLUTION INTRAVENOUS at 21:48

## 2017-09-09 RX ADMIN — IPRATROPIUM BROMIDE AND ALBUTEROL SULFATE 3 ML: .5; 3 SOLUTION RESPIRATORY (INHALATION) at 12:03

## 2017-09-09 RX ADMIN — Medication 325 MG: at 08:50

## 2017-09-09 RX ADMIN — INSULIN DETEMIR 20 UNITS: 100 INJECTION, SOLUTION SUBCUTANEOUS at 08:49

## 2017-09-09 RX ADMIN — IPRATROPIUM BROMIDE AND ALBUTEROL SULFATE 3 ML: .5; 3 SOLUTION RESPIRATORY (INHALATION) at 09:39

## 2017-09-09 RX ADMIN — METOPROLOL TARTRATE 50 MG: 50 TABLET, FILM COATED ORAL at 08:50

## 2017-09-09 RX ADMIN — METHYLPREDNISOLONE SODIUM SUCCINATE 60 MG: 125 INJECTION, POWDER, FOR SOLUTION INTRAMUSCULAR; INTRAVENOUS at 09:03

## 2017-09-09 RX ADMIN — IPRATROPIUM BROMIDE AND ALBUTEROL SULFATE 3 ML: .5; 3 SOLUTION RESPIRATORY (INHALATION) at 02:38

## 2017-09-09 RX ADMIN — OXYCODONE HYDROCHLORIDE AND ACETAMINOPHEN 500 MG: 500 TABLET ORAL at 08:50

## 2017-09-09 RX ADMIN — METHYLPREDNISOLONE SODIUM SUCCINATE 60 MG: 125 INJECTION, POWDER, FOR SOLUTION INTRAMUSCULAR; INTRAVENOUS at 03:05

## 2017-09-09 RX ADMIN — ACETAMINOPHEN 325 MG: 325 TABLET, FILM COATED ORAL at 15:49

## 2017-09-09 RX ADMIN — BUDESONIDE 0.5 MG: 0.5 INHALANT RESPIRATORY (INHALATION) at 09:38

## 2017-09-09 RX ADMIN — TAZOBACTAM SODIUM AND PIPERACILLIN SODIUM 3.38 G: 375; 3 INJECTION, SOLUTION INTRAVENOUS at 09:03

## 2017-09-09 RX ADMIN — INSULIN LISPRO 6 UNITS: 100 INJECTION, SOLUTION INTRAVENOUS; SUBCUTANEOUS at 17:17

## 2017-09-09 RX ADMIN — BACLOFEN 10 MG: 10 TABLET ORAL at 13:47

## 2017-09-09 RX ADMIN — LEVOFLOXACIN 750 MG: 750 INJECTION, SOLUTION INTRAVENOUS at 12:10

## 2017-09-09 RX ADMIN — GABAPENTIN 900 MG: 300 CAPSULE ORAL at 21:46

## 2017-09-09 RX ADMIN — ATORVASTATIN CALCIUM 10 MG: 10 TABLET, FILM COATED ORAL at 21:46

## 2017-09-09 RX ADMIN — BUDESONIDE 0.5 MG: 0.5 INHALANT RESPIRATORY (INHALATION) at 19:35

## 2017-09-09 RX ADMIN — AMLODIPINE BESYLATE 10 MG: 10 TABLET ORAL at 21:46

## 2017-09-09 RX ADMIN — BACLOFEN 10 MG: 10 TABLET ORAL at 21:47

## 2017-09-09 RX ADMIN — CLOPIDOGREL BISULFATE 75 MG: 75 TABLET ORAL at 08:50

## 2017-09-09 RX ADMIN — ASPIRIN 81 MG 81 MG: 81 TABLET ORAL at 08:50

## 2017-09-09 RX ADMIN — PREDNISONE 30 MG: 20 TABLET ORAL at 12:05

## 2017-09-09 RX ADMIN — IPRATROPIUM BROMIDE AND ALBUTEROL SULFATE 3 ML: .5; 3 SOLUTION RESPIRATORY (INHALATION) at 15:19

## 2017-09-09 RX ADMIN — INSULIN LISPRO 6 UNITS: 100 INJECTION, SOLUTION INTRAVENOUS; SUBCUTANEOUS at 21:47

## 2017-09-09 RX ADMIN — TAZOBACTAM SODIUM AND PIPERACILLIN SODIUM 3.38 G: 375; 3 INJECTION, SOLUTION INTRAVENOUS at 16:18

## 2017-09-09 RX ADMIN — Medication 250 MG: at 08:50

## 2017-09-09 RX ADMIN — GUAIFENESIN 600 MG: 600 TABLET, EXTENDED RELEASE ORAL at 12:05

## 2017-09-09 RX ADMIN — LEVOTHYROXINE SODIUM 200 MCG: 100 TABLET ORAL at 06:28

## 2017-09-09 RX ADMIN — IPRATROPIUM BROMIDE AND ALBUTEROL SULFATE 3 ML: .5; 3 SOLUTION RESPIRATORY (INHALATION) at 19:35

## 2017-09-09 RX ADMIN — FUROSEMIDE 40 MG: 40 TABLET ORAL at 12:03

## 2017-09-09 RX ADMIN — BACLOFEN 10 MG: 10 TABLET ORAL at 06:28

## 2017-09-09 NOTE — PROGRESS NOTES
"    [facility] Medicine Services  PROGRESS NOTE      Date of Admission: 2017  Length of Stay: 4  Primary Care Physician: Samuel Buck MD    Patient Name: Jennifer Oliveira  : 1940  MRN: [id number]    Subjective     CC:  Pneumonia, weakness    Weakness - Generalized   Associated symptoms include a fever.   Fever      Sitting up in bed, doesn't like the noise of the high flow cannula. Coughing with yellow sputum. No n/v. No diarrhea. Some edema. Better overall, tired.    Review of Systems   Constitutional: Positive for fever.   Respiratory: Positive for shortness of breath.         Otherwise complete ROS is negative except as mentioned in the HPI.      Objective     Vital Signs: /79 (BP Location: Left arm, Patient Position: Lying)  Pulse 60  Temp 98.6 °F (37 °C) (Oral)   Resp 18  Ht 63\" (160 cm)  Wt 170 lb (77.1 kg)  SpO2 95%  BMI 30.11 kg/m2     Physical Exam :  Sitting up in bad, NAD, no tachypnea  OP clear, MMM  Neck supple  RRR  Decreased bases, worse on R  +BS, ND, NT  LUNA   No rashes        Results Reviewed:  I have personally reviewed current lab, radiology, and data and agree.      Results from last 7 days  Lab Units 17  0517  0617  05   WBC 10*3/mm3 5.04 6.14 6.27   HEMOGLOBIN g/dL 8.9* 8.0* 8.3*   HEMATOCRIT % 27.9* 25.7* 26.8*   PLATELETS 10*3/mm3 172 174 171       Results from last 7 days  Lab Units 17  0526 17  0519 17  0629 17  0519 17  2117 17  1146   SODIUM mmol/L 136 135 136 136  --  140   POTASSIUM mmol/L 3.9 4.1 4.6 4.2  --  4.4   CHLORIDE mmol/L 109 108 107 106  --  108   CO2 mmol/L 22.0 19.0* 20.0 22.0  --  26.0   BUN mg/dL 47* 36* 29* 32*  --  43*   CREATININE mg/dL 2.10* 2.20* 2.00* 2.00*  --  2.20*   GLUCOSE mg/dL 228* 324* 146* 64*  --  57*   CALCIUM mg/dL 8.8 8.5* 7.8* 7.9*  --  8.7   ALT (SGPT) U/L  --   --   --  19  --  28   AST (SGOT) U/L  --   --   --  28  --  51*   TROPONIN I " ng/mL  --   --   --   --  0.073* 0.085*     BNP   Date Value Ref Range Status   09/07/2017 306.0 (H) 0.0 - 100.0 pg/mL Final     No results found for: PHART  Blood Culture   Date Value Ref Range Status   09/05/2017 No growth at 24 hours  Preliminary   09/05/2017 No growth at 24 hours  Preliminary       Imaging Results (last 24 hours)     Procedure Component Value Units Date/Time    XR Chest 1 View [[id number]] Updated:  09/09/17 0530            I have reviewed the medications.      Assessment / Plan     Assessment & Plan :  Hospital Problem List     * (Principal)Right lower lobe pneumonia    Normochromic normocytic anemia    Overview Addendum 9/5/2017  3:01 PM by ARSLAN Montalvo     - Baseline Hgb 9s  - Hx of C-scope with polyps 2016, EGD 2016 with gastritis   - EGD 5/14/17 and 5/26/17 with symptomatic anemia         Chronic diastolic heart failure    Overview Signed 4/19/2017  1:21 PM by Samuel Dela Cruz MD     - Last echo 11/2016 LVEF 70%, normal VHD         HTN (hypertension)    Chronic kidney disease (CKD), stage III (moderate)    Overview Signed 4/19/2017  1:14 PM by Samuel Dela Cruz MD     - baseline Cr 1.5-1.6         h/o stroke with right hemiplegia    Overview Signed 5/14/2017 10:24 PM by Mariam Murray RN EXTENDER     residual chronic right hemiplegia         Disease of thyroid gland    Type 2 diabetes mellitus    KRIS (acute kidney injury)    Nausea and vomiting    Weakness    Acute respiratory failure with hypoxia               Brief Hospital Course to date:  Jennifer Oliveira is a 77 y.o. 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 Symptoms progressed to high grade fever, worsening cough and  severe weakness (unable to stand) N/V within 24 hrs.  Also had to increase baseline home O2 from 2L to 4L.  Presented to [facility] ED found to have RLL pna with fever and KRIS:      Plan:  Continue broad spectrum abx, wean oxygen as tolerated, mobilize, get sputum culture, added mucinex  Resume home lasix  Wean steroids to PO  Adjust insulin, exacerbated DM with steroids  KRIS unresolved, but stable, monitor on Lasix, but eating well            Disposition: I expect the patient to be discharged home with  PT vs may need skillled rehab, currently still actively ill and on Hi-flow O2 likely not medically ready until early next week.    Mitch Raymundo MD  09/09/17  10:18 AM

## 2017-09-09 NOTE — PLAN OF CARE
Problem: Patient Care Overview (Adult)  Goal: Plan of Care Review  Outcome: Ongoing (interventions implemented as appropriate)    09/09/17 9148   Coping/Psychosocial Response Interventions   Plan Of Care Reviewed With patient   Outcome Evaluation   Outcome Summary/Follow up Plan VSS. No complaints of SOA. Pt remains on high flow NC at this time. Did complain od a headache today that was relieved with tylenol. No other complaints at this time. Will continue to monitor.,    Patient Care Overview   Progress no change

## 2017-09-09 NOTE — PLAN OF CARE
Problem: Patient Care Overview (Adult)  Goal: Plan of Care Review  Outcome: Ongoing (interventions implemented as appropriate)    09/08/17 1604 09/09/17 0527   Coping/Psychosocial Response Interventions   Plan Of Care Reviewed With --  patient   Outcome Evaluation   Outcome Summary/Follow up Plan --  Pt's vitals stable, remains on high flow nasal canula - sats 95% - pt denies shortness of air. Pt had no complaints or events thtrough the night.   Patient Care Overview   Progress progress toward functional goals is gradual --        Goal: Discharge Needs Assessment  Outcome: Ongoing (interventions implemented as appropriate)    09/08/17 1513   Discharge Needs Assessment   Concerns To Be Addressed adjustment to diagnosis/illness concerns;basic needs concerns;discharge planning concerns;home safety concerns   Readmission Within The Last 30 Days no previous admission in last 30 days   Discharge Disposition still a patient         Problem: Pneumonia (Adult)  Goal: Signs and Symptoms of Listed Potential Problems Will be Absent or Manageable (Pneumonia)  Outcome: Ongoing (interventions implemented as appropriate)    09/09/17 0527   Pneumonia   Problems Assessed (Pneumonia) all   Problems Present (Pneumonia) fluid/electrolyte imbalance         Problem: Infection, Risk/Actual (Adult)  Goal: Identify Related Risk Factors and Signs and Symptoms  Outcome: Ongoing (interventions implemented as appropriate)    09/08/17 1513   Infection, Risk/Actual   Signs and Symptoms (Infection, Risk/Actual) blood glucose changes;edema;weakness       Goal: Infection Prevention/Resolution  Outcome: Ongoing (interventions implemented as appropriate)    09/08/17 1513   Infection, Risk/Actual (Adult)   Infection Prevention/Resolution making progress toward outcome         Problem: Fall Risk (Adult)  Goal: Identify Related Risk Factors and Signs and Symptoms  Outcome: Ongoing (interventions implemented as appropriate)    09/07/17 0543   Fall Risk    Fall Risk: Related Risk Factors age-related changes;fatigue/slow reaction;gait/mobility problems;history of falls;polypharmacy;sensory deficits   Fall Risk: Signs and Symptoms presence of risk factors       Goal: Absence of Falls  Outcome: Ongoing (interventions implemented as appropriate)    09/09/17 0500   Fall Risk (Adult)   Absence of Falls achieves outcome

## 2017-09-10 ENCOUNTER — APPOINTMENT (OUTPATIENT)
Dept: GENERAL RADIOLOGY | Facility: HOSPITAL | Age: 77
End: 2017-09-10

## 2017-09-10 LAB
ANION GAP SERPL CALCULATED.3IONS-SCNC: 12 MMOL/L (ref 3–11)
BACTERIA SPEC AEROBE CULT: NORMAL
BACTERIA SPEC AEROBE CULT: NORMAL
BASOPHILS # BLD AUTO: 0.02 10*3/MM3 (ref 0–0.2)
BASOPHILS NFR BLD AUTO: 0.3 % (ref 0–1)
BUN BLD-MCNC: 60 MG/DL (ref 9–23)
BUN/CREAT SERPL: 27.3 (ref 7–25)
CALCIUM SPEC-SCNC: 8.2 MG/DL (ref 8.7–10.4)
CHLORIDE SERPL-SCNC: 107 MMOL/L (ref 99–109)
CO2 SERPL-SCNC: 21 MMOL/L (ref 20–31)
CREAT BLD-MCNC: 2.2 MG/DL (ref 0.6–1.3)
DEPRECATED RDW RBC AUTO: 44.9 FL (ref 37–54)
EOSINOPHIL # BLD AUTO: 0.02 10*3/MM3 (ref 0–0.3)
EOSINOPHIL NFR BLD AUTO: 0.3 % (ref 0–3)
ERYTHROCYTE [DISTWIDTH] IN BLOOD BY AUTOMATED COUNT: 13.7 % (ref 11.3–14.5)
GFR SERPL CREATININE-BSD FRML MDRD: 22 ML/MIN/1.73
GLUCOSE BLD-MCNC: 258 MG/DL (ref 70–100)
GLUCOSE BLDC GLUCOMTR-MCNC: 215 MG/DL (ref 70–130)
GLUCOSE BLDC GLUCOMTR-MCNC: 257 MG/DL (ref 70–130)
GLUCOSE BLDC GLUCOMTR-MCNC: 257 MG/DL (ref 70–130)
GLUCOSE BLDC GLUCOMTR-MCNC: 270 MG/DL (ref 70–130)
HCT VFR BLD AUTO: 30 % (ref 34.5–44)
HGB BLD-MCNC: 9.8 G/DL (ref 11.5–15.5)
IMM GRANULOCYTES # BLD: 0.09 10*3/MM3 (ref 0–0.03)
IMM GRANULOCYTES NFR BLD: 1.2 % (ref 0–0.6)
LYMPHOCYTES # BLD AUTO: 0.61 10*3/MM3 (ref 0.6–4.8)
LYMPHOCYTES NFR BLD AUTO: 7.8 % (ref 24–44)
MCH RBC QN AUTO: 29.3 PG (ref 27–31)
MCHC RBC AUTO-ENTMCNC: 32.7 G/DL (ref 32–36)
MCV RBC AUTO: 89.6 FL (ref 80–99)
MONOCYTES # BLD AUTO: 0.62 10*3/MM3 (ref 0–1)
MONOCYTES NFR BLD AUTO: 7.9 % (ref 0–12)
NEUTROPHILS # BLD AUTO: 6.46 10*3/MM3 (ref 1.5–8.3)
NEUTROPHILS NFR BLD AUTO: 82.5 % (ref 41–71)
PLATELET # BLD AUTO: 228 10*3/MM3 (ref 150–450)
PMV BLD AUTO: 10.6 FL (ref 6–12)
POTASSIUM BLD-SCNC: 4 MMOL/L (ref 3.5–5.5)
RBC # BLD AUTO: 3.35 10*6/MM3 (ref 3.89–5.14)
SODIUM BLD-SCNC: 140 MMOL/L (ref 132–146)
WBC NRBC COR # BLD: 7.82 10*3/MM3 (ref 3.5–10.8)

## 2017-09-10 PROCEDURE — 94799 UNLISTED PULMONARY SVC/PX: CPT

## 2017-09-10 PROCEDURE — 80048 BASIC METABOLIC PNL TOTAL CA: CPT | Performed by: HOSPITALIST

## 2017-09-10 PROCEDURE — 63710000001 PREDNISONE PER 1 MG: Performed by: HOSPITALIST

## 2017-09-10 PROCEDURE — 94640 AIRWAY INHALATION TREATMENT: CPT

## 2017-09-10 PROCEDURE — 82962 GLUCOSE BLOOD TEST: CPT

## 2017-09-10 PROCEDURE — 63710000001 INSULIN DETEMIR PER 5 UNITS: Performed by: HOSPITALIST

## 2017-09-10 PROCEDURE — 94760 N-INVAS EAR/PLS OXIMETRY 1: CPT

## 2017-09-10 PROCEDURE — 63710000001 INSULIN LISPRO (HUMAN) PER 5 UNITS: Performed by: HOSPITALIST

## 2017-09-10 PROCEDURE — 99233 SBSQ HOSP IP/OBS HIGH 50: CPT | Performed by: HOSPITALIST

## 2017-09-10 PROCEDURE — 71010 HC CHEST PA OR AP: CPT

## 2017-09-10 PROCEDURE — 25010000002 PIPERACILLIN SOD-TAZOBACTAM PER 1 G

## 2017-09-10 PROCEDURE — 85025 COMPLETE CBC W/AUTO DIFF WBC: CPT | Performed by: HOSPITALIST

## 2017-09-10 PROCEDURE — 63710000001 PREDNISONE PER 5 MG: Performed by: HOSPITALIST

## 2017-09-10 RX ADMIN — IPRATROPIUM BROMIDE AND ALBUTEROL SULFATE 3 ML: .5; 3 SOLUTION RESPIRATORY (INHALATION) at 11:40

## 2017-09-10 RX ADMIN — ATORVASTATIN CALCIUM 10 MG: 10 TABLET, FILM COATED ORAL at 21:12

## 2017-09-10 RX ADMIN — METOPROLOL TARTRATE 50 MG: 50 TABLET, FILM COATED ORAL at 21:12

## 2017-09-10 RX ADMIN — INSULIN LISPRO 4 UNITS: 100 INJECTION, SOLUTION INTRAVENOUS; SUBCUTANEOUS at 11:59

## 2017-09-10 RX ADMIN — BACLOFEN 10 MG: 10 TABLET ORAL at 13:34

## 2017-09-10 RX ADMIN — ASPIRIN 81 MG 81 MG: 81 TABLET ORAL at 08:39

## 2017-09-10 RX ADMIN — PREDNISONE 30 MG: 20 TABLET ORAL at 08:39

## 2017-09-10 RX ADMIN — METOPROLOL TARTRATE 50 MG: 50 TABLET, FILM COATED ORAL at 08:39

## 2017-09-10 RX ADMIN — PANTOPRAZOLE SODIUM 40 MG: 40 TABLET, DELAYED RELEASE ORAL at 06:31

## 2017-09-10 RX ADMIN — TAZOBACTAM SODIUM AND PIPERACILLIN SODIUM 3.38 G: 375; 3 INJECTION, SOLUTION INTRAVENOUS at 17:09

## 2017-09-10 RX ADMIN — Medication 325 MG: at 08:39

## 2017-09-10 RX ADMIN — INSULIN LISPRO 4 UNITS: 100 INJECTION, SOLUTION INTRAVENOUS; SUBCUTANEOUS at 17:09

## 2017-09-10 RX ADMIN — GABAPENTIN 900 MG: 300 CAPSULE ORAL at 21:12

## 2017-09-10 RX ADMIN — INSULIN DETEMIR 25 UNITS: 100 INJECTION, SOLUTION SUBCUTANEOUS at 08:34

## 2017-09-10 RX ADMIN — BUDESONIDE 0.5 MG: 0.5 INHALANT RESPIRATORY (INHALATION) at 19:57

## 2017-09-10 RX ADMIN — IPRATROPIUM BROMIDE AND ALBUTEROL SULFATE 3 ML: .5; 3 SOLUTION RESPIRATORY (INHALATION) at 15:58

## 2017-09-10 RX ADMIN — GUAIFENESIN 600 MG: 600 TABLET, EXTENDED RELEASE ORAL at 21:13

## 2017-09-10 RX ADMIN — Medication 250 MG: at 17:09

## 2017-09-10 RX ADMIN — CLOPIDOGREL BISULFATE 75 MG: 75 TABLET ORAL at 08:39

## 2017-09-10 RX ADMIN — INSULIN LISPRO 4 UNITS: 100 INJECTION, SOLUTION INTRAVENOUS; SUBCUTANEOUS at 08:40

## 2017-09-10 RX ADMIN — OXYCODONE HYDROCHLORIDE AND ACETAMINOPHEN 500 MG: 500 TABLET ORAL at 08:40

## 2017-09-10 RX ADMIN — GUAIFENESIN 600 MG: 600 TABLET, EXTENDED RELEASE ORAL at 08:40

## 2017-09-10 RX ADMIN — BACLOFEN 10 MG: 10 TABLET ORAL at 06:31

## 2017-09-10 RX ADMIN — LEVOTHYROXINE SODIUM 200 MCG: 100 TABLET ORAL at 06:31

## 2017-09-10 RX ADMIN — TAZOBACTAM SODIUM AND PIPERACILLIN SODIUM 3.38 G: 375; 3 INJECTION, SOLUTION INTRAVENOUS at 04:22

## 2017-09-10 RX ADMIN — IPRATROPIUM BROMIDE AND ALBUTEROL SULFATE 3 ML: .5; 3 SOLUTION RESPIRATORY (INHALATION) at 19:56

## 2017-09-10 RX ADMIN — INSULIN DETEMIR 25 UNITS: 100 INJECTION, SOLUTION SUBCUTANEOUS at 21:17

## 2017-09-10 RX ADMIN — Medication 250 MG: at 08:39

## 2017-09-10 RX ADMIN — INSULIN LISPRO 3 UNITS: 100 INJECTION, SOLUTION INTRAVENOUS; SUBCUTANEOUS at 21:13

## 2017-09-10 RX ADMIN — BUDESONIDE 0.5 MG: 0.5 INHALANT RESPIRATORY (INHALATION) at 06:54

## 2017-09-10 RX ADMIN — TAZOBACTAM SODIUM AND PIPERACILLIN SODIUM 3.38 G: 375; 3 INJECTION, SOLUTION INTRAVENOUS at 08:38

## 2017-09-10 RX ADMIN — FUROSEMIDE 40 MG: 40 TABLET ORAL at 08:39

## 2017-09-10 RX ADMIN — TAZOBACTAM SODIUM AND PIPERACILLIN SODIUM 3.38 G: 375; 3 INJECTION, SOLUTION INTRAVENOUS at 21:23

## 2017-09-10 RX ADMIN — IPRATROPIUM BROMIDE AND ALBUTEROL SULFATE 3 ML: .5; 3 SOLUTION RESPIRATORY (INHALATION) at 06:54

## 2017-09-10 RX ADMIN — AMLODIPINE BESYLATE 10 MG: 10 TABLET ORAL at 21:13

## 2017-09-10 RX ADMIN — BACLOFEN 10 MG: 10 TABLET ORAL at 21:13

## 2017-09-10 NOTE — PLAN OF CARE
Problem: Patient Care Overview (Adult)  Goal: Plan of Care Review  Outcome: Ongoing (interventions implemented as appropriate)    09/10/17 1544   Coping/Psychosocial Response Interventions   Plan Of Care Reviewed With patient   Outcome Evaluation   Outcome Summary/Follow up Plan pt has had no complaints of pain or SOB throughout the day. O2 sats remain stable on the high flow nasal cannula. IV abx continued per orders. NSR on the monitor with all VSS, will continue to monitor.   Patient Care Overview   Progress no change         Problem: Pneumonia (Adult)  Goal: Signs and Symptoms of Listed Potential Problems Will be Absent or Manageable (Pneumonia)  Outcome: Ongoing (interventions implemented as appropriate)    Problem: Infection, Risk/Actual (Adult)  Goal: Identify Related Risk Factors and Signs and Symptoms  Outcome: Ongoing (interventions implemented as appropriate)  Goal: Infection Prevention/Resolution  Outcome: Ongoing (interventions implemented as appropriate)    Problem: Fall Risk (Adult)  Goal: Identify Related Risk Factors and Signs and Symptoms  Outcome: Ongoing (interventions implemented as appropriate)  Goal: Absence of Falls  Outcome: Outcome(s) achieved Date Met:  09/10/17

## 2017-09-10 NOTE — PLAN OF CARE
Problem: Patient Care Overview (Adult)  Goal: Plan of Care Review  Outcome: Ongoing (interventions implemented as appropriate)    09/10/17 0563   Coping/Psychosocial Response Interventions   Plan Of Care Reviewed With patient   Outcome Evaluation   Outcome Summary/Follow up Plan remains on high flow cannula with sats mid to lower 90's c/io some b ack pain scheduled meds given with good relief slept most of the night remains inc of urine and stool abx as ordered   Patient Care Overview   Progress progress toward functional goals as expected

## 2017-09-10 NOTE — PROGRESS NOTES
"    [facility] Medicine Services  PROGRESS NOTE      Date of Admission: 2017  Length of Stay: 5  Primary Care Physician: Samuel Buck MD    Patient Name: Jennifer Oliveira  : 1940  MRN: [id number]    Subjective     CC:  Pneumonia, weakness    Weakness - Generalized   Associated symptoms include a fever.   Fever      Sitting up in bed, doesn't like the noise of the high flow cannula. Coughing yellow sputum. Deep cough. Less short of breath, more comfortable. Poor appetite. Notes edema.  Review of Systems   Constitutional: Positive for fever.   Respiratory: Positive for shortness of breath.         Otherwise complete ROS is negative except as mentioned in the HPI.      Objective     Vital Signs: /57 (BP Location: Right leg, Patient Position: Lying)  Pulse 62  Temp 97.4 °F (36.3 °C) (Oral)   Resp 18  Ht 63\" (160 cm)  Wt 170 lb (77.1 kg)  SpO2 91%  BMI 30.11 kg/m2     Physical Exam :  Sitting up in bad, NAD, no tachypnea  OP clear, MMM  Neck supple  RRR  Decreased bases, worse on R  +BS, ND, NT  LUNA   Tr edema B UE/LE  No rashes        Results Reviewed:  I have personally reviewed current lab, radiology, and data and agree.      Results from last 7 days  Lab Units 09/10/17  0707 17  0517  0629   WBC 10*3/mm3 7.82 5.04 6.14   HEMOGLOBIN g/dL 9.8* 8.9* 8.0*   HEMATOCRIT % 30.0* 27.9* 25.7*   PLATELETS 10*3/mm3 228 172 174       Results from last 7 days  Lab Units 09/10/17  0707 17  0526 17  0519  17  0519 17  2117 17  1146   SODIUM mmol/L 140 136 135  < > 136  --  140   POTASSIUM mmol/L 4.0 3.9 4.1  < > 4.2  --  4.4   CHLORIDE mmol/L 107 109 108  < > 106  --  108   CO2 mmol/L 21.0 22.0 19.0*  < > 22.0  --  26.0   BUN mg/dL 60* 47* 36*  < > 32*  --  43*   CREATININE mg/dL 2.20* 2.10* 2.20*  < > 2.00*  --  2.20*   GLUCOSE mg/dL 258* 228* 324*  < > 64*  --  57*   CALCIUM mg/dL 8.2* 8.8 8.5*  < > 7.9*  --  8.7   ALT (SGPT) U/L  --   " --   --   --  19  --  28   AST (SGOT) U/L  --   --   --   --  28  --  51*   TROPONIN I ng/mL  --   --   --   --   --  0.073* 0.085*   < > = values in this interval not displayed.  No results found for: BNP  No results found for: PHART  Blood Culture   Date Value Ref Range Status   09/05/2017 No growth at 24 hours  Preliminary   09/05/2017 No growth at 24 hours  Preliminary       Imaging Results (last 24 hours)     Procedure Component Value Units Date/Time    XR Chest 1 View [[id number]] Collected:  09/09/17 1348     Updated:  09/09/17 1829    Narrative:       EXAMINATION: XR CHEST, SINGLE VIEW - 09/09/2017     INDICATION: J18.9-Pneumonia, unspecified organism; Z74.09-Other reduced  mobility.     COMPARISON: Chest x-ray 09/07/2017.     FINDINGS: Cardiac silhouette is unchanged. Bibasilar opacities and  effusions are unchanged from prior with small right and trace left  effusion present. Trace pulmonary vascular congestion similar to prior.  No new focal airspace opacity with mid and upper lung zones remaining  grossly clear. No discrete pneumothorax.       Impression:       Bibasilar opacification similar to prior with small right  and trace left pleural effusions present.     DICTATED:     09/09/2017  EDITED:         09/09/2017     This report was finalized on 9/9/2017 6:27 PM by Dr. Jass Martinez.       XR Chest 1 View [[id number]] Updated:  09/10/17 0425            I have reviewed the medications.      Assessment / Plan     Assessment & Plan :  Hospital Problem List     * (Principal)Right lower lobe pneumonia    Normochromic normocytic anemia    Overview Addendum 9/5/2017  3:01 PM by ARSLAN Montalvo     - Baseline Hgb 9s  - Hx of C-scope with polyps 2016, EGD 2016 with gastritis   - EGD 5/14/17 and 5/26/17 with symptomatic anemia         Chronic diastolic heart failure    Overview Signed 4/19/2017  1:21 PM by Samuel Dela Cruz MD     - Last echo 11/2016 LVEF 70%, normal VHD         HTN (hypertension)     Chronic kidney disease (CKD), stage III (moderate)    Overview Signed 4/19/2017  1:14 PM by Samuel Dela Cruz MD     - baseline Cr 1.5-1.6         h/o stroke with right hemiplegia    Overview Signed 5/14/2017 10:24 PM by Mariam Murray RN EXTENDER     residual chronic right hemiplegia         Disease of thyroid gland    Type 2 diabetes mellitus    KRIS (acute kidney injury)    Nausea and vomiting    Weakness    Acute respiratory failure with hypoxia               Brief Hospital Course to date:  Jennifer Oliveira is a 77 y.o. 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 Symptoms progressed to high grade fever, worsening cough and severe weakness (unable to stand) N/V within 24 hrs.  Also had to increase baseline home O2 from 2L to 4L.  Presented to [facility] ED found to have RLL pna with fever and KRIS:      Plan:  Continue broad spectrum abx, wean oxygen as tolerated, mobilize, get sputum culture, added mucinex  Weaned steroids to PO  Adjust insulin, exacerbated DM with steroids  KRIS unresolved, hold lasix, check UA/urine EOS, consider changing antibiotic regimen, reviewed meds for nephrotoxicity            Disposition: I expect the patient to be discharged home with HH PT vs may need skillled rehab, currently still actively ill and on Hi-flow O2 likely not medically ready until early next week.    Mitch Raymundo MD  09/10/17  10:02 AM

## 2017-09-11 ENCOUNTER — APPOINTMENT (OUTPATIENT)
Dept: GENERAL RADIOLOGY | Facility: HOSPITAL | Age: 77
End: 2017-09-11

## 2017-09-11 ENCOUNTER — APPOINTMENT (OUTPATIENT)
Dept: CT IMAGING | Facility: HOSPITAL | Age: 77
End: 2017-09-11

## 2017-09-11 LAB
ANION GAP SERPL CALCULATED.3IONS-SCNC: 10 MMOL/L (ref 3–11)
ARTERIAL PATENCY WRIST A: POSITIVE
ATMOSPHERIC PRESS: ABNORMAL MMHG
BACTERIA SPEC RESP CULT: NORMAL
BASE EXCESS BLDA CALC-SCNC: -3.9 MMOL/L (ref 0–2)
BASOPHILS # BLD AUTO: 0.02 10*3/MM3 (ref 0–0.2)
BASOPHILS NFR BLD AUTO: 0.2 % (ref 0–1)
BDY SITE: ABNORMAL
BUN BLD-MCNC: 57 MG/DL (ref 9–23)
BUN/CREAT SERPL: 25.9 (ref 7–25)
CALCIUM SPEC-SCNC: 8 MG/DL (ref 8.7–10.4)
CHLORIDE SERPL-SCNC: 109 MMOL/L (ref 99–109)
CO2 BLDA-SCNC: 22.8 MMOL/L (ref 22–33)
CO2 SERPL-SCNC: 24 MMOL/L (ref 20–31)
COHGB MFR BLD: 0.5 % (ref 0–2)
CREAT BLD-MCNC: 2.2 MG/DL (ref 0.6–1.3)
DEPRECATED RDW RBC AUTO: 48.7 FL (ref 37–54)
EOSINOPHIL # BLD AUTO: 0.01 10*3/MM3 (ref 0–0.3)
EOSINOPHIL NFR BLD AUTO: 0.1 % (ref 0–3)
ERYTHROCYTE [DISTWIDTH] IN BLOOD BY AUTOMATED COUNT: 14.3 % (ref 11.3–14.5)
GFR SERPL CREATININE-BSD FRML MDRD: 22 ML/MIN/1.73
GLUCOSE BLD-MCNC: 75 MG/DL (ref 70–100)
GLUCOSE BLDC GLUCOMTR-MCNC: 166 MG/DL (ref 70–130)
GLUCOSE BLDC GLUCOMTR-MCNC: 188 MG/DL (ref 70–130)
GLUCOSE BLDC GLUCOMTR-MCNC: 227 MG/DL (ref 70–130)
GLUCOSE BLDC GLUCOMTR-MCNC: 66 MG/DL (ref 70–130)
GLUCOSE BLDC GLUCOMTR-MCNC: 76 MG/DL (ref 70–130)
GLUCOSE BLDC GLUCOMTR-MCNC: 79 MG/DL (ref 70–130)
GLUCOSE BLDC GLUCOMTR-MCNC: 85 MG/DL (ref 70–130)
GLUCOSE BLDC GLUCOMTR-MCNC: 97 MG/DL (ref 70–130)
GRAM STN SPEC: NORMAL
HCO3 BLDA-SCNC: 21.5 MMOL/L (ref 20–26)
HCT VFR BLD AUTO: 33.5 % (ref 34.5–44)
HCT VFR BLD CALC: 31.4 %
HGB BLD-MCNC: 10.7 G/DL (ref 11.5–15.5)
HGB BLDA-MCNC: 10.2 G/DL (ref 14–18)
HOROWITZ INDEX BLD+IHG-RTO: 50 %
IMM GRANULOCYTES # BLD: 0.08 10*3/MM3 (ref 0–0.03)
IMM GRANULOCYTES NFR BLD: 0.7 % (ref 0–0.6)
LYMPHOCYTES # BLD AUTO: 0.93 10*3/MM3 (ref 0.6–4.8)
LYMPHOCYTES NFR BLD AUTO: 8.2 % (ref 24–44)
MCH RBC QN AUTO: 29.4 PG (ref 27–31)
MCHC RBC AUTO-ENTMCNC: 31.9 G/DL (ref 32–36)
MCV RBC AUTO: 92 FL (ref 80–99)
METHGB BLD QL: 1.2 % (ref 0–1.5)
MODALITY: ABNORMAL
MONOCYTES # BLD AUTO: 0.78 10*3/MM3 (ref 0–1)
MONOCYTES NFR BLD AUTO: 6.9 % (ref 0–12)
NEUTROPHILS # BLD AUTO: 9.54 10*3/MM3 (ref 1.5–8.3)
NEUTROPHILS NFR BLD AUTO: 83.9 % (ref 41–71)
OXYHGB MFR BLDV: 88.9 % (ref 94–99)
PCO2 BLDA: 39.6 MM HG (ref 35–45)
PH BLDA: 7.34 PH UNITS (ref 7.35–7.45)
PLATELET # BLD AUTO: 271 10*3/MM3 (ref 150–450)
PMV BLD AUTO: 10.3 FL (ref 6–12)
PO2 BLDA: 66.3 MM HG (ref 83–108)
POTASSIUM BLD-SCNC: 3.7 MMOL/L (ref 3.5–5.5)
RBC # BLD AUTO: 3.64 10*6/MM3 (ref 3.89–5.14)
SODIUM BLD-SCNC: 143 MMOL/L (ref 132–146)
WBC NRBC COR # BLD: 11.36 10*3/MM3 (ref 3.5–10.8)

## 2017-09-11 PROCEDURE — 63710000001 PREDNISONE PER 1 MG: Performed by: HOSPITALIST

## 2017-09-11 PROCEDURE — 94799 UNLISTED PULMONARY SVC/PX: CPT

## 2017-09-11 PROCEDURE — 25010000002 FUROSEMIDE PER 20 MG: Performed by: HOSPITALIST

## 2017-09-11 PROCEDURE — 63710000001 PREDNISONE PER 5 MG: Performed by: HOSPITALIST

## 2017-09-11 PROCEDURE — 80048 BASIC METABOLIC PNL TOTAL CA: CPT | Performed by: HOSPITALIST

## 2017-09-11 PROCEDURE — 99233 SBSQ HOSP IP/OBS HIGH 50: CPT | Performed by: HOSPITALIST

## 2017-09-11 PROCEDURE — 97530 THERAPEUTIC ACTIVITIES: CPT

## 2017-09-11 PROCEDURE — 99223 1ST HOSP IP/OBS HIGH 75: CPT | Performed by: INTERNAL MEDICINE

## 2017-09-11 PROCEDURE — 80069 RENAL FUNCTION PANEL: CPT | Performed by: INTERNAL MEDICINE

## 2017-09-11 PROCEDURE — 82805 BLOOD GASES W/O2 SATURATION: CPT | Performed by: HOSPITALIST

## 2017-09-11 PROCEDURE — 70450 CT HEAD/BRAIN W/O DYE: CPT

## 2017-09-11 PROCEDURE — 36600 WITHDRAWAL OF ARTERIAL BLOOD: CPT | Performed by: HOSPITALIST

## 2017-09-11 PROCEDURE — 94640 AIRWAY INHALATION TREATMENT: CPT

## 2017-09-11 PROCEDURE — 25010000002 LEVOFLOXACIN PER 250 MG: Performed by: NURSE PRACTITIONER

## 2017-09-11 PROCEDURE — 85025 COMPLETE CBC W/AUTO DIFF WBC: CPT | Performed by: HOSPITALIST

## 2017-09-11 PROCEDURE — 71010 HC CHEST PA OR AP: CPT

## 2017-09-11 PROCEDURE — 25010000002 PIPERACILLIN SOD-TAZOBACTAM PER 1 G

## 2017-09-11 PROCEDURE — 82962 GLUCOSE BLOOD TEST: CPT

## 2017-09-11 PROCEDURE — 97110 THERAPEUTIC EXERCISES: CPT

## 2017-09-11 PROCEDURE — 94760 N-INVAS EAR/PLS OXIMETRY 1: CPT

## 2017-09-11 PROCEDURE — 63710000001 INSULIN DETEMIR PER 5 UNITS: Performed by: HOSPITALIST

## 2017-09-11 RX ORDER — FUROSEMIDE 10 MG/ML
20 INJECTION INTRAMUSCULAR; INTRAVENOUS ONCE
Status: COMPLETED | OUTPATIENT
Start: 2017-09-11 | End: 2017-09-11

## 2017-09-11 RX ADMIN — INSULIN DETEMIR 25 UNITS: 100 INJECTION, SOLUTION SUBCUTANEOUS at 22:39

## 2017-09-11 RX ADMIN — TAZOBACTAM SODIUM AND PIPERACILLIN SODIUM 3.38 G: 375; 3 INJECTION, SOLUTION INTRAVENOUS at 22:38

## 2017-09-11 RX ADMIN — IPRATROPIUM BROMIDE AND ALBUTEROL SULFATE 3 ML: .5; 3 SOLUTION RESPIRATORY (INHALATION) at 18:51

## 2017-09-11 RX ADMIN — BACLOFEN 10 MG: 10 TABLET ORAL at 06:07

## 2017-09-11 RX ADMIN — Medication 250 MG: at 09:59

## 2017-09-11 RX ADMIN — Medication 250 MG: at 17:05

## 2017-09-11 RX ADMIN — FUROSEMIDE 20 MG: 10 INJECTION, SOLUTION INTRAMUSCULAR; INTRAVENOUS at 10:37

## 2017-09-11 RX ADMIN — LEVOFLOXACIN 750 MG: 750 INJECTION, SOLUTION INTRAVENOUS at 11:47

## 2017-09-11 RX ADMIN — LEVOTHYROXINE SODIUM 200 MCG: 100 TABLET ORAL at 06:07

## 2017-09-11 RX ADMIN — IPRATROPIUM BROMIDE AND ALBUTEROL SULFATE 3 ML: .5; 3 SOLUTION RESPIRATORY (INHALATION) at 15:14

## 2017-09-11 RX ADMIN — TAZOBACTAM SODIUM AND PIPERACILLIN SODIUM 3.38 G: 375; 3 INJECTION, SOLUTION INTRAVENOUS at 17:05

## 2017-09-11 RX ADMIN — GUAIFENESIN 600 MG: 600 TABLET, EXTENDED RELEASE ORAL at 09:59

## 2017-09-11 RX ADMIN — TAZOBACTAM SODIUM AND PIPERACILLIN SODIUM 3.38 G: 375; 3 INJECTION, SOLUTION INTRAVENOUS at 04:35

## 2017-09-11 RX ADMIN — BACLOFEN 10 MG: 10 TABLET ORAL at 22:37

## 2017-09-11 RX ADMIN — METOPROLOL TARTRATE 50 MG: 50 TABLET, FILM COATED ORAL at 22:39

## 2017-09-11 RX ADMIN — IPRATROPIUM BROMIDE AND ALBUTEROL SULFATE 3 ML: .5; 3 SOLUTION RESPIRATORY (INHALATION) at 23:20

## 2017-09-11 RX ADMIN — ASPIRIN 81 MG 81 MG: 81 TABLET ORAL at 09:59

## 2017-09-11 RX ADMIN — CLOPIDOGREL BISULFATE 75 MG: 75 TABLET ORAL at 09:59

## 2017-09-11 RX ADMIN — BUDESONIDE 0.5 MG: 0.5 INHALANT RESPIRATORY (INHALATION) at 06:59

## 2017-09-11 RX ADMIN — IPRATROPIUM BROMIDE AND ALBUTEROL SULFATE 3 ML: .5; 3 SOLUTION RESPIRATORY (INHALATION) at 00:01

## 2017-09-11 RX ADMIN — OXYCODONE HYDROCHLORIDE AND ACETAMINOPHEN 500 MG: 500 TABLET ORAL at 09:59

## 2017-09-11 RX ADMIN — GABAPENTIN 900 MG: 300 CAPSULE ORAL at 22:37

## 2017-09-11 RX ADMIN — AMLODIPINE BESYLATE 10 MG: 10 TABLET ORAL at 22:39

## 2017-09-11 RX ADMIN — GUAIFENESIN 600 MG: 600 TABLET, EXTENDED RELEASE ORAL at 22:37

## 2017-09-11 RX ADMIN — METOPROLOL TARTRATE 50 MG: 50 TABLET, FILM COATED ORAL at 09:59

## 2017-09-11 RX ADMIN — PREDNISONE 30 MG: 20 TABLET ORAL at 09:58

## 2017-09-11 RX ADMIN — IPRATROPIUM BROMIDE AND ALBUTEROL SULFATE 3 ML: .5; 3 SOLUTION RESPIRATORY (INHALATION) at 10:44

## 2017-09-11 RX ADMIN — IPRATROPIUM BROMIDE AND ALBUTEROL SULFATE 3 ML: .5; 3 SOLUTION RESPIRATORY (INHALATION) at 03:56

## 2017-09-11 RX ADMIN — BACLOFEN 10 MG: 10 TABLET ORAL at 14:47

## 2017-09-11 RX ADMIN — IPRATROPIUM BROMIDE AND ALBUTEROL SULFATE 3 ML: .5; 3 SOLUTION RESPIRATORY (INHALATION) at 06:59

## 2017-09-11 RX ADMIN — INSULIN LISPRO 3 UNITS: 100 INJECTION, SOLUTION INTRAVENOUS; SUBCUTANEOUS at 22:38

## 2017-09-11 RX ADMIN — PANTOPRAZOLE SODIUM 40 MG: 40 TABLET, DELAYED RELEASE ORAL at 06:07

## 2017-09-11 RX ADMIN — TAZOBACTAM SODIUM AND PIPERACILLIN SODIUM 3.38 G: 375; 3 INJECTION, SOLUTION INTRAVENOUS at 09:59

## 2017-09-11 RX ADMIN — ATORVASTATIN CALCIUM 10 MG: 10 TABLET, FILM COATED ORAL at 22:37

## 2017-09-11 RX ADMIN — BUDESONIDE 0.5 MG: 0.5 INHALANT RESPIRATORY (INHALATION) at 18:51

## 2017-09-11 RX ADMIN — Medication 325 MG: at 09:59

## 2017-09-11 NOTE — CONSULTS
Pulmonary New Patient Evaluation     REFERRING PHYSICIAN:  Samuel Buck MD    Subjective       Chief Complaint   Patient presents with   • Weakness - Generalized   • Fever            SUBJECTIVE     Ms. Oliveira is a 76yo F with a history of CVA/TIA, PAD, T2DM, hypothyroidism, and recent GI bleed requiring transfusion who was admitted on 9/5 for CAP. She was initially seen in the [facility] ED on 9/4 and discharged with a prescription for Levaquin which she was unable to  as the pharmacy was closed. She continued to have fever, cough and severe weakness and presented to [facility].     A CXR was performed in the ED which showed bibasilar opacities as well as a RLL infiltrate. She was admitted and started on Zosyn and Levaquin as there was concern for aspiration. She was given IVF for acute kidney injury and possible dehydration. She usually wears 2L NC at home. At the time of admission, she was requiring 3L NC. She required increased O2 on 9/7 and this improved with diuresis. Since that time, her respiratory failure has progressed and she is now on HFNC at 50%.     Today, she tells me that she feels much more short of breath. She doesn't think that she has ever had anything like this before. She thinks that she is peeing well. She continues to cough up sputum. Afebrile x 72 hours.       PMH: She  has a past medical history of CKD (chronic kidney disease), stage III; Coronary artery disease; Diabetes mellitus; Diastolic heart failure; Disease of thyroid gland; GERD (gastroesophageal reflux disease); Hypertension; PAD (peripheral artery disease); and Stroke.   PSxH: She  has a past surgical history that includes femoral popliteal bypass (Right); Hysterectomy; Cholecystectomy; Carpal tunnel release; Esophagogastroduodenoscopy (N/A, 5/15/2017); and Esophagogastroduodenoscopy (N/A, 5/30/2017).        Medications:     Current Facility-Administered Medications:   •  acetaminophen (TYLENOL) tablet 650 mg, 650 mg,  Oral, Q4H PRN, Tiffanie ZOHAIB Gonzales, APRN, 325 mg at 09/09/17 1549  •  albuterol (PROVENTIL) nebulizer solution 0.083% 2.5 mg/3mL, 2.5 mg, Nebulization, Q4H PRN, Tiffanie ZOHAIB Gonzales, APRN  •  amLODIPine (NORVASC) tablet 10 mg, 10 mg, Oral, Nightly, Tiffanie ZOHAIB Gonzales, APRN, 10 mg at 09/10/17 2113  •  aspirin chewable tablet 81 mg, 81 mg, Oral, Daily, Tiffanie ZOHAIB Gonzales, APRN, 81 mg at 09/11/17 0959  •  atorvastatin (LIPITOR) tablet 10 mg, 10 mg, Oral, Daily, Tiffanie ZOHAIB Gonzales, APRN, 10 mg at 09/10/17 2112  •  baclofen (LIORESAL) tablet 10 mg, 10 mg, Oral, TID, Tiffanielam Gonzales, APRN, 10 mg at 09/11/17 0607  •  budesonide (PULMICORT) nebulizer solution 0.5 mg, 0.5 mg, Nebulization, BID - RT, Malia Lorenzo MD, 0.5 mg at 09/11/17 0659  •  clopidogrel (PLAVIX) tablet 75 mg, 75 mg, Oral, Daily, Tiffanierichard Gonzales, APRN, 75 mg at 09/11/17 0959  •  dextrose (D50W) solution 25 g, 25 g, Intravenous, Q15 Min PRN, Tiffanierichard Gonzales, APRN  •  dextrose (GLUTOSE) oral gel 15 g, 15 g, Oral, Q15 Min PRN, Tiffanie Gonzales, APRN  •  ferrous sulfate tablet 325 mg, 325 mg, Oral, Daily With Breakfast, Tiffanierichard Gonzales, APRN, 325 mg at 09/11/17 0959  •  gabapentin (NEURONTIN) capsule 900 mg, 900 mg, Oral, Nightly, Tiffanierichard Gonzales, APRN, 900 mg at 09/10/17 2112  •  glucagon (GLUCAGEN) injection 1 mg, 1 mg, Subcutaneous, Q15 Min PRN, Tiffanierichard Gonzales, APRN  •  guaiFENesin (MUCINEX) 12 hr tablet 600 mg, 600 mg, Oral, Q12H, Mitch Raymundo MD, 600 mg at 09/11/17 0959  •  insulin detemir (LEVEMIR) injection 25 Units, 25 Units, Subcutaneous, Q12H, Mitch Raymundo MD, 25 Units at 09/10/17 2117  •  insulin lispro (humaLOG) injection 0-7 Units, 0-7 Units, Subcutaneous, 4x Daily With Meals & Nightly, ARSLAN Montalvo, 3 Units at 09/10/17 2113  •  insulin lispro (humaLOG) injection 4 Units, 4 Units, Subcutaneous, TID With Meals, Mitch Raymundo MD, 4 Units at 09/10/17 1709  •  ipratropium-albuterol (DUO-NEB) nebulizer solution 3 mL, 3 mL, Nebulization, Q4H -  RT, Tiffanie Gonzales APRN, 3 mL at 09/11/17 1044  •  ipratropium-albuterol (DUO-NEB) nebulizer solution 3 mL, 3 mL, Nebulization, Q4H PRN, Malia Lorenzo MD, 3 mL at 09/07/17 1038  •  levoFLOXacin (LEVAQUIN) 750 mg/150 mL D5W (premix) (LEVAQUIN) 750 mg, 750 mg, Intravenous, Q48H, Tiffanie Gonzales APRN, 750 mg at 09/11/17 1147  •  levothyroxine (SYNTHROID, LEVOTHROID) tablet 200 mcg, 200 mcg, Oral, Daily, Tiffanie Gonzales APRN, 200 mcg at 09/11/17 0607  •  metoprolol tartrate (LOPRESSOR) tablet 50 mg, 50 mg, Oral, Q12H, Tiffanie Gonzales APRN, 50 mg at 09/11/17 0959  •  ondansetron (ZOFRAN) tablet 4 mg, 4 mg, Oral, Q6H PRN **OR** ondansetron (ZOFRAN) injection 4 mg, 4 mg, Intravenous, Q6H PRN, ARSLAN Montalvo  •  pantoprazole (PROTONIX) EC tablet 40 mg, 40 mg, Oral, Daily, Tiffanie Gonzales APRN, 40 mg at 09/11/17 0607  •  Pharmacy Consult, , Does not apply, Continuous PRN, Joel Nation, [facility]  •  phenol (CHLORASEPTIC) 1.4 % liquid 2 spray, 2 spray, Mouth/Throat, Q2H PRN, Malia Lorenzo MD  •  piperacillin-tazobactam (ZOSYN) 3.375 g in iso-osmotic dextrose 50 ml (premix), 3.375 g, Intravenous, Q6H, Wilber Begum IV, RP, 3.375 g at 09/11/17 0959  •  predniSONE (DELTASONE) tablet 30 mg, 30 mg, Oral, Daily With Breakfast, Mitch Raymundo MD, 30 mg at 09/11/17 0958  •  saccharomyces boulardii (FLORASTOR) capsule 250 mg, 250 mg, Oral, BID, Mitch Raymundo MD, 250 mg at 09/11/17 0959  •  sodium chloride 0.9 % flush 1-10 mL, 1-10 mL, Intravenous, PRN, Tiffanie Gonzales APRN  •  sodium chloride 0.9 % flush 10 mL, 10 mL, Intravenous, PRN, Triage Protocol Emergency, MD  •  vitamin C (ASCORBIC ACID) tablet 500 mg, 500 mg, Oral, Daily With Breakfast, ARSLAN Montalvo, 500 mg at 09/11/17 0959    Allergies: She is allergic to erythromycin.   FH: Her family history includes Diabetes in her mother; Heart disease in her father.   SH: She  reports that she quit smoking about 8 years ago. She has a 45.00 pack-year  smoking history. She has never used smokeless tobacco. She reports that she does not drink alcohol or use illicit drugs.     The patient's relevant past medical, surgical and social history were reviewed and updated in Epic as appropriate.    ROS (14):   Review of Systems  As described in the HPI. Otherwise rest of ROS (14 systems) were negative.      Objective   OBJECTIVE     Physical Examination   Vitals:    09/11/17 0659 09/11/17 0700 09/11/17 1044 09/11/17 1100   BP:  123/62  161/63   BP Location:  Left leg  Left arm   Patient Position:  Lying  Lying   Pulse: 69 78 84 87   Resp: 28 22 28 26   Temp:  98.8 °F (37.1 °C)  98.4 °F (36.9 °C)   TempSrc:  Axillary  Oral   SpO2: 95% 95% 93% 94%   Weight:       Height:           Body mass index is 30.11 kg/(m^2).    Physical Examination    Constitutional:  Mild distress. Obese   Neck:  Oropharynx clear.  Normal range of motion. Neck supple.   No JVD present. No tracheal deviation present.  No thyromegaly present.    Cardiovascular: Normal rate, regular and rhythm. Normal heart sounds.  No murmurs, gallop or rub.  Trace peripheral edema.   Respiratory: Mild respiratory distress.   Bibasilar crackles with expiratory wheezing.     Abdominal:  Soft. No masses. Non-tender. No distension. No HSM.   Extremities: No digital cyanosis. No clubbing.   Lymphadenopathy: None.   Skin: Skin is warm and dry. No rashes, lesions or ulcers noted.    Neurological:   Alert and Oriented to person, place, and time.    Psychiatric:  Normal affect. Normal behavior.     Diagnostic Data    CXR 09/11/2017 Personally reviewed. There are bibasilar opacities and marked pulmonary congestion.      PFTs: No PFTs available for review.         ASSESSMENT / PLAN     Assessment:    1. Acute on Chronic Respiratory Failure  1. 2L NC prior to admission, now requiring HFNC  2. Right Lower Lobe Pneumonia  1. Afebrile  2. Respiratory Cx w/ Normal Respiratory Bveerly  3. ABX: Levaquin  3. History of  COPD  1. Prednisone 30mg for exacerbation  2. Duonebs/Pulmicort  4. KRIS on CKD  1. Baseline Cr 1.4  5. Volume Overload  1. Normal EF on Echo from 4/2017  2. History of Diastolic dysfunction per chart    Ms. Oliveira is a 76yo F admitted for KRIS and Pneumonia who has continued to have progressive respiratory failure despite antibiotic therapy. She has now been afebrile x 3 days. The clinical picture is most consistent with volume overload and pulmonary edema. She was recently transfused and given IVF for anemia and possible dehydration and has received a significant amount of volume since admission.       Plan:  - Recommend diuresis   - Recommend strict intake and output to ensure that patient is net negative. She may need a wolfe catheter for accurate output  - Consider repeating echocardiogram  - No need for antibiotic escalation at this time.          I discussed the diagnosis, evaluation, and  treatment options with the patient and/or appropriate family members.    Thank you very much for allowing me to participate in the care of your patient.    I spent 30 minutes with the patient. I spent > 50% percent of this time counseling and discussing current status and management.    Abigail Crawford,   Pulmonary and Critical Care Medicine    C.C.:  Patient Care Team:  Samuel Buck MD as PCP - General (Family Medicine)

## 2017-09-11 NOTE — PLAN OF CARE
Problem: Patient Care Overview (Adult)  Goal: Plan of Care Review  Outcome: Ongoing (interventions implemented as appropriate)    09/11/17 0627   Coping/Psychosocial Response Interventions   Plan Of Care Reviewed With patient   Outcome Evaluation   Outcome Summary/Follow up Plan VSS. O2 at 50% Fio2 at 55 L o2 through heated Humidified high flow oxygen. IV abx per order. NSR on the monitor. Patient appears little fatigues this am. Pt reports that she is tired. Pt is alert and oriented.. No other complains.   Patient Care Overview   Progress no change

## 2017-09-11 NOTE — THERAPY TREATMENT NOTE
Acute Care - Physical Therapy Treatment Note  [facility]     Patient Name: Jennifer Oliveira  : 1940  MRN: [id number]  Today's Date: 2017  Onset of Illness/Injury or Date of Surgery Date: 17  Date of Referral to PT: 17  Referring Physician: ARSLAN Gonzales    Admit Date: 2017    Visit Dx:    ICD-10-CM ICD-9-CM   1. Pneumonia of right lower lobe due to infectious organism J18.9 483.8   2. Impaired mobility and ADLs Z74.09 799.89   3. Impaired functional mobility, balance, gait, and endurance Z74.09 V49.89     Patient Active Problem List   Diagnosis   • Acute hypoxemic respiratory failure   • PAD (peripheral artery disease)   • Normochromic normocytic anemia   • Chronic diastolic heart failure   • Diabetes type 2, uncontrolled   • Lower extremity cellulitis   • Hypothyroid   • HTN (hypertension)   • Metabolic encephalopathy   • TIA (transient ischemic attack)   • Acute cystitis without hematuria   • Chronic kidney disease (CKD), stage III (moderate)   • GERD (gastroesophageal reflux disease) with hx of gastritis    • h/o stroke with right hemiplegia   • Hypertension   • Disease of thyroid gland   • Diastolic heart failure   • Type 2 diabetes mellitus   • CKD (chronic kidney disease), stage III   • Symptomatic anemia   • GI bleed   • KRIS (acute kidney injury)   • Nausea and vomiting   • Right lower lobe pneumonia   • Weakness   • Pneumonia   • Acute respiratory failure with hypoxia   • Hyperkalemia               Adult Rehabilitation Note       17 1315 17 1314       Rehab Assessment/Intervention    Discipline physical therapist  -SJ occupational therapist  -JR     Document Type  therapy note (daily note)  -JR     Subjective Information agree to therapy;complains of;nausea/vomiting  -SJ no complaints;agree to therapy  -JR     Patient Effort, Rehab Treatment fair  -SJ      Patient Effort, Rehab Treatment Comment pt very lethargic, episode of emesis  -SJ      Symptoms Noted  During/After Treatment other (see comments)  -SJ      Symptoms Noted Comment nausea, vomiting, RN informed  -SJ Pt vomiting during session, notified RN  -JR     Precautions/Limitations fall precautions;oxygen therapy device and L/min;other (see comments)   R-sided weakness  -SJ fall precautions;oxygen therapy device and L/min   R sided weakness  -JR     Recorded by [SJ] Winter Nur PT [JR] Magalie Trinh, OT     Vital Signs    Pre Systolic BP Rehab 161  -  -JR     Pre Treatment Diastolic BP 63  -SJ 63  -JR     Post Systolic BP Rehab 178  -  -JR     Post Treatment Diastolic BP 70  -SJ 70  -JR     Pretreatment Heart Rate (beats/min) 68  -SJ 67  -JR     Posttreatment Heart Rate (beats/min) 66  -SJ 66  -JR     Pre SpO2 (%) 92  -SJ 93  -JR     O2 Delivery Pre Treatment supplemental O2  -SJ supplemental O2   high flow  -JR     Post SpO2 (%) 91  -SJ 92  -JR     O2 Delivery Post Treatment supplemental O2  -SJ supplemental O2  -JR     Pre Patient Position Supine  -SJ Supine  -JR     Intra Patient Position Supine  -SJ Supine  -JR     Post Patient Position Supine  -SJ Supine  -JR     Recorded by [SJ] Winter Nur PT [JR] Magalie Trinh, OT     Pain Assessment    Pain Assessment 0-10  -SJ 0-10  -JR     Pain Score 0  -SJ 0  -JR     Post Pain Score 0  -SJ 0  -JR     Recorded by [] Winter Nur PT [JR] Magalie Trinh, OT     Cognitive Assessment/Intervention    Current Cognitive/Communication Assessment impaired  -SJ      Orientation Status oriented to;person  -      Follows Commands/Answers Questions 50% of the time;able to follow single-step instructions;needs cueing;needs increased time;needs repetition  -SJ 50% of the time;75% of the time;able to follow single-step instructions;needs cueing;needs increased time;needs repetition  -JR     Personal Safety mild impairment;decreased awareness, need for assist;decreased awareness, need for safety;decreased insight to deficits  -SJ mild  impairment;decreased awareness, need for assist;decreased awareness, need for safety;decreased insight to deficits  -JR     Recorded by [SJ] Winter Nur PT [JR] Magalie Trinh, OT     Bed Mobility, Assessment/Treatment    Bed Mobility, Assistive Device draw sheet;bed rails  -SJ draw sheet;bed rails  -JR     Bed Mobility, Roll Left, [place] maximum assist (25% patient effort);2 person assist required  -SJ maximum assist (25% patient effort);2 person assist required  -JR     Bed Mobility, Roll Right, [place] maximum assist (25% patient effort);2 person assist required  -SJ maximum assist (25% patient effort);2 person assist required  -JR     Bed Mobility, Safety Issues decreased use of arms for pushing/pulling;decreased use of legs for bridging/pushing;impaired trunk control for bed mobility  -SJ decreased use of arms for pushing/pulling;decreased use of legs for bridging/pushing;impaired trunk control for bed mobility  -JR     Bed Mobility, Impairments strength decreased;coordination impaired;motor control impaired;muscle tone abnormal  -SJ strength decreased;coordination impaired;motor control impaired;muscle tone abnormal  -JR     Bed Mobility, Comment cues for hand placement and sequencing, pt had episode of incontinence during session  -SJ Pt required verbal cues for sequencing. Pt incontinent of bowel and urine and dependent for hygiene.  -JR     Recorded by [SJ] Winter Nur PT [JR] Magalie Trinh, OT     Transfer Assessment/Treatment    Transfers, Bed-Chair [place] not tested  -SJ      Recorded by [SJ] Winter Nur PT      Toileting Assessment/Training    Toileting Assess/Train, Position  supine  -JR     Toileting Assess/Train, Indepen Level  dependent (less than 25% patient effort)  -JR     Toileting Assess/Train, Impairments  muscle tone abnormal;ROM decreased;strength decreased;impaired balance;postural control impaired  -JR     Toileting Assess/Train, Comment  Pt incontinent  of bowel and urine, dependent for hygiene  -JR     Recorded by  [JR] Magalie Trinh, OT     Therapy Exercises    Right Lower Extremity PROM:;sitting;ankle pumps/circles;calf stretch;hamstring stretch;heel slides;hip abduction/adduction;hip ER;hip IR;20 reps  -SJ      Left Lower Extremity AROM:;sitting;ankle pumps/circles;glut sets;heel slides;hip abduction/adduction;hip ER;hip flexion;hip IR;quad sets;SAQ;20 reps  -SJ      Right Upper Extremity  PROM:;10 reps;elbow flexion/extension;pronation/supination;shoulder abduction/adduction;hand pumps;shoulder extension/flexion  -JR     Left Upper Extremity  AAROM:;10 reps;elbow flexion/extension;pronation/supination;shoulder abduction/adduction;shoulder extension/flexion  -JR     Recorded by [SJ] Winter Nur PT [JR] Magalie Trinh, OT     Positioning and Restraints    Pre-Treatment Position in bed  - in bed  -JR     Post Treatment Position bed  - bed  -JR     In Bed notified nsg;supine;call light within reach;encouraged to call for assist;exit alarm on;heels elevated  - notified nsg;supine;call light within reach;encouraged to call for assist;exit alarm on;RUE elevated;RLE elevated;LLE elevated  -JR     Recorded by [SJ] Winter Nur PT [JR] Magalie Trinh, OT       User Key  (r) = Recorded By, (t) = Taken By, (c) = Cosigned By    Initials Name Effective Dates     Winter Nur PT 06/19/15 -     JR Magaile Trinh OT 06/22/15 -                 IP PT Goals       09/08/17 1604 09/06/17 1037       Bed Mobility PT LTG    Bed Mobility PT LTG, Date Established  09/06/17  -DM     Bed Mobility PT LTG, Time to Achieve  1 wk  -DM     Bed Mobility PT LTG, Activity Type  all bed mobility  -DM     Bed Mobility PT LTG, [place] Level  moderate assist (50% patient effort);2 person assist required  -DM     Bed Mobility PT LTG, Outcome goal ongoing  -      Transfer Training PT LTG    Transfer Training PT LTG, Date Established  09/06/17  -DM     Transfer  Training PT LTG, Time to Achieve  1 wk  -DM     Transfer Training PT LTG, Activity Type  bed to chair /chair to bed;sit to stand/stand to sit  -DM     Transfer Training PT LTG, [place] Level  maximum assist (25% patient effort);2 person assist required  -DM     Transfer Training PT LTG, Assist Device  walker, homa  -DM     Transfer Training PT LTG, Outcome goal ongoing  -SJ      Gait Training PT LTG    Gait Training Goal PT LTG, Date Established  09/06/17  -DM     Gait Training Goal PT LTG, Time to Achieve  1 wk  -DM     Gait Training Goal PT LTG, [place] Level  maximum assist (25% patient effort);2 person assist required   stable VS; RLE brace  -DM     Gait Training Goal PT LTG, Assist Device  walker, homa  -DM     Gait Training Goal PT LTG, Distance to Achieve  3  -DM     Gait Training Goal PT LTG, Outcome goal ongoing  -SJ        User Key  (r) = Recorded By, (t) = Taken By, (c) = Cosigned By    Initials Name Provider Type    EMMA Nur, PT Physical Therapist    TETE Mcgrath, PT Physical Therapist          Physical Therapy Education     Title: PT OT SLP Therapies (Active)     Topic: Physical Therapy (Active)     Point: Mobility training (Active)    Learning Progress Summary    Learner Readiness Method Response Comment Documented by Status   Patient Acceptance E,D NR   09/11/17 1618 Active    Acceptance E,D NR   09/08/17 1605 Active    Eager E,D NR   09/06/17 1036 Active               Point: Home exercise program (Active)    Learning Progress Summary    Learner Readiness Method Response Comment Documented by Status   Patient Acceptance E,D NR   09/11/17 1618 Active    Acceptance E,D NR   09/08/17 1605 Active    Eager E,D NR   09/06/17 1036 Active               Point: Body mechanics (Active)    Learning Progress Summary    Learner Readiness Method Response Comment Documented by Status   Patient Acceptance E,D NR   09/11/17 1618 Active    Acceptance E,D NR   09/08/17 1605  Active    Eager E,D NR   09/06/17 1036 Active               Point: Precautions (Active)    Learning Progress Summary    Learner Readiness Method Response Comment Documented by Status   Patient Acceptance E,D NR   09/11/17 1618 Active    Acceptance E,D NR   09/08/17 1605 Active    Eager E,D NR   09/06/17 1036 Active                      User Key     Initials Effective Dates Name Provider Type Discipline     06/19/15 -  Winter Nur, PT Physical Therapist PT     06/19/15 -  Lou Mcgrath, PT Physical Therapist PT                    PT Recommendation and Plan  Anticipated Discharge Disposition: inpatient rehabilitation facility (pul rehab)  PT Frequency: daily  Plan of Care Review  Plan Of Care Reviewed With: patient  Progress: declining  Outcome Summary/Follow up Plan: Pt more lethargic today, increased difficulty participating in therex. Pt required maxA x2 for bed mobility, c/o R UE pain.          Outcome Measures       09/11/17 1315 09/11/17 1314       How much help from another person do you currently need...    Turning from your back to your side while in flat bed without using bedrails? 2  -SJ      Moving from lying on back to sitting on the side of a flat bed without bedrails? 2  -SJ      Moving to and from a bed to a chair (including a wheelchair)? 1  -SJ      Standing up from a chair using your arms (e.g., wheelchair, bedside chair)? 1  -SJ      Climbing 3-5 steps with a railing? 1  -SJ      To walk in hospital room? 1  -SJ      AM-PAC 6 Clicks Score 8  -SJ      How much help from another is currently needed...    Putting on and taking off regular lower body clothing?  1  -JR     Bathing (including washing, rinsing, and drying)  1  -JR     Toileting (which includes using toilet bed pan or urinal)  1  -JR     Putting on and taking off regular upper body clothing  2  -JR     Taking care of personal grooming (such as brushing teeth)  2  -JR     Eating meals  2  -JR     Score  9  -JR      Functional Assessment    Outcome Measure Options AM-PAC 6 Clicks Basic Mobility (PT)  - AM-PAC 6 Clicks Daily Activity (OT)  -JR       User Key  (r) = Recorded By, (t) = Taken By, (c) = Cosigned By    Initials Name Provider Type    EMMA Nur PT Physical Therapist    JR Magalie Trinh, OT Occupational Therapist           Time Calculation:         PT Charges       09/11/17 1618          Time Calculation    Start Time 1315  -      PT Received On 09/11/17  -      PT Goal Re-Cert Due Date 09/16/17  -      Time Calculation- PT    Total Timed Code Minutes- PT 12 minute(s)  -        User Key  (r) = Recorded By, (t) = Taken By, (c) = Cosigned By    Initials Name Provider Type    EMMA Nur PT Physical Therapist          Therapy Charges for Today     Code Description Service Date Service Provider Modifiers Qty    [id number] HC PT THER PROC EA 15 MIN 9/11/2017 Winter Nur PT GP 1          PT G-Codes  Outcome Measure Options: AM-PAC 6 Clicks Basic Mobility (PT)    Winter Nur PT  9/11/2017

## 2017-09-11 NOTE — THERAPY TREATMENT NOTE
Acute Care - Occupational Therapy Treatment Note  [facility]     Patient Name: Jennifer Oliveira  : 1940  MRN: [id number]  Today's Date: 2017  Onset of Illness/Injury or Date of Surgery Date: 17  Date of Referral to OT: 17  Referring Physician: ARSLAN Gonzales      Admit Date: 2017    Visit Dx:     ICD-10-CM ICD-9-CM   1. Pneumonia of right lower lobe due to infectious organism J18.9 483.8   2. Impaired mobility and ADLs Z74.09 799.89   3. Impaired functional mobility, balance, gait, and endurance Z74.09 V49.89     Patient Active Problem List   Diagnosis   • Acute hypoxemic respiratory failure   • PAD (peripheral artery disease)   • Normochromic normocytic anemia   • Chronic diastolic heart failure   • Diabetes type 2, uncontrolled   • Lower extremity cellulitis   • Hypothyroid   • HTN (hypertension)   • Metabolic encephalopathy   • TIA (transient ischemic attack)   • Acute cystitis without hematuria   • Chronic kidney disease (CKD), stage III (moderate)   • GERD (gastroesophageal reflux disease) with hx of gastritis    • h/o stroke with right hemiplegia   • Hypertension   • Disease of thyroid gland   • Diastolic heart failure   • Type 2 diabetes mellitus   • CKD (chronic kidney disease), stage III   • Symptomatic anemia   • GI bleed   • KRIS (acute kidney injury)   • Nausea and vomiting   • Right lower lobe pneumonia   • Weakness   • Pneumonia   • Acute respiratory failure with hypoxia   • Hyperkalemia             Adult Rehabilitation Note       17 1314 17 1540       Rehab Assessment/Intervention    Discipline occupational therapist  -JR physical therapist  -SJ     Document Type therapy note (daily note)  -JR      Subjective Information no complaints;agree to therapy  -JR      Patient Effort, Rehab Treatment  adequate  -SJ     Symptoms Noted During/After Treatment  none  -SJ     Symptoms Noted Comment Pt vomiting during session, notified RN  -JR      Precautions/Limitations  fall precautions;oxygen therapy device and L/min   R sided weakness  -JR fall precautions;oxygen therapy device and L/min   R-sided weakness  -SJ     Recorded by [JR] Magalie Trinh, OT [SJ] Winter Nur, PT     Vital Signs    Pre Systolic BP Rehab 161  -  -SJ     Pre Treatment Diastolic BP 63  -JR 69  -SJ     Post Systolic BP Rehab 178  -JR      Post Treatment Diastolic BP 70  -JR      Pretreatment Heart Rate (beats/min) 67  -JR 71  -SJ     Posttreatment Heart Rate (beats/min) 66  -JR      Pre SpO2 (%) 93  -JR 91  -SJ     O2 Delivery Pre Treatment supplemental O2   high flow  -JR supplemental O2  -SJ     Post SpO2 (%) 92  -JR      O2 Delivery Post Treatment supplemental O2  -JR      Pre Patient Position Supine  -JR      Intra Patient Position Supine  -JR      Post Patient Position Supine  -JR      Recorded by [JR] Magalie Trinh, OT [SJ] Winter Nur, PT     Pain Assessment    Pain Assessment 0-10  -JR No/denies pain  -SJ     Pain Score 0  -JR      Post Pain Score 0  -JR      Recorded by [JR] Magalie Trinh, OT [SJ] Winter Nur, PT     Cognitive Assessment/Intervention    Current Cognitive/Communication Assessment  functional  -SJ     Orientation Status  oriented to;person;situation  -SJ     Follows Commands/Answers Questions 50% of the time;75% of the time;able to follow single-step instructions;needs cueing;needs increased time;needs repetition  -JR 75% of the time;able to follow single-step instructions;needs cueing;needs increased time  -SJ     Personal Safety mild impairment;decreased awareness, need for assist;decreased awareness, need for safety;decreased insight to deficits  -JR mild impairment;decreased awareness, need for assist  -SJ     Personal Safety Interventions  fall prevention program maintained;gait belt;muscle strengthening facilitated;nonskid shoes/slippers when out of bed  -SJ     Recorded by [JR] Magalie Trinh, OT [SJ] Winter Nur, PT     Bed Mobility,  Assessment/Treatment    Bed Mobility, Assistive Device draw sheet;bed rails  -      Bed Mobility, Roll Left, [place] maximum assist (25% patient effort);2 person assist required  -JR      Bed Mobility, Roll Right, [place] maximum assist (25% patient effort);2 person assist required  -      Bed Mobility, Safety Issues decreased use of arms for pushing/pulling;decreased use of legs for bridging/pushing;impaired trunk control for bed mobility  -      Bed Mobility, Impairments strength decreased;coordination impaired;motor control impaired;muscle tone abnormal  -      Bed Mobility, Comment Pt required verbal cues for sequencing. Pt incontinent of bowel and urine and dependent for hygiene.  -JR pt declined  -SJ     Recorded by [JR] Magalie Trinh, OT [SJ] Winter Nur, PT     Transfer Assessment/Treatment    Transfer, Comment  pt declined  -SJ     Recorded by  [SJ] Winter Nur, PT     Toileting Assessment/Training    Toileting Assess/Train, Position supine  -JR      Toileting Assess/Train, Indepen Level dependent (less than 25% patient effort)  -JR      Toileting Assess/Train, Impairments muscle tone abnormal;ROM decreased;strength decreased;impaired balance;postural control impaired  -      Toileting Assess/Train, Comment Pt incontinent of bowel and urine, dependent for hygiene  -JR      Recorded by [JR] Magalie Trinh, OT      Therapy Exercises    Right Lower Extremity  PROM:;sitting;ankle pumps/circles;calf stretch;hamstring stretch;heel slides;hip abduction/adduction;hip ER;hip IR;20 reps  -     Left Lower Extremity  AROM:;sitting;ankle pumps/circles;glut sets;heel slides;hip abduction/adduction;hip ER;hip flexion;hip IR;quad sets;SAQ;20 reps  -     Right Upper Extremity PROM:;10 reps;elbow flexion/extension;pronation/supination;shoulder abduction/adduction;hand pumps;shoulder extension/flexion  -      Left Upper Extremity AAROM:;10 reps;elbow  flexion/extension;pronation/supination;shoulder abduction/adduction;shoulder extension/flexion  - 10 reps;elbow flexion/extension;hand pumps;pronation/supination;shoulder extension/flexion;shoulder ER/IR;AAROM:;AROM:  -SJ     Recorded by [JR] Magalie Trinh, OT [SJ] Winter Nur PT     Positioning and Restraints    Pre-Treatment Position in bed  -JR in bed  -SJ     Post Treatment Position bed  -JR bed  -SJ     In Bed notified nsg;supine;call light within reach;encouraged to call for assist;exit alarm on;RUE elevated;RLE elevated;LLE elevated  -JR fowlers;call light within reach;encouraged to call for assist;with family/caregiver;with nsg;heels elevated  -SJ     Recorded by [JR] Magalie Trinh, OT [SJ] Winter Nur PT       User Key  (r) = Recorded By, (t) = Taken By, (c) = Cosigned By    Initials Name Effective Dates    SJ Winter Nur PT 06/19/15 -     JR Magalie Trinh, OT 06/22/15 -                 OT Goals       09/11/17 1349 09/07/17 1439 09/06/17 1019    Bed Mobility OT LTG    Bed Mobility OT LTG, Date Established   09/06/17  -CL    Bed Mobility OT LTG, Time to Achieve   by discharge  -CL    Bed Mobility OT LTG, Activity Type   roll left/roll right;supine to sit/sit to supine  -CL    Bed Mobility OT LTG, [place] Level   moderate assist (50% patient effort);2 person assist required  -CL    Bed Mobility OT LTG, Additional Goal   AAD  -CL    Bed Mobility OT LTG, Outcome goal ongoing  -JR goal ongoing  -CL     Transfer Training OT LTG    Transfer Training OT LTG, Date Established   09/06/17  -CL    Transfer Training OT LTG, Time to Achieve   by discharge  -CL    Transfer Training OT LTG, Activity Type   toilet;bed to chair /chair to bed;sit to stand/stand to sit  -CL    Transfer Training OT LTG, [place] Level   moderate assist (50% patient effort);2 person assist required  -CL    Transfer Training OT LTG, Additional Goal   AAD  -CL    Transfer Training OT LTG, Outcome goal ongoing   -JR goal ongoing  -CL     Patient Education OT LTG    Patient Education OT LTG, Date Established   09/06/17  -CL    Patient Education OT LTG, Time to Achieve   by discharge  -CL    Patient Education OT LTG, Education Type   written program;HEP;posture/body mechanics;1 hand/homa technique;home safety;adaptive equipment mgmt;energy conservation;adaptive breathing  -CL    Patient Education OT LTG, Education Understanding   verbalizes understanding  -CL    Patient Education OT LTG Outcome goal ongoing  -JR goal ongoing  -CL     UB Dressing OT LTG    UB Dressing Goal OT LTG, Date Established   09/06/17  -CL    UB Dressing Goal OT LTG, Time to Achieve   by discharge  -CL    UB Dressing Goal OT LTG, Activity Type   Utilizing homa-dressing technique  -CL    UB Dressing Goal OT LTG, [place] Level   moderate assist (50% patient effort)  -CL    UB Dressing Goal OT LTG, Additional Goal   AAD  -CL    UB Dressing Goal OT LTG, Outcome goal ongoing  -JR goal ongoing  -CL       User Key  (r) = Recorded By, (t) = Taken By, (c) = Cosigned By    Initials Name Provider Type    JR Magalie Trinh, OT Occupational Therapist    CL Trinidad Ngyuen, OT Occupational Therapist          Occupational Therapy Education     Title: PT OT SLP Therapies (Active)     Topic: Occupational Therapy (Active)     Point: ADL training (Active)    Description: Instruct learner(s) on proper safety adaptation and remediation techniques during self care or transfers.   Instruct in proper use of assistive devices.    Learning Progress Summary    Learner Readiness Method Response Comment Documented by Status   Patient Acceptance E NR Educated pt on B UE HEP. JR 09/11/17 1348 Active    Acceptance E,D NR Pt educated on positioning and BUE HEP. CL 09/07/17 1438 Active    Acceptance E,D NR Pt educated on appropriate safety precautions, t/f techniques, positioning, and benefits of therapy. CL 09/06/17 1018 Active               Point: Home exercise program (Active)     Description: Instruct learner(s) on appropriate technique for monitoring, assisting and/or progressing therapeutic exercises/activities.    Learning Progress Summary    Learner Readiness Method Response Comment Documented by Status   Patient Acceptance E NR Educated pt on B UE HEP.  09/11/17 1348 Active    Acceptance E,D NR Pt educated on positioning and BUE HEP.  09/07/17 1438 Active               Point: Precautions (Active)    Description: Instruct learner(s) on prescribed precautions during self-care and functional transfers.    Learning Progress Summary    Learner Readiness Method Response Comment Documented by Status   Patient Acceptance E,D NR Pt educated on positioning and BUE HEP. CL 09/07/17 1438 Active    Acceptance E,D NR Pt educated on appropriate safety precautions, t/f techniques, positioning, and benefits of therapy.  09/06/17 1018 Active               Point: Body mechanics (Active)    Description: Instruct learner(s) on proper positioning and spine alignment during self-care, functional mobility activities and/or exercises.    Learning Progress Summary    Learner Readiness Method Response Comment Documented by Status   Patient Acceptance E,D NR Pt educated on appropriate safety precautions, t/f techniques, positioning, and benefits of therapy.  09/06/17 1018 Active                      User Key     Initials Effective Dates Name Provider Type Discipline    JR 06/22/15 -  Magalie Trinh, OT Occupational Therapist OT    CL 06/08/16 -  Trinidad Nguyen OT Occupational Therapist OT                  OT Recommendation and Plan  Anticipated Equipment Needs At Discharge:  (TBA further)  Anticipated Discharge Disposition: skilled nursing facility  Planned Therapy Interventions: adaptive equipment training, ADL retraining, balance training, bed mobility training, energy conservation, home exercise program, transfer training  Therapy Frequency: daily  Plan of Care Review  Plan Of Care Reviewed With:  patient  Progress: progress towards functional goals is fair  Outcome Summary/Follow up Plan: Pt required max A x2 for rolling R/L for hygiene following incontinent episode. VSS throughout treatment.        Outcome Measures       09/11/17 1314 09/08/17 1540       How much help from another person do you currently need...    Turning from your back to your side while in flat bed without using bedrails?  2  -SJ     Moving from lying on back to sitting on the side of a flat bed without bedrails?  2  -SJ     Moving to and from a bed to a chair (including a wheelchair)?  2  -SJ     Standing up from a chair using your arms (e.g., wheelchair, bedside chair)?  2  -SJ     Climbing 3-5 steps with a railing?  1  -SJ     To walk in hospital room?  1  -SJ     AM-PAC 6 Clicks Score  10  -SJ     How much help from another is currently needed...    Putting on and taking off regular lower body clothing? 1  -JR      Bathing (including washing, rinsing, and drying) 1  -JR      Toileting (which includes using toilet bed pan or urinal) 1  -JR      Putting on and taking off regular upper body clothing 2  -JR      Taking care of personal grooming (such as brushing teeth) 2  -JR      Eating meals 2  -JR      Score 9  -JR      Functional Assessment    Outcome Measure Options AM-PAC 6 Clicks Daily Activity (OT)  -JR AM-PAC 6 Clicks Basic Mobility (PT)  -       User Key  (r) = Recorded By, (t) = Taken By, (c) = Cosigned By    Initials Name Provider Type    EMMA Nur, PT Physical Therapist    JR Magalie Trinh, OT Occupational Therapist           Time Calculation:         Time Calculation- OT       09/11/17 1351          Time Calculation- OT    OT Start Time 1314  -      Total Timed Code Minutes- OT 15 minute(s)  -JR      OT Received On 09/11/17  -        User Key  (r) = Recorded By, (t) = Taken By, (c) = Cosigned By    Initials Name Provider Type    JR Magalie Trinh, OT Occupational Therapist           Therapy Charges for  Today     Code Description Service Date Service Provider Modifiers Qty    [id number]  OT THERAPEUTIC ACT EA 15 MIN 9/11/2017 Magalie Trinh, OT GO 1               Magalie Trinh OT  9/11/2017

## 2017-09-11 NOTE — PROGRESS NOTES
Continued Stay Note  [facility]     Patient Name: Jennifer Oliveira  MRN: [id number]  Today's Date: 9/11/2017    Admit Date: 9/5/2017          Discharge Plan       09/11/17 1554    Case Management/Social Work Plan    Plan Rehab    Patient/Family In Agreement With Plan yes    Additional Comments Attempted to see pt x2 today. Pt not medically ready for discharge.  called Xiomara with [facility] and [facility] is continuing to follow pt. SW will continue to follow for discharge needs and concerns.     Final Note    Final Note SW is following              Discharge Codes     None        Expected Discharge Date and Time     Expected Discharge Date Expected Discharge Time    Sep 8, 2017             SIMONE Finch

## 2017-09-11 NOTE — CONSULTS
NAL Consult Note    Jennifer Oliveira  1940  [id number]    Date of Admit:  9/5/2017    Date of Consult: 9/11/2017        Requesting Provider: No ref. provider found    Evaluating Physician: Babar Law MD    Reason for Consultation:  Elevated Cr    Chief Complaint:  SOB     History of present illness:    Patient is a 77 y.o.  Yr old female with unknow CKD.  Per pt has never been seen by nephrology but Cr may have ranged 1.5-1.6 in the past.  No NSAIDs.  Pt with recent GIB requiring multiple transfusions.  Recent Diagnosis of PNA.    Came back with sig weakness, SOB.  + N/V and poor po intake.  Here cr 2.0-2.2.  Nephrology consulted to eval.  Pt feeling better.. SOB improved. Pt reports good UOP/        Past Medical History  Past Medical History:   Diagnosis Date   • CKD (chronic kidney disease), stage III     baseline Cr 1.5-1.6   • Coronary artery disease    • Diabetes mellitus    • Diastolic heart failure     last LVEF 70%   • Disease of thyroid gland    • GERD (gastroesophageal reflux disease)    • Hypertension    • PAD (peripheral artery disease)     s/p right fem-pop bypass 7/2016   • Stroke     residual chronic right hemiplegia       Past Surgical History:   Procedure Laterality Date   • CARPAL TUNNEL RELEASE     • CHOLECYSTECTOMY     • ENDOSCOPY N/A 5/15/2017    Procedure: ESOPHAGOGASTRODUODENOSCOPY;  Surgeon: Lizbet Vidales MD;  Location:  [facility];  Service:    • ENDOSCOPY N/A 5/30/2017    Procedure: ESOPHAGOGASTRODUODENOSCOPY;  Surgeon: Lizbet Vidales MD;  Location:  [facility];  Service:    • FEMORAL POPLITEAL BYPASS Right    • HYSTERECTOMY         Allergies:  Allergies   Allergen Reactions   • Erythromycin Rash       Medication:   See electronic record    Soc Hx:   Social History     Social History   • Marital status: Single     Spouse name: N/A   • Number of children: N/A   • Years of education: N/A     Social History Main Topics   • Smoking status: Former Smoker     Packs/day:  "1.00     Years: 45.00     Quit date: 2009   • Smokeless tobacco: Never Used   • Alcohol use No   • Drug use: No   • Sexual activity: No     Other Topics Concern   • None     Social History Narrative    Lives alone in [place]. [employer] and two daughters who assist her.        Fam Hx:  No congenital renal disease      Review of Systems:  Full review of systems reviewed and are as above  In HPI or per admitting H&P,otherwise negative for acute complaints    Physical Exam:   Vital Signs   Blood pressure 123/62, pulse 84, temperature 98.8 °F (37.1 °C), temperature source Axillary, resp. rate 28, height 63\" (160 cm), weight 170 lb (77.1 kg), SpO2 93 %.  09/10 0701 - 09/11 0700  In: 600 [P.O.:600]  Out: 1500 [Urine:1500]    GENERAL: WD elderly female. NAD.   PSYCHIATRIC:  Awake and alert,  Normal mood and affect. Cooperative with PE  EYE: PE, no icterus, no conjunctivitis  ENT: ommm, dentition intact,  Hearing intact  NECK: Supple , No JVD discernable,  Trachea midline, no palp thyroid  CV: RRR;  no edema  LUNGS:  Quiet,  Nonlabored resp.  Clear to auscultation bilaterally.  ABDOMEN: Soft, nontender, nondistended. BS present.  : no palp bladder, no wolfe  SKIN: Warm and dry without rash    Laboratory Data    Results from last 7 days  Lab Units 09/11/17  0734 09/10/17  0707 09/08/17  0519   HEMOGLOBIN g/dL 10.7* 9.8* 8.9*   HEMATOCRIT % 33.5* 30.0* 27.9*       Results from last 7 days  Lab Units 09/11/17  0846 09/10/17  0707 09/09/17  0526 09/08/17  0519  09/06/17  0519 09/05/17  1146   SODIUM mmol/L 143 140 136 135  < > 136 140   POTASSIUM mmol/L 3.7 4.0 3.9 4.1  < > 4.2 4.4   CHLORIDE mmol/L 109 107 109 108  < > 106 108   CO2 mmol/L 24.0 21.0 22.0 19.0*  < > 22.0 26.0   BUN mg/dL 57* 60* 47* 36*  < > 32* 43*   CREATININE mg/dL 2.20* 2.20* 2.10* 2.20*  < > 2.00* 2.20*   CALCIUM mg/dL 8.0* 8.2* 8.8 8.5*  < > 7.9* 8.7   ALBUMIN g/dL  --   --   --   --   --  3.00* 3.70   < > = values in this interval not " displayed.    Results from last 7 days  Lab Units 09/05/17  1223   COLOR UA  Yellow   CLARITY UA  Cloudy*   PH, URINE  6.0   SPECIFIC GRAVITY, URINE  1.017   GLUCOSE UA  Negative   KETONES UA  Negative   BILIRUBIN UA  Negative   PROTEIN UA  >=300 mg/dL (3+)*   BLOOD UA  Moderate (2+)*   LEUKOCYTES UA  Negative   NITRITE UA  Negative       Results from last 7 days  Lab Units 09/06/17  0519   ALK PHOS U/L 54   BILIRUBIN mg/dL 0.3   ALT (SGPT) U/L 19   AST (SGOT) U/L 28         Estimated Creatinine Clearance: 21.1 mL/min (by C-G formula based on Cr of 2.2).    A/P:      ARF:  Cr persistantly 2.0-2.2 since admission 6 days ago.  May have underlying ATN due to dehydration with N/V prior to admission. Non-oliguric.  Was on lasix, held yesterday.  Multiple abx.  Will get urine indicies. Cont strict I/O's.       CKD3:  Baseline 1.5-1.6    HTN: BP stable.     PNA: on ABx    Anemia:  Hgb stable.     Thank you consulting us on Jennifer Oliveira who is of high risk and complexity.  We will follow along closely    Babar Law MD  9/11/2017  11:08 AM

## 2017-09-11 NOTE — PLAN OF CARE
Problem: Patient Care Overview (Adult)  Goal: Plan of Care Review  Outcome: Ongoing (interventions implemented as appropriate)    09/11/17 1616   Coping/Psychosocial Response Interventions   Plan Of Care Reviewed With patient   Outcome Evaluation   Outcome Summary/Follow up Plan Pt more lethargic today, increased difficulty participating in therex. Pt required maxA x2 for bed mobility, c/o R UE pain.   Patient Care Overview   Progress declining         Problem: Inpatient Physical Therapy  Goal: Bed Mobility Goal LTG- PT  Outcome: Ongoing (interventions implemented as appropriate)    09/06/17 1037 09/08/17 1604   Bed Mobility PT LTG   Bed Mobility PT LTG, Date Established 09/06/17 --    Bed Mobility PT LTG, Time to Achieve 1 wk --    Bed Mobility PT LTG, Activity Type all bed mobility --    Bed Mobility PT LTG, [place] Level moderate assist (50% patient effort);2 person assist required --    Bed Mobility PT LTG, Outcome --  goal ongoing       Goal: Transfer Training Goal 1 LTG- PT  Outcome: Ongoing (interventions implemented as appropriate)    09/06/17 1037 09/08/17 1604   Transfer Training PT LTG   Transfer Training PT LTG, Date Established 09/06/17 --    Transfer Training PT LTG, Time to Achieve 1 wk --    Transfer Training PT LTG, Activity Type bed to chair /chair to bed;sit to stand/stand to sit --    Transfer Training PT LTG, [place] Level maximum assist (25% patient effort);2 person assist required --    Transfer Training PT LTG, Assist Device walker, homa --    Transfer Training PT LTG, Outcome --  goal ongoing       Goal: Gait Training Goal LTG- PT  Outcome: Ongoing (interventions implemented as appropriate)    09/06/17 1037 09/08/17 1604   Gait Training PT LTG   Gait Training Goal PT LTG, Date Established 09/06/17 --    Gait Training Goal PT LTG, Time to Achieve 1 wk --    Gait Training Goal PT LTG, [place] Level maximum assist (25% patient effort);2 person assist required  (stable VS; RLE  brace) --    Gait Training Goal PT LTG, Assist Device walker, homa --    Gait Training Goal PT LTG, Distance to Achieve 3 --    Gait Training Goal PT LTG, Outcome --  goal ongoing

## 2017-09-11 NOTE — PLAN OF CARE
Problem: Patient Care Overview (Adult)  Goal: Plan of Care Review  Outcome: Ongoing (interventions implemented as appropriate)    09/11/17 1349   Coping/Psychosocial Response Interventions   Plan Of Care Reviewed With patient   Outcome Evaluation   Outcome Summary/Follow up Plan Pt required max A x2 for rolling R/L for hygiene following incontinent episode. VSS throughout treatment.   Patient Care Overview   Progress progress towards functional goals is fair         Problem: Inpatient Occupational Therapy  Goal: Bed Mobility Goal LTG- OT  Outcome: Ongoing (interventions implemented as appropriate)    09/06/17 1019 09/11/17 1349   Bed Mobility OT LTG   Bed Mobility OT LTG, Date Established 09/06/17 --    Bed Mobility OT LTG, Time to Achieve by discharge --    Bed Mobility OT LTG, Activity Type roll left/roll right;supine to sit/sit to supine --    Bed Mobility OT LTG, [place] Level moderate assist (50% patient effort);2 person assist required --    Bed Mobility OT LTG, Additional Goal AAD --    Bed Mobility OT LTG, Outcome --  goal ongoing       Goal: Transfer Training Goal 1 LTG- OT  Outcome: Ongoing (interventions implemented as appropriate)    09/06/17 1019 09/11/17 1349   Transfer Training OT LTG   Transfer Training OT LTG, Date Established 09/06/17 --    Transfer Training OT LTG, Time to Achieve by discharge --    Transfer Training OT LTG, Activity Type toilet;bed to chair /chair to bed;sit to stand/stand to sit --    Transfer Training OT LTG, [place] Level moderate assist (50% patient effort);2 person assist required --    Transfer Training OT LTG, Additional Goal AAD --    Transfer Training OT LTG, Outcome --  goal ongoing       Goal: Patient Education Goal LTG- OT  Outcome: Ongoing (interventions implemented as appropriate)    09/06/17 1019 09/11/17 1349   Patient Education OT LTG   Patient Education OT LTG, Date Established 09/06/17 --    Patient Education OT LTG, Time to Achieve by discharge --     Patient Education OT LTG, Education Type written program;HEP;posture/body mechanics;1 hand/homa technique;home safety;adaptive equipment mgmt;energy conservation;adaptive breathing --    Patient Education OT LTG, Education Understanding verbalizes understanding --    Patient Education OT LTG Outcome --  goal ongoing       Goal: UB Dressing Goal LTG- OT  Outcome: Ongoing (interventions implemented as appropriate)    09/06/17 1019 09/11/17 1349   UB Dressing OT LTG   UB Dressing Goal OT LTG, Date Established 09/06/17 --    UB Dressing Goal OT LTG, Time to Achieve by discharge --    UB Dressing Goal OT LTG, Activity Type Utilizing homa-dressing technique --    UB Dressing Goal OT LTG, [place] Level moderate assist (50% patient effort) --    UB Dressing Goal OT LTG, Additional Goal AAD --    UB Dressing Goal OT LTG, Outcome --  goal ongoing

## 2017-09-11 NOTE — PLAN OF CARE
Problem: Patient Care Overview (Adult)  Goal: Plan of Care Review  Outcome: Ongoing (interventions implemented as appropriate)    09/11/17 1633   Coping/Psychosocial Response Interventions   Plan Of Care Reviewed With patient   Outcome Evaluation   Outcome Summary/Follow up Plan pt lethargic this morning and speech was slurred. Blood sugar was found to be in the 70s and orange juice/honey given and pt mentality improved but lethargy persisted. MD notified and pulmonolgy/ nephrology consulted and stat ABGs and CT obtained. ABGs unremarkable and CT showed no acute findings. O2 sats remain stable on high flow cannula but breathing is more labored today with accessory muscle use noted. NSR on the monitor with all VSS. Will continue to monitor.    Patient Care Overview   Progress declining         Problem: Pneumonia (Adult)  Goal: Signs and Symptoms of Listed Potential Problems Will be Absent or Manageable (Pneumonia)  Outcome: Ongoing (interventions implemented as appropriate)    Problem: Infection, Risk/Actual (Adult)  Goal: Identify Related Risk Factors and Signs and Symptoms  Outcome: Ongoing (interventions implemented as appropriate)  Goal: Infection Prevention/Resolution  Outcome: Ongoing (interventions implemented as appropriate)    Problem: Fall Risk (Adult)  Goal: Identify Related Risk Factors and Signs and Symptoms  Outcome: Ongoing (interventions implemented as appropriate)

## 2017-09-12 ENCOUNTER — APPOINTMENT (OUTPATIENT)
Dept: GENERAL RADIOLOGY | Facility: HOSPITAL | Age: 77
End: 2017-09-12

## 2017-09-12 LAB
ALBUMIN SERPL-MCNC: 3.4 G/DL (ref 3.2–4.8)
ANION GAP SERPL CALCULATED.3IONS-SCNC: 11 MMOL/L (ref 3–11)
ANION GAP SERPL CALCULATED.3IONS-SCNC: 11 MMOL/L (ref 3–11)
BASOPHILS # BLD AUTO: 0.01 10*3/MM3 (ref 0–0.2)
BASOPHILS NFR BLD AUTO: 0.1 % (ref 0–1)
BUN BLD-MCNC: 53 MG/DL (ref 9–23)
BUN BLD-MCNC: 58 MG/DL (ref 9–23)
BUN/CREAT SERPL: 26.5 (ref 7–25)
BUN/CREAT SERPL: 29 (ref 7–25)
CALCIUM SPEC-SCNC: 8.3 MG/DL (ref 8.7–10.4)
CALCIUM SPEC-SCNC: 8.9 MG/DL (ref 8.7–10.4)
CHLORIDE SERPL-SCNC: 103 MMOL/L (ref 99–109)
CHLORIDE SERPL-SCNC: 105 MMOL/L (ref 99–109)
CO2 SERPL-SCNC: 24 MMOL/L (ref 20–31)
CO2 SERPL-SCNC: 25 MMOL/L (ref 20–31)
CREAT BLD-MCNC: 2 MG/DL (ref 0.6–1.3)
CREAT BLD-MCNC: 2 MG/DL (ref 0.6–1.3)
DEPRECATED RDW RBC AUTO: 45.4 FL (ref 37–54)
EOSINOPHIL # BLD AUTO: 0.01 10*3/MM3 (ref 0–0.3)
EOSINOPHIL NFR BLD AUTO: 0.1 % (ref 0–3)
ERYTHROCYTE [DISTWIDTH] IN BLOOD BY AUTOMATED COUNT: 13.7 % (ref 11.3–14.5)
GFR SERPL CREATININE-BSD FRML MDRD: 24 ML/MIN/1.73
GFR SERPL CREATININE-BSD FRML MDRD: 24 ML/MIN/1.73
GLUCOSE BLD-MCNC: 159 MG/DL (ref 70–100)
GLUCOSE BLD-MCNC: 236 MG/DL (ref 70–100)
GLUCOSE BLDC GLUCOMTR-MCNC: 135 MG/DL (ref 70–130)
GLUCOSE BLDC GLUCOMTR-MCNC: 139 MG/DL (ref 70–130)
GLUCOSE BLDC GLUCOMTR-MCNC: 160 MG/DL (ref 70–130)
GLUCOSE BLDC GLUCOMTR-MCNC: 282 MG/DL (ref 70–130)
HCT VFR BLD AUTO: 28.3 % (ref 34.5–44)
HGB BLD-MCNC: 9.3 G/DL (ref 11.5–15.5)
IMM GRANULOCYTES # BLD: 0.1 10*3/MM3 (ref 0–0.03)
IMM GRANULOCYTES NFR BLD: 0.9 % (ref 0–0.6)
IRON 24H UR-MRATE: 19 MCG/DL (ref 50–175)
IRON SATN MFR SERPL: 9 % (ref 15–50)
LYMPHOCYTES # BLD AUTO: 0.84 10*3/MM3 (ref 0.6–4.8)
LYMPHOCYTES NFR BLD AUTO: 7.5 % (ref 24–44)
MCH RBC QN AUTO: 29.4 PG (ref 27–31)
MCHC RBC AUTO-ENTMCNC: 32.9 G/DL (ref 32–36)
MCV RBC AUTO: 89.6 FL (ref 80–99)
MONOCYTES # BLD AUTO: 0.55 10*3/MM3 (ref 0–1)
MONOCYTES NFR BLD AUTO: 4.9 % (ref 0–12)
NEUTROPHILS # BLD AUTO: 9.76 10*3/MM3 (ref 1.5–8.3)
NEUTROPHILS NFR BLD AUTO: 86.5 % (ref 41–71)
PHOSPHATE SERPL-MCNC: 5.9 MG/DL (ref 2.4–5.1)
PLATELET # BLD AUTO: 276 10*3/MM3 (ref 150–450)
PMV BLD AUTO: 9.9 FL (ref 6–12)
POTASSIUM BLD-SCNC: 3.8 MMOL/L (ref 3.5–5.5)
POTASSIUM BLD-SCNC: 3.8 MMOL/L (ref 3.5–5.5)
RBC # BLD AUTO: 3.16 10*6/MM3 (ref 3.89–5.14)
SODIUM BLD-SCNC: 138 MMOL/L (ref 132–146)
SODIUM BLD-SCNC: 141 MMOL/L (ref 132–146)
TIBC SERPL-MCNC: 211 MCG/DL (ref 250–450)
WBC NRBC COR # BLD: 11.27 10*3/MM3 (ref 3.5–10.8)

## 2017-09-12 PROCEDURE — 71010 HC CHEST PA OR AP: CPT

## 2017-09-12 PROCEDURE — 82962 GLUCOSE BLOOD TEST: CPT

## 2017-09-12 PROCEDURE — 94799 UNLISTED PULMONARY SVC/PX: CPT

## 2017-09-12 PROCEDURE — 25010000002 NA FERRIC GLUC CPLX PER 12.5 MG: Performed by: INTERNAL MEDICINE

## 2017-09-12 PROCEDURE — 99232 SBSQ HOSP IP/OBS MODERATE 35: CPT | Performed by: INTERNAL MEDICINE

## 2017-09-12 PROCEDURE — 83550 IRON BINDING TEST: CPT | Performed by: INTERNAL MEDICINE

## 2017-09-12 PROCEDURE — 80048 BASIC METABOLIC PNL TOTAL CA: CPT | Performed by: HOSPITALIST

## 2017-09-12 PROCEDURE — 94640 AIRWAY INHALATION TREATMENT: CPT

## 2017-09-12 PROCEDURE — 94760 N-INVAS EAR/PLS OXIMETRY 1: CPT

## 2017-09-12 PROCEDURE — 63710000001 INSULIN DETEMIR PER 5 UNITS: Performed by: HOSPITALIST

## 2017-09-12 PROCEDURE — 99233 SBSQ HOSP IP/OBS HIGH 50: CPT | Performed by: INTERNAL MEDICINE

## 2017-09-12 PROCEDURE — 83540 ASSAY OF IRON: CPT | Performed by: INTERNAL MEDICINE

## 2017-09-12 PROCEDURE — 63710000001 PREDNISONE PER 1 MG: Performed by: HOSPITALIST

## 2017-09-12 PROCEDURE — 25010000002 PIPERACILLIN SOD-TAZOBACTAM PER 1 G

## 2017-09-12 PROCEDURE — 63710000001 PREDNISONE PER 5 MG: Performed by: HOSPITALIST

## 2017-09-12 PROCEDURE — 85025 COMPLETE CBC W/AUTO DIFF WBC: CPT | Performed by: HOSPITALIST

## 2017-09-12 RX ORDER — LEVOFLOXACIN 750 MG/1
750 TABLET ORAL EVERY OTHER DAY
Status: DISCONTINUED | OUTPATIENT
Start: 2017-09-13 | End: 2017-09-18

## 2017-09-12 RX ADMIN — INSULIN LISPRO 4 UNITS: 100 INJECTION, SOLUTION INTRAVENOUS; SUBCUTANEOUS at 08:18

## 2017-09-12 RX ADMIN — GUAIFENESIN 600 MG: 600 TABLET, EXTENDED RELEASE ORAL at 21:56

## 2017-09-12 RX ADMIN — PREDNISONE 30 MG: 20 TABLET ORAL at 08:16

## 2017-09-12 RX ADMIN — PANTOPRAZOLE SODIUM 40 MG: 40 TABLET, DELAYED RELEASE ORAL at 06:58

## 2017-09-12 RX ADMIN — BACLOFEN 10 MG: 10 TABLET ORAL at 21:55

## 2017-09-12 RX ADMIN — IPRATROPIUM BROMIDE AND ALBUTEROL SULFATE 3 ML: .5; 3 SOLUTION RESPIRATORY (INHALATION) at 15:24

## 2017-09-12 RX ADMIN — METOPROLOL TARTRATE 50 MG: 50 TABLET, FILM COATED ORAL at 21:56

## 2017-09-12 RX ADMIN — BUDESONIDE 0.5 MG: 0.5 INHALANT RESPIRATORY (INHALATION) at 19:14

## 2017-09-12 RX ADMIN — ASPIRIN 81 MG 81 MG: 81 TABLET ORAL at 08:16

## 2017-09-12 RX ADMIN — INSULIN DETEMIR 25 UNITS: 100 INJECTION, SOLUTION SUBCUTANEOUS at 21:57

## 2017-09-12 RX ADMIN — BACLOFEN 10 MG: 10 TABLET ORAL at 15:57

## 2017-09-12 RX ADMIN — GABAPENTIN 900 MG: 300 CAPSULE ORAL at 21:56

## 2017-09-12 RX ADMIN — TAZOBACTAM SODIUM AND PIPERACILLIN SODIUM 3.38 G: 375; 3 INJECTION, SOLUTION INTRAVENOUS at 04:47

## 2017-09-12 RX ADMIN — OXYCODONE HYDROCHLORIDE AND ACETAMINOPHEN 500 MG: 500 TABLET ORAL at 08:16

## 2017-09-12 RX ADMIN — AMLODIPINE BESYLATE 10 MG: 10 TABLET ORAL at 21:55

## 2017-09-12 RX ADMIN — BACLOFEN 10 MG: 10 TABLET ORAL at 06:58

## 2017-09-12 RX ADMIN — GUAIFENESIN 600 MG: 600 TABLET, EXTENDED RELEASE ORAL at 08:16

## 2017-09-12 RX ADMIN — SODIUM CHLORIDE 250 MG: 9 INJECTION, SOLUTION INTRAVENOUS at 13:39

## 2017-09-12 RX ADMIN — Medication 250 MG: at 08:16

## 2017-09-12 RX ADMIN — BUDESONIDE 0.5 MG: 0.5 INHALANT RESPIRATORY (INHALATION) at 06:54

## 2017-09-12 RX ADMIN — LEVOTHYROXINE SODIUM 200 MCG: 100 TABLET ORAL at 06:58

## 2017-09-12 RX ADMIN — IPRATROPIUM BROMIDE AND ALBUTEROL SULFATE 3 ML: .5; 3 SOLUTION RESPIRATORY (INHALATION) at 19:14

## 2017-09-12 RX ADMIN — INSULIN LISPRO 4 UNITS: 100 INJECTION, SOLUTION INTRAVENOUS; SUBCUTANEOUS at 21:56

## 2017-09-12 RX ADMIN — ATORVASTATIN CALCIUM 10 MG: 10 TABLET, FILM COATED ORAL at 21:57

## 2017-09-12 RX ADMIN — INSULIN LISPRO 2 UNITS: 100 INJECTION, SOLUTION INTRAVENOUS; SUBCUTANEOUS at 08:17

## 2017-09-12 RX ADMIN — IPRATROPIUM BROMIDE AND ALBUTEROL SULFATE 3 ML: .5; 3 SOLUTION RESPIRATORY (INHALATION) at 03:28

## 2017-09-12 RX ADMIN — TAZOBACTAM SODIUM AND PIPERACILLIN SODIUM 3.38 G: 375; 3 INJECTION, SOLUTION INTRAVENOUS at 21:56

## 2017-09-12 RX ADMIN — IPRATROPIUM BROMIDE AND ALBUTEROL SULFATE 3 ML: .5; 3 SOLUTION RESPIRATORY (INHALATION) at 10:31

## 2017-09-12 RX ADMIN — TAZOBACTAM SODIUM AND PIPERACILLIN SODIUM 3.38 G: 375; 3 INJECTION, SOLUTION INTRAVENOUS at 09:46

## 2017-09-12 RX ADMIN — TAZOBACTAM SODIUM AND PIPERACILLIN SODIUM 3.38 G: 375; 3 INJECTION, SOLUTION INTRAVENOUS at 15:57

## 2017-09-12 RX ADMIN — METOPROLOL TARTRATE 50 MG: 50 TABLET, FILM COATED ORAL at 08:16

## 2017-09-12 RX ADMIN — INSULIN DETEMIR 25 UNITS: 100 INJECTION, SOLUTION SUBCUTANEOUS at 08:16

## 2017-09-12 RX ADMIN — IPRATROPIUM BROMIDE AND ALBUTEROL SULFATE 3 ML: .5; 3 SOLUTION RESPIRATORY (INHALATION) at 06:53

## 2017-09-12 RX ADMIN — Medication 325 MG: at 08:16

## 2017-09-12 RX ADMIN — CLOPIDOGREL BISULFATE 75 MG: 75 TABLET ORAL at 08:16

## 2017-09-12 RX ADMIN — Medication 250 MG: at 18:18

## 2017-09-12 NOTE — PROGRESS NOTES
"   LOS: 7 days    Patient Care Team:  Samuel Buck MD as PCP - General (Family Medicine)    Subjective     No new events    Objective     Vital Signs:  Blood pressure 130/59, pulse 63, temperature 97.7 °F (36.5 °C), temperature source Oral, resp. rate 22, height 63\" (160 cm), weight 170 lb (77.1 kg), SpO2 96 %.    Flowsheet Rows         First Filed Value    Admission Height  63\" (160 cm) Documented at 09/05/2017 1009    Admission Weight  170 lb (77.1 kg) Documented at 09/05/2017 1009          09/11 0701 - 09/12 0700  In: 530 [P.O.:480]  Out: -     Physical Exam:    GENERAL: WD elderly female. NAD.   PSYCHIATRIC:  Awake and alert,  Normal mood and affect. Cooperative with PE  EYE: PE, no icterus, no conjunctivitis  ENT: ommm, dentition intact,  Hearing intact  NECK: Supple , No JVD discernable,   CV: RRR;  no edema  LUNGS:  Quiet,  Nonlabored resp.  + rhonchi and decreased bilaterally.  ABDOMEN: Soft, nontender, nondistended. BS present.  : no palp bladder, no wolfe  SKIN: Warm and dry without rash    Labs:    Results from last 7 days  Lab Units 09/12/17  0526 09/11/17  0734 09/10/17  0707   WBC 10*3/mm3 11.27* 11.36* 7.82   HEMOGLOBIN g/dL 9.3* 10.7* 9.8*   PLATELETS 10*3/mm3 276 271 228       Results from last 7 days  Lab Units 09/12/17  0526 09/11/17  2324 09/11/17  0846 09/10/17  0707  09/06/17  0519 09/05/17  1146   SODIUM mmol/L 141 138 143 140  < > 136 140   POTASSIUM mmol/L 3.8 3.8 3.7 4.0  < > 4.2 4.4   CHLORIDE mmol/L 105 103 109 107  < > 106 108   CO2 mmol/L 25.0 24.0 24.0 21.0  < > 22.0 26.0   BUN mg/dL 58* 53* 57* 60*  < > 32* 43*   CREATININE mg/dL 2.00* 2.00* 2.20* 2.20*  < > 2.00* 2.20*   CALCIUM mg/dL 8.3* 8.9 8.0* 8.2*  < > 7.9* 8.7   PHOSPHORUS mg/dL  --  5.9*  --   --   --   --   --    ALBUMIN g/dL  --  3.40  --   --   --  3.00* 3.70   < > = values in this interval not displayed.    Results from last 7 days  Lab Units 09/06/17  0519   ALK PHOS U/L 54   BILIRUBIN mg/dL 0.3   ALT (SGPT) U/L 19 "   AST (SGOT) U/L 28       Results from last 7 days  Lab Units 09/11/17  1031   PH, ARTERIAL pH units 7.344*   PO2 ART mm Hg 66.3*   PCO2, ARTERIAL mm Hg 39.6   HCO3 ART mmol/L 21.5       Results from last 7 days  Lab Units 09/05/17  1223   COLOR UA  Yellow   CLARITY UA  Cloudy*   PH, URINE  6.0   SPECIFIC GRAVITY, URINE  1.017   GLUCOSE UA  Negative   KETONES UA  Negative   BILIRUBIN UA  Negative   PROTEIN UA  >=300 mg/dL (3+)*   BLOOD UA  Moderate (2+)*   LEUKOCYTES UA  Negative   NITRITE UA  Negative       Estimated Creatinine Clearance: 23.2 mL/min (by C-G formula based on Cr of 2).      A/P:    ARF:  Cr persistantly 2.0-2.2 since admission 6 days ago.  May have underlying ATN due to dehydration with N/V prior to admission. Non-oliguric.  Was on lasix, held yesterday.  Multiple abx.  Awating urine indicies. No UOP recorded overnight.       CKD3:  Baseline 1.5-1.6     HTN: BP stable.      PNA: on ABx     Anemia:  Hgb stable.  Tsat 8%.  Will give IV Fe.         Babar Law MD  09/12/17  10:24 AM

## 2017-09-12 NOTE — PLAN OF CARE
Problem: Patient Care Overview (Adult)  Goal: Plan of Care Review  Outcome: Ongoing (interventions implemented as appropriate)    09/12/17 0636   Coping/Psychosocial Response Interventions   Plan Of Care Reviewed With patient   Outcome Evaluation   Outcome Summary/Follow up Plan Pt remains on high flow nasal canula and VSS. Pt breath sounds are very course with ronchi.    Patient Care Overview   Progress no change

## 2017-09-12 NOTE — PROGRESS NOTES
"Adult Nutrition  Assessment/PES    Patient Name:  Jennifer Oliveira  YOB: 1940  MRN: [id number]  Admit Date:  9/5/2017    Assessment Date:  9/12/2017        Reason for Assessment       09/12/17 1834    Reason for Assessment    Reason For Assessment/Visit length of stay    Time Spent (min) 45    Diagnosis Diagnosis    Cardiac CAD;CHF;HTN;PVD    Endocrine DM Type 2;Hypothyroid    Gastrointestinal Diarrhea;Vomiting;Nausea;GERD/Reflux;Gastrointestinal bleed    Hematological Anemia    Infectious Disease C. diff    Neurological CVA;TIA    Pulmonary/Critical Care COPD;Pneumonia    Renal KRIS;CKD    Substance Use Tobacco- remote        Patient Active Problem List   Diagnosis   • Acute hypoxemic respiratory failure   • PAD (peripheral artery disease)   • Normochromic normocytic anemia   • Chronic diastolic heart failure   • Diabetes type 2, uncontrolled   • Lower extremity cellulitis   • Hypothyroid   • HTN (hypertension)   • Metabolic encephalopathy   • TIA (transient ischemic attack)   • Acute cystitis without hematuria   • Chronic kidney disease (CKD), stage III (moderate)   • GERD (gastroesophageal reflux disease) with hx of gastritis    • h/o stroke with right hemiplegia   • Hypertension   • Disease of thyroid gland   • Diastolic heart failure   • Type 2 diabetes mellitus   • CKD (chronic kidney disease), stage III   • Symptomatic anemia   • GI bleed   • KRIS (acute kidney injury)   • Nausea and vomiting   • Right lower lobe pneumonia   • Weakness   • Pneumonia   • Acute respiratory failure with hypoxia   • Hyperkalemia             Anthropometrics       09/12/17 1836    Anthropometrics (Special Considerations)    Height Used for Calculations 1.6 m (5' 3\")    Weight Used for Calculations 77.1 kg (170 lb)    RD Calculated BMI (kg/m2) 30            Labs/Tests/Procedures/Meds       09/12/17 1837    Labs/Tests/Procedures/Meds    Labs/Tests Review Reviewed;C-Diff;Hgb A1C       Results from last 7 days  Lab Units " 09/12/17  0526  09/06/17  0519   SODIUM mmol/L 141  < > 136   POTASSIUM mmol/L 3.8  < > 4.2   CHLORIDE mmol/L 105  < > 106   CO2 mmol/L 25.0  < > 22.0   BUN mg/dL 58*  < > 32*   CREATININE mg/dL 2.00*  < > 2.00*   CALCIUM mg/dL 8.3*  < > 7.9*   BILIRUBIN mg/dL  --   --  0.3   ALK PHOS U/L  --   --  54   ALT (SGPT) U/L  --   --  19   AST (SGOT) U/L  --   --  28   GLUCOSE mg/dL 159*  < > 64*   < > = values in this interval not displayed.                SLP :Outcome Summary/Follow up Plan 9-5-17 Dysphagia Eval completed. Pt showing no s/s aspiration with any PO trials. Do note likely recurrent PNA. Clinically, swallow looks strong. If any continued concern for silent aspiration risk, please order for Modified Barium Swallow. For now: Regular diet, thin liquids, no SLP needs. Thanks              Physical Findings       09/12/17 1837    Physical Findings/Assessment    Additional Documentation Physical Appearance (Group)   pt sitting up in bed, on HFNC, reports she is unable to use R. arm 2nd CVA, needs assistance with meals, would like softer foods, poor appetite    Physical Appearance    Gastrointestinal diarrhea    Skin other (see comments)   bruises, scabs              Nutrition Prescription Ordered       09/12/17 1838    Nutrition Prescription PO    Current PO Diet Regular    Common Modifiers Cardiac;Consistent Carbohydrate            Evaluation of Received Nutrient/Fluid Intake       09/12/17 1838    PO Evaluation    Number of Meals 8    % PO Intake 41              Problem/Interventions:        Problem 1       09/12/17 1834    Nutrition Diagnoses Problem 1    Problem 1 Inadequate Intake/Infusion    Etiology (related to) --   per clinical status    Signs/Symptoms (evidenced by) PO Intake    Percent (%) intake recorded 41 %    Over number of meals 8                    Intervention Goal       09/12/17 1839    Intervention Goal    General Nutrition support treatment    PO Tolerate PO;Increase intake            Nutrition  Intervention       09/12/17 1839    Nutrition Intervention    RD/Tech Action Interview for preference;Menu provided;Menu adjusted;Supplement provided            Nutrition Prescription       09/12/17 1839    Nutrition Prescription PO    PO Prescription Begin/change supplement;Begin/change diet    Begin/Change Diet to Soft Texture    Texture Ground    Fluid Consistency Thin    Supplement Boost Glucose Control    Supplement Frequency 3 times a day    Common Modifiers Cardiac;Consistent Carbohydrate            Education/Evaluation       09/12/17 1839    Monitor/Evaluation    Monitor Per protocol;I&O;PO intake;Pertinent labs;Weight;Skin status;Symptoms          Electronically signed by:  Stacey Hernandez RD  09/12/17 6:40 PM

## 2017-09-12 NOTE — PROGRESS NOTES
"    [facility] Medicine Services  PROGRESS NOTE      Date of Admission: 2017  Length of Stay: 7  Primary Care Physician: Samuel Buck MD    Patient Name: Jennifer Oliveira  : 1940  MRN: [id number]    Subjective     CC:  Pneumonia, weakness; fever.    HPI:  Sitting up, says she feels a little better. Worset problem is cough.   Not eating much.  Breathing seems the same as before.  Is okay with getting a catheter.  At home, she walks with a walker.     Fever      SReview of Systems      Otherwise complete ROS is negative except as mentioned in the HPI.      Objective     Vital Signs: /59 (BP Location: Left arm, Patient Position: Lying)  Pulse 56  Temp 97.7 °F (36.5 °C) (Oral)   Resp 28  Ht 63\" (160 cm)  Wt 170 lb (77.1 kg)  SpO2 92%  BMI 30.11 kg/m2     Physical Exam :  Sitting up in bad, coughing occas and using yankuer  OP clear, MMM  Neck supple  RRR  Decreased bases,prlonged exp phase, scattered exp wheezes  +BS, ND, NT  LUNA   signif edema B UE/LE  No rashes  Good strength L side, 5/5 LUE/LLE, weak R from prior CVA      Results Reviewed:  I have personally reviewed current lab, radiology, and data and agree.      Results from last 7 days  Lab Units 17  0526 17  0734 09/10/17  0707   WBC 10*3/mm3 11.27* 11.36* 7.82   HEMOGLOBIN g/dL 9.3* 10.7* 9.8*   HEMATOCRIT % 28.3* 33.5* 30.0*   PLATELETS 10*3/mm3 276 271 228       Results from last 7 days  Lab Units 17  0526 17  2324 17  0846  17  0519 17  2117   SODIUM mmol/L 141 138 143  < > 136  --    POTASSIUM mmol/L 3.8 3.8 3.7  < > 4.2  --    CHLORIDE mmol/L 105 103 109  < > 106  --    CO2 mmol/L 25.0 24.0 24.0  < > 22.0  --    BUN mg/dL 58* 53* 57*  < > 32*  --    CREATININE mg/dL 2.00* 2.00* 2.20*  < > 2.00*  --    GLUCOSE mg/dL 159* 236* 75  < > 64*  --    CALCIUM mg/dL 8.3* 8.9 8.0*  < > 7.9*  --    ALT (SGPT) U/L  --   --   --   --  19  --    AST (SGOT) U/L  --   --   --   --  " 28  --    TROPONIN I ng/mL  --   --   --   --   --  0.073*   < > = values in this interval not displayed.  No results found for: BNP  pH, Arterial   Date Value Ref Range Status   09/11/2017 7.344 (L) 7.350 - 7.450 pH units Final     Blood Culture   Date Value Ref Range Status   09/05/2017 No growth at 24 hours  Preliminary   09/05/2017 No growth at 24 hours  Preliminary       Imaging Results (last 24 hours)     Procedure Component Value Units Date/Time    XR Chest 1 View [[id number]] Collected:  09/11/17 0929     Updated:  09/11/17 1600    Narrative:       EXAMINATION: XR CHEST 1 VW- 09/11/2017     INDICATION: DYSPNEA, ON EXERTION      COMPARISON: 09/10/2017     FINDINGS: Portable chest reveals heart to be enlarged. Increased  pulmonary vascularity bilaterally with ill-defined opacification lung  bases. Degenerative changes seen within the spine.            Impression:       Stable chest as above with ill-defined opacification lung  bases bilaterally. Increased pulmonary vascularity bilaterally.     D:  09/11/2017  E:  09/11/2017     This report was finalized on 9/11/2017 3:58 PM by Dr. Agata Berger MD.       CT Head Without Contrast [[id number]] Collected:  09/11/17 1244     Updated:  09/11/17 1605    Narrative:       EXAMINATION: CT HEAD WO CONTRAST-      INDICATION: Altered MS; J18.9-Pneumonia, unspecified organism;  Z74.09-Other reduced mobility; memory loss, weakness.     TECHNIQUE: Multiple axial CT imaging was obtained of the head from the  skull base to the skull vertex without the administration of intravenous  contrast.     The radiation dose reduction device was turned on for each scan per the  ALARA (As Low as Reasonably Achievable) protocol.     COMPARISON: None.     FINDINGS: There is atrophy identified of the brain. No intracranial  hemorrhage or hydrocephalus. No mass, mass effect, or midline shift. No  abnormal extraaxial fluid collection is identified. The bony structures  reveal no evidence  of osseous abnormality. The visualized paranasal  sinuses are clear. The mastoid air cells are patent.       Impression:       Mild chronic changes seen within the brain with no evidence  of acute intracranial abnormality.     D:  09/11/2017  E:  09/11/2017     This report was finalized on 9/11/2017 4:02 PM by Dr. Agata Berger MD.       XR Chest 1 View [[id number]] Collected:  09/12/17 0946     Updated:  09/12/17 1151    Narrative:       EXAMINATION: XR CHEST 1 VW-09/12/2017:      INDICATION: DYSPNEA ON EXERTION.      COMPARISON: NONE.     FINDINGS:  There is diffuse airspace opacity throughout both lungs with  consolidation at the bases.           Impression:       Compared to previous studies of yesterday, there is a  slight improvement at the lung bases with increased aeration, however,  ill defined consolidative and diffuse airspace opacities persist  throughout both lungs.     D:  09/12/2017  E:  09/12/2017     This report was finalized on 9/12/2017 11:49 AM by Dr. Babar Kruse MD.               I have reviewed the medications.      Assessment / Plan     Assessment & Plan :  Hospital Problem List     * (Principal)Right lower lobe pneumonia    Normochromic normocytic anemia    Overview Addendum 9/5/2017  3:01 PM by ARSLAN Montalvo     - Baseline Hgb 9s  - Hx of C-scope with polyps 2016, EGD 2016 with gastritis   - EGD 5/14/17 and 5/26/17 with symptomatic anemia         Chronic diastolic heart failure    Overview Signed 4/19/2017  1:21 PM by Samuel Dela Cruz MD     - Last echo 11/2016 LVEF 70%, normal VHD         HTN (hypertension)    Chronic kidney disease (CKD), stage III (moderate)    Overview Signed 4/19/2017  1:14 PM by Samuel Dela Cruz MD     - baseline Cr 1.5-1.6         h/o stroke with right hemiplegia    Overview Signed 5/14/2017 10:24 PM by Mariam Murray, RN EXTENDER     residual chronic right hemiplegia         Disease of thyroid gland    Type 2 diabetes mellitus    KRIS (acute  kidney injury)    Nausea and vomiting    Weakness    Acute respiratory failure with hypoxia               Brief Hospital Course to date:  Jennifer Oliveira is a 77 y.o. female with PMH significant for previous heavy smoker, CVA/ TIA with residual right sided hemiparesis, PAD s/p fem- pop , HTN, T2DM, hypothyroid, recent GIB requiring multiple transfusions that was seen 17 at [facility] ED and diagnosed with CAP, started on Levaquin but unable to  prescription due to pharmacy being closed. Symptoms progressed to high grade fever, worsening cough and severe weakness (unable to stand) N/V within 24 hrs.  Also had to increase baseline home O2 from 2L to 4L.  Presented to [facility] ED found to have RLL pna with fever and KRIS:      Acute hypoxic respiratory failure  --Continue antibiotics  --CXR favors some vascular congestion, giving Lasix.  but normal ECHO   - pulmonary following      Altered MS/weakness  -- hypoxia by AB/66  On 50percent  -- neg CT head    Hx of CVA/TIA with R weakness    KRIS/CKD  --stable but elevated  -- nephrology input noted.  ?ATN.  Baseline 1.6 not far off.    DM  Hypothyroidism  HTN  Hx of PAD  DVT prophylaxis      Add wolfe.  Watch output and renal fxn. Agree with diuresis    Disposition: I expect the patient to be discharged to rehab.    Denisse Maharaj MD  17  12:20 PM

## 2017-09-12 NOTE — PROGRESS NOTES
INPATIENT PULMONARY SERVICE  PROGRESS NOTE     Hospital LOS: 7 days    Ms. Jennifer Oliveira, is followed for a Chief Complaint of:  Chief Complaint   Patient presents with   • Weakness - Generalized   • Fever     Principal Problem:    Right lower lobe pneumonia  Active Problems:    Normochromic normocytic anemia    Chronic diastolic heart failure    HTN (hypertension)    Chronic kidney disease (CKD), stage III (moderate)    h/o stroke with right hemiplegia    Disease of thyroid gland    Type 2 diabetes mellitus    KRIS (acute kidney injury)    Nausea and vomiting    Weakness    Acute respiratory failure with hypoxia      Subjective   S   Ms. Oliveira is a 78yo F with a history of CVA/TIA, PAD, T2DM, hypothyroidism, and recent GI bleed requiring transfusion who was admitted on 9/5 for CAP. She was initially seen in the [facility] ED on 9/4 and discharged with a prescription for Levaquin which she was unable to  as the pharmacy was closed. She continued to have fever, cough and severe weakness and presented to [facility].      A CXR was performed in the ED which showed bibasilar opacities as well as a RLL infiltrate. She was admitted and started on Zosyn and Levaquin as there was concern for aspiration. She was given IVF for acute kidney injury and possible dehydration. She usually wears 2L NC at home. At the time of admission, she was requiring 3L NC. She required increased O2 on 9/7 and this improved with diuresis. Since that time, her respiratory failure has progressed and she is now on HFNC at 50%.     Interval History:  Ms. Oliveira tells me that she feels somewhat better this AM. HFNC is down to 35%. She was given Lasix and probably diuresed but accurate I/Os were not done.        The patient's relevant past medical, surgical and social history were reviewed and updated in Epic as appropriate.      ROS:   Constitutional: Negative for fever.   Respiratory: Positive for dyspnea.   Cardiovascular: Negative for  chest pain.   Gastrointestinal: Negative for  nausea, vomiting and diarrhea.     Objective   O     Vitals  Temp  Min: 97.7 °F (36.5 °C)  Max: 98.4 °F (36.9 °C)  BP  Min: 116/42  Max: 186/79  Pulse  Min: 58  Max: 87  Resp  Min: 18  Max: 28  SpO2  Min: 93 %  Max: 96 % Flow (L/min)  Min: 40  Max: 55    I/O 24 HR (7:00 AM-6:59AM):  Intake/Output       09/11/17 0700 - 09/12/17 0659 09/12/17 0700 - 09/13/17 0659    Intake (ml) 650 120    Output (ml) -- --    Net (ml) 650 120            Physical Examination    Telemetry: Sinus Rhythm: normal sinus rhythm      Constitutional:  Mild distress. Improved from yesterday.    Cardiovascular: Regular rate and rhythm.  No murmurs, rub or gallop.   Respiratory: Normal symmetric chest expansion.  Expiratory wheezing.   Bibasilar wet crackles.    Abdominal:  Soft. No masses.   Non-tender. No distension.   No hepatosplenomegaly.   Extremities: No digital cyanosis or clubbing.  Trace peripheral edema.   Neurological:   Alert and Oriented to person, place, and time.   Moves all extremities.     Lines, Drains & Airways    Active LDAs     Name:   Placement date:   Placement time:   Site:   Days:    Peripheral IV Line - Single Lumen 09/12/17 0500 metacarpal vein (top of hand), left 20 gauge  09/12/17    0500      less than 1                  Results from last 7 days  Lab Units 09/12/17  0526 09/11/17  0734 09/10/17  0707   WBC 10*3/mm3 11.27* 11.36* 7.82   HEMOGLOBIN g/dL 9.3* 10.7* 9.8*   MCV fL 89.6 92.0 89.6   PLATELETS 10*3/mm3 276 271 228       Results from last 7 days  Lab Units 09/12/17  0526 09/11/17  2324 09/11/17  0846   SODIUM mmol/L 141 138 143   POTASSIUM mmol/L 3.8 3.8 3.7   CO2 mmol/L 25.0 24.0 24.0   CREATININE mg/dL 2.00* 2.00* 2.20*   PHOSPHORUS mg/dL  --  5.9*  --      CREATININE: 2 mg/dL ABNORMAL (09/12/17 0526)  Estimated creatinine clearance: 23.2 mL/min    Results from last 7 days  Lab Units 09/06/17  0519 09/05/17  1146   ALK PHOS U/L 54 68   BILIRUBIN mg/dL 0.3 0.2*    ALT (SGPT) U/L 19 28   AST (SGOT) U/L 28 51*         Results from last 7 days  Lab Units 09/11/17  1031   PH, ARTERIAL pH units 7.344*   PCO2, ARTERIAL mm Hg 39.6   PO2 ART mm Hg 66.3*   FIO2 % 50       Images:   CXR 09/12/2017 Personally reviewed. Persistent bilateral airspace disease slightly improved as compared to yesterday.        I reviewed the patient's new clinical results.  I reviewed the patient's new imaging results and agree with the interpretation.      Assessment/Plan   A / P     77 y.o.female, admitted on 9/5/2017 with: Fever and weakness    Impressions:  1. Acute on Chronic Respiratory Failure  1. 2L NC prior to admission, now requiring HFNC  2. Right Lower Lobe Pneumonia  1. Afebrile  2. Respiratory Cx w/ Normal Respiratory Beverly  3. ABX: Levaquin  3. History of COPD  1. Prednisone 30mg for exacerbation  2. Duonebs/Pulmicort  4. KRIS on CKD  1. Baseline Cr 1.4  5. Volume Overload  1. Normal EF on Echo from 4/2017  2. History of Diastolic dysfunction per chart      She is slightly improved after diuresis yesterday. Kidney function is slightly better as well. Unable to tell if she was net negative as UOP was not recorded.     Assessment / Plan:  1. Continue diuresis  2. Recommend strict intake and output to ensure that she is net negative each day. She may need a wolfe catheter for accurate output.  3. Consider repeating an echocardiogram as echo from 4/2017 did not mention diastolic dysfunction.   4. CXR again tomorrow.     I discussed the patient's findings and my recommendations with patient and nursing staff. We will continue to follow along.     Time:  I spent 25 minutes, face to face, with the patient or on the shaw coordinating care with other health care providers.   I spent > 50% percent of this time, counseling and discussing diagnosis, current status and management .     Abigail Crawford, DO  Pulmonary and Critical Care Medicine

## 2017-09-13 LAB
ALBUMIN SERPL-MCNC: 2.8 G/DL (ref 3.2–4.8)
ANION GAP SERPL CALCULATED.3IONS-SCNC: 10 MMOL/L (ref 3–11)
ANION GAP SERPL CALCULATED.3IONS-SCNC: 7 MMOL/L (ref 3–11)
BACTERIA UR QL AUTO: ABNORMAL /HPF
BASOPHILS # BLD AUTO: 0.01 10*3/MM3 (ref 0–0.2)
BASOPHILS NFR BLD AUTO: 0.1 % (ref 0–1)
BILIRUB UR QL STRIP: NEGATIVE
BUN BLD-MCNC: 54 MG/DL (ref 9–23)
BUN BLD-MCNC: 57 MG/DL (ref 9–23)
BUN/CREAT SERPL: 27 (ref 7–25)
BUN/CREAT SERPL: 30 (ref 7–25)
C DIFF TOX GENS STL QL NAA+PROBE: NOT DETECTED
CALCIUM SPEC-SCNC: 8 MG/DL (ref 8.7–10.4)
CALCIUM SPEC-SCNC: 8.4 MG/DL (ref 8.7–10.4)
CHLORIDE SERPL-SCNC: 107 MMOL/L (ref 99–109)
CHLORIDE SERPL-SCNC: 108 MMOL/L (ref 99–109)
CLARITY UR: ABNORMAL
CO2 SERPL-SCNC: 23 MMOL/L (ref 20–31)
CO2 SERPL-SCNC: 24 MMOL/L (ref 20–31)
COLOR UR: YELLOW
CREAT BLD-MCNC: 1.9 MG/DL (ref 0.6–1.3)
CREAT BLD-MCNC: 2 MG/DL (ref 0.6–1.3)
DEPRECATED RDW RBC AUTO: 45.7 FL (ref 37–54)
EOSINOPHIL # BLD AUTO: 0.1 10*3/MM3 (ref 0–0.3)
EOSINOPHIL NFR BLD AUTO: 1.1 % (ref 0–3)
ERYTHROCYTE [DISTWIDTH] IN BLOOD BY AUTOMATED COUNT: 14 % (ref 11.3–14.5)
GFR SERPL CREATININE-BSD FRML MDRD: 24 ML/MIN/1.73
GFR SERPL CREATININE-BSD FRML MDRD: 26 ML/MIN/1.73
GLUCOSE BLD-MCNC: 164 MG/DL (ref 70–100)
GLUCOSE BLD-MCNC: 297 MG/DL (ref 70–100)
GLUCOSE BLDC GLUCOMTR-MCNC: 130 MG/DL (ref 70–130)
GLUCOSE BLDC GLUCOMTR-MCNC: 148 MG/DL (ref 70–130)
GLUCOSE BLDC GLUCOMTR-MCNC: 195 MG/DL (ref 70–130)
GLUCOSE BLDC GLUCOMTR-MCNC: 270 MG/DL (ref 70–130)
GLUCOSE UR STRIP-MCNC: ABNORMAL MG/DL
HCT VFR BLD AUTO: 24.4 % (ref 34.5–44)
HEMOCCULT STL QL: POSITIVE
HGB BLD-MCNC: 7.9 G/DL (ref 11.5–15.5)
HGB UR QL STRIP.AUTO: ABNORMAL
HYALINE CASTS UR QL AUTO: ABNORMAL /LPF
IMM GRANULOCYTES # BLD: 0.15 10*3/MM3 (ref 0–0.03)
IMM GRANULOCYTES NFR BLD: 1.7 % (ref 0–0.6)
KETONES UR QL STRIP: NEGATIVE
LEUKOCYTE ESTERASE UR QL STRIP.AUTO: NEGATIVE
LYMPHOCYTES # BLD AUTO: 0.74 10*3/MM3 (ref 0.6–4.8)
LYMPHOCYTES NFR BLD AUTO: 8.3 % (ref 24–44)
MCH RBC QN AUTO: 28.8 PG (ref 27–31)
MCHC RBC AUTO-ENTMCNC: 32.4 G/DL (ref 32–36)
MCV RBC AUTO: 89.1 FL (ref 80–99)
MONOCYTES # BLD AUTO: 0.59 10*3/MM3 (ref 0–1)
MONOCYTES NFR BLD AUTO: 6.6 % (ref 0–12)
NEUTROPHILS # BLD AUTO: 7.31 10*3/MM3 (ref 1.5–8.3)
NEUTROPHILS NFR BLD AUTO: 82.2 % (ref 41–71)
NITRITE UR QL STRIP: NEGATIVE
PH UR STRIP.AUTO: 5.5 [PH] (ref 5–8)
PHOSPHATE SERPL-MCNC: 4.8 MG/DL (ref 2.4–5.1)
PLATELET # BLD AUTO: 250 10*3/MM3 (ref 150–450)
PMV BLD AUTO: 10 FL (ref 6–12)
POTASSIUM BLD-SCNC: 3.6 MMOL/L (ref 3.5–5.5)
POTASSIUM BLD-SCNC: 3.7 MMOL/L (ref 3.5–5.5)
PROT UR QL STRIP: ABNORMAL
PROT UR-MCNC: 156 MG/DL (ref 1–14)
RBC # BLD AUTO: 2.74 10*6/MM3 (ref 3.89–5.14)
RBC # UR: ABNORMAL /HPF
REF LAB TEST METHOD: ABNORMAL
SODIUM BLD-SCNC: 138 MMOL/L (ref 132–146)
SODIUM BLD-SCNC: 141 MMOL/L (ref 132–146)
SODIUM UR-SCNC: 76 MMOL/L (ref 30–90)
SP GR UR STRIP: 1.01 (ref 1–1.03)
SQUAMOUS #/AREA URNS HPF: ABNORMAL /HPF
UROBILINOGEN UR QL STRIP: ABNORMAL
WBC NRBC COR # BLD: 8.9 10*3/MM3 (ref 3.5–10.8)
WBC UR QL AUTO: ABNORMAL /HPF
YEAST URNS QL MICRO: ABNORMAL /HPF

## 2017-09-13 PROCEDURE — 25010000002 NA FERRIC GLUC CPLX PER 12.5 MG: Performed by: INTERNAL MEDICINE

## 2017-09-13 PROCEDURE — 25010000002 PIPERACILLIN SOD-TAZOBACTAM PER 1 G

## 2017-09-13 PROCEDURE — 87493 C DIFF AMPLIFIED PROBE: CPT | Performed by: NURSE PRACTITIONER

## 2017-09-13 PROCEDURE — 94799 UNLISTED PULMONARY SVC/PX: CPT

## 2017-09-13 PROCEDURE — 84300 ASSAY OF URINE SODIUM: CPT | Performed by: INTERNAL MEDICINE

## 2017-09-13 PROCEDURE — 84156 ASSAY OF PROTEIN URINE: CPT | Performed by: INTERNAL MEDICINE

## 2017-09-13 PROCEDURE — 97530 THERAPEUTIC ACTIVITIES: CPT

## 2017-09-13 PROCEDURE — 80069 RENAL FUNCTION PANEL: CPT | Performed by: INTERNAL MEDICINE

## 2017-09-13 PROCEDURE — 94640 AIRWAY INHALATION TREATMENT: CPT

## 2017-09-13 PROCEDURE — 99231 SBSQ HOSP IP/OBS SF/LOW 25: CPT | Performed by: INTERNAL MEDICINE

## 2017-09-13 PROCEDURE — 99232 SBSQ HOSP IP/OBS MODERATE 35: CPT | Performed by: INTERNAL MEDICINE

## 2017-09-13 PROCEDURE — 63710000001 PREDNISONE PER 1 MG: Performed by: HOSPITALIST

## 2017-09-13 PROCEDURE — 97110 THERAPEUTIC EXERCISES: CPT

## 2017-09-13 PROCEDURE — 94760 N-INVAS EAR/PLS OXIMETRY 1: CPT

## 2017-09-13 PROCEDURE — 81001 URINALYSIS AUTO W/SCOPE: CPT | Performed by: INTERNAL MEDICINE

## 2017-09-13 PROCEDURE — 63710000001 INSULIN DETEMIR PER 5 UNITS: Performed by: HOSPITALIST

## 2017-09-13 PROCEDURE — 63710000001 PREDNISONE PER 5 MG: Performed by: HOSPITALIST

## 2017-09-13 PROCEDURE — 82272 OCCULT BLD FECES 1-3 TESTS: CPT | Performed by: INTERNAL MEDICINE

## 2017-09-13 PROCEDURE — 85025 COMPLETE CBC W/AUTO DIFF WBC: CPT | Performed by: INTERNAL MEDICINE

## 2017-09-13 PROCEDURE — 80048 BASIC METABOLIC PNL TOTAL CA: CPT | Performed by: INTERNAL MEDICINE

## 2017-09-13 PROCEDURE — 82962 GLUCOSE BLOOD TEST: CPT

## 2017-09-13 RX ORDER — BUMETANIDE 1 MG/1
2 TABLET ORAL ONCE
Status: COMPLETED | OUTPATIENT
Start: 2017-09-13 | End: 2017-09-13

## 2017-09-13 RX ADMIN — INSULIN DETEMIR 25 UNITS: 100 INJECTION, SOLUTION SUBCUTANEOUS at 09:38

## 2017-09-13 RX ADMIN — INSULIN LISPRO 4 UNITS: 100 INJECTION, SOLUTION INTRAVENOUS; SUBCUTANEOUS at 12:25

## 2017-09-13 RX ADMIN — Medication 250 MG: at 09:35

## 2017-09-13 RX ADMIN — INSULIN LISPRO 4 UNITS: 100 INJECTION, SOLUTION INTRAVENOUS; SUBCUTANEOUS at 17:40

## 2017-09-13 RX ADMIN — IPRATROPIUM BROMIDE AND ALBUTEROL SULFATE 3 ML: .5; 3 SOLUTION RESPIRATORY (INHALATION) at 02:56

## 2017-09-13 RX ADMIN — GUAIFENESIN 600 MG: 600 TABLET, EXTENDED RELEASE ORAL at 21:15

## 2017-09-13 RX ADMIN — IPRATROPIUM BROMIDE AND ALBUTEROL SULFATE 3 ML: .5; 3 SOLUTION RESPIRATORY (INHALATION) at 11:38

## 2017-09-13 RX ADMIN — IPRATROPIUM BROMIDE AND ALBUTEROL SULFATE 3 ML: .5; 3 SOLUTION RESPIRATORY (INHALATION) at 16:32

## 2017-09-13 RX ADMIN — METOPROLOL TARTRATE 50 MG: 50 TABLET, FILM COATED ORAL at 21:15

## 2017-09-13 RX ADMIN — TAZOBACTAM SODIUM AND PIPERACILLIN SODIUM 3.38 G: 375; 3 INJECTION, SOLUTION INTRAVENOUS at 05:12

## 2017-09-13 RX ADMIN — PANTOPRAZOLE SODIUM 40 MG: 40 TABLET, DELAYED RELEASE ORAL at 05:13

## 2017-09-13 RX ADMIN — BUDESONIDE 0.5 MG: 0.5 INHALANT RESPIRATORY (INHALATION) at 07:08

## 2017-09-13 RX ADMIN — BUDESONIDE 0.5 MG: 0.5 INHALANT RESPIRATORY (INHALATION) at 18:46

## 2017-09-13 RX ADMIN — ATORVASTATIN CALCIUM 10 MG: 10 TABLET, FILM COATED ORAL at 21:15

## 2017-09-13 RX ADMIN — BACLOFEN 10 MG: 10 TABLET ORAL at 14:38

## 2017-09-13 RX ADMIN — AMLODIPINE BESYLATE 10 MG: 10 TABLET ORAL at 21:15

## 2017-09-13 RX ADMIN — ASPIRIN 81 MG 81 MG: 81 TABLET ORAL at 09:35

## 2017-09-13 RX ADMIN — INSULIN LISPRO 4 UNITS: 100 INJECTION, SOLUTION INTRAVENOUS; SUBCUTANEOUS at 09:33

## 2017-09-13 RX ADMIN — CLOPIDOGREL BISULFATE 75 MG: 75 TABLET ORAL at 09:36

## 2017-09-13 RX ADMIN — INSULIN LISPRO 4 UNITS: 100 INJECTION, SOLUTION INTRAVENOUS; SUBCUTANEOUS at 21:15

## 2017-09-13 RX ADMIN — PREDNISONE 30 MG: 20 TABLET ORAL at 09:34

## 2017-09-13 RX ADMIN — METOPROLOL TARTRATE 50 MG: 50 TABLET, FILM COATED ORAL at 09:36

## 2017-09-13 RX ADMIN — TAZOBACTAM SODIUM AND PIPERACILLIN SODIUM 3.38 G: 375; 3 INJECTION, SOLUTION INTRAVENOUS at 09:32

## 2017-09-13 RX ADMIN — BACLOFEN 10 MG: 10 TABLET ORAL at 21:15

## 2017-09-13 RX ADMIN — IPRATROPIUM BROMIDE AND ALBUTEROL SULFATE 3 ML: .5; 3 SOLUTION RESPIRATORY (INHALATION) at 23:12

## 2017-09-13 RX ADMIN — GUAIFENESIN 600 MG: 600 TABLET, EXTENDED RELEASE ORAL at 09:35

## 2017-09-13 RX ADMIN — ACETAMINOPHEN 650 MG: 325 TABLET, FILM COATED ORAL at 18:20

## 2017-09-13 RX ADMIN — BUMETANIDE 2 MG: 1 TABLET ORAL at 14:38

## 2017-09-13 RX ADMIN — LEVOTHYROXINE SODIUM 200 MCG: 100 TABLET ORAL at 05:13

## 2017-09-13 RX ADMIN — Medication 250 MG: at 17:38

## 2017-09-13 RX ADMIN — IPRATROPIUM BROMIDE AND ALBUTEROL SULFATE 3 ML: .5; 3 SOLUTION RESPIRATORY (INHALATION) at 00:37

## 2017-09-13 RX ADMIN — IPRATROPIUM BROMIDE AND ALBUTEROL SULFATE 3 ML: .5; 3 SOLUTION RESPIRATORY (INHALATION) at 18:46

## 2017-09-13 RX ADMIN — GABAPENTIN 900 MG: 300 CAPSULE ORAL at 21:15

## 2017-09-13 RX ADMIN — INSULIN LISPRO 2 UNITS: 100 INJECTION, SOLUTION INTRAVENOUS; SUBCUTANEOUS at 17:42

## 2017-09-13 RX ADMIN — IPRATROPIUM BROMIDE AND ALBUTEROL SULFATE 3 ML: .5; 3 SOLUTION RESPIRATORY (INHALATION) at 07:08

## 2017-09-13 RX ADMIN — BACLOFEN 10 MG: 10 TABLET ORAL at 05:13

## 2017-09-13 RX ADMIN — OXYCODONE HYDROCHLORIDE AND ACETAMINOPHEN 500 MG: 500 TABLET ORAL at 09:35

## 2017-09-13 RX ADMIN — INSULIN DETEMIR 25 UNITS: 100 INJECTION, SOLUTION SUBCUTANEOUS at 21:16

## 2017-09-13 RX ADMIN — LEVOFLOXACIN 750 MG: 750 TABLET, FILM COATED ORAL at 12:34

## 2017-09-13 RX ADMIN — SODIUM CHLORIDE 250 MG: 9 INJECTION, SOLUTION INTRAVENOUS at 09:32

## 2017-09-13 NOTE — PROGRESS NOTES
"    [facility] Medicine Services  PROGRESS NOTE      Date of Admission: 2017  Length of Stay: 8  Primary Care Physician: Samuel Buck MD    Patient Name: Jennifer Oliveira  : 1940  MRN: [id number]    Subjective     CC:  Pneumonia, weakness; fever.    HPI:   Breathing better but having diarrhea .  Hasn't been oob today  Diuresed well yest (wolfe in) and fio2 is down to 30 today.   No other complaints    At home:  2L nc nocturnal only  Walks with walker after cva/R HP.      History of Present IllnessSReview of Systems   Otherwise complete ROS is negative except as mentioned in the HPI.      Objective     Vital Signs: /69 (BP Location: Left arm, Patient Position: Lying)  Pulse 70  Temp 98.1 °F (36.7 °C) (Oral)   Resp 18  Ht 63\" (160 cm)  Wt 170 lb (77.1 kg)  SpO2 93%  BMI 30.11 kg/m2     Physical Exam :  Sitting up in bed, looks tired, using yankauer for cough  OP clear, MMM  Neck supple  RRR  Decreased bases,prlonged exp phase, rhonchi but no wheeze today  +BS, ND, NT  LUNA   Decreased edema B UE/LE  No rashes  Good strength L side, 5/5 LUE/LLE, weak R from prior CVA      Results Reviewed:  I have personally reviewed current lab, radiology, and data and agree.      Results from last 7 days  Lab Units 17  0611 17  0526 17  0734   WBC 10*3/mm3 8.90 11.27* 11.36*   HEMOGLOBIN g/dL 7.9* 9.3* 10.7*   HEMATOCRIT % 24.4* 28.3* 33.5*   PLATELETS 10*3/mm3 250 276 271       Results from last 7 days  Lab Units 17  0611 17  0017 17  0526   SODIUM mmol/L 141 138 141   POTASSIUM mmol/L 3.6 3.7 3.8   CHLORIDE mmol/L 107 108 105   CO2 mmol/L 24.0 23.0 25.0   BUN mg/dL 57* 54* 58*   CREATININE mg/dL 1.90* 2.00* 2.00*   GLUCOSE mg/dL 164* 297* 159*   CALCIUM mg/dL 8.0* 8.4* 8.3*     No results found for: BNP  pH, Arterial   Date Value Ref Range Status   2017 7.344 (L) 7.350 - 7.450 pH units Final     Blood Culture   Date Value Ref Range Status "   09/05/2017 No growth at 24 hours  Preliminary   09/05/2017 No growth at 24 hours  Preliminary       Imaging Results (last 24 hours)     Procedure Component Value Units Date/Time    XR Chest 1 View [[id number]] Collected:  09/12/17 0946     Updated:  09/12/17 1151    Narrative:       EXAMINATION: XR CHEST 1 VW-09/12/2017:      INDICATION: DYSPNEA ON EXERTION.      COMPARISON: NONE.     FINDINGS:  There is diffuse airspace opacity throughout both lungs with  consolidation at the bases.           Impression:       Compared to previous studies of yesterday, there is a  slight improvement at the lung bases with increased aeration, however,  ill defined consolidative and diffuse airspace opacities persist  throughout both lungs.     D:  09/12/2017  E:  09/12/2017     This report was finalized on 9/12/2017 11:49 AM by Dr. Babar Kruse MD.               I have reviewed the medications.      Assessment / Plan     Assessment & Plan :  Hospital Problem List     * (Principal)Right lower lobe pneumonia    Normochromic normocytic anemia    Overview Addendum 9/5/2017  3:01 PM by ARSLAN Montalvo     - Baseline Hgb 9s  - Hx of C-scope with polyps 2016, EGD 2016 with gastritis   - EGD 5/14/17 and 5/26/17 with symptomatic anemia         Chronic diastolic heart failure    Overview Signed 4/19/2017  1:21 PM by Samuel Dela Cruz MD     - Last echo 11/2016 LVEF 70%, normal VHD         HTN (hypertension)    Chronic kidney disease (CKD), stage III (moderate)    Overview Signed 4/19/2017  1:14 PM by Samuel Dela Cruz MD     - baseline Cr 1.5-1.6         h/o stroke with right hemiplegia    Overview Signed 5/14/2017 10:24 PM by Mariam Murray RN EXTENDER     residual chronic right hemiplegia         Disease of thyroid gland    Type 2 diabetes mellitus    KRIS (acute kidney injury)    Nausea and vomiting    Weakness    Acute respiratory failure with hypoxia               Brief Hospital Course to date:  Jennifer Oliveira is a 77  y.o. female with PMH significant for previous heavy smoker, CVA/ TIA with residual right sided hemiparesis, PAD s/p fem- pop 2016, HTN, T2DM, hypothyroid, recent GIB requiring multiple transfusions that was seen 17 at [facility] ED and diagnosed with CAP, started on Levaquin but unable to  prescription due to pharmacy being closed. Symptoms progressed to high grade fever, worsening cough and severe weakness (unable to stand) N/V within 24 hrs.  Also had to increase baseline home O2 from 2L to 4L.  Presented to [facility] ED found to have RLL pna with fever and KRIS:      Acute hypoxic respiratory failure  --Continue antibiotics:  Wean to levquin only  --CXR favors some vascular congestion, giving Lasix.  but normal ECHO   - pulmonary following      Altered MS/weakness  -- hypoxia by AB/66  On 30percent  -- neg CT head    Hx of CVA/TIA with R weakness     KRIS/CKD  --stable but elevated  -- nephrology input noted.  ?ATN.  Baseline 1.6 not far off.    Anemia  -- down today, watch.    DM  Hypothyroidism  HTN  Hx of PAD  DVT prophylaxis      Continue wolfe.  Watch output and renal fxn. Agree with diuresis.  CBC tomorrw.  Encourage OOB daily    Disposition: I expect the patient to be discharged to rehab.    Denisse Maharaj MD  17  11:02 AM

## 2017-09-13 NOTE — PROGRESS NOTES
"   LOS: 8 days    Patient Care Team:  Samuel Buck MD as PCP - General (Family Medicine)    Subjective     Feeling better.  Still with cough  Objective     Vital Signs:  Blood pressure 168/69, pulse 70, temperature 98.1 °F (36.7 °C), temperature source Oral, resp. rate 18, height 63\" (160 cm), weight 170 lb (77.1 kg), SpO2 93 %.    Flowsheet Rows         First Filed Value    Admission Height  63\" (160 cm) Documented at 09/05/2017 1009    Admission Weight  170 lb (77.1 kg) Documented at 09/05/2017 1009          09/12 0701 - 09/13 0700  In: 1328 [P.O.:1278]  Out: 1700 [Urine:1700]    Physical Exam:    GENERAL: WD elderly female. NAD.   PSYCHIATRIC:  Awake and alert,  Normal mood and affect. Cooperative with PE  EYE: PE, no icterus, no conjunctivitis  ENT: ommm, dentition intact,  Hearing intact  NECK: Supple , No JVD discernable,   CV: RRR; + edema  LUNGS:  Quiet,  Nonlabored resp.    ABDOMEN: Soft, nontender, nondistended. BS present.  : no palp bladder, + wolfe  SKIN: Warm and dry without rash    Labs:    Results from last 7 days  Lab Units 09/13/17  0611 09/12/17  0526 09/11/17  0734   WBC 10*3/mm3 8.90 11.27* 11.36*   HEMOGLOBIN g/dL 7.9* 9.3* 10.7*   PLATELETS 10*3/mm3 250 276 271       Results from last 7 days  Lab Units 09/13/17  0611 09/13/17  0017 09/12/17  0526 09/11/17  2324   SODIUM mmol/L 141 138 141 138   POTASSIUM mmol/L 3.6 3.7 3.8 3.8   CHLORIDE mmol/L 107 108 105 103   CO2 mmol/L 24.0 23.0 25.0 24.0   BUN mg/dL 57* 54* 58* 53*   CREATININE mg/dL 1.90* 2.00* 2.00* 2.00*   CALCIUM mg/dL 8.0* 8.4* 8.3* 8.9   PHOSPHORUS mg/dL  --  4.8  --  5.9*   ALBUMIN g/dL  --  2.80*  --  3.40           Results from last 7 days  Lab Units 09/11/17  1031   PH, ARTERIAL pH units 7.344*   PO2 ART mm Hg 66.3*   PCO2, ARTERIAL mm Hg 39.6   HCO3 ART mmol/L 21.5           Estimated Creatinine Clearance: 24.4 mL/min (by C-G formula based on Cr of 1.9).      A/P:    ARF:  Cr slightly better today at 1.9 .  Cr persistantly " 2.0-2.2 since admission.  May have underlying ATN due to dehydration with N/V prior to admission. Watch with bumex.    CKD3:  Baseline 1.5-1.6     HTN: BP stable.      PNA: on ABx     Anemia:  Hgb stable.  Tsat 8%.  Recheck s/p IVFe.     Edema:  Will give dose of bumex.     High risk and complexity    Babar Law MD  09/13/17  9:40 AM

## 2017-09-13 NOTE — PLAN OF CARE
Problem: Infection, Risk/Actual (Adult)  Goal: Identify Related Risk Factors and Signs and Symptoms  Outcome: Ongoing (interventions implemented as appropriate)    09/13/17 1154   Infection, Risk/Actual   Infection, Risk/Actual: Related Risk Factors prolonged hospitalization;sleep disturbance;tissue perfusion altered   Signs and Symptoms (Infection, Risk/Actual) blood glucose changes;lab value changes;weakness       Goal: Infection Prevention/Resolution  Outcome: Ongoing (interventions implemented as appropriate)    09/13/17 9924   Infection, Risk/Actual (Adult)   Infection Prevention/Resolution making progress toward outcome

## 2017-09-13 NOTE — PLAN OF CARE
Problem: Fall Risk (Adult)  Goal: Identify Related Risk Factors and Signs and Symptoms  Outcome: Ongoing (interventions implemented as appropriate)    09/13/17 4227   Fall Risk   Fall Risk: Related Risk Factors fatigue/slow reaction;gait/mobility problems;slipper/uneven surfaces;sleep pattern alteration;environment unfamiliar   Fall Risk: Signs and Symptoms presence of risk factors

## 2017-09-13 NOTE — PLAN OF CARE
Problem: Patient Care Overview (Adult)  Goal: Plan of Care Review  Outcome: Ongoing (interventions implemented as appropriate)    09/13/17 1142   Coping/Psychosocial Response Interventions   Plan Of Care Reviewed With patient   Outcome Evaluation   Outcome Summary/Follow up Plan patient limited by weakness and diarrhea. Bed mobility and exercises only.   Patient Care Overview   Progress no change         Problem: Inpatient Physical Therapy  Goal: Bed Mobility Goal LTG- PT  Outcome: Ongoing (interventions implemented as appropriate)    09/06/17 1037 09/13/17 1142   Bed Mobility PT LTG   Bed Mobility PT LTG, Date Established 09/06/17 --    Bed Mobility PT LTG, Time to Achieve 1 wk --    Bed Mobility PT LTG, Activity Type all bed mobility --    Bed Mobility PT LTG, [place] Level moderate assist (50% patient effort);2 person assist required --    Bed Mobility PT LTG, Date Goal Reviewed --  09/13/17   Bed Mobility PT LTG, Outcome --  goal ongoing       Goal: Transfer Training Goal 1 LTG- PT  Outcome: Ongoing (interventions implemented as appropriate)    09/06/17 1037 09/13/17 1142   Transfer Training PT LTG   Transfer Training PT LTG, Date Established 09/06/17 --    Transfer Training PT LTG, Time to Achieve 1 wk --    Transfer Training PT LTG, Activity Type bed to chair /chair to bed;sit to stand/stand to sit --    Transfer Training PT LTG, [place] Level maximum assist (25% patient effort);2 person assist required --    Transfer Training PT LTG, Assist Device walker, homa --    Transfer Training PT LTG, Date Goal Reviewed --  09/13/17   Transfer Training PT LTG, Outcome --  goal ongoing       Goal: Gait Training Goal LTG- PT  Outcome: Ongoing (interventions implemented as appropriate)    09/06/17 1037 09/13/17 1142   Gait Training PT LTG   Gait Training Goal PT LTG, Date Established 09/06/17 --    Gait Training Goal PT LTG, Time to Achieve 1 wk --    Gait Training Goal PT LTG, [place]ord Level maximum assist  (25% patient effort);2 person assist required  (stable VS; RLE brace) --    Gait Training Goal PT LTG, Assist Device walker, homa --    Gait Training Goal PT LTG, Distance to Achieve 3 --    Gait Training Goal PT LTG, Date Goal Reviewed --  09/13/17   Gait Training Goal PT LTG, Outcome --  goal ongoing

## 2017-09-13 NOTE — THERAPY TREATMENT NOTE
Acute Care - Physical Therapy Treatment Note  [facility]     Patient Name: Jennifer Oliveira  : 1940  MRN: [id number]  Today's Date: 2017  Onset of Illness/Injury or Date of Surgery Date: 17  Date of Referral to PT: 17  Referring Physician: ARSLAN Gonzales    Admit Date: 2017    Visit Dx:    ICD-10-CM ICD-9-CM   1. Pneumonia of right lower lobe due to infectious organism J18.9 483.8   2. Impaired mobility and ADLs Z74.09 799.89   3. Impaired functional mobility, balance, gait, and endurance Z74.09 V49.89     Patient Active Problem List   Diagnosis   • Acute hypoxemic respiratory failure   • PAD (peripheral artery disease)   • Normochromic normocytic anemia   • Chronic diastolic heart failure   • Diabetes type 2, uncontrolled   • Lower extremity cellulitis   • Hypothyroid   • HTN (hypertension)   • Metabolic encephalopathy   • TIA (transient ischemic attack)   • Acute cystitis without hematuria   • Chronic kidney disease (CKD), stage III (moderate)   • GERD (gastroesophageal reflux disease) with hx of gastritis    • h/o stroke with right hemiplegia   • Hypertension   • Disease of thyroid gland   • Diastolic heart failure   • Type 2 diabetes mellitus   • CKD (chronic kidney disease), stage III   • Symptomatic anemia   • GI bleed   • KRIS (acute kidney injury)   • Nausea and vomiting   • Right lower lobe pneumonia   • Weakness   • Pneumonia   • Acute respiratory failure with hypoxia   • Hyperkalemia               Adult Rehabilitation Note       17 1013 17 1315 17 1314    Rehab Assessment/Intervention    Discipline physical therapy assistant  -AS physical therapist  -SJ occupational therapist  -JR    Document Type therapy note (daily note)  -AS  therapy note (daily note)  -JR    Subjective Information agree to therapy;complains of;weakness;fatigue  -AS agree to therapy;complains of;nausea/vomiting  -SJ no complaints;agree to therapy  -JR    Patient Effort, Rehab Treatment fair   -AS fair  -SJ     Patient Effort, Rehab Treatment Comment  pt very lethargic, episode of emesis  -SJ     Symptoms Noted During/After Treatment other (see comments)   continued BL during therapy  -AS other (see comments)  -SJ     Symptoms Noted Comment  nausea, vomiting, RN informed  -SJ Pt vomiting during session, notified RN  -JR    Precautions/Limitations fall precautions;oxygen therapy device and L/min;other (see comments)   right sided weakness  -AS fall precautions;oxygen therapy device and L/min;other (see comments)   R-sided weakness  -SJ fall precautions;oxygen therapy device and L/min   R sided weakness  -JR    Recorded by [AS] Anel Rizvi, PTA [SJ] Winter Nur, PT [JR] Magalie PENNY Ziggy, OT    Vital Signs    Pre Systolic BP Rehab  161  -  -JR    Pre Treatment Diastolic BP  63  -SJ 63  -JR    Post Systolic BP Rehab  178  -  -JR    Post Treatment Diastolic BP  70  -SJ 70  -JR    Pretreatment Heart Rate (beats/min)  68  -SJ 67  -JR    Posttreatment Heart Rate (beats/min)  66  -SJ 66  -JR    Pre SpO2 (%)  92  -SJ 93  -JR    O2 Delivery Pre Treatment  supplemental O2  -SJ supplemental O2   high flow  -JR    Post SpO2 (%)  91  -SJ 92  -JR    O2 Delivery Post Treatment  supplemental O2  -SJ supplemental O2  -JR    Pre Patient Position  Supine  -SJ Supine  -JR    Intra Patient Position  Supine  -SJ Supine  -JR    Post Patient Position  Supine  -SJ Supine  -JR    Recorded by  [SJ] Winter Nur, PT [JR] Magalie Trinh, OT    Pain Assessment    Pain Assessment V[place]-Rosas FACES  -AS 0-10  -SJ 0-10  -JR    V[place]es-Ros[place] FACES Pain Rating 2  -AS      Pain Score  0  -SJ 0  -JR    Post Pain Score 2  -AS 0  -SJ 0  -JR    Pain Type Acute pain  -AS      Pain Location Knee  -AS      Pain Orientation Right  -AS      Pain Descriptors Aching  -AS      Pain Frequency Constant/continuous  -AS      Patient's Stated Pain Goal No pain  -AS      Pain Intervention(s) Repositioned  -AS      Response to  Interventions tolerated  -AS      Recorded by [AS] Anel Rizvi PTA [SJ] Winter Nur PT [JR] Magalie Trinh, OT    Cognitive Assessment/Intervention    Current Cognitive/Communication Assessment impaired  -AS impaired  -SJ     Orientation Status oriented to;person;place  -AS oriented to;person  -SJ     Follows Commands/Answers Questions 75% of the time  -AS 50% of the time;able to follow single-step instructions;needs cueing;needs increased time;needs repetition  -SJ 50% of the time;75% of the time;able to follow single-step instructions;needs cueing;needs increased time;needs repetition  -JR    Personal Safety mild impairment  -AS mild impairment;decreased awareness, need for assist;decreased awareness, need for safety;decreased insight to deficits  -SJ mild impairment;decreased awareness, need for assist;decreased awareness, need for safety;decreased insight to deficits  -JR    Personal Safety Interventions fall prevention program maintained;gait belt;nonskid shoes/slippers when out of bed;other (see comments)   bed alarm  -AS      Recorded by [AS] Anel Rizvi PTA [SJ] Winter Nur PT [JR] Magalie Trinh, OT    Bed Mobility, Assessment/Treatment    Bed Mobility, Assistive Device draw sheet  -AS draw sheet;bed rails  -SJ draw sheet;bed rails  -JR    Bed Mobility, Roll Left, [place] verbal cues required;maximum assist (25% patient effort);2 person assist required  -AS maximum assist (25% patient effort);2 person assist required  -SJ maximum assist (25% patient effort);2 person assist required  -JR    Bed Mobility, Roll Right, [place] verbal cues required;maximum assist (25% patient effort);2 person assist required  -AS maximum assist (25% patient effort);2 person assist required  -SJ maximum assist (25% patient effort);2 person assist required  -JR    Bed Mobility, Safety Issues  decreased use of arms for pushing/pulling;decreased use of legs for bridging/pushing;impaired trunk  control for bed mobility  -SJ decreased use of arms for pushing/pulling;decreased use of legs for bridging/pushing;impaired trunk control for bed mobility  -JR    Bed Mobility, Impairments  strength decreased;coordination impaired;motor control impaired;muscle tone abnormal  -SJ strength decreased;coordination impaired;motor control impaired;muscle tone abnormal  -JR    Bed Mobility, Comment patient rolled to be cleaned, then scooted up to HOB for correct positioning  -AS cues for hand placement and sequencing, pt had episode of incontinence during session  -SJ Pt required verbal cues for sequencing. Pt incontinent of bowel and urine and dependent for hygiene.  -JR    Recorded by [AS] Aenl Rizvi, PTA [SJ] Winter Nur, PT [JR] Magalie Trinh, OT    Transfer Assessment/Treatment    Transfers, Bed-Chair [place] not tested  -AS not tested  -SJ     Recorded by [AS] Anel Rizvi, PTA [SJ] Winter Nur, PT     Gait Assessment/Treatment    Gait, [place] Level unable to perform  -AS      Recorded by [AS] Anel Rizvi PTA      Toileting Assessment/Training    Toileting Assess/Train, Position   supine  -JR    Toileting Assess/Train, Indepen Level   dependent (less than 25% patient effort)  -JR    Toileting Assess/Train, Impairments   muscle tone abnormal;ROM decreased;strength decreased;impaired balance;postural control impaired  -JR    Toileting Assess/Train, Comment   Pt incontinent of bowel and urine, dependent for hygiene  -JR    Recorded by   [JR] Magalie Trinh, OT    Therapy Exercises    Right Lower Extremity PROM:;5 reps;hip abduction/adduction  -AS PROM:;sitting;ankle pumps/circles;calf stretch;hamstring stretch;heel slides;hip abduction/adduction;hip ER;hip IR;20 reps  -SJ     Left Lower Extremity AAROM:;5 reps;supine;ankle pumps/circles;heel slides  -AS AROM:;sitting;ankle pumps/circles;glut sets;heel slides;hip abduction/adduction;hip ER;hip flexion;hip IR;quad sets;SAQ;20  reps  -SJ     Right Upper Extremity   PROM:;10 reps;elbow flexion/extension;pronation/supination;shoulder abduction/adduction;hand pumps;shoulder extension/flexion  -JR    Left Upper Extremity   AAROM:;10 reps;elbow flexion/extension;pronation/supination;shoulder abduction/adduction;shoulder extension/flexion  -JR    Recorded by [AS] Anel Rizvi PTA [SJ] Winter Nur, PT [JR] Magalie Trinh, OT    Positioning and Restraints    Pre-Treatment Position in bed  -AS in bed  -SJ in bed  -JR    Post Treatment Position bed  -AS bed  -SJ bed  -JR    In Bed supine;call light within reach;encouraged to call for assist;exit alarm on;RUE elevated;LUE elevated;legs elevated  -AS notified nsg;supine;call light within reach;encouraged to call for assist;exit alarm on;heels elevated  - notified nsg;supine;call light within reach;encouraged to call for assist;exit alarm on;RUE elevated;RLE elevated;LLE elevated  -JR    Recorded by [AS] Anel Rizvi PTA [SJ] Winter Nur, PT [JR] Magalie Trinh, OT      User Key  (r) = Recorded By, (t) = Taken By, (c) = Cosigned By    Initials Name Effective Dates     Winter Nur PT 06/19/15 -     JR Magalie Trinh, OT 06/22/15 -     AS Anel Rizvi PTA 06/22/15 -                 IP PT Goals       09/13/17 1142 09/08/17 1604 09/06/17 1037    Bed Mobility PT LTG    Bed Mobility PT LTG, Date Established   09/06/17  -DM    Bed Mobility PT LTG, Time to Achieve   1 wk  -DM    Bed Mobility PT LTG, Activity Type   all bed mobility  -DM    Bed Mobility PT LTG, [place] Level   moderate assist (50% patient effort);2 person assist required  -DM    Bed Mobility PT LTG, Date Goal Reviewed 09/13/17  -AS      Bed Mobility PT LTG, Outcome goal ongoing  -AS goal ongoing  -     Transfer Training PT LTG    Transfer Training PT LTG, Date Established   09/06/17  -DM    Transfer Training PT LTG, Time to Achieve   1 wk  -DM    Transfer Training PT LTG, Activity Type   bed to  chair /chair to bed;sit to stand/stand to sit  -DM    Transfer Training PT LTG, [place] Level   maximum assist (25% patient effort);2 person assist required  -DM    Transfer Training PT LTG, Assist Device   walker, homa  -DM    Transfer Training PT  LTG, Date Goal Reviewed 09/13/17  -AS      Transfer Training PT LTG, Outcome goal ongoing  -AS goal ongoing  -SJ     Gait Training PT LTG    Gait Training Goal PT LTG, Date Established   09/06/17  -DM    Gait Training Goal PT LTG, Time to Achieve   1 wk  -DM    Gait Training Goal PT LTG, [place] Level   maximum assist (25% patient effort);2 person assist required   stable VS; RLE brace  -DM    Gait Training Goal PT LTG, Assist Device   walker, homa  -DM    Gait Training Goal PT LTG, Distance to Achieve   3  -DM    Gait Training Goal PT LTG, Date Goal Reviewed 09/13/17  -AS      Gait Training Goal PT LTG, Outcome goal ongoing  -AS goal ongoing  -SJ       User Key  (r) = Recorded By, (t) = Taken By, (c) = Cosigned By    Initials Name Provider Type    EMMA Nur, PT Physical Therapist    TEET Mcgrath, PT Physical Therapist    AS Anel Rizvi, PTA Physical Therapy Assistant          Physical Therapy Education     Title: PT OT SLP Therapies (Active)     Topic: Physical Therapy (Active)     Point: Mobility training (Active)    Learning Progress Summary    Learner Readiness Method Response Comment Documented by Status   Patient Acceptance E NR  AS 09/13/17 1142 Active    Acceptance E,D NR   09/11/17 1618 Active    Acceptance E,D NR   09/08/17 1605 Active    Eager E,D NR   09/06/17 1036 Active               Point: Home exercise program (Active)    Learning Progress Summary    Learner Readiness Method Response Comment Documented by Status   Patient Acceptance E NR  AS 09/13/17 1142 Active    Acceptance E,D NR   09/11/17 1618 Active    Acceptance E,D NR   09/08/17 1605 Active    Eager E,D NR   09/06/17 1036 Active               Point: Body  mechanics (Active)    Learning Progress Summary    Learner Readiness Method Response Comment Documented by Status   Patient Acceptance E NR  AS 09/13/17 1142 Active    Acceptance E,D NR   09/11/17 1618 Active    Acceptance E,D NR   09/08/17 1605 Active    Eager E,D NR  DM 09/06/17 1036 Active               Point: Precautions (Active)    Learning Progress Summary    Learner Readiness Method Response Comment Documented by Status   Patient Acceptance E NR  AS 09/13/17 1142 Active    Acceptance E,D NR   09/11/17 1618 Active    Acceptance E,D NR   09/08/17 1605 Active    Eager E,D NR  DM 09/06/17 1036 Active                      User Key     Initials Effective Dates Name Provider Type Discipline     06/19/15 -  Winter Nur, PT Physical Therapist PT     06/19/15 -  Lou Mcgrath, PT Physical Therapist PT    AS 06/22/15 -  Anel Rizvi, PTA Physical Therapy Assistant PT                    PT Recommendation and Plan  Anticipated Discharge Disposition: inpatient rehabilitation facility ([facility] rehab)  PT Frequency: daily  Plan of Care Review  Plan Of Care Reviewed With: patient  Progress: no change  Outcome Summary/Follow up Plan: patient limited by weakness and diarrhea. Bed mobility and exercises only.          Outcome Measures       09/13/17 1013 09/11/17 1315 09/11/17 1314    How much help from another person do you currently need...    Turning from your back to your side while in flat bed without using bedrails? 2  -AS 2  -SJ     Moving from lying on back to sitting on the side of a flat bed without bedrails? 2  -AS 2  -SJ     Moving to and from a bed to a chair (including a wheelchair)? 1  -AS 1  -SJ     Standing up from a chair using your arms (e.g., wheelchair, bedside chair)? 1  -AS 1  -SJ     Climbing 3-5 steps with a railing? 1  -AS 1  -SJ     To walk in hospital room? 1  -AS 1  -SJ     AM-PAC 6 Clicks Score 8  -AS 8  -SJ     How much help from another is currently needed...    Putting on and  taking off regular lower body clothing?   1  -JR    Bathing (including washing, rinsing, and drying)   1  -JR    Toileting (which includes using toilet bed pan or urinal)   1  -JR    Putting on and taking off regular upper body clothing   2  -JR    Taking care of personal grooming (such as brushing teeth)   2  -JR    Eating meals   2  -JR    Score   9  -JR    Functional Assessment    Outcome Measure Options AM-PAC 6 Clicks Basic Mobility (PT)  -AS AM-PAC 6 Clicks Basic Mobility (PT)  -SJ AM-PAC 6 Clicks Daily Activity (OT)  -JR      User Key  (r) = Recorded By, (t) = Taken By, (c) = Cosigned By    Initials Name Provider Type     Winter Nur, PT Physical Therapist    JR Magalie Trinh, OT Occupational Therapist    AS Anel Rizvi PTA Physical Therapy Assistant           Time Calculation:         PT Charges       09/13/17 1144          Time Calculation    Start Time 1013  -AS      PT Received On 09/13/17  -AS      PT Goal Re-Cert Due Date 09/16/17  -AS      Time Calculation- PT    Total Timed Code Minutes- PT 20 minute(s)  -AS        User Key  (r) = Recorded By, (t) = Taken By, (c) = Cosigned By    Initials Name Provider Type    AS Anel Rizvi PTA Physical Therapy Assistant          Therapy Charges for Today     Code Description Service Date Service Provider Modifiers Qty    [id number] HC PT THER PROC EA 15 MIN 9/13/2017 Anel Rizvi PTA GP 1          PT G-Codes  Outcome Measure Options: AM-PAC 6 Clicks Basic Mobility (PT)    Anel Rizvi PTA  9/13/2017

## 2017-09-13 NOTE — PROGRESS NOTES
INPATIENT PULMONARY SERVICE  PROGRESS NOTE     Hospital LOS: 8 days    Ms. Jennifer Oliveira, is followed for a Chief Complaint of:  Chief Complaint   Patient presents with   • Weakness - Generalized   • Fever     Principal Problem:    Right lower lobe pneumonia  Active Problems:    Normochromic normocytic anemia    Chronic diastolic heart failure    HTN (hypertension)    Chronic kidney disease (CKD), stage III (moderate)    h/o stroke with right hemiplegia    Disease of thyroid gland    Type 2 diabetes mellitus    KRIS (acute kidney injury)    Nausea and vomiting    Weakness    Acute respiratory failure with hypoxia      Subjective   S   Ms. Oliveira is a 78yo F with a history of CVA/TIA, PAD, T2DM, hypothyroidism, and recent GI bleed requiring transfusion who was admitted on 9/5 for CAP. She was initially seen in the [facility] ED on 9/4 and discharged with a prescription for Levaquin which she was unable to  as the pharmacy was closed. She continued to have fever, cough and severe weakness and presented to [facility].      A CXR was performed in the ED which showed bibasilar opacities as well as a RLL infiltrate. She was admitted and started on Zosyn and Levaquin as there was concern for aspiration. She was given IVF for acute kidney injury and possible dehydration. She usually wears 2L NC at home. At the time of admission, she was requiring 3L NC. She required increased O2 on 9/7 and this improved with diuresis. Her respiratory status continues to improve with diuresis.     Interval History:  Ms. Oliveira feels better from a respiratory standpoint this morning. She is down to 30% on HFNC. Her main complaint today is diarrhea. She is in contact precautions for C. Diff but her C. Diff PCR is negative.         The patient's relevant past medical, surgical and social history were reviewed and updated in Epic as appropriate.      ROS:   Constitutional: Negative for fever.   Respiratory: Positive for dyspnea.    Cardiovascular: Negative for chest pain.   Gastrointestinal: Positive for diarrhea.      Objective   O     Vitals  Temp  Min: 98.1 °F (36.7 °C)  Max: 98.4 °F (36.9 °C)  BP  Min: 156/51  Max: 199/67  Pulse  Min: 68  Max: 82  Resp  Min: 18  Max: 22  SpO2  Min: 93 %  Max: 94 % Flow (L/min)  Min: 30  Max: 30    I/O 24 HR (7:00 AM-6:59AM):  Intake/Output       09/12/17 0700 - 09/13/17 0659 09/13/17 0700 - 09/14/17 0659    Intake (ml) 1328 150    Output (ml) 1700 400    Net (ml) -372 -250            Physical Examination    Telemetry: Sinus Rhythm: normal sinus rhythm      Constitutional:  No acute distress.   Conversant.    Cardiovascular: Regular rate and rhythm.  No murmurs, rub or gallop.   Respiratory: Normal symmetric chest expansion.  Bibasilar crackles appreciated.    Abdominal:  Soft. No masses.   Non-tender. No distension.   No hepatosplenomegaly.   Extremities: No digital cyanosis or clubbing.  Trace peripheral edema.   Neurological:   Alert and Oriented to person, place, and time.   Moves all extremities.           Results from last 7 days  Lab Units 09/13/17  0611 09/12/17  0526 09/11/17  0734   WBC 10*3/mm3 8.90 11.27* 11.36*   HEMOGLOBIN g/dL 7.9* 9.3* 10.7*   MCV fL 89.1 89.6 92.0   PLATELETS 10*3/mm3 250 276 271       Results from last 7 days  Lab Units 09/13/17  0611 09/13/17  0017 09/12/17  0526 09/11/17  2324   SODIUM mmol/L 141 138 141 138   POTASSIUM mmol/L 3.6 3.7 3.8 3.8   CO2 mmol/L 24.0 23.0 25.0 24.0   CREATININE mg/dL 1.90* 2.00* 2.00* 2.00*   PHOSPHORUS mg/dL  --  4.8  --  5.9*     CREATININE: 1.9 mg/dL ABNORMAL (09/13/17 0611)  Estimated creatinine clearance: 24.4 mL/min          Results from last 7 days  Lab Units 09/11/17  1031   PH, ARTERIAL pH units 7.344*   PCO2, ARTERIAL mm Hg 39.6   PO2 ART mm Hg 66.3*   FIO2 % 50       Images: No new imaging available for review.     I reviewed the patient's new clinical results.      Assessment/Plan   A / P     77 y.o.female, admitted on 9/5/2017  with: Fever and weakness    Impressions:  1. Acute on Chronic Respiratory Failure  1. 2L NC prior to admission, now requiring HFNC down to 30%  2. Right Lower Lobe Pneumonia  1. Afebrile  2. Respiratory Cx w/ Normal Respiratory [place]  3. ABX: Zosyn (D9) and Levaquin (Intermittent dosing started on 9/5)  3. History of COPD  1. Prednisone 30mg (D5) for exacerbation  2. Duonebs/Pulmicort  4. KRIS on CKD  1. Baseline Cr 1.4  2. Creatinine improved to 1.9 this AM  5. Volume Overload  1. Normal EF on Echo from 4/2017  2. History of Diastolic dysfunction per chart  6. Diarrhea  1. C. Diff PCR negative      She continues to improve with diuresis. Kidney function is slightly better as well. She is now having diarrhea.     Assessment / Plan:  1. Continue diuresis as tolerated. May need to reduce diuresis if diarrhea is significant.   2. Today is day 5 of Prednisone. Recommend to start Prednisone taper tomorrow.   3. Can likely de-escalate antibiotics to Levaquin alone. Recommend 10 days of antibiotic therapy for pneumonia.   4. Wean supplemental O2 as tolerated. May tolerate regular nasal cannula today.     I discussed the patient's findings and my recommendations with patient and nursing staff. We will continue to follow along.     Time:  I spent 15 minutes, face to face, with the patient or on the shaw coordinating care with other health care providers.   I spent > 50% percent of this time, counseling and discussing diagnosis, current status and management .     Abigail Crawford, DO  Pulmonary and Critical Care Medicine

## 2017-09-13 NOTE — THERAPY TREATMENT NOTE
Acute Care - Occupational Therapy Treatment Note  [facility]     Patient Name: Jennifer Oliveira  : 1940  MRN: [id number]  Today's Date: 2017  Onset of Illness/Injury or Date of Surgery Date: 17  Date of Referral to OT: 17  Referring Physician: ARSLAN Gonzales      Admit Date: 2017    Visit Dx:     ICD-10-CM ICD-9-CM   1. Pneumonia of right lower lobe due to infectious organism J18.9 483.8   2. Impaired mobility and ADLs Z74.09 799.89   3. Impaired functional mobility, balance, gait, and endurance Z74.09 V49.89     Patient Active Problem List   Diagnosis   • Acute hypoxemic respiratory failure   • PAD (peripheral artery disease)   • Normochromic normocytic anemia   • Chronic diastolic heart failure   • Diabetes type 2, uncontrolled   • Lower extremity cellulitis   • Hypothyroid   • HTN (hypertension)   • Metabolic encephalopathy   • TIA (transient ischemic attack)   • Acute cystitis without hematuria   • Chronic kidney disease (CKD), stage III (moderate)   • GERD (gastroesophageal reflux disease) with hx of gastritis    • h/o stroke with right hemiplegia   • Hypertension   • Disease of thyroid gland   • Diastolic heart failure   • Type 2 diabetes mellitus   • CKD (chronic kidney disease), stage III   • Symptomatic anemia   • GI bleed   • KRIS (acute kidney injury)   • Nausea and vomiting   • Right lower lobe pneumonia   • Weakness   • Pneumonia   • Acute respiratory failure with hypoxia   • Hyperkalemia             Adult Rehabilitation Note       17 1013 17 1012 17 1315    Rehab Assessment/Intervention    Discipline physical therapy assistant  -AS occupational therapist  -CL physical therapist  -SJ    Document Type therapy note (daily note)  -AS therapy note (daily note)  -CL     Subjective Information agree to therapy;complains of;weakness;fatigue  -AS agree to therapy;complains of;weakness;fatigue  -CL agree to therapy;complains of;nausea/vomiting  -SJ    Patient Effort,  Rehab Treatment fair  -AS fair  -CL fair  -SJ    Patient Effort, Rehab Treatment Comment   pt very lethargic, episode of emesis  -SJ    Symptoms Noted During/After Treatment other (see comments)   continued BL during therapy  -AS  other (see comments)  -SJ    Symptoms Noted Comment   nausea, vomiting, RN informed  -SJ    Precautions/Limitations fall precautions;oxygen therapy device and L/min;other (see comments)   right sided weakness  -AS fall precautions;oxygen therapy device and L/min   residual R sided weakness, [place]  -CL fall precautions;oxygen therapy device and L/min;other (see comments)   R-sided weakness  -SJ    Specific Treatment Considerations  Pt session limited d/t frequent BM incontinence, pt declined attempts at EOB, deferred OOB.   -CL     Recorded by [AS] Anel Rizvi PTA [CL] Trinidad Nguyen OT [SJ] Winter Nur, PT    Vital Signs    Pre Systolic BP Rehab  --   VSS, RN cleared for tx.   -  -SJ    Pre Treatment Diastolic BP   63  -SJ    Post Systolic BP Rehab   178  -SJ    Post Treatment Diastolic BP   70  -SJ    Pretreatment Heart Rate (beats/min)   68  -SJ    Posttreatment Heart Rate (beats/min)   66  -SJ    Pre SpO2 (%)   92  -SJ    O2 Delivery Pre Treatment   supplemental O2  -SJ    Post SpO2 (%)   91  -SJ    O2 Delivery Post Treatment   supplemental O2  -SJ    Pre Patient Position   Supine  -SJ    Intra Patient Position   Supine  -SJ    Post Patient Position   Supine  -SJ    Recorded by  [CL] Trinidad Nguyen OT [SJ] Winter Nur, PT    Pain Assessment    Pain Assessment Valles-Rosas FACES  -AS Valles-Baker FACES  -CL 0-10  -SJ    Valles-Rosas FACES Pain Rating 2  -AS 2  -CL     Pain Score  2  -CL 0  -SJ    Post Pain Score 2  -AS 2  -CL 0  -SJ    Pain Type Acute pain  -AS Acute pain  -CL     Pain Location Knee  -AS Knee  -CL     Pain Orientation Right  -AS Right  -CL     Pain Descriptors Aching  -AS      Pain Frequency Constant/continuous  -AS      Patient's Stated Pain Goal No pain   -AS      Pain Intervention(s) Repositioned  -AS Repositioned;Ambulation/increased activity  -CL     Response to Interventions tolerated  -AS Tolerated.   -CL     Recorded by [AS] Anel Rizvi PTA [CL] Trinidad Nguyen OT [SJ] Winter Nur PT    Cognitive Assessment/Intervention    Current Cognitive/Communication Assessment impaired  -AS functional  -CL impaired  -SJ    Orientation Status oriented to;person;place  -AS oriented to;person;situation;place  -CL oriented to;person  -SJ    Follows Commands/Answers Questions 75% of the time  -AS 75% of the time;100% of the time;needs cueing;needs increased time;needs repetition  -CL 50% of the time;able to follow single-step instructions;needs cueing;needs increased time;needs repetition  -SJ    Personal Safety mild impairment  -AS mild impairment;decreased awareness, need for assist;decreased awareness, need for safety  -CL mild impairment;decreased awareness, need for assist;decreased awareness, need for safety;decreased insight to deficits  -SJ    Personal Safety Interventions fall prevention program maintained;gait belt;nonskid shoes/slippers when out of bed;other (see comments)   bed alarm  -AS fall prevention program maintained;nonskid shoes/slippers when out of bed;supervised activity  -CL     Recorded by [AS] Anel Rizvi PTA [CL] Trinidad Nguyen OT [SJ] Winter Nur PT    Bed Mobility, Assessment/Treatment    Bed Mobility, Assistive Device draw sheet  -AS draw sheet;head of bed elevated;bed rails  -CL draw sheet;bed rails  -SJ    Bed Mobility, Roll Left, [place] verbal cues required;maximum assist (25% patient effort);2 person assist required  -AS maximum assist (25% patient effort);2 person assist required;verbal cues required  -CL maximum assist (25% patient effort);2 person assist required  -SJ    Bed Mobility, Roll Right, [place] verbal cues required;maximum assist (25% patient effort);2 person assist required  -AS maximum assist (25%  patient effort);2 person assist required;verbal cues required  -CL maximum assist (25% patient effort);2 person assist required  -SJ    Bed Mobility, Safety Issues   decreased use of arms for pushing/pulling;decreased use of legs for bridging/pushing;impaired trunk control for bed mobility  -SJ    Bed Mobility, Impairments   strength decreased;coordination impaired;motor control impaired;muscle tone abnormal  -SJ    Bed Mobility, Comment patient rolled to be cleaned, then scooted up to [facility] for correct positioning  -AS Rolled for post-toilet hygiene d/t BM incontinence.   -CL cues for hand placement and sequencing, pt had episode of incontinence during session  -SJ    Recorded by [AS] Anel Rizvi PTA [CL] Trinidad Nguyen OT [SJ] Winter Nur PT    Transfer Assessment/Treatment    Transfers, Bed-Chair [place] not tested  -AS not tested  -CL not tested  -SJ    Recorded by [AS] Anel Rizvi PTA [CL] Trinidad Nguyen OT [SJ] Winter Nur, PT    Gait Assessment/Treatment    Gait, [place] Level unable to perform  -AS      Recorded by [AS] Anel Rizvi PTA      Functional Mobility    Functional Mobility- Ind. Level  unable to perform  -CL     Recorded by  [CL] Trinidad Nguyen OT     Lower Body Dressing Assessment/Training    LB Dressing Assess/Train, Clothing Type  donning:;doffing:;socks  -CL     LB Dressing Assess/Train, Position  supine  -CL     LB Dressing Assess/Train, [place]  dependent (less than 25% patient effort)  -CL     Recorded by  [CL] Trinidad Nguyen OT     Toileting Assessment/Training    Toileting Assess/Train, Position  supine  -CL     Toileting Assess/Train, Indep[place] Level  dependent (less than 25% patient effort);2 person assist required;verbal cues required  -CL     Toileting Assess/Train, Comment  Clothing management and post-toilet hygiene.   -CL     Recorded by  [CL] Trinidad Nguyen OT     Therapy Exercises    Right Lower Extremity PROM:;5 reps;hip abduction/adduction  -AS   PROM:;sitting;ankle pumps/circles;calf stretch;hamstring stretch;heel slides;hip abduction/adduction;hip ER;hip IR;20 reps  -SJ    Left Lower Extremity AAROM:;5 reps;supine;ankle pumps/circles;heel slides  -AS  AROM:;sitting;ankle pumps/circles;glut sets;heel slides;hip abduction/adduction;hip ER;hip flexion;hip IR;quad sets;SAQ;20 reps  -SJ    Right Upper Extremity  PROM:;10 reps;elbow flexion/extension;hand pumps;pronation/supination;shoulder extension/flexion;shoulder ER/IR   wrist F/E  -CL     Recorded by [AS] Anel Rizvi PTA [CL] Trinidad Nguyen OT [SJ] Winter Nur, PT    Positioning and Restraints    Pre-Treatment Position in bed  -AS in bed  -CL in bed  -SJ    Post Treatment Position bed  -AS bed  -CL bed  -SJ    In Bed supine;call light within reach;encouraged to call for assist;exit alarm on;RUE elevated;LUE elevated;legs elevated  -AS notified nsg;fowlers;call light within reach;encouraged to call for assist;exit alarm on;RUE elevated;LUE elevated;legs elevated;heels elevated  -CL notified nsg;supine;call light within reach;encouraged to call for assist;exit alarm on;heels elevated  -SJ    Recorded by [AS] Anel Rizvi, PTA [CL] Trinidad Nguyen OT [SJ] Winter Nur, PT      09/11/17 1314          Rehab Assessment/Intervention    Discipline occupational therapist  -JR      Document Type therapy note (daily note)  -JR      Subjective Information no complaints;agree to therapy  -JR      Symptoms Noted Comment Pt vomiting during session, notified RN  -JR      Precautions/Limitations fall precautions;oxygen therapy device and L/min   R sided weakness  -JR      Recorded by [JR] Magalie Trinh OT      Vital Signs    Pre Systolic BP Rehab 161  -JR      Pre Treatment Diastolic BP 63  -JR      Post Systolic BP Rehab 178  -JR      Post Treatment Diastolic BP 70  -JR      Pretreatment Heart Rate (beats/min) 67  -JR      Posttreatment Heart Rate (beats/min) 66  -JR      Pre SpO2 (%) 93  -JR      O2  Delivery Pre Treatment supplemental O2   high flow  -JR      Post SpO2 (%) 92  -JR      O2 Delivery Post Treatment supplemental O2  -JR      Pre Patient Position Supine  -JR      Intra Patient Position Supine  -JR      Post Patient Position Supine  -JR      Recorded by [JR] Magalie Trinh OT      Pain Assessment    Pain Assessment 0-10  -JR      Pain Score 0  -JR      Post Pain Score 0  -JR      Recorded by [JR] Magalie Trinh OT      Cognitive Assessment/Intervention    Follows Commands/Answers Questions 50% of the time;75% of the time;able to follow single-step instructions;needs cueing;needs increased time;needs repetition  -JR      Personal Safety mild impairment;decreased awareness, need for assist;decreased awareness, need for safety;decreased insight to deficits  -JR      Recorded by [JR] Magalie Trinh OT      Bed Mobility, Assessment/Treatment    Bed Mobility, Assistive Device draw sheet;bed rails  -JR      Bed Mobility, Roll Left, [place] maximum assist (25% patient effort);2 person assist required  -JR      Bed Mobility, Roll Right, [place] maximum assist (25% patient effort);2 person assist required  -JR      Bed Mobility, Safety Issues decreased use of arms for pushing/pulling;decreased use of legs for bridging/pushing;impaired trunk control for bed mobility  -JR      Bed Mobility, Impairments strength decreased;coordination impaired;motor control impaired;muscle tone abnormal  -JR      Bed Mobility, Comment Pt required verbal cues for sequencing. Pt incontinent of bowel and urine and dependent for hygiene.  -JR      Recorded by [JR] Magalie Trinh OT      Toileting Assessment/Training    Toileting Assess/Train, Position supine  -JR      Toileting Assess/Train, [place]ep[place] Level dependent (less than 25% patient effort)  -      Toileting Assess/Train, Impairments muscle tone abnormal;ROM decreased;strength decreased;impaired balance;postural control impaired  -      Toileting  Assess/Train, Comment Pt incontinent of bowel and urine, dependent for hygiene  -JR      Recorded by [JR] Magalie Trinh, LUANN      Therapy Exercises    Right Upper Extremity PROM:;10 reps;elbow flexion/extension;pronation/supination;shoulder abduction/adduction;hand pumps;shoulder extension/flexion  -JR      Left Upper Extremity AAROM:;10 reps;elbow flexion/extension;pronation/supination;shoulder abduction/adduction;shoulder extension/flexion  -JR      Recorded by [JR] Magalie Trinh OT      Positioning and Restraints    Pre-Treatment Position in bed  -JR      Post Treatment Position bed  -JR      In Bed notified nsg;supine;call light within reach;encouraged to call for assist;exit alarm on;RUE elevated;RLE elevated;LLE elevated  -JR      Recorded by [] Magalie rTinh OT        User Key  (r) = Recorded By, (t) = Taken By, (c) = Cosigned By    Initials Name Effective Dates    SJ Winter Nur, PT 06/19/15 -     JR Magalie Trinh, OT 06/22/15 -     AS Anel Rizvi, PTA 06/22/15 -     CL Trinidad Nguyen, OT 06/08/16 -                 OT Goals       09/13/17 1150 09/11/17 1349 09/07/17 1439    Bed Mobility OT LTG    Bed Mobility OT LTG, Outcome goal ongoing  -CL goal ongoing  -JR goal ongoing  -CL    Transfer Training OT LTG    Transfer Training OT LTG, Outcome goal ongoing  -CL goal ongoing  -JR goal ongoing  -CL    Patient Education OT LTG    Patient Education OT LTG Outcome goal ongoing  -CL goal ongoing  -JR goal ongoing  -CL    UB Dressing OT LTG    UB Dressing Goal OT LTG, Outcome goal ongoing  -CL goal ongoing  -JR goal ongoing  -CL      09/06/17 1019          Bed Mobility OT LTG    Bed Mobility OT LTG, Date Established 09/06/17  -CL      Bed Mobility OT LTG, Time to Achieve by discharge  -CL      Bed Mobility OT LTG, Activity Type roll left/roll right;supine to sit/sit to supine  -CL      Bed Mobility OT LTG, [place] Level moderate assist (50% patient effort);2 person assist required  -CL       Bed Mobility OT LTG, Additional Goal AAD  -CL      Transfer Training OT LTG    Transfer Training OT LTG, Date Established 09/06/17  -CL      Transfer Training OT LTG, Time to Achieve by discharge  -CL      Transfer Training OT LTG, Activity Type toilet;bed to chair /chair to bed;sit to stand/stand to sit  -CL      Transfer Training OT LTG, [place] Level moderate assist (50% patient effort);2 person assist required  -CL      Transfer Training OT LTG, Additional Goal AAD  -CL      Patient Education OT LTG    Patient Education OT LTG, Date Established 09/06/17  -CL      Patient Education OT LTG, Time to Achieve by discharge  -CL      Patient Education OT LTG, Education Type written program;HEP;posture/body mechanics;1 hand/homa technique;home safety;adaptive equipment mgmt;energy conservation;adaptive breathing  -CL      Patient Education OT LTG, Education Understanding verbalizes understanding  -CL      UB Dressing OT LTG    UB Dressing Goal OT LTG, Date Established 09/06/17  -CL      UB Dressing Goal OT LTG, Time to Achieve by discharge  -CL      UB Dressing Goal OT LTG, Activity Type Utilizing homa-dressing technique  -CL      UB Dressing Goal OT LTG, [place] Level moderate assist (50% patient effort)  -CL      UB Dressing Goal OT LTG, Additional Goal AAD  -CL        User Key  (r) = Recorded By, (t) = Taken By, (c) = Cosigned By    Initials Name Provider Type    JR Magalie Trinh, OT Occupational Therapist    CASEY Nguyen, OT Occupational Therapist          Occupational Therapy Education     Title: PT OT SLP Therapies (Active)     Topic: Occupational Therapy (Active)     Point: ADL training (Active)    Description: Instruct learner(s) on proper safety adaptation and remediation techniques during self care or transfers.   Instruct in proper use of assistive devices.    Learning Progress Summary    Learner Readiness Method Response Comment Documented by Status   Patient Acceptance E,D NR Pt educated  on appropriate safety precautions, positioning, and HEP.  09/13/17 1150 Active    Acceptance E NR Educated pt on B UE HEP.  09/11/17 1348 Active    Acceptance E,D NR Pt educated on positioning and BUE HEP.  09/07/17 1438 Active    Acceptance E,D NR Pt educated on appropriate safety precautions, t/f techniques, positioning, and benefits of therapy.  09/06/17 1018 Active               Point: Home exercise program (Active)    Description: Instruct learner(s) on appropriate technique for monitoring, assisting and/or progressing therapeutic exercises/activities.    Learning Progress Summary    Learner Readiness Method Response Comment Documented by Status   Patient Acceptance E,D NR Pt educated on appropriate safety precautions, positioning, and HEP.  09/13/17 1150 Active    Acceptance E NR Educated pt on B UE HEP.  09/11/17 1348 Active    Acceptance E,D NR Pt educated on positioning and BUE HEP.  09/07/17 1438 Active               Point: Precautions (Active)    Description: Instruct learner(s) on prescribed precautions during self-care and functional transfers.    Learning Progress Summary    Learner Readiness Method Response Comment Documented by Status   Patient Acceptance E,D NR Pt educated on appropriate safety precautions, positioning, and HEP.  09/13/17 1150 Active    Acceptance E,D NR Pt educated on positioning and BUE HEP.  09/07/17 1438 Active    Acceptance E,D NR Pt educated on appropriate safety precautions, t/f techniques, positioning, and benefits of therapy.  09/06/17 1018 Active               Point: Body mechanics (Active)    Description: Instruct learner(s) on proper positioning and spine alignment during self-care, functional mobility activities and/or exercises.    Learning Progress Summary    Learner Readiness Method Response Comment Documented by Status   Patient Acceptance E,D NR Pt educated on appropriate safety precautions, t/f techniques, positioning, and benefits of therapy.  CL 09/06/17 1018 Active                      User Key     Initials Effective Dates Name Provider Type Discipline    JR 06/22/15 -  Magalie Trinh, OT Occupational Therapist OT    CL 06/08/16 -  Trinidad Nguyen OT Occupational Therapist OT                  OT Recommendation and Plan  Anticipated Equipment Needs At Discharge:  (TBA further)  Anticipated Discharge Disposition: skilled nursing facility  Planned Therapy Interventions: adaptive equipment training, ADL retraining, balance training, bed mobility training, energy conservation, home exercise program, transfer training  Therapy Frequency: daily  Plan of Care Review  Plan Of Care Reviewed With: patient  Progress: improving  Outcome Summary/Follow up Plan: Pt session limited d/t frequent BM incontinence, pt declined EOB and deferred OOB activity. Pt w/ good effort to perform in-bed ther-ex. Recommend cont skilled IPOT POC, will monitor progress closely.         Outcome Measures       09/13/17 1013 09/13/17 1012 09/11/17 1315    How much help from another person do you currently need...    Turning from your back to your side while in flat bed without using bedrails? 2  -AS  2  -SJ    Moving from lying on back to sitting on the side of a flat bed without bedrails? 2  -AS  2  -SJ    Moving to and from a bed to a chair (including a wheelchair)? 1  -AS  1  -SJ    Standing up from a chair using your arms (e.g., wheelchair, bedside chair)? 1  -AS  1  -SJ    Climbing 3-5 steps with a railing? 1  -AS  1  -SJ    To walk in hospital room? 1  -AS  1  -SJ    AM-PAC 6 Clicks Score 8  -AS  8  -SJ    How much help from another is currently needed...    Putting on and taking off regular lower body clothing?  1  -CL     Bathing (including washing, rinsing, and drying)  1  -CL     Toileting (which includes using toilet bed pan or urinal)  1  -CL     Putting on and taking off regular upper body clothing  2  -CL     Taking care of personal grooming (such as brushing teeth)  2  -CL      Eating meals  2  -CL     Score  9  -CL     Functional Assessment    Outcome Measure Options AM-PAC 6 Clicks Basic Mobility (PT)  -AS AM-PAC 6 Clicks Daily Activity (OT)  -CL AM-PAC 6 Clicks Basic Mobility (PT)  -SJ      09/11/17 1314          How much help from another is currently needed...    Putting on and taking off regular lower body clothing? 1  -JR      Bathing (including washing, rinsing, and drying) 1  -JR      Toileting (which includes using toilet bed pan or urinal) 1  -JR      Putting on and taking off regular upper body clothing 2  -JR      Taking care of personal grooming (such as brushing teeth) 2  -JR      Eating meals 2  -JR      Score 9  -JR      Functional Assessment    Outcome Measure Options AM-PAC 6 Clicks Daily Activity (OT)  -JR        User Key  (r) = Recorded By, (t) = Taken By, (c) = Cosigned By    Initials Name Provider Type    EMMA Nur, PT Physical Therapist    JR Magalie Trinh, OT Occupational Therapist    AS Anel Rizvi, PTA Physical Therapy Assistant    CL Trinidad Nguyen OT Occupational Therapist           Time Calculation:         Time Calculation- OT       09/13/17 1152          Time Calculation- OT    OT Start Time 1012  -CL      Total Timed Code Minutes- OT 16 minute(s)  -CL      OT Received On 09/13/17  -CL      OT Goal Re-Cert Due Date 09/16/17  -CL        User Key  (r) = Recorded By, (t) = Taken By, (c) = Cosigned By    Initials Name Provider Type    CL Trinidad Nguyen OT Occupational Therapist           Therapy Charges for Today     Code Description Service Date Service Provider Modifiers Qty    [id number]  OT THERAPEUTIC ACT EA 15 MIN 9/13/2017 Trinidad Nguyen OT GO 1               Trinidad Nguyen OT  9/13/2017

## 2017-09-13 NOTE — PLAN OF CARE
Problem: Pneumonia (Adult)  Goal: Signs and Symptoms of Listed Potential Problems Will be Absent or Manageable (Pneumonia)  Outcome: Ongoing (interventions implemented as appropriate)    09/13/17 1154   Pneumonia   Problems Assessed (Pneumonia) all   Problems Present (Pneumonia) fluid/electrolyte imbalance;respiratory compromise

## 2017-09-13 NOTE — PLAN OF CARE
Problem: Patient Care Overview (Adult)  Goal: Plan of Care Review  Outcome: Ongoing (interventions implemented as appropriate)    09/13/17 1150   Coping/Psychosocial Response Interventions   Plan Of Care Reviewed With patient   Outcome Evaluation   Outcome Summary/Follow up Plan Pt session limited d/t frequent BM incontinence, pt declined EOB and deferred OOB activity. Pt w/ good effort to perform in-bed ther-ex. Recommend cont skilled IPOT POC, will monitor progress closely.    Patient Care Overview   Progress improving         Problem: Inpatient Occupational Therapy  Goal: Bed Mobility Goal LTG- OT  Outcome: Ongoing (interventions implemented as appropriate)    09/06/17 1019 09/13/17 1150   Bed Mobility OT LTG   Bed Mobility OT LTG, Date Established 09/06/17 --    Bed Mobility OT LTG, Time to Achieve by discharge --    Bed Mobility OT LTG, Activity Type roll left/roll right;supine to sit/sit to supine --    Bed Mobility OT LTG, [place] Level moderate assist (50% patient effort);2 person assist required --    Bed Mobility OT LTG, Additional Goal AAD --    Bed Mobility OT LTG, Outcome --  goal ongoing       Goal: Transfer Training Goal 1 LTG- OT  Outcome: Ongoing (interventions implemented as appropriate)    09/06/17 1019 09/13/17 1150   Transfer Training OT LTG   Transfer Training OT LTG, Date Established 09/06/17 --    Transfer Training OT LTG, Time to Achieve by discharge --    Transfer Training OT LTG, Activity Type toilet;bed to chair /chair to bed;sit to stand/stand to sit --    Transfer Training OT LTG, [place] Level moderate assist (50% patient effort);2 person assist required --    Transfer Training OT LTG, Additional Goal AAD --    Transfer Training OT LTG, Outcome --  goal ongoing       Goal: Patient Education Goal LTG- OT  Outcome: Ongoing (interventions implemented as appropriate)    09/06/17 1019 09/13/17 1150   Patient Education OT LTG   Patient Education OT LTG, Date Established 09/06/17 --     Patient Education OT LTG, Time to Achieve by discharge --    Patient Education OT LTG, Education Type written program;HEP;posture/body mechanics;1 hand/homa technique;home safety;adaptive equipment mgmt;energy conservation;adaptive breathing --    Patient Education OT LTG, Education Understanding verbalizes understanding --    Patient Education OT LTG Outcome --  goal ongoing       Goal: UB Dressing Goal LTG- OT  Outcome: Ongoing (interventions implemented as appropriate)    09/06/17 1019 09/13/17 1150   UB Dressing OT LTG   UB Dressing Goal OT LTG, Date Established 09/06/17 --    UB Dressing Goal OT LTG, Time to Achieve by discharge --    UB Dressing Goal OT LTG, Activity Type Utilizing homa-dressing technique --    UB Dressing Goal OT LTG, [place] Level moderate assist (50% patient effort) --    UB Dressing Goal OT LTG, Additional Goal AAD --    UB Dressing Goal OT LTG, Outcome --  goal ongoing

## 2017-09-14 ENCOUNTER — APPOINTMENT (OUTPATIENT)
Dept: GENERAL RADIOLOGY | Facility: HOSPITAL | Age: 77
End: 2017-09-14

## 2017-09-14 LAB
ANION GAP SERPL CALCULATED.3IONS-SCNC: 4 MMOL/L (ref 3–11)
BUN BLD-MCNC: 47 MG/DL (ref 9–23)
BUN/CREAT SERPL: 27.6 (ref 7–25)
CALCIUM SPEC-SCNC: 8.2 MG/DL (ref 8.7–10.4)
CHLORIDE SERPL-SCNC: 112 MMOL/L (ref 99–109)
CO2 SERPL-SCNC: 25 MMOL/L (ref 20–31)
CREAT BLD-MCNC: 1.7 MG/DL (ref 0.6–1.3)
DEPRECATED RDW RBC AUTO: 45.9 FL (ref 37–54)
ERYTHROCYTE [DISTWIDTH] IN BLOOD BY AUTOMATED COUNT: 13.8 % (ref 11.3–14.5)
GFR SERPL CREATININE-BSD FRML MDRD: 29 ML/MIN/1.73
GLUCOSE BLD-MCNC: 71 MG/DL (ref 70–100)
GLUCOSE BLDC GLUCOMTR-MCNC: 189 MG/DL (ref 70–130)
GLUCOSE BLDC GLUCOMTR-MCNC: 213 MG/DL (ref 70–130)
GLUCOSE BLDC GLUCOMTR-MCNC: 283 MG/DL (ref 70–130)
GLUCOSE BLDC GLUCOMTR-MCNC: 76 MG/DL (ref 70–130)
HCT VFR BLD AUTO: 24.8 % (ref 34.5–44)
HGB BLD-MCNC: 8 G/DL (ref 11.5–15.5)
MCH RBC QN AUTO: 28.8 PG (ref 27–31)
MCHC RBC AUTO-ENTMCNC: 32.3 G/DL (ref 32–36)
MCV RBC AUTO: 89.2 FL (ref 80–99)
PLATELET # BLD AUTO: 277 10*3/MM3 (ref 150–450)
PMV BLD AUTO: 9.9 FL (ref 6–12)
POTASSIUM BLD-SCNC: 3.7 MMOL/L (ref 3.5–5.5)
RBC # BLD AUTO: 2.78 10*6/MM3 (ref 3.89–5.14)
SODIUM BLD-SCNC: 141 MMOL/L (ref 132–146)
WBC NRBC COR # BLD: 8.22 10*3/MM3 (ref 3.5–10.8)

## 2017-09-14 PROCEDURE — 82962 GLUCOSE BLOOD TEST: CPT

## 2017-09-14 PROCEDURE — 63710000001 PREDNISONE PER 5 MG: Performed by: HOSPITALIST

## 2017-09-14 PROCEDURE — 94640 AIRWAY INHALATION TREATMENT: CPT

## 2017-09-14 PROCEDURE — 94760 N-INVAS EAR/PLS OXIMETRY 1: CPT

## 2017-09-14 PROCEDURE — 97110 THERAPEUTIC EXERCISES: CPT

## 2017-09-14 PROCEDURE — 25010000002 NA FERRIC GLUC CPLX PER 12.5 MG: Performed by: INTERNAL MEDICINE

## 2017-09-14 PROCEDURE — 71010 HC CHEST PA OR AP: CPT

## 2017-09-14 PROCEDURE — 99231 SBSQ HOSP IP/OBS SF/LOW 25: CPT | Performed by: INTERNAL MEDICINE

## 2017-09-14 PROCEDURE — 99232 SBSQ HOSP IP/OBS MODERATE 35: CPT | Performed by: INTERNAL MEDICINE

## 2017-09-14 PROCEDURE — 94799 UNLISTED PULMONARY SVC/PX: CPT

## 2017-09-14 PROCEDURE — 63710000001 INSULIN DETEMIR PER 5 UNITS: Performed by: HOSPITALIST

## 2017-09-14 PROCEDURE — 94667 MNPJ CHEST WALL 1ST: CPT

## 2017-09-14 PROCEDURE — 80048 BASIC METABOLIC PNL TOTAL CA: CPT | Performed by: INTERNAL MEDICINE

## 2017-09-14 PROCEDURE — 63710000001 PREDNISONE PER 1 MG: Performed by: HOSPITALIST

## 2017-09-14 PROCEDURE — 85027 COMPLETE CBC AUTOMATED: CPT | Performed by: INTERNAL MEDICINE

## 2017-09-14 RX ORDER — METOPROLOL TARTRATE 100 MG/1
100 TABLET ORAL EVERY 12 HOURS SCHEDULED
Status: DISCONTINUED | OUTPATIENT
Start: 2017-09-14 | End: 2017-09-18

## 2017-09-14 RX ORDER — HYDRALAZINE HYDROCHLORIDE 50 MG/1
50 TABLET, FILM COATED ORAL EVERY 8 HOURS SCHEDULED
Status: DISCONTINUED | OUTPATIENT
Start: 2017-09-14 | End: 2017-09-16

## 2017-09-14 RX ORDER — PREDNISONE 20 MG/1
20 TABLET ORAL
Status: DISCONTINUED | OUTPATIENT
Start: 2017-09-15 | End: 2017-09-18

## 2017-09-14 RX ORDER — LABETALOL HYDROCHLORIDE 5 MG/ML
10 INJECTION, SOLUTION INTRAVENOUS ONCE
Status: COMPLETED | OUTPATIENT
Start: 2017-09-14 | End: 2017-09-14

## 2017-09-14 RX ORDER — HYDRALAZINE HYDROCHLORIDE 25 MG/1
25 TABLET, FILM COATED ORAL EVERY 8 HOURS SCHEDULED
Status: DISCONTINUED | OUTPATIENT
Start: 2017-09-14 | End: 2017-09-14

## 2017-09-14 RX ADMIN — HYDRALAZINE HYDROCHLORIDE 50 MG: 50 TABLET, FILM COATED ORAL at 21:45

## 2017-09-14 RX ADMIN — BACLOFEN 10 MG: 10 TABLET ORAL at 06:19

## 2017-09-14 RX ADMIN — INSULIN LISPRO 4 UNITS: 100 INJECTION, SOLUTION INTRAVENOUS; SUBCUTANEOUS at 17:04

## 2017-09-14 RX ADMIN — PREDNISONE 30 MG: 20 TABLET ORAL at 08:56

## 2017-09-14 RX ADMIN — ATORVASTATIN CALCIUM 10 MG: 10 TABLET, FILM COATED ORAL at 21:44

## 2017-09-14 RX ADMIN — IPRATROPIUM BROMIDE AND ALBUTEROL SULFATE 3 ML: .5; 3 SOLUTION RESPIRATORY (INHALATION) at 11:52

## 2017-09-14 RX ADMIN — GABAPENTIN 900 MG: 300 CAPSULE ORAL at 21:44

## 2017-09-14 RX ADMIN — OXYCODONE HYDROCHLORIDE AND ACETAMINOPHEN 500 MG: 500 TABLET ORAL at 08:57

## 2017-09-14 RX ADMIN — Medication 250 MG: at 17:04

## 2017-09-14 RX ADMIN — SODIUM CHLORIDE 250 MG: 9 INJECTION, SOLUTION INTRAVENOUS at 13:11

## 2017-09-14 RX ADMIN — LABETALOL HYDROCHLORIDE 10 MG: 5 INJECTION, SOLUTION INTRAVENOUS at 16:19

## 2017-09-14 RX ADMIN — INSULIN LISPRO 2 UNITS: 100 INJECTION, SOLUTION INTRAVENOUS; SUBCUTANEOUS at 13:12

## 2017-09-14 RX ADMIN — IPRATROPIUM BROMIDE AND ALBUTEROL SULFATE 3 ML: .5; 3 SOLUTION RESPIRATORY (INHALATION) at 19:03

## 2017-09-14 RX ADMIN — BUDESONIDE 0.5 MG: 0.5 INHALANT RESPIRATORY (INHALATION) at 19:03

## 2017-09-14 RX ADMIN — INSULIN DETEMIR 25 UNITS: 100 INJECTION, SOLUTION SUBCUTANEOUS at 08:56

## 2017-09-14 RX ADMIN — LEVOTHYROXINE SODIUM 200 MCG: 100 TABLET ORAL at 06:19

## 2017-09-14 RX ADMIN — PANTOPRAZOLE SODIUM 40 MG: 40 TABLET, DELAYED RELEASE ORAL at 06:20

## 2017-09-14 RX ADMIN — GUAIFENESIN 600 MG: 600 TABLET, EXTENDED RELEASE ORAL at 21:44

## 2017-09-14 RX ADMIN — IPRATROPIUM BROMIDE AND ALBUTEROL SULFATE 3 ML: .5; 3 SOLUTION RESPIRATORY (INHALATION) at 02:53

## 2017-09-14 RX ADMIN — INSULIN LISPRO 4 UNITS: 100 INJECTION, SOLUTION INTRAVENOUS; SUBCUTANEOUS at 21:42

## 2017-09-14 RX ADMIN — BACLOFEN 10 MG: 10 TABLET ORAL at 13:11

## 2017-09-14 RX ADMIN — METOPROLOL TARTRATE 50 MG: 50 TABLET, FILM COATED ORAL at 08:56

## 2017-09-14 RX ADMIN — INSULIN LISPRO 3 UNITS: 100 INJECTION, SOLUTION INTRAVENOUS; SUBCUTANEOUS at 17:05

## 2017-09-14 RX ADMIN — IPRATROPIUM BROMIDE AND ALBUTEROL SULFATE 3 ML: .5; 3 SOLUTION RESPIRATORY (INHALATION) at 07:18

## 2017-09-14 RX ADMIN — AMLODIPINE BESYLATE 10 MG: 10 TABLET ORAL at 21:45

## 2017-09-14 RX ADMIN — Medication 250 MG: at 08:57

## 2017-09-14 RX ADMIN — BUDESONIDE 0.5 MG: 0.5 INHALANT RESPIRATORY (INHALATION) at 07:18

## 2017-09-14 RX ADMIN — ASPIRIN 81 MG 81 MG: 81 TABLET ORAL at 08:57

## 2017-09-14 RX ADMIN — HYDRALAZINE HYDROCHLORIDE 50 MG: 50 TABLET, FILM COATED ORAL at 13:11

## 2017-09-14 RX ADMIN — CLOPIDOGREL BISULFATE 75 MG: 75 TABLET ORAL at 08:57

## 2017-09-14 RX ADMIN — IPRATROPIUM BROMIDE AND ALBUTEROL SULFATE 3 ML: .5; 3 SOLUTION RESPIRATORY (INHALATION) at 16:09

## 2017-09-14 RX ADMIN — BACLOFEN 10 MG: 10 TABLET ORAL at 21:44

## 2017-09-14 RX ADMIN — GUAIFENESIN 600 MG: 600 TABLET, EXTENDED RELEASE ORAL at 08:57

## 2017-09-14 RX ADMIN — IPRATROPIUM BROMIDE AND ALBUTEROL SULFATE 3 ML: .5; 3 SOLUTION RESPIRATORY (INHALATION) at 23:37

## 2017-09-14 RX ADMIN — METOPROLOL TARTRATE 100 MG: 100 TABLET ORAL at 21:45

## 2017-09-14 RX ADMIN — INSULIN LISPRO 4 UNITS: 100 INJECTION, SOLUTION INTRAVENOUS; SUBCUTANEOUS at 08:57

## 2017-09-14 RX ADMIN — INSULIN LISPRO 4 UNITS: 100 INJECTION, SOLUTION INTRAVENOUS; SUBCUTANEOUS at 13:11

## 2017-09-14 RX ADMIN — INSULIN DETEMIR 25 UNITS: 100 INJECTION, SOLUTION SUBCUTANEOUS at 21:42

## 2017-09-14 NOTE — PROGRESS NOTES
"   LOS: 9 days    Patient Care Team:  Samuel Buck MD as PCP - General (Family Medicine)    Subjective     Stable.    Objective     Vital Signs:  Blood pressure (!) 181/70, pulse 76, temperature 98.3 °F (36.8 °C), temperature source Oral, resp. rate 18, height 63\" (160 cm), weight 170 lb (77.1 kg), SpO2 91 %.    Flowsheet Rows         First Filed Value    Admission Height  63\" (160 cm) Documented at 09/05/2017 1009    Admission Weight  170 lb (77.1 kg) Documented at 09/05/2017 1009          09/13 0701 - 09/14 0700  In: 1810 [P.O.:1660]  Out: 3500 [Urine:3500]    Physical Exam:    GENERAL: WD elderly female. NAD.   PSYCHIATRIC:  Awake and alert,  Normal mood and affect. Cooperative with PE  EYE: PE, no icterus, no conjunctivitis  ENT: ommm, dentition intact,  Hearing intact  NECK: Supple , No JVD discernable,   CV: RRR; + edema  LUNGS:  Quiet,  Nonlabored resp.    ABDOMEN: Soft, nontender, nondistended. BS present.  : no palp bladder, + wolfe  SKIN: Warm and dry without rash    Labs:    Results from last 7 days  Lab Units 09/14/17  0516 09/13/17  0611 09/12/17  0526   WBC 10*3/mm3 8.22 8.90 11.27*   HEMOGLOBIN g/dL 8.0* 7.9* 9.3*   PLATELETS 10*3/mm3 277 250 276       Results from last 7 days  Lab Units 09/14/17  0516 09/13/17  0611 09/13/17  0017 09/12/17  0526 09/11/17  2324   SODIUM mmol/L 141 141 138 141 138   POTASSIUM mmol/L 3.7 3.6 3.7 3.8 3.8   CHLORIDE mmol/L 112* 107 108 105 103   CO2 mmol/L 25.0 24.0 23.0 25.0 24.0   BUN mg/dL 47* 57* 54* 58* 53*   CREATININE mg/dL 1.70* 1.90* 2.00* 2.00* 2.00*   CALCIUM mg/dL 8.2* 8.0* 8.4* 8.3* 8.9   PHOSPHORUS mg/dL  --   --  4.8  --  5.9*   ALBUMIN g/dL  --   --  2.80*  --  3.40           Results from last 7 days  Lab Units 09/11/17  1031   PH, ARTERIAL pH units 7.344*   PO2 ART mm Hg 66.3*   PCO2, ARTERIAL mm Hg 39.6   HCO3 ART mmol/L 21.5       Results from last 7 days  Lab Units 09/13/17  1955   COLOR UA  Yellow   CLARITY UA  Cloudy*   PH, URINE  5.5   SPECIFIC " GRAVITY, URINE  1.013   GLUCOSE UA  100 mg/dL (Trace)*   KETONES UA  Negative   BILIRUBIN UA  Negative   PROTEIN UA  100 mg/dL (2+)*   BLOOD UA  Moderate (2+)*   LEUKOCYTES UA  Negative   NITRITE UA  Negative       Estimated Creatinine Clearance: 27.3 mL/min (by C-G formula based on Cr of 1.7).      A/P:    ARF:  Cr improving with good UOP.   Cr persistantly 2.0-2.2 since admission.  May have underlying ATN due to dehydration with N/V prior to admission. Watch with bumex.    CKD3:  Baseline 1.5-1.6     HTN: BP elevated.. Will start hydralazine for now.      PNA: on ABx     Anemia:  Hgb stable.  Tsat 9%.  Recheck s/p IVFe.     Edema:  Re-dose Bumex as needed.     High risk and complexity    Babar Law MD  09/14/17  10:18 AM

## 2017-09-14 NOTE — PLAN OF CARE
Problem: Patient Care Overview (Adult)  Goal: Plan of Care Review  Outcome: Ongoing (interventions implemented as appropriate)    09/14/17 0522   Coping/Psychosocial Response Interventions   Plan Of Care Reviewed With patient   Outcome Evaluation   Outcome Summary/Follow up Plan Pt's vitals stable, pt tolerating weaning down oxygen (went from 50% to 40%). Pt had no complaints or events through the night.    Patient Care Overview   Progress improving       Goal: Discharge Needs Assessment  Outcome: Ongoing (interventions implemented as appropriate)    09/08/17 1513 09/13/17 1155   Discharge Needs Assessment   Concerns To Be Addressed --  denies needs/concerns at this time   Readmission Within The Last 30 Days no previous admission in last 30 days --    Discharge Disposition still a patient --          Problem: Pneumonia (Adult)  Goal: Signs and Symptoms of Listed Potential Problems Will be Absent or Manageable (Pneumonia)  Outcome: Ongoing (interventions implemented as appropriate)    09/13/17 1154   Pneumonia   Problems Assessed (Pneumonia) all   Problems Present (Pneumonia) fluid/electrolyte imbalance;respiratory compromise         Problem: Infection, Risk/Actual (Adult)  Goal: Identify Related Risk Factors and Signs and Symptoms  Outcome: Ongoing (interventions implemented as appropriate)    09/13/17 1154   Infection, Risk/Actual   Infection, Risk/Actual: Related Risk Factors prolonged hospitalization;sleep disturbance;tissue perfusion altered   Signs and Symptoms (Infection, Risk/Actual) blood glucose changes;lab value changes;weakness       Goal: Infection Prevention/Resolution  Outcome: Ongoing (interventions implemented as appropriate)    09/14/17 0522   Infection, Risk/Actual (Adult)   Infection Prevention/Resolution making progress toward outcome         Problem: Fall Risk (Adult)  Goal: Identify Related Risk Factors and Signs and Symptoms  Outcome: Ongoing (interventions implemented as appropriate)     09/13/17 1154   Fall Risk   Fall Risk: Related Risk Factors fatigue/slow reaction;gait/mobility problems;slipper/uneven surfaces;sleep pattern alteration;environment unfamiliar   Fall Risk: Signs and Symptoms presence of risk factors

## 2017-09-14 NOTE — PROGRESS NOTES
INPATIENT PULMONARY SERVICE  PROGRESS NOTE     Hospital LOS: 9 days    Ms. Jennifer Oliveira, is followed for a Chief Complaint of:  Chief Complaint   Patient presents with   • Weakness - Generalized   • Fever     Principal Problem:    Right lower lobe pneumonia  Active Problems:    Normochromic normocytic anemia    Chronic diastolic heart failure    HTN (hypertension)    Chronic kidney disease (CKD), stage III (moderate)    h/o stroke with right hemiplegia    Disease of thyroid gland    Type 2 diabetes mellitus    KRIS (acute kidney injury)    Nausea and vomiting    Weakness    Acute respiratory failure with hypoxia      Subjective   S   Ms. Oliveira is a 76yo F with a history of CVA/TIA, PAD, T2DM, hypothyroidism, and recent GI bleed requiring transfusion who was admitted on 9/5 for CAP. She was initially seen in the [facility] ED on 9/4 and discharged with a prescription for Levaquin which she was unable to  as the pharmacy was closed. She continued to have fever, cough and severe weakness and presented to [facility].      A CXR was performed in the ED which showed bibasilar opacities as well as a RLL infiltrate. She was admitted and started on Zosyn and Levaquin as there was concern for aspiration. She was given IVF for acute kidney injury and possible dehydration. She usually wears 2L NC at home. At the time of admission, she was requiring 3L NC. She required increased O2 on 9/7 and this improved with diuresis. Her respiratory status continues to improve with diuresis.     Interval History:  Ms. Oliveira tells me that she feels better this morning. Still coughing.        The patient's relevant past medical, surgical and social history were reviewed and updated in Epic as appropriate.      ROS:   Constitutional: Negative for fever.   Respiratory: Positive for dyspnea.   Cardiovascular: Negative for chest pain.   Gastrointestinal: Positive for diarrhea.      Objective   O     Vitals  Temp  Min: 97.8 °F (36.6  °C)  Max: 98.3 °F (36.8 °C)  BP  Min: 164/92  Max: 194/75  Pulse  Min: 62  Max: 82  Resp  Min: 18  Max: 20  SpO2  Min: 87 %  Max: 94 % Flow (L/min)  Min: 30  Max: 50    I/O 24 HR (7:00 AM-6:59AM):  Intake/Output       09/13/17 0700 - 09/14/17 0659    Intake (ml) 1810    Output (ml) 3500    Net (ml) -1690            Physical Examination    Telemetry: Sinus Rhythm: normal sinus rhythm      Constitutional:  No acute distress.   Conversant.    Cardiovascular: Regular rate and rhythm.  No murmurs, rub or gallop.   Respiratory: Normal symmetric chest expansion.  Rhonchi on the right.  No wheezing.     Abdominal:  Soft. No masses.   Non-tender. No distension.   No hepatosplenomegaly.   Extremities: No digital cyanosis or clubbing.  Trace peripheral edema.   Neurological:   Alert and Oriented to person, place, and time.   Moves all extremities.           Results from last 7 days  Lab Units 09/14/17  0516 09/13/17  0611 09/12/17  0526   WBC 10*3/mm3 8.22 8.90 11.27*   HEMOGLOBIN g/dL 8.0* 7.9* 9.3*   MCV fL 89.2 89.1 89.6   PLATELETS 10*3/mm3 277 250 276       Results from last 7 days  Lab Units 09/14/17  0516 09/13/17  0611 09/13/17  0017  09/11/17  2324   SODIUM mmol/L 141 141 138  < > 138   POTASSIUM mmol/L 3.7 3.6 3.7  < > 3.8   CO2 mmol/L 25.0 24.0 23.0  < > 24.0   CREATININE mg/dL 1.70* 1.90* 2.00*  < > 2.00*   PHOSPHORUS mg/dL  --   --  4.8  --  5.9*   < > = values in this interval not displayed.  CREATININE: 1.7 mg/dL ABNORMAL (09/14/17 0516)  Estimated creatinine clearance: 27.3 mL/min          Results from last 7 days  Lab Units 09/11/17  1031   PH, ARTERIAL pH units 7.344*   PCO2, ARTERIAL mm Hg 39.6   PO2 ART mm Hg 66.3*   FIO2 % 50       Images:   Chest xray: 09/14/2017 Personally reviewed. Bilateral airspace disease is improved. Right consolidation still       Present. May represent atelectasis.     I reviewed the patient's new clinical results.  I have reviewed the patient's new imaging and agree with the  interpretation.       Assessment/Plan   A / P     77 y.o.female, admitted on 9/5/2017 with: Fever and weakness    Impressions:  1. Acute on Chronic Respiratory Failure  1. 2L NC prior to admission, now requiring HFNC 40%  2. Right Lower Lobe Pneumonia  1. Afebrile  2. Respiratory Cx w/ Normal Respiratory [place]  3. ABX: Levaquin (Intermittent dosing started on 9/5)  3. History of COPD  1. Prednisone 30mg (D5) for exacerbation  2. Duonebs/Pulmicort  4. KRIS on CKD  1. Baseline Cr 1.4  2. Creatinine improved to 1.9 this AM  5. Volume Overload  1. Normal EF on Echo from 4/2017  2. History of Diastolic dysfunction per chart  6. Diarrhea  1. C. Diff PCR negative      She has improved with diuresis. However, still with significant hypoxia and right lower lobe infiltrate. This may represent atelectasis. Kidney function continues to improve.      Assessment / Plan:  1. Continue diuresis as tolerated. May need to reduce diuresis if diarrhea is significant.   2. Today is day 6 of Prednisone. Start taper tomorrow w/ Prednisone 20mg ordered.   3. Percussion to right side and IS as well as flutter valve for right sided atelectasis.   4. Wean supplemental O2 as tolerated. May tolerate regular nasal cannula today.     I discussed the patient's findings and my recommendations with patient and nursing staff. We will continue to follow along.     Time:  I spent 15 minutes, face to face, with the patient or on the shaw coordinating care with other health care providers.   I spent > 50% percent of this time, counseling and discussing diagnosis, current status and management .     Abigail Crawford, DO  Pulmonary and Critical Care Medicine

## 2017-09-14 NOTE — PROGRESS NOTES
Continued Stay Note   [place]     Patient Name: Jennifer Oliveira  MRN: [id number]  Today's Date: 9/14/2017    Admit Date: 9/5/2017          Discharge Plan       09/14/17 1415    Case Management/Social Work Plan    Plan [facility]    Patient/Family In Agreement With Plan yes    Additional Comments Spoke with pt at bedside. Pt reports she is only interested in [facility] after speaking with her family. Pt reports she nad her family think [facility] will be the best option for her. [facility] continues to follow pt. Pt not medically reayd for discharge. SW continues to follow for discharge needs.     Final Note    Final Note SW is following              Discharge Codes     None        Expected Discharge Date and Time     Expected Discharge Date Expected Discharge Time    Sep 15, 2017             SIMONE Finch

## 2017-09-14 NOTE — PROGRESS NOTES
"    [facility] Medicine Services  PROGRESS NOTE      Date of Admission: 2017  Length of Stay: 9  Primary Care Physician: Samuel Buck MD    Patient Name: Jennifer Oliveira  : 1940  MRN: [id number]    Subjective     CC:  Pneumonia, weakness; fever.    H      History of Present Illness    Thinks her breathing is better.  Didn't get much sleep yest  No diarrhea today  No other complaints  Was up in chair yest.    At home:  2L nc nocturnal only  Walks with walker after cva/R HP    .Review of Systems      Otherwise complete ROS is negative except as mentioned in the HPI.      Objective     Vital Signs: BP (!) 181/70 (BP Location: Left arm, Patient Position: Lying)  Pulse 76  Temp 98.3 °F (36.8 °C) (Oral)   Resp 18  Ht 63\" (160 cm)  Wt 170 lb (77.1 kg)  SpO2 91%  BMI 30.11 kg/m2     Physical Exam :  Sitting up in bed, sleepy but comfortable appearing., Not coughing today  OP clear, MMM  Neck supple  RRR  Decreased bases, scattered rhonchi but no wheeze  And not labored.  Still 30percent FIo2 by hiflow.  +BS, ND, NT  LUNA    edema R UE/RLE  No rashes  Good strength L side, 5/5 LUE/LLE, weak R from prior CVA      Results Reviewed:  I have personally reviewed current lab, radiology, and data and agree.      Results from last 7 days  Lab Units 17  0517  0617  0526   WBC 10*3/mm3 8.22 8.90 11.27*   HEMOGLOBIN g/dL 8.0* 7.9* 9.3*   HEMATOCRIT % 24.8* 24.4* 28.3*   PLATELETS 10*3/mm3 277 250 276       Results from last 7 days  Lab Units 17  0516 17  0611 17  0017   SODIUM mmol/L 141 141 138   POTASSIUM mmol/L 3.7 3.6 3.7   CHLORIDE mmol/L 112* 107 108   CO2 mmol/L 25.0 24.0 23.0   BUN mg/dL 47* 57* 54*   CREATININE mg/dL 1.70* 1.90* 2.00*   GLUCOSE mg/dL 71 164* 297*   CALCIUM mg/dL 8.2* 8.0* 8.4*     No results found for: BNP  No results found for: PHART  Blood Culture   Date Value Ref Range Status   2017 No growth at 24 hours  Preliminary "   09/05/2017 No growth at 24 hours  Preliminary       Imaging Results (last 24 hours)     Procedure Component Value Units Date/Time    XR Chest 1 View [[id number]] Collected:  09/14/17 1047     Updated:  09/14/17 1047    Narrative:       EXAMINATION: XR CHEST 1 VW- 09/14/2017     INDICATION: J18.9-Pneumonia, unspecified organism; Z74.09-Other reduced  mobility     COMPARISON: 09/12/2017 portable chest     FINDINGS: Heart is normal in size. The vasculature appears mildly  cephalized. Rather dense and extensive perihilar disease seen on the  09/12/2017 exam has changed in pattern, overall moderately improved on  the left, focally somewhat denser in the right base but improved in the  right upper lung. Right basilar opacity may actually represent a small  new effusion. No pneumothorax is seen.       Impression:       Changing pattern of disease, with significantly improved  aeration of the left lung, and focally increased atelectasis or effusion  in the right base. No new chest disease elsewhere.     D:  09/14/2017  E:  09/14/2017               I have reviewed the medications.      Assessment / Plan     Assessment & Plan :  Hospital Problem List     * (Principal)Right lower lobe pneumonia    Normochromic normocytic anemia    Overview Addendum 9/5/2017  3:01 PM by ARSLAN Montalvo     - Baseline Hgb 9s  - Hx of C-scope with polyps 2016, EGD 2016 with gastritis   - EGD 5/14/17 and 5/26/17 with symptomatic anemia         Chronic diastolic heart failure    Overview Signed 4/19/2017  1:21 PM by Samuel Dela Cruz MD     - Last echo 11/2016 LVEF 70%, normal VHD         HTN (hypertension)    Chronic kidney disease (CKD), stage III (moderate)    Overview Signed 4/19/2017  1:14 PM by Samuel Dela Cruz MD     - baseline Cr 1.5-1.6         h/o stroke with right hemiplegia    Overview Signed 5/14/2017 10:24 PM by Mariam Murray RN EXTENDER     residual chronic right hemiplegia         Disease of thyroid gland    Type 2  diabetes mellitus    KRIS (acute kidney injury)    Nausea and vomiting    Weakness    Acute respiratory failure with hypoxia               Brief Hospital Course to date:  Jennifer Oliveira is a 77 y.o. female with PMH significant for previous heavy smoker, CVA/ TIA with residual right sided hemiparesis, PAD s/p fem- pop , HTN, T2DM, hypothyroid, recent GIB requiring multiple transfusions that was seen 17 at [facility] ED and diagnosed with CAP, started on Levaquin but unable to  prescription due to pharmacy being closed. Symptoms progressed to high grade fever, worsening cough and severe weakness (unable to stand) N/V within 24 hrs.  Also had to increase baseline home O2 from 2L to 4L.  Presented to [facility] ED found to have RLL pna with fever and KRIS:      Acute hypoxic respiratory failure  --Continue antibiotics:  Weaned to levaquin only  --CXR favors some vascular congestion, giving Lasix.  but normal ECHO   - pulmonary following  -- slow wean prednisone    Altered MS/weakness  -- hypoxia by AB/66  On 30percent  -- neg CT head    HTN uncontrolled  -- inc hydral and BB    Hx of CVA/TIA with R weakness     KRIS/CKD  -- returning to baseline despite diuresis    Anemia  -- low/stable.  watch    Diarrhea after abx  -- cdt neg     DM  Hypothyroidism  HTN  Hx of PAD  DVT prophylaxis      Continue wolfe.  Watch output and renal fxn. Agree with diuresis.  CBC tomorrw.  Encourage OOB daily    Disposition: I expect the patient to be discharged to rehab.    Denisse Maharaj MD  17  10:54 AM

## 2017-09-14 NOTE — PLAN OF CARE
"Problem: Patient Care Overview (Adult)  Goal: Plan of Care Review  Outcome: Ongoing (interventions implemented as appropriate)    09/14/17 1144   Coping/Psychosocial Response Interventions   Plan Of Care Reviewed With patient   Outcome Evaluation   Outcome Summary/Follow up Plan Pt only tolerating supine therex at this point. Pt cooperative with therex, follows commands, reports \"it feels better\" and decreases her discomfort after therex.    Patient Care Overview   Progress progress toward functional goals is gradual         Problem: Inpatient Physical Therapy  Goal: Bed Mobility Goal LTG- PT  Outcome: Ongoing (interventions implemented as appropriate)    09/06/17 1037 09/13/17 1142   Bed Mobility PT LTG   Bed Mobility PT LTG, Date Established 09/06/17 --    Bed Mobility PT LTG, Time to Achieve 1 wk --    Bed Mobility PT LTG, Activity Type all bed mobility --    Bed Mobility PT LTG, [place] Level moderate assist (50% patient effort);2 person assist required --    Bed Mobility PT LTG, Date Goal Reviewed --  09/13/17   Bed Mobility PT LTG, Outcome --  goal ongoing       Goal: Transfer Training Goal 1 LTG- PT  Outcome: Ongoing (interventions implemented as appropriate)    09/06/17 1037 09/13/17 1142   Transfer Training PT LTG   Transfer Training PT LTG, Date Established 09/06/17 --    Transfer Training PT LTG, Time to Achieve 1 wk --    Transfer Training PT LTG, Activity Type bed to chair /chair to bed;sit to stand/stand to sit --    Transfer Training PT LTG, [place] Level maximum assist (25% patient effort);2 person assist required --    Transfer Training PT LTG, Assist Device walker, homa --    Transfer Training PT LTG, Date Goal Reviewed --  09/13/17   Transfer Training PT LTG, Outcome --  goal ongoing       Goal: Gait Training Goal LTG- PT  Outcome: Ongoing (interventions implemented as appropriate)    09/06/17 1037 09/13/17 1142   Gait Training PT LTG   Gait Training Goal PT LTG, Date Established " 09/06/17 --    Gait Training Goal PT LTG, Time to Achieve 1 wk --    Gait Training Goal PT LTG, [place] Level maximum assist (25% patient effort);2 person assist required  (stable VS; RLE brace) --    Gait Training Goal PT LTG, Assist Device walker, homa --    Gait Training Goal PT LTG, Distance to Achieve 3 --    Gait Training Goal PT LTG, Date Goal Reviewed --  09/13/17   Gait Training Goal PT LTG, Outcome --  goal ongoing

## 2017-09-14 NOTE — PLAN OF CARE
Problem: Patient Care Overview (Adult)  Goal: Plan of Care Review  Outcome: Ongoing (interventions implemented as appropriate)    09/14/17 1529   Coping/Psychosocial Response Interventions   Plan Of Care Reviewed With patient   Outcome Evaluation   Outcome Summary/Follow up Plan Pt on high flow O2 at 50%, attempted to wean down to 40% but pt could not tolerate. Will continue to try to wean O2. NSR on tele, BP has been high, new medications added to control. Phillips in place for sttrict I&O measurement. Will continue to monitor and send to rehab when a bed available and medically ready.    Patient Care Overview   Progress no change

## 2017-09-14 NOTE — THERAPY TREATMENT NOTE
Acute Care - Physical Therapy Treatment Note  [facility]     Patient Name: Jennifer Oliveira  : 1940  MRN: [id number]  Today's Date: 2017  Onset of Illness/Injury or Date of Surgery Date: 17  Date of Referral to PT: 17  Referring Physician: ARSLAN Gonzales    Admit Date: 2017    Visit Dx:    ICD-10-CM ICD-9-CM   1. Pneumonia of right lower lobe due to infectious organism J18.9 483.8   2. Impaired mobility and ADLs Z74.09 799.89   3. Impaired functional mobility, balance, gait, and endurance Z74.09 V49.89     Patient Active Problem List   Diagnosis   • Acute hypoxemic respiratory failure   • PAD (peripheral artery disease)   • Normochromic normocytic anemia   • Chronic diastolic heart failure   • Diabetes type 2, uncontrolled   • Lower extremity cellulitis   • Hypothyroid   • HTN (hypertension)   • Metabolic encephalopathy   • TIA (transient ischemic attack)   • Acute cystitis without hematuria   • Chronic kidney disease (CKD), stage III (moderate)   • GERD (gastroesophageal reflux disease) with hx of gastritis    • h/o stroke with right hemiplegia   • Hypertension   • Disease of thyroid gland   • Diastolic heart failure   • Type 2 diabetes mellitus   • CKD (chronic kidney disease), stage III   • Symptomatic anemia   • GI bleed   • KRIS (acute kidney injury)   • Nausea and vomiting   • Right lower lobe pneumonia   • Weakness   • Pneumonia   • Acute respiratory failure with hypoxia   • Hyperkalemia               Adult Rehabilitation Note       17 1111 17 1013 17 1012    Rehab Assessment/Intervention    Discipline physical therapist  -SJ physical therapy assistant  -AS occupational therapist  -CL    Document Type  therapy note (daily note)  -AS therapy note (daily note)  -CL    Subjective Information agree to therapy;complains of;fatigue  -SJ agree to therapy;complains of;weakness;fatigue  -AS agree to therapy;complains of;weakness;fatigue  -CL    Patient Effort, Rehab  Treatment fair  -SJ fair  -AS fair  -CL    Symptoms Noted During/After Treatment  other (see comments)   continued BL during therapy  -AS     Precautions/Limitations fall precautions;oxygen therapy device and L/min  -SJ fall precautions;oxygen therapy device and L/min;other (see comments)   right sided weakness  -AS fall precautions;oxygen therapy device and L/min   residual R sided weakness, [place] NC  -CL    Specific Treatment Considerations   Pt session limited d/t frequent BM incontinence, pt declined attempts at EOB, deferred OOB.   -CL    Recorded by [SJ] Winter Nur, PT [AS] Anel Rizvi, PTA [CL] Trinidad Nguyen OT    Vital Signs    Pre Systolic BP Rehab   --   WISAM RN cleared for tx.   -CL    Recorded by   [CL] Trinidad Nguyen OT    Pain Assessment    Pain Assessment [place] FACES  -SJ Vall[place] FACES  -AS Vall[place] FACES  -CL    Valles-Ros[place] FACES Pain Rating  2  -AS 2  -CL    Pain Score 2  -SJ  2  -CL    Post Pain Score 2  -SJ 2  -AS 2  -CL    Pain Type Acute pain;Chronic pain  -SJ Acute pain  -AS Acute pain  -CL    Pain Location Arm  -SJ Knee  -AS Knee  -CL    Pain Orientation Right  -SJ Right  -AS Right  -CL    Pain Descriptors  Aching  -AS     Pain Frequency  Constant/continuous  -AS     Patient's Stated Pain Goal  No pain  -AS     Pain Intervention(s) Repositioned;Ambulation/increased activity  -SJ Repositioned  -AS Repositioned;Ambulation/increased activity  -CL    Response to Interventions  tolerated  -AS Tolerated.   -CL    Recorded by [SJ] Winter Nur, PT [AS] Anel Rizvi, PTA [CL] Trinidad Nguyen OT    Cognitive Assessment/Intervention    Current Cognitive/Communication Assessment functional  -SJ impaired  -AS functional  -CL    Orientation Status oriented to;person;place  -SJ oriented to;person;place  -AS oriented to;person;situation;place  -CL    Follows Commands/Answers Questions 100% of the time;able to follow single-step instructions;needs cueing;needs increased time  -SJ 75%  of the time  -AS 75% of the time;100% of the time;needs cueing;needs increased time;needs repetition  -CL    Personal Safety mild impairment  -SJ mild impairment  -AS mild impairment;decreased awareness, need for assist;decreased awareness, need for safety  -CL    Personal Safety Interventions fall prevention program maintained;gait belt;muscle strengthening facilitated;nonskid shoes/slippers when out of bed  -SJ fall prevention program maintained;gait belt;nonskid shoes/slippers when out of bed;other (see comments)   bed alarm  -AS fall prevention program maintained;nonskid shoes/slippers when out of bed;supervised activity  -CL    Recorded by [SJ] Winter Nur PT [AS] Anel Rizvi PTA [CL] Trinidad Nguyen OT    Bed Mobility, Assessment/Treatment    Bed Mobility, Assistive Device  draw sheet  -AS draw sheet;head of bed elevated;bed rails  -CL    Bed Mobility, Roll Left, [place] not tested  -SJ verbal cues required;maximum assist (25% patient effort);2 person assist required  -AS maximum assist (25% patient effort);2 person assist required;verbal cues required  -CL    Bed Mobility, Roll Right, [place] not tested  -SJ verbal cues required;maximum assist (25% patient effort);2 person assist required  -AS maximum assist (25% patient effort);2 person assist required;verbal cues required  -CL    Bed Mob, Supine to Sit, [place] not tested  -SJ      Bed Mobility, Comment  patient rolled to be cleaned, then scooted up to HOB for correct positioning  -AS Rolled for post-toilet hygiene d/t BM incontinence.   -CL    Recorded by [SJ] Winter Nur PT [AS] Anel Rizvi, PTA [CL] Trinidad Nguyen OT    Transfer Assessment/Treatment    Transfers, Bed-Chair [place] not tested  -SJ not tested  -AS not tested  -CL    Recorded by [SJ] Winter Nur PT [AS] Anel Rizvi PTA [CL] Trinidad Nguyen OT    Gait Assessment/Treatment    Gait, [place] Level  unable to perform  -AS     Recorded by   [AS] Anel Rizvi, PTA     Functional Mobility    Functional Mobility- Ind. Level   unable to perform  -CL    Recorded by   [CL] Trinidad Nguyen OT    Lower Body Dressing Assessment/Training    LB Dressing Assess/Train, Clothing Type   donning:;doffing:;socks  -CL    LB Dressing Assess/Train, Position   supine  -CL    LB Dressing Assess/Train, [place]   dependent (less than 25% patient effort)  -CL    Recorded by   [CL] Trinidad Nguyen OT    Toileting Assessment/Training    Toileting Assess/Train, Position   supine  -CL    Toileting Assess/Train, [place] Level   dependent (less than 25% patient effort);2 person assist required;verbal cues required  -CL    Toileting Assess/Train, Comment   Clothing management and post-toilet hygiene.   -CL    Recorded by   [CL] Trinidad Nguyen OT    Therapy Exercises    Right Lower Extremity PROM:;20 reps;supine;ankle pumps/circles;calf stretch;hip abduction/adduction;hip flexion;heel slides  -SJ PROM:;5 reps;hip abduction/adduction  -AS     Left Lower Extremity AAROM:;30 reps;supine;ankle pumps/circles;hip abduction/adduction;heel slides;SAQ;hip ER;hip IR  -SJ AAROM:;5 reps;supine;ankle pumps/circles;heel slides  -AS     Right Upper Extremity PROM:;10 reps;elbow flexion/extension;hand pumps;pronation/supination;shoulder extension/flexion;shoulder ER/IR  -SJ  PROM:;10 reps;elbow flexion/extension;hand pumps;pronation/supination;shoulder extension/flexion;shoulder ER/IR   wrist F/E  -CL    Recorded by [SJ] Winter Nur, PT [AS] Anel Rizvi, PTA [CL] Trinidad Nguyen OT    Positioning and Restraints    Pre-Treatment Position in bed  -SJ in bed  -AS in bed  -CL    Post Treatment Position bed  -SJ bed  -AS bed  -CL    In Bed fowlers;call light within reach;encouraged to call for assist;heels elevated  -SJ supine;call light within reach;encouraged to call for assist;exit alarm on;RUE elevated;LUE elevated;legs elevated  -AS notified nsg;fowlers;call light within reach;encouraged  to call for assist;exit alarm on;RUE elevated;LUE elevated;legs elevated;heels elevated  -CL    Recorded by [SJ] Winter Nur PT [AS] Anel Rizvi PTA [CL] Trinidad Nguyen OT      09/11/17 1315 09/11/17 1314       Rehab Assessment/Intervention    Discipline physical therapist  -SJ occupational therapist  -     Document Type  therapy note (daily note)  -JR     Subjective Information agree to therapy;complains of;nausea/vomiting  -SJ no complaints;agree to therapy  -JR     Patient Effort, Rehab Treatment fair  -SJ      Patient Effort, Rehab Treatment Comment pt very lethargic, episode of emesis  -SJ      Symptoms Noted During/After Treatment other (see comments)  -SJ      Symptoms Noted Comment nausea, vomiting, RN informed  -SJ Pt vomiting during session, notified RN  -JR     Precautions/Limitations fall precautions;oxygen therapy device and L/min;other (see comments)   R-sided weakness  -SJ fall precautions;oxygen therapy device and L/min   R sided weakness  -JR     Recorded by [SJ] Winter Nur PT [JR] Magalie Trinh, OT     Vital Signs    Pre Systolic BP Rehab 161  -  -JR     Pre Treatment Diastolic BP 63  -SJ 63  -JR     Post Systolic BP Rehab 178  -  -JR     Post Treatment Diastolic BP 70  -SJ 70  -JR     Pretreatment Heart Rate (beats/min) 68  -SJ 67  -JR     Posttreatment Heart Rate (beats/min) 66  -SJ 66  -JR     Pre SpO2 (%) 92  -SJ 93  -JR     O2 Delivery Pre Treatment supplemental O2  -SJ supplemental O2   high flow  -JR     Post SpO2 (%) 91  -SJ 92  -JR     O2 Delivery Post Treatment supplemental O2  -SJ supplemental O2  -JR     Pre Patient Position Supine  -SJ Supine  -JR     Intra Patient Position Supine  -SJ Supine  -JR     Post Patient Position Supine  -SJ Supine  -JR     Recorded by [SJ] Winter Nur PT [JR] Magalie Trinh, OT     Pain Assessment    Pain Assessment 0-10  -SJ 0-10  -JR     Pain Score 0  -SJ 0  -JR     Post Pain Score 0  - 0  -JR     Recorded by [SJ]  Winter Nur PT [JR] Magalie Trinh, OT     Cognitive Assessment/Intervention    Current Cognitive/Communication Assessment impaired  -SJ      Orientation Status oriented to;person  -SJ      Follows Commands/Answers Questions 50% of the time;able to follow single-step instructions;needs cueing;needs increased time;needs repetition  -SJ 50% of the time;75% of the time;able to follow single-step instructions;needs cueing;needs increased time;needs repetition  -JR     Personal Safety mild impairment;decreased awareness, need for assist;decreased awareness, need for safety;decreased insight to deficits  -SJ mild impairment;decreased awareness, need for assist;decreased awareness, need for safety;decreased insight to deficits  -JR     Recorded by [SJ] Winter Nur PT [JR] Magalie Trinh OT     Bed Mobility, Assessment/Treatment    Bed Mobility, Assistive Device draw sheet;bed rails  -SJ draw sheet;bed rails  -JR     Bed Mobility, Roll Left, [place] maximum assist (25% patient effort);2 person assist required  -SJ maximum assist (25% patient effort);2 person assist required  -JR     Bed Mobility, Roll Right, [place] maximum assist (25% patient effort);2 person assist required  -SJ maximum assist (25% patient effort);2 person assist required  -JR     Bed Mobility, Safety Issues decreased use of arms for pushing/pulling;decreased use of legs for bridging/pushing;impaired trunk control for bed mobility  -SJ decreased use of arms for pushing/pulling;decreased use of legs for bridging/pushing;impaired trunk control for bed mobility  -JR     Bed Mobility, Impairments strength decreased;coordination impaired;motor control impaired;muscle tone abnormal  -SJ strength decreased;coordination impaired;motor control impaired;muscle tone abnormal  -JR     Bed Mobility, Comment cues for hand placement and sequencing, pt had episode of incontinence during session  -SJ Pt required verbal cues for sequencing. Pt  incontinent of bowel and urine and dependent for hygiene.  -JR     Recorded by [SJ] Winter Nur PT [JR] Magalie Trinh, OT     Transfer Assessment/Treatment    Transfers, Bed-Chair [place] not tested  -SJ      Recorded by [SJ] Winter Nur PT      Toileting Assessment/Training    Toileting Assess/Train, Position  supine  -JR     Toileting Assess/Train, [place]ep[place] Level  dependent (less than 25% patient effort)  -JR     Toileting Assess/Train, Impairments  muscle tone abnormal;ROM decreased;strength decreased;impaired balance;postural control impaired  -JR     Toileting Assess/Train, Comment  Pt incontinent of bowel and urine, dependent for hygiene  -JR     Recorded by  [JR] Magalie Trinh, OT     Therapy Exercises    Right Lower Extremity PROM:;sitting;ankle pumps/circles;calf stretch;hamstring stretch;heel slides;hip abduction/adduction;hip ER;hip IR;20 reps  -SJ      Left Lower Extremity AROM:;sitting;ankle pumps/circles;glut sets;heel slides;hip abduction/adduction;hip ER;hip flexion;hip IR;quad sets;SAQ;20 reps  -SJ      Right Upper Extremity  PROM:;10 reps;elbow flexion/extension;pronation/supination;shoulder abduction/adduction;hand pumps;shoulder extension/flexion  -JR     Left Upper Extremity  AAROM:;10 reps;elbow flexion/extension;pronation/supination;shoulder abduction/adduction;shoulder extension/flexion  -JR     Recorded by [SJ] Winter Nur PT [JR] Magalie Trinh, OT     Positioning and Restraints    Pre-Treatment Position in bed  -SJ in bed  -JR     Post Treatment Position bed  - bed  -JR     In Bed notified nsg;supine;call light within reach;encouraged to call for assist;exit alarm on;heels elevated  - notified nsg;supine;call light within reach;encouraged to call for assist;exit alarm on;RUE elevated;RLE elevated;LLE elevated  -JR     Recorded by [SJ] Winter Nur PT [JR] Magalie Trinh, OT       User Key  (r) = Recorded By, (t) = Taken By, (c) = Cosigned By    Initials  Name Effective Dates    EMMA Nur, PT 06/19/15 -     JR Magalielara Trinh, OT 06/22/15 -     AS Anel Cris Rizvi, PTA 06/22/15 -     CL Trinidad Patrick, OT 06/08/16 -                 IP PT Goals       09/13/17 1142 09/08/17 1604 09/06/17 1037    Bed Mobility PT LTG    Bed Mobility PT LTG, Date Established   09/06/17  -DM    Bed Mobility PT LTG, Time to Achieve   1 wk  -DM    Bed Mobility PT LTG, Activity Type   all bed mobility  -DM    Bed Mobility PT LTG, [place] Level   moderate assist (50% patient effort);2 person assist required  -DM    Bed Mobility PT LTG, Date Goal Reviewed 09/13/17  -AS      Bed Mobility PT LTG, Outcome goal ongoing  -AS goal ongoing  -SJ     Transfer Training PT LTG    Transfer Training PT LTG, Date Established   09/06/17  -DM    Transfer Training PT LTG, Time to Achieve   1 wk  -DM    Transfer Training PT LTG, Activity Type   bed to chair /chair to bed;sit to stand/stand to sit  -DM    Transfer Training PT LTG, [place] Level   maximum assist (25% patient effort);2 person assist required  -DM    Transfer Training PT LTG, Assist Device   walker, homa  -DM    Transfer Training PT  LTG, Date Goal Reviewed 09/13/17  -AS      Transfer Training PT LTG, Outcome goal ongoing  -AS goal ongoing  -SJ     Gait Training PT LTG    Gait Training Goal PT LTG, Date Established   09/06/17  -DM    Gait Training Goal PT LTG, Time to Achieve   1 wk  -DM    Gait Training Goal PT LTG, [place] Level   maximum assist (25% patient effort);2 person assist required   stable VS; RLE brace  -DM    Gait Training Goal PT LTG, Assist Device   walker, [place]a  -DM    Gait Training Goal PT LTG, Distance to Achieve   3  -DM    Gait Training Goal PT LTG, Date Goal Reviewed 09/13/17  -AS      Gait Training Goal PT LTG, Outcome goal ongoing  -AS goal ongoing  -SJ       User Key  (r) = Recorded By, (t) = Taken By, (c) = Cosigned By    Initials Name Provider Type    SJ Winter Nur, PT Physical Therapist    DM  Lou Mcgrath, PT Physical Therapist    AS Anel Rizvi, VIJAY Physical Therapy Assistant          Physical Therapy Education     Title: PT OT SLP Therapies (Active)     Topic: Physical Therapy (Active)     Point: Mobility training (Active)    Learning Progress Summary    Learner Readiness Method Response Comment Documented by Status   Patient Acceptance E,D NR   09/14/17 1146 Active    Acceptance E NR  AS 09/13/17 1142 Active    Acceptance E,D NR   09/11/17 1618 Active    Acceptance E,D NR   09/08/17 1605 Active    Eager E,D NR   09/06/17 1036 Active               Point: Home exercise program (Active)    Learning Progress Summary    Learner Readiness Method Response Comment Documented by Status   Patient Acceptance E,D NR   09/14/17 1146 Active    Acceptance E NR  AS 09/13/17 1142 Active    Acceptance E,D NR   09/11/17 1618 Active    Acceptance E,D NR   09/08/17 1605 Active    Eager E,D NR   09/06/17 1036 Active               Point: Body mechanics (Active)    Learning Progress Summary    Learner Readiness Method Response Comment Documented by Status   Patient Acceptance E,D NR   09/14/17 1146 Active    Acceptance E NR  AS 09/13/17 1142 Active    Acceptance E,D NR   09/11/17 1618 Active    Acceptance E,D NR   09/08/17 1605 Active    Eager E,D NR   09/06/17 1036 Active               Point: Precautions (Active)    Learning Progress Summary    Learner Readiness Method Response Comment Documented by Status   Patient Acceptance E,D NR   09/14/17 1146 Active    Acceptance E NR  AS 09/13/17 1142 Active    Acceptance E,D NR   09/11/17 1618 Active    Acceptance E,D NR   09/08/17 1605 Active    Eager E,D NR   09/06/17 1036 Active                      User Key     Initials Effective Dates Name Provider Type Discipline     06/19/15 -  Winter Nur, PT Physical Therapist PT     06/19/15 -  Lou Mcgrath, PT Physical Therapist PT    AS 06/22/15 -  Anel Rizvi PTA Physical  "Therapy Assistant PT                    PT Recommendation and Plan  Anticipated Discharge Disposition: inpatient rehabilitation facility ([facility] rehab)  PT Frequency: daily  Plan of Care Review  Plan Of Care Reviewed With: patient  Progress: progress toward functional goals is gradual  Outcome Summary/Follow up Plan: Pt only tolerating supine therex at this point.  Pt cooperative with therex, follows commands, reports \"it feels better\" and decreases her discomfort after therex.           Outcome Measures       09/14/17 1111 09/13/17 1013 09/13/17 1012    How much help from another person do you currently need...    Turning from your back to your side while in flat bed without using bedrails? 2  -SJ 2  -AS     Moving from lying on back to sitting on the side of a flat bed without bedrails? 2  -SJ 2  -AS     Moving to and from a bed to a chair (including a wheelchair)? 1  -SJ 1  -AS     Standing up from a chair using your arms (e.g., wheelchair, bedside chair)? 1  -SJ 1  -AS     Climbing 3-5 steps with a railing? 1  -SJ 1  -AS     To walk in hospital room? 1  -SJ 1  -AS     AM-PAC 6 Clicks Score 8  -SJ 8  -AS     How much help from another is currently needed...    Putting on and taking off regular lower body clothing?   1  -CL    Bathing (including washing, rinsing, and drying)   1  -CL    Toileting (which includes using toilet bed pan or urinal)   1  -CL    Putting on and taking off regular upper body clothing   2  -CL    Taking care of personal grooming (such as brushing teeth)   2  -CL    Eating meals   2  -CL    Score   9  -CL    Functional Assessment    Outcome Measure Options AM-PAC 6 Clicks Basic Mobility (PT)  -SJ AM-PAC 6 Clicks Basic Mobility (PT)  -AS AM-PAC 6 Clicks Daily Activity (OT)  -CL      09/11/17 1315 09/11/17 1314       How much help from another person do you currently need...    Turning from your back to your side while in flat bed without using bedrails? 2  -SJ      Moving from lying on back to " sitting on the side of a flat bed without bedrails? 2  -SJ      Moving to and from a bed to a chair (including a wheelchair)? 1  -SJ      Standing up from a chair using your arms (e.g., wheelchair, bedside chair)? 1  -SJ      Climbing 3-5 steps with a railing? 1  -SJ      To walk in hospital room? 1  -SJ      AM-PAC 6 Clicks Score 8  -SJ      How much help from another is currently needed...    Putting on and taking off regular lower body clothing?  1  -JR     Bathing (including washing, rinsing, and drying)  1  -JR     Toileting (which includes using toilet bed pan or urinal)  1  -JR     Putting on and taking off regular upper body clothing  2  -JR     Taking care of personal grooming (such as brushing teeth)  2  -JR     Eating meals  2  -JR     Score  9  -     Functional Assessment    Outcome Measure Options AM-PAC 6 Clicks Basic Mobility (PT)  - AM-PAC 6 Clicks Daily Activity (OT)  -       User Key  (r) = Recorded By, (t) = Taken By, (c) = Cosigned By    Initials Name Provider Type     Winter Nur PT Physical Therapist    JR Magalie Trinh, OT Occupational Therapist    AS Anel Rizvi, PTA Physical Therapy Assistant    CL Trinidad Nguyen, OT Occupational Therapist           Time Calculation:         PT Charges       09/14/17 1146          Time Calculation    Start Time 1111  -      PT Received On 09/14/17  -      PT Goal Re-Cert Due Date 09/16/17  -      Time Calculation- PT    Total Timed Code Minutes- PT 23 minute(s)  -        User Key  (r) = Recorded By, (t) = Taken By, (c) = Cosigned By    Initials Name Provider Type     Winter Nur PT Physical Therapist          Therapy Charges for Today     Code Description Service Date Service Provider Modifiers Qty    06073600753 HC PT THER PROC EA 15 MIN 9/14/2017 Winter Nur PT GP 2          PT G-Codes  Outcome Measure Options: AM-PAC 6 Clicks Basic Mobility (PT)    Winter Nur PT  9/14/2017

## 2017-09-15 ENCOUNTER — APPOINTMENT (OUTPATIENT)
Dept: GENERAL RADIOLOGY | Facility: HOSPITAL | Age: 77
End: 2017-09-15

## 2017-09-15 LAB
ANION GAP SERPL CALCULATED.3IONS-SCNC: 4 MMOL/L (ref 3–11)
BUN BLD-MCNC: 54 MG/DL (ref 9–23)
BUN/CREAT SERPL: 36 (ref 7–25)
CALCIUM SPEC-SCNC: 8.2 MG/DL (ref 8.7–10.4)
CHLORIDE SERPL-SCNC: 111 MMOL/L (ref 99–109)
CO2 SERPL-SCNC: 25 MMOL/L (ref 20–31)
CREAT BLD-MCNC: 1.5 MG/DL (ref 0.6–1.3)
GFR SERPL CREATININE-BSD FRML MDRD: 34 ML/MIN/1.73
GLUCOSE BLD-MCNC: 203 MG/DL (ref 70–100)
GLUCOSE BLDC GLUCOMTR-MCNC: 163 MG/DL (ref 70–130)
GLUCOSE BLDC GLUCOMTR-MCNC: 265 MG/DL (ref 70–130)
GLUCOSE BLDC GLUCOMTR-MCNC: 276 MG/DL (ref 70–130)
GLUCOSE BLDC GLUCOMTR-MCNC: 335 MG/DL (ref 70–130)
POTASSIUM BLD-SCNC: 4.2 MMOL/L (ref 3.5–5.5)
SODIUM BLD-SCNC: 140 MMOL/L (ref 132–146)

## 2017-09-15 PROCEDURE — 99232 SBSQ HOSP IP/OBS MODERATE 35: CPT | Performed by: INTERNAL MEDICINE

## 2017-09-15 PROCEDURE — 94799 UNLISTED PULMONARY SVC/PX: CPT

## 2017-09-15 PROCEDURE — 94760 N-INVAS EAR/PLS OXIMETRY 1: CPT

## 2017-09-15 PROCEDURE — 25010000002 NA FERRIC GLUC CPLX PER 12.5 MG: Performed by: INTERNAL MEDICINE

## 2017-09-15 PROCEDURE — 94762 N-INVAS EAR/PLS OXIMTRY CONT: CPT

## 2017-09-15 PROCEDURE — 99231 SBSQ HOSP IP/OBS SF/LOW 25: CPT | Performed by: INTERNAL MEDICINE

## 2017-09-15 PROCEDURE — 63710000001 PREDNISONE PER 1 MG: Performed by: INTERNAL MEDICINE

## 2017-09-15 PROCEDURE — 94640 AIRWAY INHALATION TREATMENT: CPT

## 2017-09-15 PROCEDURE — 82962 GLUCOSE BLOOD TEST: CPT

## 2017-09-15 PROCEDURE — 97530 THERAPEUTIC ACTIVITIES: CPT

## 2017-09-15 PROCEDURE — 63710000001 INSULIN DETEMIR PER 5 UNITS: Performed by: HOSPITALIST

## 2017-09-15 PROCEDURE — 94668 MNPJ CHEST WALL SBSQ: CPT

## 2017-09-15 PROCEDURE — 71010 HC CHEST PA OR AP: CPT

## 2017-09-15 PROCEDURE — 80048 BASIC METABOLIC PNL TOTAL CA: CPT | Performed by: INTERNAL MEDICINE

## 2017-09-15 RX ORDER — OXYCODONE HCL 5 MG/5 ML
5 SOLUTION, ORAL ORAL ONCE AS NEEDED
Status: DISCONTINUED | OUTPATIENT
Start: 2017-09-15 | End: 2017-09-24 | Stop reason: HOSPADM

## 2017-09-15 RX ORDER — LOSARTAN POTASSIUM 25 MG/1
25 TABLET ORAL
Status: DISCONTINUED | OUTPATIENT
Start: 2017-09-15 | End: 2017-09-18

## 2017-09-15 RX ADMIN — METOPROLOL TARTRATE 100 MG: 100 TABLET ORAL at 08:53

## 2017-09-15 RX ADMIN — SODIUM CHLORIDE 250 MG: 9 INJECTION, SOLUTION INTRAVENOUS at 08:53

## 2017-09-15 RX ADMIN — Medication 250 MG: at 08:53

## 2017-09-15 RX ADMIN — INSULIN LISPRO 2 UNITS: 100 INJECTION, SOLUTION INTRAVENOUS; SUBCUTANEOUS at 09:00

## 2017-09-15 RX ADMIN — IPRATROPIUM BROMIDE AND ALBUTEROL SULFATE 3 ML: .5; 3 SOLUTION RESPIRATORY (INHALATION) at 07:14

## 2017-09-15 RX ADMIN — LOSARTAN POTASSIUM 25 MG: 25 TABLET, FILM COATED ORAL at 20:59

## 2017-09-15 RX ADMIN — ATORVASTATIN CALCIUM 10 MG: 10 TABLET, FILM COATED ORAL at 20:59

## 2017-09-15 RX ADMIN — BUDESONIDE 0.5 MG: 0.5 INHALANT RESPIRATORY (INHALATION) at 19:39

## 2017-09-15 RX ADMIN — ASPIRIN 81 MG 81 MG: 81 TABLET ORAL at 08:53

## 2017-09-15 RX ADMIN — IPRATROPIUM BROMIDE AND ALBUTEROL SULFATE 3 ML: .5; 3 SOLUTION RESPIRATORY (INHALATION) at 23:24

## 2017-09-15 RX ADMIN — ACETAMINOPHEN 650 MG: 325 TABLET, FILM COATED ORAL at 02:51

## 2017-09-15 RX ADMIN — BACLOFEN 10 MG: 10 TABLET ORAL at 05:56

## 2017-09-15 RX ADMIN — INSULIN LISPRO 4 UNITS: 100 INJECTION, SOLUTION INTRAVENOUS; SUBCUTANEOUS at 17:19

## 2017-09-15 RX ADMIN — HYDRALAZINE HYDROCHLORIDE 50 MG: 50 TABLET, FILM COATED ORAL at 05:56

## 2017-09-15 RX ADMIN — INSULIN LISPRO 4 UNITS: 100 INJECTION, SOLUTION INTRAVENOUS; SUBCUTANEOUS at 12:01

## 2017-09-15 RX ADMIN — GUAIFENESIN 600 MG: 600 TABLET, EXTENDED RELEASE ORAL at 20:59

## 2017-09-15 RX ADMIN — INSULIN LISPRO 5 UNITS: 100 INJECTION, SOLUTION INTRAVENOUS; SUBCUTANEOUS at 20:58

## 2017-09-15 RX ADMIN — IPRATROPIUM BROMIDE AND ALBUTEROL SULFATE 3 ML: .5; 3 SOLUTION RESPIRATORY (INHALATION) at 16:09

## 2017-09-15 RX ADMIN — GUAIFENESIN 600 MG: 600 TABLET, EXTENDED RELEASE ORAL at 08:53

## 2017-09-15 RX ADMIN — Medication 250 MG: at 17:19

## 2017-09-15 RX ADMIN — OXYCODONE HYDROCHLORIDE AND ACETAMINOPHEN 500 MG: 500 TABLET ORAL at 08:53

## 2017-09-15 RX ADMIN — BACLOFEN 10 MG: 10 TABLET ORAL at 21:00

## 2017-09-15 RX ADMIN — BUDESONIDE 0.5 MG: 0.5 INHALANT RESPIRATORY (INHALATION) at 07:14

## 2017-09-15 RX ADMIN — LEVOFLOXACIN 750 MG: 750 TABLET, FILM COATED ORAL at 08:53

## 2017-09-15 RX ADMIN — LEVOTHYROXINE SODIUM 200 MCG: 100 TABLET ORAL at 05:56

## 2017-09-15 RX ADMIN — GABAPENTIN 900 MG: 300 CAPSULE ORAL at 20:58

## 2017-09-15 RX ADMIN — IPRATROPIUM BROMIDE AND ALBUTEROL SULFATE 3 ML: .5; 3 SOLUTION RESPIRATORY (INHALATION) at 19:39

## 2017-09-15 RX ADMIN — PANTOPRAZOLE SODIUM 40 MG: 40 TABLET, DELAYED RELEASE ORAL at 05:56

## 2017-09-15 RX ADMIN — INSULIN DETEMIR 25 UNITS: 100 INJECTION, SOLUTION SUBCUTANEOUS at 08:53

## 2017-09-15 RX ADMIN — IPRATROPIUM BROMIDE AND ALBUTEROL SULFATE 3 ML: .5; 3 SOLUTION RESPIRATORY (INHALATION) at 02:39

## 2017-09-15 RX ADMIN — CLOPIDOGREL BISULFATE 75 MG: 75 TABLET ORAL at 08:53

## 2017-09-15 RX ADMIN — AMLODIPINE BESYLATE 10 MG: 10 TABLET ORAL at 20:59

## 2017-09-15 RX ADMIN — METOPROLOL TARTRATE 100 MG: 100 TABLET ORAL at 21:00

## 2017-09-15 RX ADMIN — PREDNISONE 20 MG: 20 TABLET ORAL at 08:53

## 2017-09-15 RX ADMIN — BACLOFEN 10 MG: 10 TABLET ORAL at 13:41

## 2017-09-15 RX ADMIN — HYDRALAZINE HYDROCHLORIDE 50 MG: 50 TABLET, FILM COATED ORAL at 13:41

## 2017-09-15 RX ADMIN — INSULIN DETEMIR 25 UNITS: 100 INJECTION, SOLUTION SUBCUTANEOUS at 20:57

## 2017-09-15 RX ADMIN — IPRATROPIUM BROMIDE AND ALBUTEROL SULFATE 3 ML: .5; 3 SOLUTION RESPIRATORY (INHALATION) at 12:38

## 2017-09-15 RX ADMIN — HYDRALAZINE HYDROCHLORIDE 50 MG: 50 TABLET, FILM COATED ORAL at 21:00

## 2017-09-15 RX ADMIN — INSULIN LISPRO 4 UNITS: 100 INJECTION, SOLUTION INTRAVENOUS; SUBCUTANEOUS at 08:53

## 2017-09-15 NOTE — PLAN OF CARE
Problem: Patient Care Overview (Adult)  Goal: Plan of Care Review  Outcome: Ongoing (interventions implemented as appropriate)    09/15/17 1520   Coping/Psychosocial Response Interventions   Plan Of Care Reviewed With patient   Outcome Evaluation   Outcome Summary/Follow up Plan Pt feels better and looks better today than she did yesterday. Still on high flow O2 at 45-50%, attempting to wean but failing. Receiving IV iron. BP continues to be high, new medication added. Pt needs to be out of the bed but refuses to go to chair with therapy. NSR on tele. Jacqueline continues to stay in for strict I&O. Will continue to monitor and DC to [facility] when medically ready and bed is available.    Patient Care Overview   Progress improving

## 2017-09-15 NOTE — THERAPY TREATMENT NOTE
Acute Care - Occupational Therapy Treatment Note  [facility]     Patient Name: Jennifer Oliveira  : 1940  MRN: [id number]  Today's Date: 9/15/2017  Onset of Illness/Injury or Date of Surgery Date: 17  Date of Referral to OT: 17  Referring Physician: ARSLAN Gonzales      Admit Date: 2017    Visit Dx:     ICD-10-CM ICD-9-CM   1. Pneumonia of right lower lobe due to infectious organism J18.9 483.8   2. Impaired mobility and ADLs Z74.09 799.89   3. Impaired functional mobility, balance, gait, and endurance Z74.09 V49.89     Patient Active Problem List   Diagnosis   • Acute hypoxemic respiratory failure   • PAD (peripheral artery disease)   • Normochromic normocytic anemia   • Chronic diastolic heart failure   • Diabetes type 2, uncontrolled   • Lower extremity cellulitis   • Hypothyroid   • HTN (hypertension)   • Metabolic encephalopathy   • TIA (transient ischemic attack)   • Acute cystitis without hematuria   • Chronic kidney disease (CKD), stage III (moderate)   • GERD (gastroesophageal reflux disease) with hx of gastritis    • h/o stroke with right hemiplegia   • Hypertension   • Disease of thyroid gland   • Diastolic heart failure   • Type 2 diabetes mellitus   • CKD (chronic kidney disease), stage III   • Symptomatic anemia   • GI bleed   • KRIS (acute kidney injury)   • Nausea and vomiting   • Right lower lobe pneumonia   • Weakness   • Pneumonia   • Acute respiratory failure with hypoxia   • Hyperkalemia             Adult Rehabilitation Note       09/15/17 1435 17 1111 17 1013    Rehab Assessment/Intervention    Discipline occupational therapist  -TB physical therapist  -SJ physical therapy assistant  -AS    Document Type therapy note (daily note)  -TB  therapy note (daily note)  -AS    Subjective Information agree to therapy;complains of;weakness;fatigue  -TB agree to therapy;complains of;fatigue  -SJ agree to therapy;complains of;weakness;fatigue  -AS    Patient Effort, Rehab  Treatment fair  -TB fair  -SJ fair  -AS    Symptoms Noted During/After Treatment   other (see comments)   continued BL during therapy  -AS    Precautions/Limitations fall precautions;oxygen therapy device and L/min  -TB fall precautions;oxygen therapy device and L/min  -SJ fall precautions;oxygen therapy device and L/min;other (see comments)   right sided weakness  -AS    Specific Treatment Considerations Supplemental O2 - high flow NC  -TB      Patient Response to Treatment Remote CVA with residual R HP  -TB      Recorded by [TB] Marifer Schofield, OT [SJ] Winter Nur, PT [AS] Anel Rizvi, PTA    Vital Signs    Pre SpO2 (%) 91  -TB      O2 Delivery Pre Treatment supplemental O2  -TB      Intra SpO2 (%) 90  -TB      O2 Delivery Intra Treatment supplemental O2  -TB      Post SpO2 (%) 93  -TB      O2 Delivery Post Treatment supplemental O2  -TB      Pre Patient Position Supine  -TB      Intra Patient Position Side Lying  -TB      Post Patient Position Supine  -TB      Recorded by [TB] Marifer Schofield OT      Pain Assessment    Pain Assessment No/denies pain  -TB Valles-Baker FACES  -SJ Valles-Rosas FACES  -AS    Valles-Rosas FACES Pain Rating 2  -TB  2  -AS    Pain Score  2  -SJ     Post Pain Score  2  -SJ 2  -AS    Pain Type Acute pain  -TB Acute pain;Chronic pain  -SJ Acute pain  -AS    Pain Location Hand  -TB Arm  -SJ Knee  -AS    Pain Orientation Left   at IV site  -TB Right  -SJ Right  -AS    Pain Descriptors   Aching  -AS    Pain Frequency   Constant/continuous  -AS    Patient's Stated Pain Goal   No pain  -AS    Pain Intervention(s) Repositioned  -TB Repositioned;Ambulation/increased activity  -SJ Repositioned  -AS    Response to Interventions Pt tolerated activity  -TB  tolerated  -AS    Recorded by [TB] Marifer Schofield, OT [SJ] Winter Nur, PT [AS] Anel Rizvi, PTA    Vision Assessment/Intervention    Visual Impairment WFL with corrective lenses  -TB      Recorded by [TB]  Marifer Schofield OT      Cognitive Assessment/Intervention    Current Cognitive/Communication Assessment functional  -TB functional  -SJ impaired  -AS    Orientation Status oriented to;person;place;situation  -TB oriented to;person;place  -SJ oriented to;person;place  -AS    Follows Commands/Answers Questions able to follow single-step instructions;needs cueing;needs repetition;100% of the time  -% of the time;able to follow single-step instructions;needs cueing;needs increased time  -SJ 75% of the time  -AS    Personal Safety mild impairment  -TB mild impairment  -SJ mild impairment  -AS    Personal Safety Interventions  fall prevention program maintained;gait belt;muscle strengthening facilitated;nonskid shoes/slippers when out of bed  -SJ fall prevention program maintained;gait belt;nonskid shoes/slippers when out of bed;other (see comments)   bed alarm  -AS    Recorded by [TB] Marifer Schofield, OT [SJ] Winter Nur, PT [AS] Anel Rizvi, PTA    Bed Mobility, Assessment/Treatment    Bed Mobility, Assistive Device draw sheet  -TB  draw sheet  -AS    Bed Mobility, Roll Left, [place] maximum assist (25% patient effort)  -TB not tested  -SJ verbal cues required;maximum assist (25% patient effort);2 person assist required  -AS    Bed Mobility, Roll Right, [place] moderate assist (50% patient effort)  -TB not tested  -SJ verbal cues required;maximum assist (25% patient effort);2 person assist required  -AS    Bed Mob, Supine to Sit, [place]  not tested  -SJ     Bed Mobility, Safety Issues decreased use of arms for pushing/pulling;decreased use of legs for bridging/pushing  -TB      Bed Mobility, Impairments ROM decreased;strength decreased  -TB      Bed Mobility, Comment   patient rolled to be cleaned, then scooted up to [facility] for correct positioning  -AS    Recorded by [TB] Marifer Schofield, OT [SJ] Winter Nur, PT [AS] Anel Rizvi, PTA    Transfer  Assessment/Treatment    Transfers, Bed-Chair [place]  not tested  -SJ not tested  -AS    Transfer, Comment Declined OOB  -TB      Recorded by [TB] Marifer Schofield OT [SJ] Winter Nur, PT [AS] Anel Rizvi, PTA    Gait Assessment/Treatment    Gait, [place] Level   unable to perform  -AS    Recorded by   [AS] Anel Rizvi PTA    Toileting Assessment/Training    Toileting Assess/Train, Position supine  -TB      Toileting Assess/Train, Indepen Level dependent (less than 25% patient effort)  -TB      Recorded by [TB] Marifer Schofield OT      Therapy Exercises    Right Lower Extremity PROM:;ankle pumps/circles;calf stretch  -TB PROM:;20 reps;supine;ankle pumps/circles;calf stretch;hip abduction/adduction;hip flexion;heel slides  -SJ PROM:;5 reps;hip abduction/adduction  -AS    Left Lower Extremity AROM:;10 reps;ankle pumps/circles  -TB AAROM:;30 reps;supine;ankle pumps/circles;hip abduction/adduction;heel slides;SAQ;hip ER;hip IR  -SJ AAROM:;5 reps;supine;ankle pumps/circles;heel slides  -AS    Bilateral Lower Extremities AROM:;10 reps;supine;glut sets;quad sets  -TB      Right Upper Extremity PROM:;supine;shoulder extension/flexion;shoulder abduction/adduction;shoulder horizontal abd/add;elbow flexion/extension;pronation/supination;hand pumps;10 reps  -TB PROM:;10 reps;elbow flexion/extension;hand pumps;pronation/supination;shoulder extension/flexion;shoulder ER/IR  -SJ     Left Upper Extremity AROM:;15 reps;supine;shoulder extension/flexion;shoulder abduction/adduction;shoulder horizontal abd/add;shoulder ER/IR;elbow flexion/extension;hand pumps  -TB      Recorded by [TB] Marifer Schofield, OT [SJ] Winter Nur, PT [AS] Anel Rizvi, PTA    Positioning and Restraints    Pre-Treatment Position in bed  -TB in bed  -SJ in bed  -AS    Post Treatment Position bed  -TB bed  -SJ bed  -AS    In Bed supine;call light within reach;encouraged to call for assist;with  family/caregiver;RUE elevated;heels elevated  -TB fowlers;call light within reach;encouraged to call for assist;heels elevated  -SJ supine;call light within reach;encouraged to call for assist;exit alarm on;RUE elevated;LUE elevated;legs elevated  -AS    Recorded by [TB] Marifer Schofield, OT [SJ] Winter Nur, PT [AS] Anel Rizvi, PTA      09/13/17 1012          Rehab Assessment/Intervention    Discipline occupational therapist  -CL      Document Type therapy note (daily note)  -CL      Subjective Information agree to therapy;complains of;weakness;fatigue  -CL      Patient Effort, Rehab Treatment fair  -CL      Precautions/Limitations fall precautions;oxygen therapy device and L/min   residual R sided weakness, [place] NC  -CL      Specific Treatment Considerations Pt session limited d/t frequent BM incontinence, pt declined attempts at EOB, deferred OOB.   -CL      Recorded by [CL] Trinidad Nguyen OT      Vital Signs    Pre Systolic BP Rehab --   VSS, RN cleared for tx.   -CL      Recorded by [CL] Trinidad Nguyen OT      Pain Assessment    Pain Assessment Valles-Rosas FACES  -CL      Valles-Rosas FACES Pain Rating 2  -CL      Pain Score 2  -CL      Post Pain Score 2  -CL      Pain Type Acute pain  -CL      Pain Location Knee  -CL      Pain Orientation Right  -CL      Pain Intervention(s) Repositioned;Ambulation/increased activity  -CL      Response to Interventions Tolerated.   -CL      Recorded by [CL] Trinidad Nguyen OT      Cognitive Assessment/Intervention    Current Cognitive/Communication Assessment functional  -CL      Orientation Status oriented to;person;situation;place  -CL      Follows Commands/Answers Questions 75% of the time;100% of the time;needs cueing;needs increased time;needs repetition  -CL      Personal Safety mild impairment;decreased awareness, need for assist;decreased awareness, need for safety  -CL      Personal Safety Interventions fall prevention program maintained;nonskid  shoes/slippers when out of bed;supervised activity  -CL      Recorded by [CL] Trinidad Nguyen OT      Bed Mobility, Assessment/Treatment    Bed Mobility, Assistive Device draw sheet;head of bed elevated;bed rails  -CL      Bed Mobility, Roll Left, [place] maximum assist (25% patient effort);2 person assist required;verbal cues required  -CL      Bed Mobility, Roll Right, [place] maximum assist (25% patient effort);2 person assist required;verbal cues required  -CL      Bed Mobility, Comment Rolled for post-toilet hygiene d/t BM incontinence.   -CL      Recorded by [CL] Trinidad Nguyen OT      Transfer Assessment/Treatment    Transfers, Bed-Chair [place] not tested  -CL      Recorded by [CL] Trinidad Nguyen OT      Functional Mobility    Functional Mobility- Ind. Level unable to perform  -CL      Recorded by [CL] Trinidad Nguyen OT      Lower Body Dressing Assessment/Training    LB Dressing Assess/Train, Clothing Type donning:;doffing:;socks  -CL      LB Dressing Assess/Train, Position supine  -CL      LB Dressing Assess/Train, [place] dependent (less than 25% patient effort)  -CL      Recorded by [CL] Trinidad Nguyen OT      Toileting Assessment/Training    Toileting Assess/Train, Position supine  -CL      Toileting Assess/Train, Indepen Level dependent (less than 25% patient effort);2 person assist required;verbal cues required  -CL      Toileting Assess/Train, Comment Clothing management and post-toilet hygiene.   -CL      Recorded by [CL] Trinidad Nguyen OT      Therapy Exercises    Right Upper Extremity PROM:;10 reps;elbow flexion/extension;hand pumps;pronation/supination;shoulder extension/flexion;shoulder ER/IR   wrist F/E  -CL      Recorded by [CL] Trinidad Nguyen OT      Positioning and Restraints    Pre-Treatment Position in bed  -CL      Post Treatment Position bed  -CL      In Bed notified nsg;fowlers;call light within reach;encouraged to call for assist;exit alarm on;RUE elevated;LUE elevated;legs  elevated;heels elevated  -CL      Recorded by [CL] Trinidad Nguyen OT        User Key  (r) = Recorded By, (t) = Taken By, (c) = Cosigned By    Initials Name Effective Dates    TB Marifer Schofield, OT 06/22/15 -     SJ Winter Nur, PT 06/19/15 -     AS Anel Rizvi, PTA 06/22/15 -     CL Trinidad Nguyen, OT 06/08/16 -                 OT Goals       09/15/17 1513 09/13/17 1150 09/11/17 1349    Bed Mobility OT LTG    Bed Mobility OT LTG, Outcome goal ongoing  -TB goal ongoing  -CL goal ongoing  -JR    Transfer Training OT LTG    Transfer Training OT LTG, Outcome goal ongoing  -TB goal ongoing  -CL goal ongoing  -JR    Patient Education OT LTG    Patient Education OT LTG Outcome goal ongoing  -TB goal ongoing  -CL goal ongoing  -JR    UB Dressing OT LTG    UB Dressing Goal OT LTG, Outcome goal ongoing  -TB goal ongoing  -CL goal ongoing  -JR      09/07/17 1439 09/06/17 1019       Bed Mobility OT LTG    Bed Mobility OT LTG, Date Established  09/06/17  -CL     Bed Mobility OT LTG, Time to Achieve  by discharge  -CL     Bed Mobility OT LTG, Activity Type  roll left/roll right;supine to sit/sit to supine  -CL     Bed Mobility OT LTG, [place] Level  moderate assist (50% patient effort);2 person assist required  -CL     Bed Mobility OT LTG, Additional Goal  AAD  -CL     Bed Mobility OT LTG, Outcome goal ongoing  -CL      Transfer Training OT LTG    Transfer Training OT LTG, Date Established  09/06/17  -CL     Transfer Training OT LTG, Time to Achieve  by discharge  -CL     Transfer Training OT LTG, Activity Type  toilet;bed to chair /chair to bed;sit to stand/stand to sit  -CL     Transfer Training OT LTG, [place] Level  moderate assist (50% patient effort);2 person assist required  -CL     Transfer Training OT LTG, Additional Goal  AAD  -CL     Transfer Training OT LTG, Outcome goal ongoing  -CL      Patient Education OT LTG    Patient Education OT LTG, Date Established  09/06/17  -CL     Patient Education  OT LTG, Time to Achieve  by discharge  -CL     Patient Education OT LTG, Education Type  written program;HEP;posture/body mechanics;1 hand/homa technique;home safety;adaptive equipment mgmt;energy conservation;adaptive breathing  -CL     Patient Education OT LTG, Education Understanding  verbalizes understanding  -CL     Patient Education OT LTG Outcome goal ongoing  -CL      UB Dressing OT LTG    UB Dressing Goal OT LTG, Date Established  09/06/17  -CL     UB Dressing Goal OT LTG, Time to Achieve  by discharge  -CL     UB Dressing Goal OT LTG, Activity Type  Utilizing homa-dressing technique  -CL     UB Dressing Goal OT LTG, [place] Level  moderate assist (50% patient effort)  -CL     UB Dressing Goal OT LTG, Additional Goal  AAD  -CL     UB Dressing Goal OT LTG, Outcome goal ongoing  -CL        User Key  (r) = Recorded By, (t) = Taken By, (c) = Cosigned By    Initials Name Provider Type    TB Marifer Schofield, OT Occupational Therapist    JR Magalie Trinh, OT Occupational Therapist    CASEY Nguyen, OT Occupational Therapist          Occupational Therapy Education     Title: PT OT SLP Therapies (Active)     Topic: Occupational Therapy (Active)     Point: ADL training (Active)    Description: Instruct learner(s) on proper safety adaptation and remediation techniques during self care or transfers.   Instruct in proper use of assistive devices.    Learning Progress Summary    Learner Readiness Method Response Comment Documented by Status   Patient Acceptance E,D NR Pt educated on appropriate safety precautions, positioning, and HEP.  09/13/17 1150 Active    Acceptance E NR Educated pt on B UE HEP.  09/11/17 1348 Active    Acceptance E,D NR Pt educated on positioning and BUE HEP.  09/07/17 1438 Active    Acceptance E,D NR Pt educated on appropriate safety precautions, t/f techniques, positioning, and benefits of therapy.  09/06/17 1018 Active               Point: Home exercise program (Done)     Description: Instruct learner(s) on appropriate technique for monitoring, assisting and/or progressing therapeutic exercises/activities.    Learning Progress Summary    Learner Readiness Method Response Comment Documented by Status   Patient Acceptance E,D VU,NR Education reinforced for benefits of activity/therapy, role of OT and HEP  09/15/17 1512 Done    Acceptance E,D NR Pt educated on appropriate safety precautions, positioning, and HEP. CL 09/13/17 1150 Active    Acceptance E NR Educated pt on B UE HEP.  09/11/17 1348 Active    Acceptance E,D NR Pt educated on positioning and BUE HEP. CL 09/07/17 1438 Active   Other Acceptance E,D VU,NR Education reinforced for benefits of activity/therapy, role of OT and HEP  09/15/17 1512 Done               Point: Precautions (Active)    Description: Instruct learner(s) on prescribed precautions during self-care and functional transfers.    Learning Progress Summary    Learner Readiness Method Response Comment Documented by Status   Patient Acceptance E,D NR Pt educated on appropriate safety precautions, positioning, and HEP. CL 09/13/17 1150 Active    Acceptance E,D NR Pt educated on positioning and BUE HEP. CL 09/07/17 1438 Active    Acceptance E,D NR Pt educated on appropriate safety precautions, t/f techniques, positioning, and benefits of therapy.  09/06/17 1018 Active               Point: Body mechanics (Active)    Description: Instruct learner(s) on proper positioning and spine alignment during self-care, functional mobility activities and/or exercises.    Learning Progress Summary    Learner Readiness Method Response Comment Documented by Status   Patient Acceptance E,D NR Pt educated on appropriate safety precautions, t/f techniques, positioning, and benefits of therapy.  09/06/17 1018 Active                      User Key     Initials Effective Dates Name Provider Type Discipline     06/22/15 -  Marifer Schofield OT Occupational Therapist OT      06/22/15 -  Magalie Trinh, OT Occupational Therapist OT    CL 06/08/16 -  Trinidad Nguyen OT Occupational Therapist OT                  OT Recommendation and Plan  Anticipated Equipment Needs At Discharge:  (TBA further)  Anticipated Discharge Disposition: skilled nursing facility  Planned Therapy Interventions: adaptive equipment training, ADL retraining, balance training, bed mobility training, energy conservation, home exercise program, transfer training  Therapy Frequency: daily  Plan of Care Review  Plan Of Care Reviewed With: patient, friend  Outcome Summary/Follow up Plan: Pt presents on high flow O2 per NC with stable sats during ther ex. Pt progressing with strength/HEP.  Declined OOB. OT to follow for ADL participation.        Outcome Measures       09/15/17 1435 09/14/17 1111 09/13/17 1013    How much help from another person do you currently need...    Turning from your back to your side while in flat bed without using bedrails?  2  -SJ 2  -AS    Moving from lying on back to sitting on the side of a flat bed without bedrails?  2  -SJ 2  -AS    Moving to and from a bed to a chair (including a wheelchair)?  1  -SJ 1  -AS    Standing up from a chair using your arms (e.g., wheelchair, bedside chair)?  1  -SJ 1  -AS    Climbing 3-5 steps with a railing?  1  -SJ 1  -AS    To walk in hospital room?  1  -SJ 1  -AS    AM-PAC 6 Clicks Score  8  -SJ 8  -AS    How much help from another is currently needed...    Putting on and taking off regular lower body clothing? 1  -TB      Bathing (including washing, rinsing, and drying) 1  -TB      Toileting (which includes using toilet bed pan or urinal) 1  -TB      Putting on and taking off regular upper body clothing 2  -TB      Taking care of personal grooming (such as brushing teeth) 2  -TB      Eating meals 2  -TB      Score 9  -TB      Functional Assessment    Outcome Measure Options AM-PAC 6 Clicks Daily Activity (OT)  -TB AM-PAC 6 Clicks Basic Mobility (PT)  -SJ  AM-PAC 6 Clicks Basic Mobility (PT)  -AS      09/13/17 1012          How much help from another is currently needed...    Putting on and taking off regular lower body clothing? 1  -CL      Bathing (including washing, rinsing, and drying) 1  -CL      Toileting (which includes using toilet bed pan or urinal) 1  -CL      Putting on and taking off regular upper body clothing 2  -CL      Taking care of personal grooming (such as brushing teeth) 2  -CL      Eating meals 2  -CL      Score 9  -CL      Functional Assessment    Outcome Measure Options AM-PAC 6 Clicks Daily Activity (OT)  -CL        User Key  (r) = Recorded By, (t) = Taken By, (c) = Cosigned By    Initials Name Provider Type    TB Marifer Schofield OT Occupational Therapist    SJ Winter Nur, PT Physical Therapist    AS Anel Rizvi, PTA Physical Therapy Assistant    CL Trinidad Nguyen, OT Occupational Therapist           Time Calculation:         Time Calculation- OT       09/15/17 1515          Time Calculation- OT    OT Start Time 1435  -TB      Total Timed Code Minutes- OT 25 minute(s)  -TB      OT Received On 09/15/17  -TB      OT Goal Re-Cert Due Date 09/16/17  -TB        User Key  (r) = Recorded By, (t) = Taken By, (c) = Cosigned By    Initials Name Provider Type    TB Marifer Schofield OT Occupational Therapist           Therapy Charges for Today     Code Description Service Date Service Provider Modifiers Qty    [id number]  OT THERAPEUTIC ACT EA 15 MIN 9/15/2017 Marifer Schofield OT GO 2               Marifer Schofield OT  9/15/2017

## 2017-09-15 NOTE — PROGRESS NOTES
INPATIENT PULMONARY SERVICE  PROGRESS NOTE     Hospital LOS: 10 days    Ms. Jennifer Oliveira, is followed for a Chief Complaint of:  Chief Complaint   Patient presents with   • Weakness - Generalized   • Fever     Principal Problem:    Right lower lobe pneumonia  Active Problems:    Normochromic normocytic anemia    Chronic diastolic heart failure    HTN (hypertension)    Chronic kidney disease (CKD), stage III (moderate)    h/o stroke with right hemiplegia    Disease of thyroid gland    Type 2 diabetes mellitus    KRIS (acute kidney injury)    Nausea and vomiting    Weakness    Acute respiratory failure with hypoxia      Subjective   S   Ms. Oliveira is a 76yo F with a history of CVA/TIA, PAD, T2DM, hypothyroidism, and recent GI bleed requiring transfusion who was admitted on 9/5 for CAP. She was initially seen in the [facility] ED on 9/4 and discharged with a prescription for Levaquin which she was unable to  as the pharmacy was closed. She continued to have fever, cough and severe weakness and presented to [facility].      A CXR was performed in the ED which showed bibasilar opacities as well as a RLL infiltrate. She was admitted and started on Zosyn and Levaquin as there was concern for aspiration. She was given IVF for acute kidney injury and possible dehydration. She usually wears 2L NC at home. At the time of admission, she was requiring 3L NC. She required increased O2 on 9/7 and this improved with diuresis. Her respiratory status continues to improve with diuresis.     Interval History:  Ms. Oliveira tells me that she feels about the same this morning. Still coughing.        The patient's relevant past medical, surgical and social history were reviewed and updated in Epic as appropriate.      ROS:   Constitutional: Negative for fever.   Respiratory: Positive for dyspnea.   Cardiovascular: Negative for chest pain.   Gastrointestinal: Positive for diarrhea.      Objective   O     Vitals  Temp  Min: 97.8 °F  (36.6 °C)  Max: 98.2 °F (36.8 °C)  BP  Min: 158/65  Max: 199/79  Pulse  Min: 64  Max: 89  Resp  Min: 16  Max: 26  SpO2  Min: 88 %  Max: 95 % Flow (L/min)  Min: 40  Max: 45    I/O 24 HR (7:00 AM-6:59AM):  Intake/Output       09/14/17 0700 - 09/15/17 0659 09/15/17 0700 - 09/16/17 0659    Intake (ml) 1320 720    Output (ml) 2800 800    Net (ml) -1480 -80            Physical Examination    Telemetry: Sinus Rhythm: normal sinus rhythm      Constitutional:  No acute distress.   Conversant.    Cardiovascular: Regular rate and rhythm.  No murmurs, rub or gallop.   Respiratory: Normal symmetric chest expansion.  Rhonchi on the right.  No wheezing.     Abdominal:  Soft. No masses.   Non-tender. No distension.   No hepatosplenomegaly.   Extremities: No digital cyanosis or clubbing.  Trace peripheral edema.   Neurological:   Alert and Oriented to person, place, and time.   Moves all extremities.           Results from last 7 days  Lab Units 09/14/17  0516 09/13/17  0611 09/12/17  0526   WBC 10*3/mm3 8.22 8.90 11.27*   HEMOGLOBIN g/dL 8.0* 7.9* 9.3*   MCV fL 89.2 89.1 89.6   PLATELETS 10*3/mm3 277 250 276       Results from last 7 days  Lab Units 09/15/17  0446 09/14/17  0516 09/13/17  0611 09/13/17  0017  09/11/17  2324   SODIUM mmol/L 140 141 141 138  < > 138   POTASSIUM mmol/L 4.2 3.7 3.6 3.7  < > 3.8   CO2 mmol/L 25.0 25.0 24.0 23.0  < > 24.0   CREATININE mg/dL 1.50* 1.70* 1.90* 2.00*  < > 2.00*   PHOSPHORUS mg/dL  --   --   --  4.8  --  5.9*   < > = values in this interval not displayed.  CREATININE: 1.5 mg/dL ABNORMAL (09/15/17 0446)  Estimated creatinine clearance: 30.9 mL/min          Results from last 7 days  Lab Units 09/11/17  1031   PH, ARTERIAL pH units 7.344*   PCO2, ARTERIAL mm Hg 39.6   PO2 ART mm Hg 66.3*   FIO2 % 50       Images: No new imaging available for review.     I reviewed the patient's new clinical results.      Assessment/Plan   A / P     77 y.o.female, admitted on 9/5/2017 with: Fever and  weakness    Impressions:  1. Acute on Chronic Respiratory Failure  1. 2L NC prior to admission, now requiring HFNC 40%  2. Right Lower Lobe Pneumonia  1. Afebrile  2. Respiratory Cx w/ Normal Respiratory [place]  3. ABX: Levaquin (Intermittent dosing started on 9/5)  3. History of COPD  1. Prednisone 30mg (D5) for exacerbation  2. Duonebs/Pulmicort  4. KRIS on CKD  1. Baseline Cr 1.4  2. Creatinine improved to 1.9 this AM  5. Volume Overload  1. Normal EF on Echo from 4/2017  2. History of Diastolic dysfunction per chart  6. Diarrhea  1. C. Diff PCR negative      She has improved with diuresis. However, still with significant hypoxia and right lower lobe infiltrate. This may represent atelectasis. She has not been out of bed in 2 days. Kidney function continues to improve.      Assessment / Plan:  1. Continue diuresis as tolerated.    2. Prednisone 20mg daily x 3 days (d1), then wean to 10mg x 3 days, then 5mg x 3 days, then stop.    3. Continue percussion to right side and IS as well as flutter valve for right sided atelectasis. She needs to get up and out of bed.   4. Wean supplemental O2 as tolerated.     I discussed the patient's findings and my recommendations with patient and nursing staff. We will continue to follow along.     Time:  I spent 15 minutes, face to face, with the patient or on the shaw coordinating care with other health care providers.   I spent > 50% percent of this time, counseling and discussing diagnosis, current status and management .     Abigail Crawford, DO  Pulmonary and Critical Care Medicine

## 2017-09-15 NOTE — PROGRESS NOTES
"    [facility] Medicine Services  PROGRESS NOTE      Date of Admission: 2017  Length of Stay: 10  Primary Care Physician: Samuel Buck MD    Patient Name: Jennifer Oliveira  : 1940  MRN: [id number]    Subjective     CC:  Pneumonia, weakness; fever.      History of Present Illness    Stool x 7 yest  Continues to diurese as well.  However, says she feels more SOA today.    Continued coughing.  No other complaints.  No abd pain.     ......  At home:  2L nc nocturnal only  Walks with walker after cva/R HP    .Review of Systems   Otherwise complete ROS is negative except as mentioned in the HPI.      Objective     Vital Signs: /57 (BP Location: Left arm, Patient Position: Lying)  Pulse 68  Temp 97.9 °F (36.6 °C) (Oral)   Resp 16  Ht 63\" (160 cm)  Wt 170 lb (77.1 kg)  SpO2 95%  BMI 30.11 kg/m2     Physical Exam :  Sitting up in bed.  Looks less comfortable today.  Holding yankauer.  Occas cough.   OP clear, MMM  Neck supple  RRR  Decreased bases, scattered rhonchi but no wheeze  And not labored.  Still 30percent FIo2 by hiflow.  +BS, ND, NT  LUNA    edema R UE/RLE  No rashes  Good strength L side, 5/5 LUE/LLE, weak R from prior CVA      Results Reviewed:  I have personally reviewed current lab, radiology, and data and agree.      Results from last 7 days  Lab Units 17  0516 17  0611 17  0526   WBC 10*3/mm3 8.22 8.90 11.27*   HEMOGLOBIN g/dL 8.0* 7.9* 9.3*   HEMATOCRIT % 24.8* 24.4* 28.3*   PLATELETS 10*3/mm3 277 250 276       Results from last 7 days  Lab Units 09/15/17  0446 17  0516 17  0611   SODIUM mmol/L 140 141 141   POTASSIUM mmol/L 4.2 3.7 3.6   CHLORIDE mmol/L 111* 112* 107   CO2 mmol/L 25.0 25.0 24.0   BUN mg/dL 54* 47* 57*   CREATININE mg/dL 1.50* 1.70* 1.90*   GLUCOSE mg/dL 203* 71 164*   CALCIUM mg/dL 8.2* 8.2* 8.0*     No results found for: BNP  No results found for: PHART  Blood Culture   Date Value Ref Range Status   2017 " No growth at 24 hours  Preliminary   09/05/2017 No growth at 24 hours  Preliminary       Imaging Results (last 24 hours)     Procedure Component Value Units Date/Time    XR Chest 1 View [[id number]] Collected:  09/14/17 1047     Updated:  09/14/17 2220    Narrative:       EXAMINATION: XR CHEST 1 VW- 09/14/2017     INDICATION: J18.9-Pneumonia, unspecified organism; Z74.09-Other reduced  mobility     COMPARISON: 09/12/2017 portable chest     FINDINGS: Heart is normal in size. The vasculature appears mildly  cephalized. Rather dense and extensive perihilar disease seen on the  09/12/2017 exam has changed in pattern, overall moderately improved on  the left, focally somewhat denser in the right base but improved in the  right upper lung. Right basilar opacity may actually represent a small  new effusion. No pneumothorax is seen.       Impression:       Changing pattern of disease, with significantly improved  aeration of the left lung, and focally increased atelectasis or effusion  in the right base. No new chest disease elsewhere.     D:  09/14/2017  E:  09/14/2017     This report was finalized on 9/14/2017 10:18 PM by DR. Mitch Cabrera MD.               I have reviewed the medications.      Assessment / Plan     Assessment & Plan :  Hospital Problem List     * (Principal)Right lower lobe pneumonia    Normochromic normocytic anemia    Overview Addendum 9/5/2017  3:01 PM by ARSLAN Montalvo     - Baseline Hgb 9s  - Hx of C-scope with polyps 2016, EGD 2016 with gastritis   - EGD 5/14/17 and 5/26/17 with symptomatic anemia         Chronic diastolic heart failure    Overview Signed 4/19/2017  1:21 PM by Samuel Dela Cruz MD     - Last echo 11/2016 LVEF 70%, normal VHD         HTN (hypertension)    Chronic kidney disease (CKD), stage III (moderate)    Overview Signed 4/19/2017  1:14 PM by Samuel Dela Cruz MD     - baseline Cr 1.5-1.6         h/o stroke with right hemiplegia    Overview Signed 5/14/2017 10:24 PM by  Mariam Murray, RN EXTENDER     residual chronic right hemiplegia         Disease of thyroid gland    Type 2 diabetes mellitus    KRIS (acute kidney injury)    Nausea and vomiting    Weakness    Acute respiratory failure with hypoxia               Brief Hospital Course to date:  Jennifer Oliveira is a 77 y.o. 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     Seen 17 at [facility] ED and diagnosed with CAP, started on Levaquin but unable to  prescription due to pharmacy being closed. Symptoms progressed to high grade fever, worsening cough and severe weakness (unable to stand) N/V within 24 hrs.  Also had to increase baseline home O2 from 2L to 4L.  Presented to [facility] ED found to have RLL pna with fever and KRIS:        Acute hypoxic respiratory failure  --Continue antibiotics:  Weaned to levaquin only  --CXR favors some vascular congestion, giving Lasix.  but normal ECHO   - pulmonary following  -- slow wean prednisone    Altered MS/weakness  -- hypoxia by AB/66  On 30percent  -- neg CT head    HTN uncontrolled  -- inc hydral and BB    Hx of CVA/TIA with R weakness     KRIS/CKD  -- returning to baseline despite diuresis    Anemia  -- low/stable.  watch    Diarrhea after abx  -- cdt neg     DM  Hypothyroidism  HTN  Hx of PAD  DVT prophylaxis      Continue wolfe.  Watch output and renal fxn. Agree with diuresis.  CBC tomorrw.  Encourage OOB daily    Disposition: I expect the patient to be discharged to rehab.    Denisse Maharaj MD  09/15/17  10:48 AM

## 2017-09-15 NOTE — PROGRESS NOTES
"   LOS: 10 days    Patient Care Team:  Samuel Buck MD as PCP - General (Family Medicine)    Subjective     Stable.    Objective     Vital Signs:  Blood pressure 162/57, pulse 68, temperature 97.8 °F (36.6 °C), temperature source Oral, resp. rate 18, height 63\" (160 cm), weight 170 lb (77.1 kg), SpO2 95 %.    Flowsheet Rows         First Filed Value    Admission Height  63\" (160 cm) Documented at 09/05/2017 1009    Admission Weight  170 lb (77.1 kg) Documented at 09/05/2017 1009          09/14 0701 - 09/15 0700  In: 1320 [P.O.:1320]  Out: 2800 [Urine:2800]    Physical Exam:    GENERAL: WD elderly female. NAD.   CV:  + edema  LUNGS:  Quiet,  Nonlabored resp.    ABDOMEN: Soft, nontender, nondistended.   : no palp bladder, + wolfe  SKIN: Warm and dry without rash    Labs:    Results from last 7 days  Lab Units 09/14/17  0516 09/13/17  0611 09/12/17  0526   WBC 10*3/mm3 8.22 8.90 11.27*   HEMOGLOBIN g/dL 8.0* 7.9* 9.3*   PLATELETS 10*3/mm3 277 250 276       Results from last 7 days  Lab Units 09/15/17  0446 09/14/17  0516 09/13/17  0611 09/13/17  0017  09/11/17  2324   SODIUM mmol/L 140 141 141 138  < > 138   POTASSIUM mmol/L 4.2 3.7 3.6 3.7  < > 3.8   CHLORIDE mmol/L 111* 112* 107 108  < > 103   CO2 mmol/L 25.0 25.0 24.0 23.0  < > 24.0   BUN mg/dL 54* 47* 57* 54*  < > 53*   CREATININE mg/dL 1.50* 1.70* 1.90* 2.00*  < > 2.00*   CALCIUM mg/dL 8.2* 8.2* 8.0* 8.4*  < > 8.9   PHOSPHORUS mg/dL  --   --   --  4.8  --  5.9*   ALBUMIN g/dL  --   --   --  2.80*  --  3.40   < > = values in this interval not displayed.        Results from last 7 days  Lab Units 09/11/17  1031   PH, ARTERIAL pH units 7.344*   PO2 ART mm Hg 66.3*   PCO2, ARTERIAL mm Hg 39.6   HCO3 ART mmol/L 21.5       Results from last 7 days  Lab Units 09/13/17  1955   COLOR UA  Yellow   CLARITY UA  Cloudy*   PH, URINE  5.5   SPECIFIC GRAVITY, URINE  1.013   GLUCOSE UA  100 mg/dL (Trace)*   KETONES UA  Negative   BILIRUBIN UA  Negative   PROTEIN UA  100 mg/dL " (2+)*   BLOOD UA  Moderate (2+)*   LEUKOCYTES UA  Negative   NITRITE UA  Negative       Estimated Creatinine Clearance: 30.9 mL/min (by C-G formula based on Cr of 1.5).      A/P:    ARF:  Resolved.     CKD3:  Baseline 1.5-1.6     HTN: BP elevated. Will try restarting losartan 25mg qhs as long as cr stays stable.  Cont  hydralazine for now.      PNA: on ABx     Anemia:  Hgb stable.  Tsat 9%.  Recheck s/p IVFe.     Edema:  Re-dose Bumex as needed for I>O    Babar Law MD  09/15/17  9:32 AM

## 2017-09-15 NOTE — PLAN OF CARE
Problem: Patient Care Overview (Adult)  Goal: Plan of Care Review  Outcome: Ongoing (interventions implemented as appropriate)    09/15/17 1513   Coping/Psychosocial Response Interventions   Plan Of Care Reviewed With patient;friend   Outcome Evaluation   Outcome Summary/Follow up Plan Pt presents on high flow O2 per NC with stable sats during ther ex. Pt progressing with strength/HEP. Declined OOB. OT to follow for ADL participation.         Problem: Inpatient Occupational Therapy  Goal: Bed Mobility Goal LTG- OT  Outcome: Ongoing (interventions implemented as appropriate)    09/06/17 1019 09/15/17 1513   Bed Mobility OT LTG   Bed Mobility OT LTG, Date Established 09/06/17 --    Bed Mobility OT LTG, Time to Achieve by discharge --    Bed Mobility OT LTG, Activity Type roll left/roll right;supine to sit/sit to supine --    Bed Mobility OT LTG, [place] Level moderate assist (50% patient effort);2 person assist required --    Bed Mobility OT LTG, Additional Goal AAD --    Bed Mobility OT LTG, Outcome --  goal ongoing       Goal: Transfer Training Goal 1 LTG- OT  Outcome: Ongoing (interventions implemented as appropriate)    09/06/17 1019 09/15/17 1513   Transfer Training OT LTG   Transfer Training OT LTG, Date Established 09/06/17 --    Transfer Training OT LTG, Time to Achieve by discharge --    Transfer Training OT LTG, Activity Type toilet;bed to chair /chair to bed;sit to stand/stand to sit --    Transfer Training OT LTG, [place] Level moderate assist (50% patient effort);2 person assist required --    Transfer Training OT LTG, Additional Goal AAD --    Transfer Training OT LTG, Outcome --  goal ongoing       Goal: Patient Education Goal LTG- OT  Outcome: Ongoing (interventions implemented as appropriate)    09/06/17 1019 09/15/17 1513   Patient Education OT LTG   Patient Education OT LTG, Date Established 09/06/17 --    Patient Education OT LTG, Time to Achieve by discharge --    Patient Education OT  LTG, Education Type written program;HEP;posture/body mechanics;1 hand/homa technique;home safety;adaptive equipment mgmt;energy conservation;adaptive breathing --    Patient Education OT LTG, Education Understanding verbalizes understanding --    Patient Education OT LTG Outcome --  goal ongoing       Goal: UB Dressing Goal LTG- OT  Outcome: Ongoing (interventions implemented as appropriate)    09/06/17 1019 09/15/17 1513   UB Dressing OT LTG   UB Dressing Goal OT LTG, Date Established 09/06/17 --    UB Dressing Goal OT LTG, Time to Achieve by discharge --    UB Dressing Goal OT LTG, Activity Type Utilizing homa-dressing technique --    UB Dressing Goal OT LTG, [place] Level moderate assist (50% patient effort) --    UB Dressing Goal OT LTG, Additional Goal AAD --    UB Dressing Goal OT LTG, Outcome --  goal ongoing

## 2017-09-15 NOTE — PROGRESS NOTES
Adult Nutrition  Assessment/PES    Patient Name:  Jennifer Oliveira  YOB: 1940  MRN: [id number]  Admit Date:  9/5/2017    Assessment Date:  9/15/2017        Reason for Assessment       09/15/17 1803    Reason for Assessment    Reason For Assessment/Visit follow up protocol    Time Spent (min) 20        Patient Active Problem List   Diagnosis   • Acute hypoxemic respiratory failure   • PAD (peripheral artery disease)   • Normochromic normocytic anemia   • Chronic diastolic heart failure   • Diabetes type 2, uncontrolled   • Lower extremity cellulitis   • Hypothyroid   • HTN (hypertension)   • Metabolic encephalopathy   • TIA (transient ischemic attack)   • Acute cystitis without hematuria   • Chronic kidney disease (CKD), stage III (moderate)   • GERD (gastroesophageal reflux disease) with hx of gastritis    • h/o stroke with right hemiplegia   • Hypertension   • Disease of thyroid gland   • Diastolic heart failure   • Type 2 diabetes mellitus   • CKD (chronic kidney disease), stage III   • Symptomatic anemia   • GI bleed   • KRIS (acute kidney injury)   • Nausea and vomiting   • Right lower lobe pneumonia   • Weakness   • Pneumonia   • Acute respiratory failure with hypoxia   • Hyperkalemia               Labs/Tests/Procedures/Meds       09/15/17 1803    Labs/Tests/Procedures/Meds    Labs/Tests Review Reviewed    Medication Review Reviewed, pertinent, weaning steroids       Results from last 7 days  Lab Units 09/15/17  0446   SODIUM mmol/L 140   POTASSIUM mmol/L 4.2   CHLORIDE mmol/L 111*   CO2 mmol/L 25.0   BUN mg/dL 54*   CREATININE mg/dL 1.50*   CALCIUM mg/dL 8.2*   GLUCOSE mg/dL 203*              Physical Findings       09/15/17 1803    Physical Findings/Assessment    Additional Documentation --   pt sitting up in bed, on HFNC,  eating dinner, reports she is trying to eat more, is drinking supplements    Physical Appearance    Skin other (see comments)   bruises/ scabs              Nutrition  Prescription Ordered       09/15/17 1804    Nutrition Prescription PO    Current PO Diet Soft Texture    Texture Ground    Supplement Boost Glucose Control    Supplement Frequency 3 times a day    Common Modifiers Cardiac;Consistent Carbohydrate                Problem/Interventions:        Problem 1       09/15/17 1802    Nutrition Diagnoses Problem 1    Problem 1 Nutrition Appropriate for Condition at this Time    Signs/Symptoms (evidenced by) PO Intake    Percent (%) intake recorded 68 %    Over number of meals 7                    Intervention Goal       09/15/17 1805    Intervention Goal    General Nutrition support treatment    PO Increase intake;Continue positive trend            Nutrition Intervention       09/15/17 1805    Nutrition Intervention    RD/Tech Action Interview for preference;Menu adjusted;Encourage intake              Education/Evaluation       09/15/17 1805    Monitor/Evaluation    Monitor Per protocol;PO intake;Supplement intake;Pertinent labs;Skin status;Symptoms          Electronically signed by:  Stacey Hernandez RD  09/15/17 6:05 PM

## 2017-09-16 LAB
ANION GAP SERPL CALCULATED.3IONS-SCNC: 5 MMOL/L (ref 3–11)
BUN BLD-MCNC: 55 MG/DL (ref 9–23)
BUN/CREAT SERPL: 39.3 (ref 7–25)
CALCIUM SPEC-SCNC: 8.8 MG/DL (ref 8.7–10.4)
CHLORIDE SERPL-SCNC: 111 MMOL/L (ref 99–109)
CO2 SERPL-SCNC: 25 MMOL/L (ref 20–31)
CREAT BLD-MCNC: 1.4 MG/DL (ref 0.6–1.3)
GFR SERPL CREATININE-BSD FRML MDRD: 36 ML/MIN/1.73
GLUCOSE BLD-MCNC: 129 MG/DL (ref 70–100)
GLUCOSE BLDC GLUCOMTR-MCNC: 146 MG/DL (ref 70–130)
GLUCOSE BLDC GLUCOMTR-MCNC: 263 MG/DL (ref 70–130)
GLUCOSE BLDC GLUCOMTR-MCNC: 331 MG/DL (ref 70–130)
GLUCOSE BLDC GLUCOMTR-MCNC: 378 MG/DL (ref 70–130)
POTASSIUM BLD-SCNC: 4.4 MMOL/L (ref 3.5–5.5)
SODIUM BLD-SCNC: 141 MMOL/L (ref 132–146)

## 2017-09-16 PROCEDURE — 94640 AIRWAY INHALATION TREATMENT: CPT

## 2017-09-16 PROCEDURE — 63710000001 PREDNISONE PER 1 MG: Performed by: INTERNAL MEDICINE

## 2017-09-16 PROCEDURE — 99232 SBSQ HOSP IP/OBS MODERATE 35: CPT | Performed by: NURSE PRACTITIONER

## 2017-09-16 PROCEDURE — 82962 GLUCOSE BLOOD TEST: CPT

## 2017-09-16 PROCEDURE — 94799 UNLISTED PULMONARY SVC/PX: CPT

## 2017-09-16 PROCEDURE — 63710000001 INSULIN DETEMIR PER 5 UNITS: Performed by: HOSPITALIST

## 2017-09-16 PROCEDURE — 63710000001 INSULIN LISPRO (HUMAN) PER 5 UNITS: Performed by: INTERNAL MEDICINE

## 2017-09-16 PROCEDURE — 94668 MNPJ CHEST WALL SBSQ: CPT

## 2017-09-16 PROCEDURE — 94760 N-INVAS EAR/PLS OXIMETRY 1: CPT

## 2017-09-16 PROCEDURE — 80048 BASIC METABOLIC PNL TOTAL CA: CPT | Performed by: INTERNAL MEDICINE

## 2017-09-16 RX ORDER — HYDRALAZINE HYDROCHLORIDE 20 MG/ML
20 INJECTION INTRAMUSCULAR; INTRAVENOUS EVERY 6 HOURS PRN
Status: DISCONTINUED | OUTPATIENT
Start: 2017-09-16 | End: 2017-09-24 | Stop reason: HOSPADM

## 2017-09-16 RX ADMIN — LOSARTAN POTASSIUM 25 MG: 25 TABLET, FILM COATED ORAL at 21:09

## 2017-09-16 RX ADMIN — PANTOPRAZOLE SODIUM 40 MG: 40 TABLET, DELAYED RELEASE ORAL at 05:33

## 2017-09-16 RX ADMIN — GUAIFENESIN 600 MG: 600 TABLET, EXTENDED RELEASE ORAL at 09:57

## 2017-09-16 RX ADMIN — OXYCODONE HYDROCHLORIDE AND ACETAMINOPHEN 500 MG: 500 TABLET ORAL at 09:57

## 2017-09-16 RX ADMIN — IPRATROPIUM BROMIDE AND ALBUTEROL SULFATE 3 ML: .5; 3 SOLUTION RESPIRATORY (INHALATION) at 23:40

## 2017-09-16 RX ADMIN — BUDESONIDE 0.5 MG: 0.5 INHALANT RESPIRATORY (INHALATION) at 08:44

## 2017-09-16 RX ADMIN — PREDNISONE 20 MG: 20 TABLET ORAL at 09:57

## 2017-09-16 RX ADMIN — ACETAMINOPHEN 650 MG: 325 TABLET, FILM COATED ORAL at 02:20

## 2017-09-16 RX ADMIN — INSULIN DETEMIR 25 UNITS: 100 INJECTION, SOLUTION SUBCUTANEOUS at 21:09

## 2017-09-16 RX ADMIN — BACLOFEN 10 MG: 10 TABLET ORAL at 05:33

## 2017-09-16 RX ADMIN — BACLOFEN 10 MG: 10 TABLET ORAL at 13:59

## 2017-09-16 RX ADMIN — HYDRALAZINE HYDROCHLORIDE 75 MG: 25 TABLET ORAL at 21:08

## 2017-09-16 RX ADMIN — GUAIFENESIN 600 MG: 600 TABLET, EXTENDED RELEASE ORAL at 21:08

## 2017-09-16 RX ADMIN — IPRATROPIUM BROMIDE AND ALBUTEROL SULFATE 3 ML: .5; 3 SOLUTION RESPIRATORY (INHALATION) at 08:44

## 2017-09-16 RX ADMIN — BACLOFEN 10 MG: 10 TABLET ORAL at 21:09

## 2017-09-16 RX ADMIN — BUDESONIDE 0.5 MG: 0.5 INHALANT RESPIRATORY (INHALATION) at 20:15

## 2017-09-16 RX ADMIN — INSULIN LISPRO 6 UNITS: 100 INJECTION, SOLUTION INTRAVENOUS; SUBCUTANEOUS at 21:09

## 2017-09-16 RX ADMIN — INSULIN DETEMIR 25 UNITS: 100 INJECTION, SOLUTION SUBCUTANEOUS at 09:58

## 2017-09-16 RX ADMIN — IPRATROPIUM BROMIDE AND ALBUTEROL SULFATE 3 ML: .5; 3 SOLUTION RESPIRATORY (INHALATION) at 12:09

## 2017-09-16 RX ADMIN — HYDRALAZINE HYDROCHLORIDE 75 MG: 25 TABLET ORAL at 13:59

## 2017-09-16 RX ADMIN — Medication 250 MG: at 09:57

## 2017-09-16 RX ADMIN — INSULIN LISPRO 4 UNITS: 100 INJECTION, SOLUTION INTRAVENOUS; SUBCUTANEOUS at 09:58

## 2017-09-16 RX ADMIN — IPRATROPIUM BROMIDE AND ALBUTEROL SULFATE 3 ML: .5; 3 SOLUTION RESPIRATORY (INHALATION) at 02:35

## 2017-09-16 RX ADMIN — INSULIN LISPRO 8 UNITS: 100 INJECTION, SOLUTION INTRAVENOUS; SUBCUTANEOUS at 18:28

## 2017-09-16 RX ADMIN — HYDRALAZINE HYDROCHLORIDE 50 MG: 50 TABLET, FILM COATED ORAL at 05:32

## 2017-09-16 RX ADMIN — CLONIDINE HYDROCHLORIDE 0.3 MG: 0.1 TABLET ORAL at 18:28

## 2017-09-16 RX ADMIN — IPRATROPIUM BROMIDE AND ALBUTEROL SULFATE 3 ML: .5; 3 SOLUTION RESPIRATORY (INHALATION) at 14:50

## 2017-09-16 RX ADMIN — AMLODIPINE BESYLATE 10 MG: 10 TABLET ORAL at 21:09

## 2017-09-16 RX ADMIN — LEVOTHYROXINE SODIUM 200 MCG: 100 TABLET ORAL at 05:33

## 2017-09-16 RX ADMIN — GABAPENTIN 900 MG: 300 CAPSULE ORAL at 21:08

## 2017-09-16 RX ADMIN — CLOPIDOGREL BISULFATE 75 MG: 75 TABLET ORAL at 09:57

## 2017-09-16 RX ADMIN — IPRATROPIUM BROMIDE AND ALBUTEROL SULFATE 3 ML: .5; 3 SOLUTION RESPIRATORY (INHALATION) at 20:15

## 2017-09-16 RX ADMIN — INSULIN LISPRO 4 UNITS: 100 INJECTION, SOLUTION INTRAVENOUS; SUBCUTANEOUS at 12:00

## 2017-09-16 RX ADMIN — INSULIN LISPRO 4 UNITS: 100 INJECTION, SOLUTION INTRAVENOUS; SUBCUTANEOUS at 13:58

## 2017-09-16 RX ADMIN — ASPIRIN 81 MG 81 MG: 81 TABLET ORAL at 09:57

## 2017-09-16 RX ADMIN — Medication 250 MG: at 18:28

## 2017-09-16 RX ADMIN — METOPROLOL TARTRATE 100 MG: 100 TABLET ORAL at 21:09

## 2017-09-16 RX ADMIN — METOPROLOL TARTRATE 100 MG: 100 TABLET ORAL at 09:57

## 2017-09-16 RX ADMIN — ATORVASTATIN CALCIUM 10 MG: 10 TABLET, FILM COATED ORAL at 21:08

## 2017-09-16 RX ADMIN — INSULIN LISPRO 6 UNITS: 100 INJECTION, SOLUTION INTRAVENOUS; SUBCUTANEOUS at 18:27

## 2017-09-16 NOTE — PROGRESS NOTES
"    [facility] Medicine Services  PROGRESS NOTE      Date of Admission: 2017  Length of Stay: 11  Primary Care Physician: Samuel Buck MD    Patient Name: Jennifer Oliveira  : 1940  MRN: [id number]    Subjective     CC:  Pneumonia, weakness; fever.      History of Present Illness    Says she feels better today.  Breathing is good though still on high-flow. Cough is decreased.   Diarrhea slowing:    No other complaints.  No abd pain.  Ate lunch without problem.    ......  At home:  2L nc nocturnal only  Walks with walker due to hist of cva/R HP    .Review of Systems   Otherwise complete ROS is negative except as mentioned in the HPI.      Objective     Vital Signs: /61 (BP Location: Left arm, Patient Position: Sitting)  Pulse 62  Temp 97.5 °F (36.4 °C) (Oral)   Resp 20  Ht 63\" (160 cm)  Wt 170 lb (77.1 kg)  SpO2 94%  BMI 30.11 kg/m2     Physical Exam :  Sitting up in recliner.  Not coughing, still on hiflow, just finished lunch.  NAD and alert.   OP clear, MMM  Neck supple  RRR  Decreased bases, scattered rhonchi same as yest.  not labored.  Sat 92.  +BS, ND, NT  LUNA    edema R UE/RLE  No rashes  strength 4+/5 L side, weak R from prior CVA      Results Reviewed:  I have personally reviewed current lab, radiology, and data and agree.      Results from last 7 days  Lab Units 17  0516 17  0611 17  0526   WBC 10*3/mm3 8.22 8.90 11.27*   HEMOGLOBIN g/dL 8.0* 7.9* 9.3*   HEMATOCRIT % 24.8* 24.4* 28.3*   PLATELETS 10*3/mm3 277 250 276       Results from last 7 days  Lab Units 17  0748 09/15/17  0446 17  0516   SODIUM mmol/L 141 140 141   POTASSIUM mmol/L 4.4 4.2 3.7   CHLORIDE mmol/L 111* 111* 112*   CO2 mmol/L 25.0 25.0 25.0   BUN mg/dL 55* 54* 47*   CREATININE mg/dL 1.40* 1.50* 1.70*   GLUCOSE mg/dL 129* 203* 71   CALCIUM mg/dL 8.8 8.2* 8.2*     No results found for: BNP  No results found for: PHART  Blood Culture   Date Value Ref Range Status "   09/05/2017 No growth at 24 hours  Preliminary   09/05/2017 No growth at 24 hours  Preliminary       Imaging Results (last 24 hours)     Procedure Component Value Units Date/Time    XR Chest 1 View [[id number]] Collected:  09/15/17 1419     Updated:  09/15/17 1518    Narrative:       EXAMINATION: XR CHEST 1 VW- 09/15/2017     INDICATION: J18.9-Pneumonia, unspecified organism; Z74.09-Other reduced  mobility     COMPARISON: 09/14/2017     FINDINGS: There are patchy bilateral perihilar and basilar airspace  changes. There has been little interval change in the appearance of the  chest when compared to the previous examination of the previous day,  09/14/2017.       Impression:       There has been no significant change since the previous  examination of the previous day.         D:  09/15/2017  E:  09/15/2017     This report was finalized on 9/15/2017 3:16 PM by Dr. Tan Mujica MD.               I have reviewed the medications.      Assessment / Plan     Assessment & Plan :  Hospital Problem List     * (Principal)Right lower lobe pneumonia    Normochromic normocytic anemia    Overview Addendum 9/5/2017  3:01 PM by ARSLAN Montalvo     - Baseline Hgb 9s  - Hx of C-scope with polyps 2016, EGD 2016 with gastritis   - EGD 5/14/17 and 5/26/17 with symptomatic anemia         Chronic diastolic heart failure    Overview Signed 4/19/2017  1:21 PM by Samuel Dela Cruz MD     - Last echo 11/2016 LVEF 70%, normal VHD         HTN (hypertension)    Chronic kidney disease (CKD), stage III (moderate)    Overview Signed 4/19/2017  1:14 PM by Samuel Dela Cruz MD     - baseline Cr 1.5-1.6         h/o stroke with right hemiplegia    Overview Signed 5/14/2017 10:24 PM by Mariam Murray, BETHANY EXTENDER     residual chronic right hemiplegia         Disease of thyroid gland    Type 2 diabetes mellitus    KRIS (acute kidney injury)    Nausea and vomiting    Weakness    Acute respiratory failure with hypoxia               [facility]  Course to date:  Jennifer Oliveira is a 77 y.o. 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     Seen 9/4/17 at [facility] ED and diagnosed with CAP, started on Levaquin but unable to  prescription due to pharmacy being closed. Symptoms progressed to high grade fever, worsening cough and severe weakness (unable to stand) N/V within 24 hrs.  Also had to increase baseline home O2 from 2L to 4L.  Presented to [facility] ED found to have RLL pna with fever and KRIS:        Acute hypoxic respiratory failure  --Continue antibiotics:  Weaned to levaquin only  --CXR favors some vascular congestion, giving Lasix.  but normal ECHO 4/17  - pulmonary following  -- slow wean prednisone    Altered MS/weakness  -- resolved  --- neg CT head    HTN uncontrolled, still  -- increased hydral and BB  -- will increase again    Hx of CVA/TIA with R weakness     KRIS/CKD  -- returning to baseline despite diuresis    Anemia  -- low/stable.  watch    Diarrhea after abx  -- cdt neg 9/13    DM  -- variable sugars on steroids - increase prandial insulin    Hypothyroidism  HTN  Hx of PAD  DVT prophylaxis    Encourage OOB daily    Need to DC wolfe at some point (placed for intermittent retention and strict I/o on diuretics).     Disposition: I expect the patient to be discharged to rehab.    Denisse Maharaj MD  09/16/17  1:21 PM

## 2017-09-16 NOTE — PROGRESS NOTES
"   LOS: 11 days    Patient Care Team:  Samuel Buck MD as PCP - General (Family Medicine)    Subjective     Stable.    Objective     Vital Signs:  Blood pressure 150/59, pulse 69, temperature 97.7 °F (36.5 °C), temperature source Oral, resp. rate 24, height 63\" (160 cm), weight 170 lb (77.1 kg), SpO2 94 %.    Flowsheet Rows         First Filed Value    Admission Height  63\" (160 cm) Documented at 09/05/2017 1009    Admission Weight  170 lb (77.1 kg) Documented at 09/05/2017 1009          09/15 0701 - 09/16 0700  In: 1440 [P.O.:1320]  Out: 2250 [Urine:2250]    Physical Exam:    GENERAL: WD elderly female. NAD.   CV:  + edema  LUNGS:  Quiet,  Nonlabored resp.    ABDOMEN: Soft, nontender, nondistended.   : no palp bladder, + wolfe  SKIN: Warm and dry without rash    Labs:    Results from last 7 days  Lab Units 09/14/17  0516 09/13/17  0611 09/12/17  0526   WBC 10*3/mm3 8.22 8.90 11.27*   HEMOGLOBIN g/dL 8.0* 7.9* 9.3*   PLATELETS 10*3/mm3 277 250 276       Results from last 7 days  Lab Units 09/16/17  0748 09/15/17  0446 09/14/17  0516 09/13/17  0611 09/13/17  0017  09/11/17  2324   SODIUM mmol/L 141 140 141 141 138  < > 138   POTASSIUM mmol/L 4.4 4.2 3.7 3.6 3.7  < > 3.8   CHLORIDE mmol/L 111* 111* 112* 107 108  < > 103   CO2 mmol/L 25.0 25.0 25.0 24.0 23.0  < > 24.0   BUN mg/dL 55* 54* 47* 57* 54*  < > 53*   CREATININE mg/dL 1.40* 1.50* 1.70* 1.90* 2.00*  < > 2.00*   CALCIUM mg/dL 8.8 8.2* 8.2* 8.0* 8.4*  < > 8.9   PHOSPHORUS mg/dL  --   --   --   --  4.8  --  5.9*   ALBUMIN g/dL  --   --   --   --  2.80*  --  3.40   < > = values in this interval not displayed.        Results from last 7 days  Lab Units 09/11/17  1031   PH, ARTERIAL pH units 7.344*   PO2 ART mm Hg 66.3*   PCO2, ARTERIAL mm Hg 39.6   HCO3 ART mmol/L 21.5       Results from last 7 days  Lab Units 09/13/17  1955   COLOR UA  Yellow   CLARITY UA  Cloudy*   PH, URINE  5.5   SPECIFIC GRAVITY, URINE  1.013   GLUCOSE UA  100 mg/dL (Trace)*   KETONES UA  " Negative   BILIRUBIN UA  Negative   PROTEIN UA  100 mg/dL (2+)*   BLOOD UA  Moderate (2+)*   LEUKOCYTES UA  Negative   NITRITE UA  Negative       Estimated Creatinine Clearance: 33.1 mL/min (by C-G formula based on Cr of 1.4).      A/P:    ARF:  Resolved.     CKD3:  Baseline 1.5-1.6     HTN: BP elevated. Will try restarting losartan 25mg qhs as long as cr stays stable.  Cont  hydralazine for now.      PNA: on ABx     Anemia:  Hgb stable.  Tsat 9%.  Recheck s/p IVFe.     Edema:  Re-dose Bumex as needed for I>O.  Good UOP    Babar Law MD  09/16/17  10:53 AM

## 2017-09-16 NOTE — PLAN OF CARE
"Problem: Patient Care Overview (Adult)  Goal: Plan of Care Review  Outcome: Ongoing (interventions implemented as appropriate)    09/15/17 2100 09/16/17 0439   Coping/Psychosocial Response Interventions   Plan Of Care Reviewed With patient --    Outcome Evaluation   Outcome Summary/Follow up Plan --  Pt's vitals stable, remains on 45% high flow nasal canula. Pt c/o pain on R hip - \"feels like my shingles is coming back\" - pt does have red, raised rash on R hip - Dr.Hannah Lizarraga notified - order for Oxycodone prn received. Pt was moved to negative pressure room and placed in isolation. Pt c/o difficulty faling asleep - hourly rounding keeps her awake. Pt had no other complaints through through the night.       Goal: Discharge Needs Assessment  Outcome: Ongoing (interventions implemented as appropriate)    09/08/17 1513 09/13/17 1155   Discharge Needs Assessment   Concerns To Be Addressed --  denies needs/concerns at this time   Discharge Disposition still a patient --          Problem: Pneumonia (Adult)  Goal: Signs and Symptoms of Listed Potential Problems Will be Absent or Manageable (Pneumonia)  Outcome: Ongoing (interventions implemented as appropriate)    09/13/17 1154 09/16/17 0439   Pneumonia   Problems Assessed (Pneumonia) --  all   Problems Present (Pneumonia) fluid/electrolyte imbalance;respiratory compromise --          Problem: Infection, Risk/Actual (Adult)  Goal: Identify Related Risk Factors and Signs and Symptoms  Outcome: Ongoing (interventions implemented as appropriate)    09/13/17 1154   Infection, Risk/Actual   Infection, Risk/Actual: Related Risk Factors prolonged hospitalization;sleep disturbance;tissue perfusion altered   Signs and Symptoms (Infection, Risk/Actual) blood glucose changes;lab value changes;weakness       Goal: Infection Prevention/Resolution  Outcome: Ongoing (interventions implemented as appropriate)    09/16/17 0439   Infection, Risk/Actual (Adult)   Infection " Prevention/Resolution making progress toward outcome         Problem: Fall Risk (Adult)  Goal: Identify Related Risk Factors and Signs and Symptoms  Outcome: Ongoing (interventions implemented as appropriate)    09/13/17 0840   Fall Risk   Fall Risk: Related Risk Factors fatigue/slow reaction;gait/mobility problems;slipper/uneven surfaces;sleep pattern alteration;environment unfamiliar   Fall Risk: Signs and Symptoms presence of risk factors

## 2017-09-16 NOTE — PROGRESS NOTES
INPATIENT PULMONARY SERVICE  PROGRESS NOTE     Hospital LOS: 11 days    Ms. Jennifer Oliveira, is followed for a Chief Complaint of:  Chief Complaint   Patient presents with   • Weakness - Generalized   • Fever     Principal Problem:    Right lower lobe pneumonia  Active Problems:    Normochromic normocytic anemia    Chronic diastolic heart failure    HTN (hypertension)    Chronic kidney disease (CKD), stage III (moderate)    h/o stroke with right hemiplegia    Disease of thyroid gland    Type 2 diabetes mellitus    KRIS (acute kidney injury)    Nausea and vomiting    Weakness    Acute respiratory failure with hypoxia      Subjective   S   Ms. Oliveira is a 78yo F with a history of CVA/TIA, PAD, T2DM, hypothyroidism, and recent GI bleed requiring transfusion who was admitted on 9/5 for CAP. She was initially seen in the [facility] ED on 9/4 and discharged with a prescription for Levaquin which she was unable to  as the pharmacy was closed. She continued to have fever, cough and severe weakness and presented to [facility].      A CXR was performed in the ED which showed bibasilar opacities as well as a RLL infiltrate. She was admitted and started on Zosyn and Levaquin as there was concern for aspiration. She was given IVF for acute kidney injury and possible dehydration. She usually wears 2L NC at home. At the time of admission, she was requiring 3L NC. She required increased O2 on 9/7 and this improved with diuresis. Her respiratory status continues to improve with diuresis.     Interval History:  Currently, she states that she is feeling better daily.  She is still responding to diuretics. Renal function is stable. She is on 50% Hiflow NC.  She is up in the chair today.  C/o feeling cold.  Has been afebrile.  She is eating ok.       The patient's relevant past medical, surgical and social history were reviewed and updated in Epic as appropriate.      ROS:   Constitutional: Negative for fever.   Respiratory:  Positive for dyspnea.   Cardiovascular: Negative for chest pain.   Gastrointestinal: Negative for diarrhea.      Objective   O     Vitals  Temp  Min: 97.6 °F (36.4 °C)  Max: 98.2 °F (36.8 °C)  BP  Min: 150/59  Max: 191/71  Pulse  Min: 61  Max: 72  Resp  Min: 16  Max: 24  SpO2  Min: 88 %  Max: 94 % Flow (L/min)  Min: 45  Max: 50    I/O 24 HR (7:00 AM-6:59AM):  Intake/Output       09/15/17 0700 - 09/16/17 0659 09/16/17 0700 - 09/17/17 0659    Intake (ml) 1440 480    Output (ml) 2250 --    Net (ml) -810 480        Physical Examination    Telemetry: Sinus Rhythm: normal sinus rhythm      Constitutional:  No acute distress.   Conversant.  Up in chair   Cardiovascular: Regular rate and rhythm.  No murmurs, rub or gallop.   Respiratory: Normal symmetric chest expansion.  CTA bilaterally  No wheezing.     Abdominal:  Soft. No masses.   Non-tender. No distension.   No hepatosplenomegaly.   Extremities: No digital cyanosis or clubbing.  Trace peripheral edema.   Neurological:   Alert and Oriented to person, place, and time.   Moves all extremities.       Results from last 7 days  Lab Units 09/14/17  0516 09/13/17  0611 09/12/17  0526   WBC 10*3/mm3 8.22 8.90 11.27*   HEMOGLOBIN g/dL 8.0* 7.9* 9.3*   MCV fL 89.2 89.1 89.6   PLATELETS 10*3/mm3 277 250 276       Results from last 7 days  Lab Units 09/16/17  0748 09/15/17  0446 09/14/17  0516  09/13/17  0017  09/11/17  2324   SODIUM mmol/L 141 140 141  < > 138  < > 138   POTASSIUM mmol/L 4.4 4.2 3.7  < > 3.7  < > 3.8   CO2 mmol/L 25.0 25.0 25.0  < > 23.0  < > 24.0   CREATININE mg/dL 1.40* 1.50* 1.70*  < > 2.00*  < > 2.00*   PHOSPHORUS mg/dL  --   --   --   --  4.8  --  5.9*   < > = values in this interval not displayed.  CREATININE: 1.4 mg/dL ABNORMAL (09/16/17 0748)  Estimated creatinine clearance: 33.1 mL/min          Results from last 7 days  Lab Units 09/11/17  1031   PH, ARTERIAL pH units 7.344*   PCO2, ARTERIAL mm Hg 39.6   PO2 ART mm Hg 66.3*   FIO2 % 50     Images: No new  imaging available for review.  I reviewed prior cxrs    I reviewed the patient's new clinical results.    Assessment/Plan   A / P     77 y.o.female, admitted on 9/5/2017 with: Fever and weakness    Impressions:  1. Acute on Chronic Respiratory Failure  1. 2L NC prior to admission, now requiring HFNC 50%  2. Right Lower Lobe Pneumonia  1. Afebrile  2. Respiratory Cx w/ Normal Respiratory Beverly  3. ABX: Levaquin (Intermittent dosing started on 9/5)  3. History of COPD  1. Prednisone 30mg (D5) for exacerbation  2. Duonebs/Pulmicort  4. KRIS on CKD  1. Baseline Cr 1.4  2. Creatinine improved to 1.9 this AM  5. Volume Overload  1. Normal EF on Echo from 4/2017  2. History of Diastolic dysfunction per chart  6. Diarrhea  1. C. Diff PCR negative      She has improved with diuresis. However, still with significant hypoxia and right lower lobe infiltrate. This may represent atelectasis. She has not been out of bed in 2 days. Kidney function is stable.    Assessment / Plan:  1. Continue diuresis as tolerated.    2. Continue Prednisone taper.  3. Continue percussion to right side and IS as well as flutter valve for right sided atelectasis. Continue to push ambulation.   4. Wean supplemental O2 as tolerated.   5. Continue duonebs, pulmicort and mucinex    I discussed the patient's findings and my recommendations with patient. We will continue to follow along.     Time:  I spent 25 minutes, face to face, with the patient or on the shaw coordinating care with other health care providers.   I spent > 50% percent of this time, counseling and discussing diagnosis, current status and management .     Winter Cameron, ARSLAN  Pulmonary and Critical Care Medicine

## 2017-09-17 LAB
ANION GAP SERPL CALCULATED.3IONS-SCNC: 7 MMOL/L (ref 3–11)
BUN BLD-MCNC: 71 MG/DL (ref 9–23)
BUN/CREAT SERPL: 44.4 (ref 7–25)
CALCIUM SPEC-SCNC: 8.4 MG/DL (ref 8.7–10.4)
CHLORIDE SERPL-SCNC: 111 MMOL/L (ref 99–109)
CO2 SERPL-SCNC: 23 MMOL/L (ref 20–31)
CREAT BLD-MCNC: 1.6 MG/DL (ref 0.6–1.3)
GFR SERPL CREATININE-BSD FRML MDRD: 31 ML/MIN/1.73
GLUCOSE BLD-MCNC: 147 MG/DL (ref 70–100)
GLUCOSE BLDC GLUCOMTR-MCNC: 149 MG/DL (ref 70–130)
GLUCOSE BLDC GLUCOMTR-MCNC: 218 MG/DL (ref 70–130)
GLUCOSE BLDC GLUCOMTR-MCNC: 229 MG/DL (ref 70–130)
GLUCOSE BLDC GLUCOMTR-MCNC: 249 MG/DL (ref 70–130)
POTASSIUM BLD-SCNC: 4.8 MMOL/L (ref 3.5–5.5)
SODIUM BLD-SCNC: 141 MMOL/L (ref 132–146)

## 2017-09-17 PROCEDURE — 63710000001 INSULIN DETEMIR PER 5 UNITS: Performed by: HOSPITALIST

## 2017-09-17 PROCEDURE — 94799 UNLISTED PULMONARY SVC/PX: CPT

## 2017-09-17 PROCEDURE — 99232 SBSQ HOSP IP/OBS MODERATE 35: CPT | Performed by: NURSE PRACTITIONER

## 2017-09-17 PROCEDURE — 94760 N-INVAS EAR/PLS OXIMETRY 1: CPT

## 2017-09-17 PROCEDURE — 82962 GLUCOSE BLOOD TEST: CPT

## 2017-09-17 PROCEDURE — 80048 BASIC METABOLIC PNL TOTAL CA: CPT | Performed by: INTERNAL MEDICINE

## 2017-09-17 PROCEDURE — 94640 AIRWAY INHALATION TREATMENT: CPT

## 2017-09-17 PROCEDURE — 63710000001 PREDNISONE PER 1 MG: Performed by: INTERNAL MEDICINE

## 2017-09-17 RX ADMIN — ATORVASTATIN CALCIUM 10 MG: 10 TABLET, FILM COATED ORAL at 20:33

## 2017-09-17 RX ADMIN — INSULIN LISPRO 4 UNITS: 100 INJECTION, SOLUTION INTRAVENOUS; SUBCUTANEOUS at 12:36

## 2017-09-17 RX ADMIN — PANTOPRAZOLE SODIUM 40 MG: 40 TABLET, DELAYED RELEASE ORAL at 05:08

## 2017-09-17 RX ADMIN — Medication 250 MG: at 17:26

## 2017-09-17 RX ADMIN — INSULIN LISPRO 8 UNITS: 100 INJECTION, SOLUTION INTRAVENOUS; SUBCUTANEOUS at 17:26

## 2017-09-17 RX ADMIN — INSULIN LISPRO 3 UNITS: 100 INJECTION, SOLUTION INTRAVENOUS; SUBCUTANEOUS at 17:27

## 2017-09-17 RX ADMIN — GUAIFENESIN 600 MG: 600 TABLET, EXTENDED RELEASE ORAL at 20:33

## 2017-09-17 RX ADMIN — BUDESONIDE 0.5 MG: 0.5 INHALANT RESPIRATORY (INHALATION) at 20:24

## 2017-09-17 RX ADMIN — IPRATROPIUM BROMIDE AND ALBUTEROL SULFATE 3 ML: .5; 3 SOLUTION RESPIRATORY (INHALATION) at 12:34

## 2017-09-17 RX ADMIN — LEVOFLOXACIN 750 MG: 750 TABLET, FILM COATED ORAL at 08:59

## 2017-09-17 RX ADMIN — PREDNISONE 20 MG: 20 TABLET ORAL at 08:59

## 2017-09-17 RX ADMIN — CLOPIDOGREL BISULFATE 75 MG: 75 TABLET ORAL at 08:59

## 2017-09-17 RX ADMIN — METOPROLOL TARTRATE 100 MG: 100 TABLET ORAL at 20:34

## 2017-09-17 RX ADMIN — IPRATROPIUM BROMIDE AND ALBUTEROL SULFATE 3 ML: .5; 3 SOLUTION RESPIRATORY (INHALATION) at 20:24

## 2017-09-17 RX ADMIN — LEVOTHYROXINE SODIUM 200 MCG: 100 TABLET ORAL at 05:08

## 2017-09-17 RX ADMIN — HYDRALAZINE HYDROCHLORIDE 75 MG: 25 TABLET ORAL at 15:39

## 2017-09-17 RX ADMIN — BACLOFEN 10 MG: 10 TABLET ORAL at 20:33

## 2017-09-17 RX ADMIN — HYDRALAZINE HYDROCHLORIDE 75 MG: 25 TABLET ORAL at 05:08

## 2017-09-17 RX ADMIN — IPRATROPIUM BROMIDE AND ALBUTEROL SULFATE 3 ML: .5; 3 SOLUTION RESPIRATORY (INHALATION) at 07:28

## 2017-09-17 RX ADMIN — GUAIFENESIN 600 MG: 600 TABLET, EXTENDED RELEASE ORAL at 08:59

## 2017-09-17 RX ADMIN — LOSARTAN POTASSIUM 25 MG: 25 TABLET, FILM COATED ORAL at 20:34

## 2017-09-17 RX ADMIN — OXYCODONE HYDROCHLORIDE AND ACETAMINOPHEN 500 MG: 500 TABLET ORAL at 08:59

## 2017-09-17 RX ADMIN — BUDESONIDE 0.5 MG: 0.5 INHALANT RESPIRATORY (INHALATION) at 07:28

## 2017-09-17 RX ADMIN — HYDRALAZINE HYDROCHLORIDE 75 MG: 25 TABLET ORAL at 20:34

## 2017-09-17 RX ADMIN — IPRATROPIUM BROMIDE AND ALBUTEROL SULFATE 3 ML: .5; 3 SOLUTION RESPIRATORY (INHALATION) at 02:40

## 2017-09-17 RX ADMIN — AMLODIPINE BESYLATE 10 MG: 10 TABLET ORAL at 21:43

## 2017-09-17 RX ADMIN — INSULIN DETEMIR 25 UNITS: 100 INJECTION, SOLUTION SUBCUTANEOUS at 20:36

## 2017-09-17 RX ADMIN — BACLOFEN 10 MG: 10 TABLET ORAL at 15:39

## 2017-09-17 RX ADMIN — INSULIN LISPRO 3 UNITS: 100 INJECTION, SOLUTION INTRAVENOUS; SUBCUTANEOUS at 21:43

## 2017-09-17 RX ADMIN — ASPIRIN 81 MG 81 MG: 81 TABLET ORAL at 08:59

## 2017-09-17 RX ADMIN — INSULIN LISPRO 8 UNITS: 100 INJECTION, SOLUTION INTRAVENOUS; SUBCUTANEOUS at 12:36

## 2017-09-17 RX ADMIN — GABAPENTIN 900 MG: 300 CAPSULE ORAL at 20:33

## 2017-09-17 RX ADMIN — Medication 250 MG: at 08:59

## 2017-09-17 RX ADMIN — BACLOFEN 10 MG: 10 TABLET ORAL at 05:08

## 2017-09-17 RX ADMIN — INSULIN DETEMIR 25 UNITS: 100 INJECTION, SOLUTION SUBCUTANEOUS at 08:57

## 2017-09-17 NOTE — PROGRESS NOTES
"   LOS: 12 days    Patient Care Team:  Samuel Buck MD as PCP - General (Family Medicine)    Subjective     Stable.    Objective     Vital Signs:  Blood pressure 136/54, pulse 66, temperature 95.5 °F (35.3 °C), temperature source Oral, resp. rate 20, height 63\" (160 cm), weight 170 lb (77.1 kg), SpO2 94 %.    Flowsheet Rows         First Filed Value    Admission Height  63\" (160 cm) Documented at 09/05/2017 1009    Admission Weight  170 lb (77.1 kg) Documented at 09/05/2017 1009          09/16 0701 - 09/17 0700  In: 600 [P.O.:600]  Out: 900 [Urine:900]    Physical Exam:    GENERAL: WD elderly female. NAD.   CV:  + edema  LUNGS:  Quiet,  Nonlabored resp.    ABDOMEN: Soft, nontender, nondistended.   : no palp bladder, + wolfe  SKIN: Warm and dry without rash    Labs:    Results from last 7 days  Lab Units 09/14/17  0516 09/13/17  0611 09/12/17  0526   WBC 10*3/mm3 8.22 8.90 11.27*   HEMOGLOBIN g/dL 8.0* 7.9* 9.3*   PLATELETS 10*3/mm3 277 250 276       Results from last 7 days  Lab Units 09/17/17  0612 09/16/17  0748 09/15/17  0446 09/14/17  0516  09/13/17  0017  09/11/17  2324   SODIUM mmol/L 141 141 140 141  < > 138  < > 138   POTASSIUM mmol/L 4.8 4.4 4.2 3.7  < > 3.7  < > 3.8   CHLORIDE mmol/L 111* 111* 111* 112*  < > 108  < > 103   CO2 mmol/L 23.0 25.0 25.0 25.0  < > 23.0  < > 24.0   BUN mg/dL 71* 55* 54* 47*  < > 54*  < > 53*   CREATININE mg/dL 1.60* 1.40* 1.50* 1.70*  < > 2.00*  < > 2.00*   CALCIUM mg/dL 8.4* 8.8 8.2* 8.2*  < > 8.4*  < > 8.9   PHOSPHORUS mg/dL  --   --   --   --   --  4.8  --  5.9*   ALBUMIN g/dL  --   --   --   --   --  2.80*  --  3.40   < > = values in this interval not displayed.        Results from last 7 days  Lab Units 09/11/17  1031   PH, ARTERIAL pH units 7.344*   PO2 ART mm Hg 66.3*   PCO2, ARTERIAL mm Hg 39.6   HCO3 ART mmol/L 21.5       Results from last 7 days  Lab Units 09/13/17  1955   COLOR UA  Yellow   CLARITY UA  Cloudy*   PH, URINE  5.5   SPECIFIC GRAVITY, URINE  1.013 "   GLUCOSE UA  100 mg/dL (Trace)*   KETONES UA  Negative   BILIRUBIN UA  Negative   PROTEIN UA  100 mg/dL (2+)*   BLOOD UA  Moderate (2+)*   LEUKOCYTES UA  Negative   NITRITE UA  Negative       Estimated Creatinine Clearance: 29 mL/min (by C-G formula based on Cr of 1.6).      A/P:    ARF:  Resolved.     CKD3:  Baseline 1.5-1.6.  Cr back up a little today with ARB..  Will watch.  Holding on diuretics for now.      HTN: BP  Better.  Cont losartan.       PNA: on ABx     Anemia:  Hgb stable.  Tsat 9%.  Recheck s/p IVFe.     Edema:  Re-dose Bumex as needed for I>O.  Good UOP    Babar Law MD  09/17/17  2:18 PM

## 2017-09-17 NOTE — PLAN OF CARE
Problem: Patient Care Overview (Adult)  Goal: Plan of Care Review  Outcome: Ongoing (interventions implemented as appropriate)    09/15/17 2100 09/17/17 0646   Coping/Psychosocial Response Interventions   Plan Of Care Reviewed With patient --    Outcome Evaluation   Outcome Summary/Follow up Plan --  Pt rested well this shift. No c/o pain. VSS.    Patient Care Overview   Progress --  progress toward functional goals as expected       Goal: Adult Individualization and Mutuality  Outcome: Ongoing (interventions implemented as appropriate)  Goal: Discharge Needs Assessment  Outcome: Ongoing (interventions implemented as appropriate)    Problem: Pneumonia (Adult)  Goal: Signs and Symptoms of Listed Potential Problems Will be Absent or Manageable (Pneumonia)  Outcome: Ongoing (interventions implemented as appropriate)    Problem: Infection, Risk/Actual (Adult)  Goal: Identify Related Risk Factors and Signs and Symptoms  Outcome: Ongoing (interventions implemented as appropriate)  Goal: Infection Prevention/Resolution  Outcome: Ongoing (interventions implemented as appropriate)    Problem: Fall Risk (Adult)  Goal: Identify Related Risk Factors and Signs and Symptoms  Outcome: Ongoing (interventions implemented as appropriate)

## 2017-09-17 NOTE — PROGRESS NOTES
"    [facility] Medicine Services  PROGRESS NOTE      Date of Admission: 2017  Length of Stay: 12  Primary Care Physician: Samuel Buck MD    Patient Name: Jennifer Oliveira  : 1940  MRN: [id number]    Subjective     CC:  Pneumonia, weakness; fever.      History of Present Illness    Continues to feel better.  No issues overnight    Review of Systems   Constitutional: Negative for appetite change and fever.   Respiratory: Positive for cough and shortness of breath.    Cardiovascular: Negative for chest pain.   Neurological: Negative for dizziness.   Psychiatric/Behavioral: Negative for agitation.        Objective     Vital Signs: /69 (BP Location: Left arm, Patient Position: Lying)  Pulse 69  Temp 97.7 °F (36.5 °C) (Oral)   Resp 18  Ht 63\" (160 cm)  Wt 170 lb (77.1 kg)  SpO2 92%  BMI 30.11 kg/m2     Physical Exam   Constitutional: She is oriented to person, place, and time. She appears well-developed and well-nourished. No distress.   HENT:   Mouth/Throat: Oropharynx is clear and moist.   Eyes: No scleral icterus.   Neck: Neck supple.   Cardiovascular: Normal rate.  Exam reveals no gallop and no friction rub.    No murmur heard.  Pulmonary/Chest: Effort normal. No respiratory distress.   Expiratory wheezing, scattered rhonchi   Abdominal: Soft.   obese   Neurological: She is alert and oriented to person, place, and time.   Right hemiparesis   Skin: Skin is warm and dry. No erythema.   Psychiatric: She has a normal mood and affect.   Vitals reviewed.      Results Reviewed:  I have personally reviewed current lab, radiology, and data and agree.      Results from last 7 days  Lab Units 17  0516 17  0611 17  0526   WBC 10*3/mm3 8.22 8.90 11.27*   HEMOGLOBIN g/dL 8.0* 7.9* 9.3*   HEMATOCRIT % 24.8* 24.4* 28.3*   PLATELETS 10*3/mm3 277 250 276       Results from last 7 days  Lab Units 17  0612 17  0748 09/15/17  0446   SODIUM mmol/L 141 141 140 "   POTASSIUM mmol/L 4.8 4.4 4.2   CHLORIDE mmol/L 111* 111* 111*   CO2 mmol/L 23.0 25.0 25.0   BUN mg/dL 71* 55* 54*   CREATININE mg/dL 1.60* 1.40* 1.50*   GLUCOSE mg/dL 147* 129* 203*   CALCIUM mg/dL 8.4* 8.8 8.2*     Cultures:       Respiratory Culture [[id number]] Collected: 09/09/17 1349       Lab Status: Final result Specimen: Sputum from Cough Updated: 09/11/17 0735        Respiratory Culture Light growth (2+) Normal Respiratory [place]        Gram Stain Result Many (4+) WBCs per low power field         Many (4+) Epithelial cells per low power field         Moderate (3+) Gram positive cocci in pairs and clusters      Component      Latest Ref Rng & Units 9/17/2017 9/17/2017 9/17/2017 9/17/2017           6:12 AM  7:24 AM 11:29 AM  4:09 PM   Glucose      70 - 130 mg/dL 147 (H) 149 (H) 229 (H) 249 (H)     Imaging Results (last 24 hours)     ** No results found for the last 24 hours. **        I have reviewed the medications.      Assessment / Plan     Assessment & Plan :  Hospital Problem List     * (Principal)Right lower lobe pneumonia    Normochromic normocytic anemia    Overview Addendum 9/5/2017  3:01 PM by ARSLAN Montalvo     - Baseline Hgb 9s  - Hx of C-scope with polyps 2016, EGD 2016 with gastritis   - EGD 5/14/17 and 5/26/17 with symptomatic anemia         Chronic diastolic heart failure    Overview Signed 4/19/2017  1:21 PM by Samuel Dela Cruz MD     - Last echo 11/2016 LVEF 70%, normal VHD         HTN (hypertension)    Chronic kidney disease (CKD), stage III (moderate)    Overview Signed 4/19/2017  1:14 PM by Samuel Dela Cruz MD     - baseline Cr 1.5-1.6         h/o stroke with right hemiplegia    Overview Signed 5/14/2017 10:24 PM by Mariam Murray, RN EXTENDER     residual chronic right hemiplegia         Disease of thyroid gland    Type 2 diabetes mellitus    KRIS (acute kidney injury)    Nausea and vomiting    Weakness    Acute respiratory failure with hypoxia               [facility]  Course to date:  Jennifer Oliveira is a 77 y.o. 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     Seen 9/4/17 at [facility] ED and diagnosed with CAP, started on Levaquin but unable to  prescription due to pharmacy being closed. Symptoms progressed to high grade fever, worsening cough and severe weakness (unable to stand) N/V within 24 hrs.  Also had to increase baseline home O2 from 2L to 4L.  Presented to [facility] ED found to have RLL pna with fever and KRIS:        Acute hypoxic respiratory failure  -- Continue antibiotics:  Weaned to levaquin only  -- CXR favors some vascular congestion, giving Lasix.  but normal ECHO 4/17  -- pulmonary following  -- slow wean prednisone    Altered MS/weakness  -- resolved  -- neg CT head    HTN  --meds adjusted  --currently stable    Hx of CVA/TIA with residual R hemiparesis     KRIS/CKD  --baseline 1.5-1.6  --nephrology following  --bump in creatinine 9.17.16  --monitor  --hold ARB if continues to increase  --not currently on diuretics    Anemia  --cbc in am    Diarrhea after abx, improved  -- cdt neg 9/13    DM  -- variable sugars on steroids - increase prandial insulin  -- scheduled humalog held at breakfast, sugar jumped at lunch    Hypothyroidism  Hx of PAD  Possible shingles per nursing  --leave in negative pressure  --check blisters to confirm 9.18.17    DVT prophylaxis:  TEDS/SCDS only due to ongoing anemia and recent       Disposition: I expect the patient to be discharged to rehab.    Catherine Mendoza, ARSLAN  09/17/17  5:38 PM

## 2017-09-17 NOTE — PLAN OF CARE
Problem: Patient Care Overview (Adult)  Goal: Plan of Care Review  Outcome: Ongoing (interventions implemented as appropriate)  Goal: Adult Individualization and Mutuality  Outcome: Ongoing (interventions implemented as appropriate)  Goal: Discharge Needs Assessment  Outcome: Ongoing (interventions implemented as appropriate)    Problem: Pneumonia (Adult)  Goal: Signs and Symptoms of Listed Potential Problems Will be Absent or Manageable (Pneumonia)  Outcome: Ongoing (interventions implemented as appropriate)    Problem: Infection, Risk/Actual (Adult)  Goal: Identify Related Risk Factors and Signs and Symptoms  Outcome: Ongoing (interventions implemented as appropriate)  Goal: Infection Prevention/Resolution  Outcome: Ongoing (interventions implemented as appropriate)    Problem: Fall Risk (Adult)  Goal: Identify Related Risk Factors and Signs and Symptoms  Outcome: Ongoing (interventions implemented as appropriate)

## 2017-09-18 LAB
ANION GAP SERPL CALCULATED.3IONS-SCNC: 5 MMOL/L (ref 3–11)
BASOPHILS # BLD AUTO: 0.02 10*3/MM3 (ref 0–0.2)
BASOPHILS NFR BLD AUTO: 0.2 % (ref 0–1)
BUN BLD-MCNC: 65 MG/DL (ref 9–23)
BUN/CREAT SERPL: 34.2 (ref 7–25)
CALCIUM SPEC-SCNC: 8.7 MG/DL (ref 8.7–10.4)
CHLORIDE SERPL-SCNC: 111 MMOL/L (ref 99–109)
CO2 SERPL-SCNC: 24 MMOL/L (ref 20–31)
CREAT BLD-MCNC: 1.9 MG/DL (ref 0.6–1.3)
CREAT UR-MCNC: 29.8 MG/DL
DEPRECATED RDW RBC AUTO: 47.8 FL (ref 37–54)
EOSINOPHIL # BLD AUTO: 0.15 10*3/MM3 (ref 0–0.3)
EOSINOPHIL NFR BLD AUTO: 1.2 % (ref 0–3)
ERYTHROCYTE [DISTWIDTH] IN BLOOD BY AUTOMATED COUNT: 14.5 % (ref 11.3–14.5)
GFR SERPL CREATININE-BSD FRML MDRD: 26 ML/MIN/1.73
GLUCOSE BLD-MCNC: 89 MG/DL (ref 70–100)
GLUCOSE BLDC GLUCOMTR-MCNC: 100 MG/DL (ref 70–130)
GLUCOSE BLDC GLUCOMTR-MCNC: 152 MG/DL (ref 70–130)
GLUCOSE BLDC GLUCOMTR-MCNC: 218 MG/DL (ref 70–130)
GLUCOSE BLDC GLUCOMTR-MCNC: 95 MG/DL (ref 70–130)
HCT VFR BLD AUTO: 23.9 % (ref 34.5–44)
HGB BLD-MCNC: 7.5 G/DL (ref 11.5–15.5)
IMM GRANULOCYTES # BLD: 0.54 10*3/MM3 (ref 0–0.03)
IMM GRANULOCYTES NFR BLD: 4.3 % (ref 0–0.6)
LYMPHOCYTES # BLD AUTO: 1.61 10*3/MM3 (ref 0.6–4.8)
LYMPHOCYTES NFR BLD AUTO: 12.8 % (ref 24–44)
MCH RBC QN AUTO: 28.7 PG (ref 27–31)
MCHC RBC AUTO-ENTMCNC: 31.4 G/DL (ref 32–36)
MCV RBC AUTO: 91.6 FL (ref 80–99)
MONOCYTES # BLD AUTO: 0.72 10*3/MM3 (ref 0–1)
MONOCYTES NFR BLD AUTO: 5.7 % (ref 0–12)
NEUTROPHILS # BLD AUTO: 9.54 10*3/MM3 (ref 1.5–8.3)
NEUTROPHILS NFR BLD AUTO: 75.8 % (ref 41–71)
PLATELET # BLD AUTO: 271 10*3/MM3 (ref 150–450)
PMV BLD AUTO: 9.8 FL (ref 6–12)
POTASSIUM BLD-SCNC: 4.9 MMOL/L (ref 3.5–5.5)
PROT UR-MCNC: 105 MG/DL (ref 1–14)
RBC # BLD AUTO: 2.61 10*6/MM3 (ref 3.89–5.14)
SODIUM BLD-SCNC: 140 MMOL/L (ref 132–146)
SODIUM UR-SCNC: 80 MMOL/L (ref 30–90)
UUN 24H UR-MCNC: 464 MG/DL
WBC NRBC COR # BLD: 12.58 10*3/MM3 (ref 3.5–10.8)

## 2017-09-18 PROCEDURE — 25010000002 HEPARIN (PORCINE) PER 1000 UNITS: Performed by: HOSPITALIST

## 2017-09-18 PROCEDURE — 94668 MNPJ CHEST WALL SBSQ: CPT

## 2017-09-18 PROCEDURE — 63710000001 INSULIN LISPRO (HUMAN) PER 5 UNITS: Performed by: NURSE PRACTITIONER

## 2017-09-18 PROCEDURE — 82570 ASSAY OF URINE CREATININE: CPT | Performed by: INTERNAL MEDICINE

## 2017-09-18 PROCEDURE — 63710000001 PREDNISONE PER 1 MG: Performed by: INTERNAL MEDICINE

## 2017-09-18 PROCEDURE — 84540 ASSAY OF URINE/UREA-N: CPT | Performed by: INTERNAL MEDICINE

## 2017-09-18 PROCEDURE — 80048 BASIC METABOLIC PNL TOTAL CA: CPT | Performed by: INTERNAL MEDICINE

## 2017-09-18 PROCEDURE — 85025 COMPLETE CBC W/AUTO DIFF WBC: CPT | Performed by: NURSE PRACTITIONER

## 2017-09-18 PROCEDURE — 94640 AIRWAY INHALATION TREATMENT: CPT

## 2017-09-18 PROCEDURE — 94799 UNLISTED PULMONARY SVC/PX: CPT

## 2017-09-18 PROCEDURE — 84156 ASSAY OF PROTEIN URINE: CPT | Performed by: INTERNAL MEDICINE

## 2017-09-18 PROCEDURE — 99233 SBSQ HOSP IP/OBS HIGH 50: CPT | Performed by: HOSPITALIST

## 2017-09-18 PROCEDURE — 63710000001 INSULIN DETEMIR PER 5 UNITS: Performed by: HOSPITALIST

## 2017-09-18 PROCEDURE — 84300 ASSAY OF URINE SODIUM: CPT | Performed by: INTERNAL MEDICINE

## 2017-09-18 PROCEDURE — 94760 N-INVAS EAR/PLS OXIMETRY 1: CPT

## 2017-09-18 PROCEDURE — 97110 THERAPEUTIC EXERCISES: CPT

## 2017-09-18 PROCEDURE — 82962 GLUCOSE BLOOD TEST: CPT

## 2017-09-18 RX ORDER — HEPARIN SODIUM 5000 [USP'U]/ML
5000 INJECTION, SOLUTION INTRAVENOUS; SUBCUTANEOUS EVERY 8 HOURS SCHEDULED
Status: DISCONTINUED | OUTPATIENT
Start: 2017-09-18 | End: 2017-09-20

## 2017-09-18 RX ORDER — BUMETANIDE 0.25 MG/ML
2 INJECTION INTRAMUSCULAR; INTRAVENOUS ONCE
Status: COMPLETED | OUTPATIENT
Start: 2017-09-18 | End: 2017-09-18

## 2017-09-18 RX ORDER — PREDNISONE 10 MG/1
10 TABLET ORAL
Status: DISCONTINUED | OUTPATIENT
Start: 2017-09-19 | End: 2017-09-23

## 2017-09-18 RX ORDER — CARVEDILOL 12.5 MG/1
25 TABLET ORAL EVERY 12 HOURS SCHEDULED
Status: DISCONTINUED | OUTPATIENT
Start: 2017-09-18 | End: 2017-09-24 | Stop reason: HOSPADM

## 2017-09-18 RX ADMIN — Medication 250 MG: at 08:45

## 2017-09-18 RX ADMIN — GABAPENTIN 900 MG: 300 CAPSULE ORAL at 22:00

## 2017-09-18 RX ADMIN — HYDRALAZINE HYDROCHLORIDE 75 MG: 25 TABLET ORAL at 22:00

## 2017-09-18 RX ADMIN — ASPIRIN 81 MG 81 MG: 81 TABLET ORAL at 08:42

## 2017-09-18 RX ADMIN — IPRATROPIUM BROMIDE AND ALBUTEROL SULFATE 3 ML: .5; 3 SOLUTION RESPIRATORY (INHALATION) at 19:36

## 2017-09-18 RX ADMIN — GUAIFENESIN 600 MG: 600 TABLET, EXTENDED RELEASE ORAL at 08:41

## 2017-09-18 RX ADMIN — BUDESONIDE 0.5 MG: 0.5 INHALANT RESPIRATORY (INHALATION) at 07:56

## 2017-09-18 RX ADMIN — INSULIN DETEMIR 25 UNITS: 100 INJECTION, SOLUTION SUBCUTANEOUS at 22:01

## 2017-09-18 RX ADMIN — BACLOFEN 10 MG: 10 TABLET ORAL at 14:23

## 2017-09-18 RX ADMIN — HYDRALAZINE HYDROCHLORIDE 75 MG: 25 TABLET ORAL at 14:23

## 2017-09-18 RX ADMIN — HEPARIN SODIUM 5000 UNITS: 5000 INJECTION, SOLUTION INTRAVENOUS; SUBCUTANEOUS at 22:01

## 2017-09-18 RX ADMIN — IPRATROPIUM BROMIDE AND ALBUTEROL SULFATE 3 ML: .5; 3 SOLUTION RESPIRATORY (INHALATION) at 15:44

## 2017-09-18 RX ADMIN — CARVEDILOL 25 MG: 12.5 TABLET, FILM COATED ORAL at 21:59

## 2017-09-18 RX ADMIN — IPRATROPIUM BROMIDE AND ALBUTEROL SULFATE 3 ML: .5; 3 SOLUTION RESPIRATORY (INHALATION) at 23:44

## 2017-09-18 RX ADMIN — BUMETANIDE 2 MG: 0.25 INJECTION, SOLUTION INTRAMUSCULAR; INTRAVENOUS at 11:00

## 2017-09-18 RX ADMIN — BUDESONIDE 0.5 MG: 0.5 INHALANT RESPIRATORY (INHALATION) at 21:25

## 2017-09-18 RX ADMIN — INSULIN LISPRO 8 UNITS: 100 INJECTION, SOLUTION INTRAVENOUS; SUBCUTANEOUS at 11:59

## 2017-09-18 RX ADMIN — Medication 250 MG: at 17:27

## 2017-09-18 RX ADMIN — PANTOPRAZOLE SODIUM 40 MG: 40 TABLET, DELAYED RELEASE ORAL at 05:06

## 2017-09-18 RX ADMIN — INSULIN DETEMIR 25 UNITS: 100 INJECTION, SOLUTION SUBCUTANEOUS at 10:04

## 2017-09-18 RX ADMIN — INSULIN LISPRO 8 UNITS: 100 INJECTION, SOLUTION INTRAVENOUS; SUBCUTANEOUS at 08:42

## 2017-09-18 RX ADMIN — INSULIN LISPRO 3 UNITS: 100 INJECTION, SOLUTION INTRAVENOUS; SUBCUTANEOUS at 22:01

## 2017-09-18 RX ADMIN — INSULIN LISPRO 8 UNITS: 100 INJECTION, SOLUTION INTRAVENOUS; SUBCUTANEOUS at 17:28

## 2017-09-18 RX ADMIN — CLOPIDOGREL BISULFATE 75 MG: 75 TABLET ORAL at 08:42

## 2017-09-18 RX ADMIN — INSULIN LISPRO 2 UNITS: 100 INJECTION, SOLUTION INTRAVENOUS; SUBCUTANEOUS at 17:28

## 2017-09-18 RX ADMIN — HYDRALAZINE HYDROCHLORIDE 75 MG: 25 TABLET ORAL at 05:06

## 2017-09-18 RX ADMIN — LEVOTHYROXINE SODIUM 200 MCG: 100 TABLET ORAL at 05:06

## 2017-09-18 RX ADMIN — BACLOFEN 10 MG: 10 TABLET ORAL at 22:00

## 2017-09-18 RX ADMIN — ATORVASTATIN CALCIUM 10 MG: 10 TABLET, FILM COATED ORAL at 22:00

## 2017-09-18 RX ADMIN — IPRATROPIUM BROMIDE AND ALBUTEROL SULFATE 3 ML: .5; 3 SOLUTION RESPIRATORY (INHALATION) at 12:29

## 2017-09-18 RX ADMIN — OXYCODONE HYDROCHLORIDE AND ACETAMINOPHEN 500 MG: 500 TABLET ORAL at 08:42

## 2017-09-18 RX ADMIN — PREDNISONE 20 MG: 20 TABLET ORAL at 08:42

## 2017-09-18 RX ADMIN — BACLOFEN 10 MG: 10 TABLET ORAL at 05:06

## 2017-09-18 RX ADMIN — METOPROLOL TARTRATE 100 MG: 100 TABLET ORAL at 08:42

## 2017-09-18 RX ADMIN — GUAIFENESIN 600 MG: 600 TABLET, EXTENDED RELEASE ORAL at 22:00

## 2017-09-18 RX ADMIN — IPRATROPIUM BROMIDE AND ALBUTEROL SULFATE 3 ML: .5; 3 SOLUTION RESPIRATORY (INHALATION) at 07:56

## 2017-09-18 NOTE — PROGRESS NOTES
Continued Stay Note  [facility]     Patient Name: Jennifer Oliveira  MRN: [id number]  Today's Date: 9/18/2017    Admit Date: 9/5/2017          Discharge Plan       09/18/17 1621    Case Management/Social Work Plan    Plan [facility] when ready    Additional Comments Update, plan remains going to [facility] when medically ready.  Pt still on high flow oxygen. CM will cont to follow.               Discharge Codes     None        Expected Discharge Date and Time     Expected Discharge Date Expected Discharge Time    Sep 19, 2017             Nida Burns RN

## 2017-09-18 NOTE — PLAN OF CARE
Problem: Patient Care Overview (Adult)  Goal: Plan of Care Review  Outcome: Ongoing (interventions implemented as appropriate)    09/15/17 2100 09/17/17 0646 09/18/17 0615   Coping/Psychosocial Response Interventions   Plan Of Care Reviewed With patient --  --    Outcome Evaluation   Outcome Summary/Follow up Plan --  --  Pt rested well this shift. No c/o pain. Incontinent of urine. VSS. NSR.   Patient Care Overview   Progress --  progress toward functional goals as expected --        Goal: Adult Individualization and Mutuality  Outcome: Ongoing (interventions implemented as appropriate)  Goal: Discharge Needs Assessment  Outcome: Ongoing (interventions implemented as appropriate)    Problem: Pneumonia (Adult)  Goal: Signs and Symptoms of Listed Potential Problems Will be Absent or Manageable (Pneumonia)  Outcome: Ongoing (interventions implemented as appropriate)    Problem: Infection, Risk/Actual (Adult)  Goal: Identify Related Risk Factors and Signs and Symptoms  Outcome: Ongoing (interventions implemented as appropriate)  Goal: Infection Prevention/Resolution  Outcome: Ongoing (interventions implemented as appropriate)    Problem: Fall Risk (Adult)  Goal: Identify Related Risk Factors and Signs and Symptoms  Outcome: Ongoing (interventions implemented as appropriate)

## 2017-09-18 NOTE — PLAN OF CARE
Problem: Patient Care Overview (Adult)  Goal: Plan of Care Review  Outcome: Ongoing (interventions implemented as appropriate)    09/18/17 1053   Coping/Psychosocial Response Interventions   Plan Of Care Reviewed With patient   Outcome Evaluation   Outcome Summary/Follow up Plan Pt was able to tolerate weight bearing for transfer to chair, progress slow, limited by weakness. Goals appropriate as initially set, progress as able.   Patient Care Overview   Progress progress toward functional goals is gradual         Problem: Inpatient Physical Therapy  Goal: Bed Mobility Goal LTG- PT  Outcome: Ongoing (interventions implemented as appropriate)    09/18/17 1053   Bed Mobility PT LTG   Bed Mobility PT LTG, Outcome goal ongoing       Goal: Transfer Training Goal 1 LTG- PT  Outcome: Ongoing (interventions implemented as appropriate)    09/18/17 1053   Transfer Training PT LTG   Transfer Training PT LTG, Outcome goal ongoing       Goal: Gait Training Goal LTG- PT    09/18/17 1053   Gait Training PT LTG   Gait Training Goal PT LTG, Outcome goal ongoing

## 2017-09-18 NOTE — PLAN OF CARE
Problem: Patient Care Overview (Adult)  Goal: Plan of Care Review  Outcome: Ongoing (interventions implemented as appropriate)    09/18/17 1703   Coping/Psychosocial Response Interventions   Plan Of Care Reviewed With patient   Outcome Evaluation   Outcome Summary/Follow up Plan isolation discontinued. 1 time dose of bumex given iv today.    Patient Care Overview   Progress no change         Problem: Pneumonia (Adult)  Goal: Signs and Symptoms of Listed Potential Problems Will be Absent or Manageable (Pneumonia)  Outcome: Ongoing (interventions implemented as appropriate)    Problem: Infection, Risk/Actual (Adult)  Goal: Identify Related Risk Factors and Signs and Symptoms  Outcome: Ongoing (interventions implemented as appropriate)  Goal: Infection Prevention/Resolution  Outcome: Ongoing (interventions implemented as appropriate)    Problem: Fall Risk (Adult)  Goal: Identify Related Risk Factors and Signs and Symptoms  Outcome: Ongoing (interventions implemented as appropriate)

## 2017-09-18 NOTE — PROGRESS NOTES
"   LOS: 13 days    Patient Care Team:  Samuel Buck MD as PCP - General (Family Medicine)    Subjective     Stable.    Objective     Vital Signs:  Blood pressure 135/53, pulse 58, temperature 98.2 °F (36.8 °C), temperature source Oral, resp. rate 18, height 63\" (160 cm), weight 170 lb (77.1 kg), SpO2 93 %.    Flowsheet Rows         First Filed Value    Admission Height  63\" (160 cm) Documented at 09/05/2017 1009    Admission Weight  170 lb (77.1 kg) Documented at 09/05/2017 1009          09/17 0701 - 09/18 0700  In: 840 [P.O.:840]  Out: 700 [Urine:700]    Physical Exam:    GENERAL: WD elderly female. NAD.   CV: RRR + edema  LUNGS:  Quiet,  Nonlabored resp.  Lungs CTA  ABDOMEN: Soft, nontender, nondistended. + BS  : no palp bladder, + wolfe  SKIN: Warm and dry without rash    Labs:    Results from last 7 days  Lab Units 09/18/17  0630 09/14/17  0516 09/13/17  0611   WBC 10*3/mm3 12.58* 8.22 8.90   HEMOGLOBIN g/dL 7.5* 8.0* 7.9*   PLATELETS 10*3/mm3 271 277 250       Results from last 7 days  Lab Units 09/18/17  0630 09/17/17  0612 09/16/17  0748 09/15/17  0446  09/13/17  0017  09/11/17  2324   SODIUM mmol/L 140 141 141 140  < > 138  < > 138   POTASSIUM mmol/L 4.9 4.8 4.4 4.2  < > 3.7  < > 3.8   CHLORIDE mmol/L 111* 111* 111* 111*  < > 108  < > 103   CO2 mmol/L 24.0 23.0 25.0 25.0  < > 23.0  < > 24.0   BUN mg/dL 65* 71* 55* 54*  < > 54*  < > 53*   CREATININE mg/dL 1.90* 1.60* 1.40* 1.50*  < > 2.00*  < > 2.00*   CALCIUM mg/dL 8.7 8.4* 8.8 8.2*  < > 8.4*  < > 8.9   PHOSPHORUS mg/dL  --   --   --   --   --  4.8  --  5.9*   ALBUMIN g/dL  --   --   --   --   --  2.80*  --  3.40   < > = values in this interval not displayed.            Results from last 7 days  Lab Units 09/13/17 1955   COLOR UA  Yellow   CLARITY UA  Cloudy*   PH, URINE  5.5   SPECIFIC GRAVITY, URINE  1.013   GLUCOSE UA  100 mg/dL (Trace)*   KETONES UA  Negative   BILIRUBIN UA  Negative   PROTEIN UA  100 mg/dL (2+)*   BLOOD UA  Moderate (2+)* "   LEUKOCYTES UA  Negative   NITRITE UA  Negative       Estimated Creatinine Clearance: 24.4 mL/min (by C-G formula based on Cr of 1.9).      A/P:    ARF:  Cr back up overnight to 1.9.  BUN better. UOP borderline at 700. Getting Bumex ordered by cardiology.   Stop ARB again with increased cr.     CKD3:  Baseline 1.5-1.6.       HTN: BP  Better.  Watch off losartan.       PNA: on ABx     Anemia:  Hgb stable.  Tsat 9%.  Recheck s/p IVFe.     Edema:  Getting bumex today    Babar Law MD  09/18/17  10:38 AM

## 2017-09-18 NOTE — THERAPY PROGRESS REPORT/RE-CERT
Acute Care - Physical Therapy Progress Note  [facility]     Patient Name: Jennifer Oliveira  : 1940  MRN: [id number]  Today's Date: 2017  Onset of Illness/Injury or Date of Surgery Date: 17  Date of Referral to PT: 17  Referring Physician: ARSLAN Gonzales    Admit Date: 2017    Visit Dx:    ICD-10-CM ICD-9-CM   1. Pneumonia of right lower lobe due to infectious organism J18.9 483.8   2. Impaired mobility and ADLs Z74.09 799.89   3. Impaired functional mobility, balance, gait, and endurance Z74.09 V49.89     Patient Active Problem List   Diagnosis   • Acute hypoxemic respiratory failure   • PAD (peripheral artery disease)   • Normochromic normocytic anemia   • Chronic diastolic heart failure   • Diabetes type 2, uncontrolled   • Lower extremity cellulitis   • Hypothyroid   • HTN (hypertension)   • Metabolic encephalopathy   • TIA (transient ischemic attack)   • Acute cystitis without hematuria   • Chronic kidney disease (CKD), stage III (moderate)   • GERD (gastroesophageal reflux disease) with hx of gastritis    • h/o stroke with right hemiplegia   • Hypertension   • Disease of thyroid gland   • Diastolic heart failure   • Type 2 diabetes mellitus   • CKD (chronic kidney disease), stage III   • Symptomatic anemia   • GI bleed   • KRIS (acute kidney injury)   • Nausea and vomiting   • Right lower lobe pneumonia   • Weakness   • Pneumonia   • Acute respiratory failure with hypoxia   • Hyperkalemia               Adult Rehabilitation Note       1724 09/15/17 1435       Rehab Assessment/Intervention    Discipline physical therapist  -KM occupational therapist  -TB     Document Type therapy note (daily note)  -KM therapy note (daily note)  -TB     Subjective Information agree to therapy  -KM agree to therapy;complains of;weakness;fatigue  -TB     Patient Effort, Rehab Treatment adequate  -KM fair  -TB     Precautions/Limitations fall precautions;oxygen therapy device and L/min  -KM fall  precautions;oxygen therapy device and L/min  -TB     Specific Treatment Considerations --   high flow O2  -KM Supplemental O2 - high flow NC  -TB     Patient Response to Treatment --   remote CVA  -KM Remote CVA with residual R HP  -TB     Recorded by [KM] Abimbola Cho, PT [TB] Marifer Schofield, LUANN     Vital Signs    Pre SpO2 (%) 93  -KM 91  -TB     O2 Delivery Pre Treatment supplemental O2  -KM supplemental O2  -TB     Intra SpO2 (%) 92  -KM 90  -TB     O2 Delivery Intra Treatment supplemental O2  -KM supplemental O2  -TB     Post SpO2 (%) 93  -KM 93  -TB     O2 Delivery Post Treatment supplemental O2  -KM supplemental O2  -TB     Pre Patient Position  Supine  -TB     Intra Patient Position  Side Lying  -TB     Post Patient Position  Supine  -TB     Recorded by [KM] Abimbola Cho, PT [TB] Marifer Schofield OT     Pain Assessment    Pain Assessment No/denies pain  -KM No/denies pain  -TB     [place] FACES Pain Rating  2  -TB     Pain Type  Acute pain  -TB     Pain Location  Hand  -TB     Pain Orientation  Left   at IV site  -TB     Pain Intervention(s)  Repositioned  -TB     Response to Interventions  Pt tolerated activity  -TB     Recorded by [KM] Abimbola Cho, PT [TB] Marifer Schofield, LUANN     Vision Assessment/Intervention    Visual Impairment  WFL with corrective lenses  -TB     Recorded by  [TB] Marifer Schofield OT     Cognitive Assessment/Intervention    Current Cognitive/Communication Assessment functional  -KM functional  -TB     Orientation Status oriented to;person;place  -KM oriented to;person;place;situation  -TB     Follows Commands/Answers Questions able to follow single-step instructions;100% of the time  -KM able to follow single-step instructions;needs cueing;needs repetition;100% of the time  -TB     Personal Safety mild impairment  -KM mild impairment  -TB     Personal Safety Interventions fall prevention program maintained  -KM      Recorded by  [KM] Abimbola Cho, PT [TB] Marifer Schofield, OT     Bed Mobility, Assessment/Treatment    Bed Mobility, Assistive Device draw sheet;bed rails  -KM draw sheet  -TB     Bed Mobility, Roll Left, [place]  maximum assist (25% patient effort)  -TB     Bed Mobility, Roll Right, [place]  moderate assist (50% patient effort)  -TB     Bed Mob, Supine to Sit, [place] maximum assist (25% patient effort);2 person assist required  -KM      Bed Mobility, Safety Issues decreased use of arms for pushing/pulling;decreased use of legs for bridging/pushing;impaired trunk control for bed mobility  -KM decreased use of arms for pushing/pulling;decreased use of legs for bridging/pushing  -TB     Bed Mobility, Impairments  ROM decreased;strength decreased  -TB     Recorded by [KM] Abimbola Cho, PT [TB] Marifer Schofield, OT     Transfer Assessment/Treatment    Transfers, Bed-Chair [place] maximum assist (25% patient effort);2 person assist required  -KM      Transfers, Sit-Stand [place] maximum assist (25% patient effort);2 person assist required  -KM      Transfers, Stand-Sit [place] maximum assist (25% patient effort);2 person assist required  -KM      Transfer, Safety Issues weight-shifting ability decreased;sequencing ability decreased   sit pivot sit, difiiculty side stepping with l leg  -KM      Transfer, Comment  Declined OOB  -TB     Recorded by [KM] Abimbola Cho, PT [TB] Marifer Schofield, OT     Gait Assessment/Treatment    Gait, [place] Level unable to perform  -KM      Recorded by [KM] Abimbola Cho, PT      Toileting Assessment/Training    Toileting Assess/Train, Position  supine  -TB     Toileting Assess/Train, Indepen Level  dependent (less than 25% patient effort)  -TB     Recorded by  [TB] Marifer Schofield, OT     Balance Skills Training    Sitting-Level of Assistance Contact guard  -KM      Sitting-Balance Support Feet supported;Left  upper extremity supported  -KM      Sitting-Balance Activities Trunk control activities  -KM      Sitting # of Minutes --   8  -KM      Standing-Level of Assistance Maximum assistance;x2  -KM      Recorded by [KM] Abimbola Cho, ELO      Therapy Exercises    Right Lower Extremity PROM:;10 reps;supine;ankle pumps/circles;calf stretch;heel slides;hip abduction/adduction  -KM PROM:;ankle pumps/circles;calf stretch  -TB     Left Lower Extremity AAROM:;10 reps;supine;ankle pumps/circles;calf stretch;heel slides;hip abduction/adduction  -KM AROM:;10 reps;ankle pumps/circles  -TB     Bilateral Lower Extremities  AROM:;10 reps;supine;glut sets;quad sets  -TB     Right Upper Extremity  PROM:;supine;shoulder extension/flexion;shoulder abduction/adduction;shoulder horizontal abd/add;elbow flexion/extension;pronation/supination;hand pumps;10 reps  -TB     Left Upper Extremity  AROM:;15 reps;supine;shoulder extension/flexion;shoulder abduction/adduction;shoulder horizontal abd/add;shoulder ER/IR;elbow flexion/extension;hand pumps  -TB     Recorded by [KM] Abimbola Cho, PT [TB] Marifer Schofield OT     Positioning and Restraints    Pre-Treatment Position in bed  -KM in bed  -TB     Post Treatment Position chair  -KM bed  -TB     In Bed  supine;call light within reach;encouraged to call for assist;with family/caregiver;RUE elevated;heels elevated  -TB     In Chair sitting;call light within reach;encouraged to call for assist;exit alarm on;on mechanical lift sling  -KM      Recorded by [KM] Abimbola Cho, PT [TB] Marifer Schofield OT       User Key  (r) = Recorded By, (t) = Taken By, (c) = Cosigned By    Initials Name Effective Dates    TB Marifer Schofield OT 06/22/15 -     KM Abimbola Cho, ELO 06/19/15 -                 IP PT Goals       09/18/17 1053 09/13/17 1142 09/08/17 1604    Bed Mobility PT LTG    Bed Mobility PT LTG, Date Goal Reviewed  09/13/17  -AS     Bed Mobility PT LTG,  Outcome goal ongoing  -KM goal ongoing  -AS goal ongoing  -SJ    Transfer Training PT LTG    Transfer Training PT  LTG, Date Goal Reviewed  09/13/17  -AS     Transfer Training PT LTG, Outcome goal ongoing  -KM goal ongoing  -AS goal ongoing  -SJ    Gait Training PT LTG    Gait Training Goal PT LTG, Date Goal Reviewed  09/13/17  -AS     Gait Training Goal PT LTG, Outcome goal ongoing  -KM goal ongoing  -AS goal ongoing  -SJ      09/06/17 1037          Bed Mobility PT LTG    Bed Mobility PT LTG, Date Established 09/06/17  -DM      Bed Mobility PT LTG, Time to Achieve 1 wk  -DM      Bed Mobility PT LTG, Activity Type all bed mobility  -DM      Bed Mobility PT LTG, [place] Level moderate assist (50% patient effort);2 person assist required  -DM      Transfer Training PT LTG    Transfer Training PT LTG, Date Established 09/06/17  -DM      Transfer Training PT LTG, Time to Achieve 1 wk  -DM      Transfer Training PT LTG, Activity Type bed to chair /chair to bed;sit to stand/stand to sit  -DM      Transfer Training PT LTG, [place] Level maximum assist (25% patient effort);2 person assist required  -DM      Transfer Training PT LTG, Assist Device walker, homa  -DM      Gait Training PT LTG    Gait Training Goal PT LTG, Date Established 09/06/17  -DM      Gait Training Goal PT LTG, Time to Achieve 1 wk  -DM      Gait Training Goal PT LTG, [place] Level maximum assist (25% patient effort);2 person assist required   stable VS; RLE brace  -DM      Gait Training Goal PT LTG, Assist Device walker, homa  -DM      Gait Training Goal PT LTG, Distance to Achieve 3  -DM        User Key  (r) = Recorded By, (t) = Taken By, (c) = Cosigned By    Initials Name Provider Type    EMMA Nur, PT Physical Therapist    SUDEEP Cho, PT Physical Therapist    TETE Mcgrath, PT Physical Therapist    AS Anel Rizvi, PTA Physical Therapy Assistant          Physical Therapy Education     Title: PT OT SLP  Therapies (Active)     Topic: Physical Therapy (Done)     Point: Mobility training (Done)    Learning Progress Summary    Learner Readiness Method Response Comment Documented by Status   Patient Acceptance E VU instructed on plan of care and transfer technique  09/18/17 1056 Done    Acceptance E VU  MS 09/15/17 1520 Done    Acceptance E,D NR   09/14/17 1146 Active    Acceptance E NR  AS 09/13/17 1142 Active    Acceptance E,D NR   09/11/17 1618 Active    Acceptance E,D NR   09/08/17 1605 Active    Eager E,D NR   09/06/17 1036 Active               Point: Home exercise program (Done)    Learning Progress Summary    Learner Readiness Method Response Comment Documented by Status   Patient Acceptance E VU instructed on plan of care and transfer technique  09/18/17 1056 Done    Acceptance E,D NR   09/14/17 1146 Active    Acceptance E NR  AS 09/13/17 1142 Active    Acceptance E,D NR   09/11/17 1618 Active    Acceptance E,D NR   09/08/17 1605 Active    Eager E,D NR   09/06/17 1036 Active               Point: Body mechanics (Done)    Learning Progress Summary    Learner Readiness Method Response Comment Documented by Status   Patient Acceptance E VU instructed on plan of care and transfer technique  09/18/17 1056 Done    Acceptance E,D NR   09/14/17 1146 Active    Acceptance E NR  AS 09/13/17 1142 Active    Acceptance E,D NR   09/11/17 1618 Active    Acceptance E,D NR   09/08/17 1605 Active    Eager E,D NR  DM 09/06/17 1036 Active               Point: Precautions (Done)    Learning Progress Summary    Learner Readiness Method Response Comment Documented by Status   Patient Acceptance E VU instructed on plan of care and transfer technique  09/18/17 1056 Done    Acceptance E,D NR   09/14/17 1146 Active    Acceptance E NR  AS 09/13/17 1142 Active    Acceptance E,D NR   09/11/17 1618 Active    Acceptance E,D NR   09/08/17 1605 Active    Eager E,D NR   09/06/17 1036 Active                       User Key     Initials Effective Dates Name Provider Type Discipline     06/19/15 -  Winter Nur, PT Physical Therapist PT    KM 06/19/15 -  Abimbola Cho, PT Physical Therapist PT    DM 06/19/15 -  Lou Mcgrath, PT Physical Therapist PT    AS 06/22/15 -  Anel Rizvi, PTA Physical Therapy Assistant PT    MS 06/16/16 -  Darcy Colin, RN Registered Nurse Nurse                    PT Recommendation and Plan  Anticipated Discharge Disposition: inpatient rehabilitation facility (pul rehab)  PT Frequency: daily  Plan of Care Review  Plan Of Care Reviewed With: patient  Progress: progress toward functional goals is gradual  Outcome Summary/Follow up Plan: Pt was able to tolerate weight bearing for transfer to chair, progress slow, limited by weakness. Goals appropriate as initially set, progress as able.          Outcome Measures       09/18/17 0924 09/15/17 1435       How much help from another person do you currently need...    Turning from your back to your side while in flat bed without using bedrails? 2  -KM      Moving from lying on back to sitting on the side of a flat bed without bedrails? 2  -KM      Moving to and from a bed to a chair (including a wheelchair)? 2  -KM      Standing up from a chair using your arms (e.g., wheelchair, bedside chair)? 2  -KM      Climbing 3-5 steps with a railing? 1  -KM      To walk in hospital room? 1  -KM      AM-PAC 6 Clicks Score 10  -KM      How much help from another is currently needed...    Putting on and taking off regular lower body clothing?  1  -TB     Bathing (including washing, rinsing, and drying)  1  -TB     Toileting (which includes using toilet bed pan or urinal)  1  -TB     Putting on and taking off regular upper body clothing  2  -TB     Taking care of personal grooming (such as brushing teeth)  2  -TB     Eating meals  2  -TB     Score  9  -TB     Functional Assessment    Outcome Measure Options AM-PAC 6 Clicks Basic Mobility (PT)  -KM  AM-PAC 6 Clicks Daily Activity (OT)  -TB       User Key  (r) = Recorded By, (t) = Taken By, (c) = Cosigned By    Initials Name Provider Type    TB Marifer Schofield, OT Occupational Therapist    KM Abimbola Cho, PT Physical Therapist           Time Calculation:         PT Charges       09/18/17 1050          Time Calculation    Start Time 0924  -KM      PT Received On 09/18/17  -KM      PT Goal Re-Cert Due Date 09/28/17  -KM      Time Calculation- PT    Total Timed Code Minutes- PT 26 minute(s)  -KM        User Key  (r) = Recorded By, (t) = Taken By, (c) = Cosigned By    Initials Name Provider Type     Abimbola Cho, PT Physical Therapist          Therapy Charges for Today     Code Description Service Date Service Provider Modifiers Qty    48505362389 HC PT THER PROC EA 15 MIN 9/18/2017 Abimbola Cho, PT GP 2    59788417157 HC PT THER SUPP EA 15 MIN 9/18/2017 Abimbola Cho, PT GP 2          PT G-Codes  Outcome Measure Options: AM-PAC 6 Clicks Basic Mobility (PT)    Abimbola Cho, PT  9/18/2017

## 2017-09-18 NOTE — PROGRESS NOTES
"      HOSPITALIST DAILY PROGRESS NOTE    Chief Complaint: SOB    Subjective   SUBJECTIVE/OVERNIGHT EVENTS   No acute events ON. Pt reports breathing about the same.     Review of Systems:  General - No fevers, no chills  CVS - No chest pain, no palpitations  Resp - No cough, +dyspnea  GI - No N/V/D, no abd pain    Objective   OBJECTIVE   I have reviewed the vital signs.  /60  Pulse 62  Temp 96.6 °F (35.9 °C) (Oral)   Resp 16  Ht 63\" (160 cm)  Wt 170 lb (77.1 kg)  SpO2 95%  BMI 30.11 kg/m2    Physical Exam:  General - NAD, pt was sitting in chair on Hi-Flow O2  HEENT - EOMI, PERRL, oropharynx clear, hearing intact  CVS - RRR, S1 S2, no rubs, gallops or murmurs, 2s cap refill, 2+ peripheral edema, 2+ pulses  Resp - CTAB, no crackles and wheezes   GI - +BS, soft, ND/NT  MSK - No joint pain or joint edema  Neuro - Awake and oriented, follows commands, interactive, moves all extremities equally    Results:  I have reviewed the labs, culture data, radiology results, and diagnostic studies.      Results from last 7 days  Lab Units 09/18/17  0630 09/14/17  0516 09/13/17  0611   WBC 10*3/mm3 12.58* 8.22 8.90   HEMOGLOBIN g/dL 7.5* 8.0* 7.9*   HEMATOCRIT % 23.9* 24.8* 24.4*   PLATELETS 10*3/mm3 271 277 250       Results from last 7 days  Lab Units 09/17/17  0612   SODIUM mmol/L 141   POTASSIUM mmol/L 4.8   CHLORIDE mmol/L 111*   CO2 mmol/L 23.0   BUN mg/dL 71*   CREATININE mg/dL 1.60*   GLUCOSE mg/dL 147*   CALCIUM mg/dL 8.4*       Culture Data:  Cultures:         I have reviewed the medications.    amLODIPine 10 mg Oral Nightly   aspirin 81 mg Oral Daily   atorvastatin 10 mg Oral Daily   baclofen 10 mg Oral TID   budesonide 0.5 mg Nebulization BID - RT   clopidogrel 75 mg Oral Daily   gabapentin 900 mg Oral Nightly   guaiFENesin 600 mg Oral Q12H   hydrALAZINE 75 mg Oral Q8H   insulin detemir 25 Units Subcutaneous Q12H   insulin lispro 0-7 Units Subcutaneous 4x Daily With Meals & Nightly   insulin lispro 8 Units " Subcutaneous TID With Meals   ipratropium-albuterol 3 mL Nebulization Q4H - RT   levoFLOXacin 750 mg Oral Every Other Day   levothyroxine 200 mcg Oral Daily   losartan 25 mg Oral Q24H   metoprolol tartrate 100 mg Oral Q12H   pantoprazole 40 mg Oral Daily   predniSONE 20 mg Oral Daily With Breakfast   saccharomyces boulardii 250 mg Oral BID   ascorbic acid 500 mg Oral Daily With Breakfast       Assessment/Plan   ASSESSMENT/PLAN    Principal Problem:    Right lower lobe pneumonia  Active Problems:    Normochromic normocytic anemia    Chronic diastolic heart failure    HTN (hypertension)    Chronic kidney disease (CKD), stage III (moderate)    h/o stroke with right hemiplegia    Disease of thyroid gland    Type 2 diabetes mellitus    KRIS (acute kidney injury)    Nausea and vomiting    Weakness    Acute respiratory failure with hypoxia    # Acute on chronic respiratory failure  - 2L NC at home  - Requiring Hi-Flow O2 currently  - Likely 2/2 volume overload and PNA    # Acute diastolic HF  - ECHO in 04/2017 normal EF, previously documented diastolic dysfunction  - Bumex 2mg today    # RLL PNA  - Initially on Zosyn and Levofloxacin. De-escalated to levofloxacin  - Completed 14days of abx 9/18  - Tapering prednisone (10mg x3days then 5mg x3days then stop)    # KRIS on CKD  - Baseline creatinine 1.5-1.6  - Losartan DCed due to elevated Cr    # HTN  - Metoprolol changed to coreg. Increase Coreg as tolerated  - DCed amlodipine for borderline bradycardia  - Continue Hydralazine    # Anemia  - IV iron    # ?COPD  - Pulmicort, Duonebs    # CVA/ TIA with residual right sided hemiparesis  # PAD s/p fem- pop 2016  - Asa, statin    DVT PPx: SQH  I expect patient to be discharged in:  4-5days    Farhana Velasquez MD  09/18/17  7:31 AM

## 2017-09-19 ENCOUNTER — APPOINTMENT (OUTPATIENT)
Dept: ULTRASOUND IMAGING | Facility: HOSPITAL | Age: 77
End: 2017-09-19

## 2017-09-19 PROBLEM — J18.9 PNEUMONIA: Status: RESOLVED | Noted: 2017-09-05 | Resolved: 2017-09-19

## 2017-09-19 PROBLEM — N30.00 ACUTE CYSTITIS WITHOUT HEMATURIA: Status: RESOLVED | Noted: 2017-04-19 | Resolved: 2017-09-19

## 2017-09-19 PROBLEM — D64.9 SYMPTOMATIC ANEMIA: Status: RESOLVED | Noted: 2017-05-26 | Resolved: 2017-09-19

## 2017-09-19 PROBLEM — G45.9 TIA (TRANSIENT ISCHEMIC ATTACK): Status: RESOLVED | Noted: 2017-04-19 | Resolved: 2017-09-19

## 2017-09-19 PROBLEM — K92.2 GI BLEED: Status: RESOLVED | Noted: 2017-05-26 | Resolved: 2017-09-19

## 2017-09-19 LAB
ALBUMIN SERPL-MCNC: 2.9 G/DL (ref 3.2–4.8)
ANION GAP SERPL CALCULATED.3IONS-SCNC: 8 MMOL/L (ref 3–11)
BACTERIA UR QL AUTO: ABNORMAL /HPF
BILIRUB UR QL STRIP: NEGATIVE
BUN BLD-MCNC: 72 MG/DL (ref 9–23)
BUN/CREAT SERPL: 36 (ref 7–25)
CALCIUM SPEC-SCNC: 8.3 MG/DL (ref 8.7–10.4)
CHLORIDE SERPL-SCNC: 109 MMOL/L (ref 99–109)
CLARITY UR: ABNORMAL
CO2 SERPL-SCNC: 22 MMOL/L (ref 20–31)
COLOR UR: YELLOW
CREAT BLD-MCNC: 2 MG/DL (ref 0.6–1.3)
GFR SERPL CREATININE-BSD FRML MDRD: 24 ML/MIN/1.73
GLUCOSE BLD-MCNC: 187 MG/DL (ref 70–100)
GLUCOSE BLDC GLUCOMTR-MCNC: 155 MG/DL (ref 70–130)
GLUCOSE BLDC GLUCOMTR-MCNC: 184 MG/DL (ref 70–130)
GLUCOSE BLDC GLUCOMTR-MCNC: 197 MG/DL (ref 70–130)
GLUCOSE BLDC GLUCOMTR-MCNC: 252 MG/DL (ref 70–130)
GLUCOSE UR STRIP-MCNC: NEGATIVE MG/DL
HGB UR QL STRIP.AUTO: NEGATIVE
HYALINE CASTS UR QL AUTO: ABNORMAL /LPF
KETONES UR QL STRIP: NEGATIVE
LEUKOCYTE ESTERASE UR QL STRIP.AUTO: ABNORMAL
NITRITE UR QL STRIP: NEGATIVE
PH UR STRIP.AUTO: <=5 [PH] (ref 5–8)
PHOSPHATE SERPL-MCNC: 5.4 MG/DL (ref 2.4–5.1)
POTASSIUM BLD-SCNC: 4.9 MMOL/L (ref 3.5–5.5)
PROT UR QL STRIP: ABNORMAL
RBC # UR: ABNORMAL /HPF
REF LAB TEST METHOD: ABNORMAL
SODIUM BLD-SCNC: 139 MMOL/L (ref 132–146)
SP GR UR STRIP: 1.02 (ref 1–1.03)
SQUAMOUS #/AREA URNS HPF: ABNORMAL /HPF
UROBILINOGEN UR QL STRIP: ABNORMAL
WBC UR QL AUTO: ABNORMAL /HPF
YEAST URNS QL MICRO: ABNORMAL /HPF

## 2017-09-19 PROCEDURE — 84155 ASSAY OF PROTEIN SERUM: CPT | Performed by: INTERNAL MEDICINE

## 2017-09-19 PROCEDURE — 86038 ANTINUCLEAR ANTIBODIES: CPT | Performed by: INTERNAL MEDICINE

## 2017-09-19 PROCEDURE — 94799 UNLISTED PULMONARY SVC/PX: CPT

## 2017-09-19 PROCEDURE — 99233 SBSQ HOSP IP/OBS HIGH 50: CPT | Performed by: INTERNAL MEDICINE

## 2017-09-19 PROCEDURE — 86256 FLUORESCENT ANTIBODY TITER: CPT | Performed by: INTERNAL MEDICINE

## 2017-09-19 PROCEDURE — 80069 RENAL FUNCTION PANEL: CPT | Performed by: HOSPITALIST

## 2017-09-19 PROCEDURE — 84156 ASSAY OF PROTEIN URINE: CPT | Performed by: INTERNAL MEDICINE

## 2017-09-19 PROCEDURE — 84165 PROTEIN E-PHORESIS SERUM: CPT | Performed by: INTERNAL MEDICINE

## 2017-09-19 PROCEDURE — 99232 SBSQ HOSP IP/OBS MODERATE 35: CPT | Performed by: HOSPITALIST

## 2017-09-19 PROCEDURE — 94640 AIRWAY INHALATION TREATMENT: CPT

## 2017-09-19 PROCEDURE — 25010000002 HEPARIN (PORCINE) PER 1000 UNITS: Performed by: HOSPITALIST

## 2017-09-19 PROCEDURE — 84166 PROTEIN E-PHORESIS/URINE/CSF: CPT | Performed by: INTERNAL MEDICINE

## 2017-09-19 PROCEDURE — 63710000001 INSULIN DETEMIR PER 5 UNITS: Performed by: HOSPITALIST

## 2017-09-19 PROCEDURE — 83520 IMMUNOASSAY QUANT NOS NONAB: CPT | Performed by: INTERNAL MEDICINE

## 2017-09-19 PROCEDURE — 76775 US EXAM ABDO BACK WALL LIM: CPT

## 2017-09-19 PROCEDURE — 81001 URINALYSIS AUTO W/SCOPE: CPT | Performed by: HOSPITALIST

## 2017-09-19 PROCEDURE — 63710000001 PREDNISONE PER 5 MG: Performed by: HOSPITALIST

## 2017-09-19 PROCEDURE — 94668 MNPJ CHEST WALL SBSQ: CPT

## 2017-09-19 PROCEDURE — 82962 GLUCOSE BLOOD TEST: CPT

## 2017-09-19 PROCEDURE — 94760 N-INVAS EAR/PLS OXIMETRY 1: CPT

## 2017-09-19 PROCEDURE — 97530 THERAPEUTIC ACTIVITIES: CPT

## 2017-09-19 RX ORDER — IPRATROPIUM BROMIDE AND ALBUTEROL SULFATE 2.5; .5 MG/3ML; MG/3ML
3 SOLUTION RESPIRATORY (INHALATION)
Status: DISCONTINUED | OUTPATIENT
Start: 2017-09-19 | End: 2017-09-23

## 2017-09-19 RX ORDER — BUMETANIDE 1 MG/1
2 TABLET ORAL 2 TIMES DAILY
Status: DISCONTINUED | OUTPATIENT
Start: 2017-09-19 | End: 2017-09-19

## 2017-09-19 RX ADMIN — INSULIN LISPRO 8 UNITS: 100 INJECTION, SOLUTION INTRAVENOUS; SUBCUTANEOUS at 17:44

## 2017-09-19 RX ADMIN — BACLOFEN 10 MG: 10 TABLET ORAL at 06:08

## 2017-09-19 RX ADMIN — GABAPENTIN 900 MG: 300 CAPSULE ORAL at 20:26

## 2017-09-19 RX ADMIN — CARVEDILOL 25 MG: 12.5 TABLET, FILM COATED ORAL at 20:25

## 2017-09-19 RX ADMIN — ATORVASTATIN CALCIUM 10 MG: 10 TABLET, FILM COATED ORAL at 20:26

## 2017-09-19 RX ADMIN — IPRATROPIUM BROMIDE AND ALBUTEROL SULFATE 3 ML: .5; 3 SOLUTION RESPIRATORY (INHALATION) at 10:43

## 2017-09-19 RX ADMIN — PANTOPRAZOLE SODIUM 40 MG: 40 TABLET, DELAYED RELEASE ORAL at 06:08

## 2017-09-19 RX ADMIN — HEPARIN SODIUM 5000 UNITS: 5000 INJECTION, SOLUTION INTRAVENOUS; SUBCUTANEOUS at 22:54

## 2017-09-19 RX ADMIN — INSULIN LISPRO 8 UNITS: 100 INJECTION, SOLUTION INTRAVENOUS; SUBCUTANEOUS at 08:14

## 2017-09-19 RX ADMIN — OXYCODONE HYDROCHLORIDE AND ACETAMINOPHEN 500 MG: 500 TABLET ORAL at 08:10

## 2017-09-19 RX ADMIN — INSULIN LISPRO 4 UNITS: 100 INJECTION, SOLUTION INTRAVENOUS; SUBCUTANEOUS at 20:26

## 2017-09-19 RX ADMIN — IPRATROPIUM BROMIDE AND ALBUTEROL SULFATE 3 ML: .5; 3 SOLUTION RESPIRATORY (INHALATION) at 15:21

## 2017-09-19 RX ADMIN — NYSTATIN 500000 UNITS: 100000 SUSPENSION ORAL at 20:26

## 2017-09-19 RX ADMIN — INSULIN DETEMIR 25 UNITS: 100 INJECTION, SOLUTION SUBCUTANEOUS at 08:15

## 2017-09-19 RX ADMIN — HYDRALAZINE HYDROCHLORIDE 75 MG: 25 TABLET ORAL at 14:08

## 2017-09-19 RX ADMIN — NYSTATIN 500000 UNITS: 100000 SUSPENSION ORAL at 17:42

## 2017-09-19 RX ADMIN — LEVOTHYROXINE SODIUM 200 MCG: 100 TABLET ORAL at 06:08

## 2017-09-19 RX ADMIN — IPRATROPIUM BROMIDE AND ALBUTEROL SULFATE 3 ML: .5; 3 SOLUTION RESPIRATORY (INHALATION) at 02:37

## 2017-09-19 RX ADMIN — BACLOFEN 10 MG: 10 TABLET ORAL at 14:08

## 2017-09-19 RX ADMIN — CARVEDILOL 25 MG: 12.5 TABLET, FILM COATED ORAL at 08:10

## 2017-09-19 RX ADMIN — BUDESONIDE 0.5 MG: 0.5 INHALANT RESPIRATORY (INHALATION) at 07:25

## 2017-09-19 RX ADMIN — IPRATROPIUM BROMIDE AND ALBUTEROL SULFATE 3 ML: .5; 3 SOLUTION RESPIRATORY (INHALATION) at 07:25

## 2017-09-19 RX ADMIN — GUAIFENESIN 600 MG: 600 TABLET, EXTENDED RELEASE ORAL at 20:26

## 2017-09-19 RX ADMIN — CLOPIDOGREL BISULFATE 75 MG: 75 TABLET ORAL at 08:10

## 2017-09-19 RX ADMIN — IPRATROPIUM BROMIDE AND ALBUTEROL SULFATE 3 ML: .5; 3 SOLUTION RESPIRATORY (INHALATION) at 19:38

## 2017-09-19 RX ADMIN — HYDRALAZINE HYDROCHLORIDE 75 MG: 25 TABLET ORAL at 22:54

## 2017-09-19 RX ADMIN — Medication 250 MG: at 17:42

## 2017-09-19 RX ADMIN — HEPARIN SODIUM 5000 UNITS: 5000 INJECTION, SOLUTION INTRAVENOUS; SUBCUTANEOUS at 14:08

## 2017-09-19 RX ADMIN — BUMETANIDE 2 MG: 1 TABLET ORAL at 14:08

## 2017-09-19 RX ADMIN — BACLOFEN 10 MG: 10 TABLET ORAL at 22:54

## 2017-09-19 RX ADMIN — INSULIN LISPRO 2 UNITS: 100 INJECTION, SOLUTION INTRAVENOUS; SUBCUTANEOUS at 17:42

## 2017-09-19 RX ADMIN — PREDNISONE 10 MG: 10 TABLET ORAL at 08:10

## 2017-09-19 RX ADMIN — INSULIN DETEMIR 25 UNITS: 100 INJECTION, SOLUTION SUBCUTANEOUS at 20:26

## 2017-09-19 RX ADMIN — HYDRALAZINE HYDROCHLORIDE 75 MG: 25 TABLET ORAL at 06:08

## 2017-09-19 RX ADMIN — INSULIN LISPRO 2 UNITS: 100 INJECTION, SOLUTION INTRAVENOUS; SUBCUTANEOUS at 12:51

## 2017-09-19 RX ADMIN — INSULIN LISPRO 8 UNITS: 100 INJECTION, SOLUTION INTRAVENOUS; SUBCUTANEOUS at 12:52

## 2017-09-19 RX ADMIN — Medication 250 MG: at 08:10

## 2017-09-19 RX ADMIN — ASPIRIN 81 MG 81 MG: 81 TABLET ORAL at 08:09

## 2017-09-19 RX ADMIN — HEPARIN SODIUM 5000 UNITS: 5000 INJECTION, SOLUTION INTRAVENOUS; SUBCUTANEOUS at 06:08

## 2017-09-19 RX ADMIN — GUAIFENESIN 600 MG: 600 TABLET, EXTENDED RELEASE ORAL at 08:10

## 2017-09-19 RX ADMIN — INSULIN LISPRO 2 UNITS: 100 INJECTION, SOLUTION INTRAVENOUS; SUBCUTANEOUS at 08:10

## 2017-09-19 NOTE — PROGRESS NOTES
"      HOSPITALIST DAILY PROGRESS NOTE    Chief Complaint: SOB    Subjective   SUBJECTIVE/OVERNIGHT EVENTS   No acute events ON. Breathing improved. No specific complaints    Review of Systems:  General - No fevers, no chills  CVS - No chest pain, no palpitations  Resp - No cough, +dyspnea  GI - No N/V/D, no abd pain    Objective   OBJECTIVE   I have reviewed the vital signs.  /54 (BP Location: Left arm, Patient Position: Lying)  Pulse 69  Temp 98.6 °F (37 °C) (Oral)   Resp 24  Ht 63\" (160 cm)  Wt 196 lb 11.2 oz (89.2 kg)  SpO2 93%  BMI 34.84 kg/m2    Physical Exam:  General - NAD, pt was sitting in chair  HEENT - EOMI, PERRL, oropharynx clear, hearing intact  CVS - RRR, S1 S2, no rubs, gallops or murmurs, 2s cap refill, 2+ peripheral edema, 2+ pulses  Resp - CTAB, no crackles and wheezes   GI - +BS, soft, ND/NT  MSK - No joint pain or joint edema  Neuro - Awake and oriented, follows commands, interactive, moves all extremities equally    Results:  I have reviewed the labs, culture data, radiology results, and diagnostic studies.      Results from last 7 days  Lab Units 09/18/17  0630 09/14/17  0516 09/13/17  0611   WBC 10*3/mm3 12.58* 8.22 8.90   HEMOGLOBIN g/dL 7.5* 8.0* 7.9*   HEMATOCRIT % 23.9* 24.8* 24.4*   PLATELETS 10*3/mm3 271 277 250       Results from last 7 days  Lab Units 09/19/17  0532   SODIUM mmol/L 139   POTASSIUM mmol/L 4.9   CHLORIDE mmol/L 109   CO2 mmol/L 22.0   BUN mg/dL 72*   CREATININE mg/dL 2.00*   GLUCOSE mg/dL 187*   CALCIUM mg/dL 8.3*       Culture Data:  Cultures:         I have reviewed the medications.    aspirin 81 mg Oral Daily   atorvastatin 10 mg Oral Daily   baclofen 10 mg Oral TID   budesonide 0.5 mg Nebulization BID - RT   carvedilol 25 mg Oral Q12H   clopidogrel 75 mg Oral Daily   gabapentin 900 mg Oral Nightly   guaiFENesin 600 mg Oral Q12H   heparin (porcine) 5,000 Units Subcutaneous Q8H   hydrALAZINE 75 mg Oral Q8H   insulin detemir 25 Units Subcutaneous Q12H "   insulin lispro 0-7 Units Subcutaneous 4x Daily With Meals & Nightly   insulin lispro 8 Units Subcutaneous TID With Meals   ipratropium-albuterol 3 mL Nebulization Q4H - RT   levothyroxine 200 mcg Oral Daily   pantoprazole 40 mg Oral Daily   predniSONE 10 mg Oral Daily With Breakfast   saccharomyces boulardii 250 mg Oral BID   ascorbic acid 500 mg Oral Daily With Breakfast       Assessment/Plan   ASSESSMENT/PLAN    Principal Problem:    Right lower lobe pneumonia  Active Problems:    Normochromic normocytic anemia    Chronic diastolic heart failure    Chronic kidney disease (CKD), stage III (moderate)    h/o stroke with right hemiplegia    Disease of thyroid gland    Type 2 diabetes mellitus    KRIS (acute kidney injury)    Nausea and vomiting    Weakness    Acute respiratory failure with hypoxia    # Acute on chronic hypoxemic respiratory failure  - 2L NC at home  - Weaned off Hi-Flow to 6L NC today  - Likely 2/2 volume overload and PNA    # Acute diastolic HF  - ECHO in 04/2017 normal EF, previously documented diastolic dysfunction  - Diuresed with 2mg Bumex yesterday with good UOP (has urinary incontinence)  - 2mg Bumex today   - Daily standing weight. Unable to get strict Is/Os due to incontinence    # RLL PNA  - Initially on Zosyn and Levofloxacin. De-escalated to levofloxacin  - Completed 14days of abx 9/18  - Tapering prednisone (10mg x3days then 5mg x3days then stop)    # KRIS on CKD  - Baseline creatinine 1.5-1.6  - Nephro on board  - FEUrea >32%  - Cr increased likely due to diuresis  - Holding ACEi/ARB while actively diuresing     # HTN  - Metoprolol changed to coreg. Continue Coreg at current dose  - DCed amlodipine for borderline bradycardia  - Continue Hydralazine for now. Consider switching to ACEi/ARB given CKD when renal fxn improves    # Anemia  - IV iron    # ?COPD  - Pulmicort, Duonebs    # CVA/ TIA with residual right sided hemiparesis  # PAD s/p fem-pop 2016  - Asa, statin    # Questionable  shingles  - Dermatomal crusted over lesions on R lower back seen >72hrs out  - Would hold off treating given no new lesions and pt has no associated neuropathic pain    DVT PPx: SQH  I expect patient to be discharged in:  4-5days    Farhana Velasquez MD  09/19/17  3:37 PM

## 2017-09-19 NOTE — PROGRESS NOTES
"   LOS: 14 days    Patient Care Team:  Samuel Buck MD as PCP - General (Family Medicine)    Subjective     Stable.    Objective     Vital Signs:  Blood pressure 127/54, pulse 58, temperature 98.6 °F (37 °C), temperature source Oral, resp. rate 20, height 63\" (160 cm), weight 196 lb 11.2 oz (89.2 kg), SpO2 90 %.    Flowsheet Rows         First Filed Value    Admission Height  63\" (160 cm) Documented at 09/05/2017 1009    Admission Weight  170 lb (77.1 kg) Documented at 09/05/2017 1009          09/18 0701 - 09/19 0700  In: 600 [P.O.:600]  Out: 850 [Urine:850]    Physical Exam:    GENERAL: WD elderly female. NAD.   CV: RRR + edema  LUNGS:  Quiet,  Nonlabored resp.  Lungs CTA  ABDOMEN: Soft, nontender, nondistended. + BS  : no palp bladder, + wolfe  SKIN: Warm and dry without rash    Labs:    Results from last 7 days  Lab Units 09/18/17  0630 09/14/17  0516 09/13/17  0611   WBC 10*3/mm3 12.58* 8.22 8.90   HEMOGLOBIN g/dL 7.5* 8.0* 7.9*   PLATELETS 10*3/mm3 271 277 250       Results from last 7 days  Lab Units 09/19/17  0532 09/18/17  0630 09/17/17  0612 09/16/17  0748  09/13/17  0017   SODIUM mmol/L 139 140 141 141  < > 138   POTASSIUM mmol/L 4.9 4.9 4.8 4.4  < > 3.7   CHLORIDE mmol/L 109 111* 111* 111*  < > 108   CO2 mmol/L 22.0 24.0 23.0 25.0  < > 23.0   BUN mg/dL 72* 65* 71* 55*  < > 54*   CREATININE mg/dL 2.00* 1.90* 1.60* 1.40*  < > 2.00*   CALCIUM mg/dL 8.3* 8.7 8.4* 8.8  < > 8.4*   PHOSPHORUS mg/dL 5.4*  --   --   --   --  4.8   ALBUMIN g/dL 2.90*  --   --   --   --  2.80*   < > = values in this interval not displayed.            Results from last 7 days  Lab Units 09/13/17 1955   COLOR UA  Yellow   CLARITY UA  Cloudy*   PH, URINE  5.5   SPECIFIC GRAVITY, URINE  1.013   GLUCOSE UA  100 mg/dL (Trace)*   KETONES UA  Negative   BILIRUBIN UA  Negative   PROTEIN UA  100 mg/dL (2+)*   BLOOD UA  Moderate (2+)*   LEUKOCYTES UA  Negative   NITRITE UA  Negative       Estimated Creatinine Clearance: 25 mL/min (by C-G " formula based on Cr of 2).    Urine Pr:Cr 3.5 gm    A/P:    ARF:  Cr back up overnight to 2.0. UOP borderline at 850. Getting Bumex    CKD3:  Baseline 1.5-1.6.  Will get renal u/s    Proteinuria: still 3.5 gm.  Past Ha1c ok.  Will get serology.       HTN: BP Better.  Watch off losartan.       PNA: on ABx     Anemia:  Hgb stable.  Tsat 9%.  Recheck s/p IVFe.     Edema:  Give addtinal bumex today,  Albumin low.  likely a component of third spaced fluid with nephrotic range proteinuria on steroids and other edema causing meds. .  Off norvasc.   Babar Law MD  09/19/17  11:50 AM

## 2017-09-19 NOTE — PLAN OF CARE
Problem: Patient Care Overview (Adult)  Goal: Plan of Care Review  Outcome: Ongoing (interventions implemented as appropriate)    09/19/17 0957   Coping/Psychosocial Response Interventions   Plan Of Care Reviewed With patient   Outcome Evaluation   Outcome Summary/Follow up Plan pt. weaned off hi-flow nasal cannula t0. 6 liters nasal cannula.    Patient Care Overview   Progress improving         Problem: Pneumonia (Adult)  Goal: Signs and Symptoms of Listed Potential Problems Will be Absent or Manageable (Pneumonia)  Outcome: Ongoing (interventions implemented as appropriate)    Problem: Infection, Risk/Actual (Adult)  Goal: Identify Related Risk Factors and Signs and Symptoms  Outcome: Ongoing (interventions implemented as appropriate)  Goal: Infection Prevention/Resolution  Outcome: Ongoing (interventions implemented as appropriate)    Problem: Fall Risk (Adult)  Goal: Identify Related Risk Factors and Signs and Symptoms  Outcome: Ongoing (interventions implemented as appropriate)

## 2017-09-19 NOTE — PROGRESS NOTES
"PULMONARY PROGRESS NOTE    Patient Care Team:  Samuel Buck MD as PCP - General (Family Medicine)    Reason for Consult acute on chronic respiratory failure, right lower lobe pneumonia    Subjective   Ms. Oliveira is a 78yo F admitted for KRIS and Pneumonia who has continued to have progressive respiratory failure despite antibiotic therapy.    Interval History:   Patient is currently complaining about her ground food. She has a persistent productive cough. She denies chest pain, palpitations, fever. She denies nausea or vomiting. She was on 35% high flow cannula this morning. She was transitioned to 6 L regular nasal cannula with saturations remaining 92-93%. She wears 2 L at home at baseline. She admits to persistent lower extremity edema. She has gained 26 pounds since admission.     Review of Systems:     ROS negative except for: Those mentioned in interval history      Objective     Vital Signs  Blood pressure 127/54, pulse 58, temperature 98.6 °F (37 °C), temperature source Oral, resp. rate 20, height 63\" (160 cm), weight 196 lb 11.2 oz (89.2 kg), SpO2 90 %.    Physical Exam:  GENERAL: Elderly white woman up right in the chair in no distress  HEENT: Normocephalic, atraumatic. Pupils equal and reactive to light. No pharyngeal exudate but her tongue is mildly red. Voice is weak  NECK: Supple, no palpable thyroid, trachea midline, no cervical or supraclavicular adenopathy  LUNGS: Symmetric chest excursion. No wheeze or rhonchi anteriorly. Bibasilar inspiratory crackles  HEART: Regular, normal heart sounds, no murmur or gallop  ABDOMEN: Obese, bowel sounds present, soft  EXTREMITIES: 1+ pitting edema of her calves, 2+ edema of her thighs and buttocks, no clubbing or cyanosis  SKIN: Warm and dry, no rash  NEURO: Alert and oriented, mild generalized weakness     Results Review:    Lab Results (last 24 hours)     Procedure Component Value Units Date/Time    Urea Nitrogen, Urine [[id number]] Collected:  09/18/17 1502    " Specimen:  Urine from Urine, Clean Catch Updated:  09/18/17 1602     Urea Nitrogen, Urine 464 mg/dL     POC Glucose Fingerstick [[id number]]  (Abnormal) Collected:  09/18/17 1644    Specimen:  Blood Updated:  09/18/17 1651     Glucose 152 (H) mg/dL     Narrative:       Meter: [id number] : [id number] Macario Martinez    Creatinine, Urine, Random [[id number]] Collected:  09/18/17 1502    Specimen:  Urine from Urine, Clean Catch Updated:  09/18/17 1818     Creatinine, Urine 29.8 mg/dL     Sodium, Urine, Random [[id number]]  (Normal) Collected:  09/18/17 1502    Specimen:  Urine from Urine, Clean Catch Updated:  09/18/17 1818     Sodium, Urine 80 mmol/L     Protein, Urine, Random [[id number]]  (Abnormal) Collected:  09/18/17 1502    Specimen:  Urine from Urine, Clean Catch Updated:  09/18/17 1829     Total Protein, Urine 105.0 (H) mg/dL     POC Glucose Fingerstick [[id number]]  (Abnormal) Collected:  09/18/17 2031    Specimen:  Blood Updated:  09/18/17 2037     Glucose 218 (H) mg/dL     Narrative:       M[facility]: [id number] : [id number] [facility]    Renal Function Panel [[id number]]  (Abnormal) Collected:  09/19/17 0532    Specimen:  Blood Updated:  09/19/17 0640     Glucose 187 (H) mg/dL      BUN 72 (H) mg/dL      Creatinine 2.00 (H) mg/dL      Sodium 139 mmol/L      Potassium 4.9 mmol/L      Chloride 109 mmol/L      CO2 22.0 mmol/L      Calcium 8.3 (L) mg/dL      Albumin 2.90 (L) g/dL      Phosphorus 5.4 (H) mg/dL      Anion Gap 8.0 mmol/L      BUN/Creatinine Ratio 36.0 (H)     eGFR Non  Amer 24 (L) mL/min/1.73     Narrative:       [employer] Guidelines    Stage     Description        GFR  1         Normal or High     90+  2         Mild decrease      60-89  3         Moderate decrease  30-59  4         Severe decrease    15-29  5         Kidney failure     <15    POC Glucose Fingerstick [[id number]]  (Abnormal) Collected:  09/19/17 0701    Specimen:  Blood Updated:  09/19/17 0710     Glucose  197 (H) mg/dL     Narrative:       Meter: [id number] : [id number] Macario Martinez    POC Glucose Fingerstick [[id number]]  (Abnormal) Collected:  09/19/17 1103    Specimen:  Blood Updated:  09/19/17 1108     Glucose 184 (H) mg/dL     Narrative:       Meter: [id number] : [id number] Macario Martinez    ANCA Panel [[id number]] Collected:  09/19/17 1232    Specimen:  Blood Updated:  09/19/17 1251    Nuclear Antigen Antibody, IFA [[id number]] Collected:  09/19/17 1232    Specimen:  Blood Updated:  09/19/17 1251        Imaging Results (last 24 hours)     ** No results found for the last 24 hours. **             aspirin 81 mg Oral Daily   atorvastatin 10 mg Oral Daily   baclofen 10 mg Oral TID   budesonide 0.5 mg Nebulization BID - RT   bumetanide 2 mg Oral BID   carvedilol 25 mg Oral Q12H   clopidogrel 75 mg Oral Daily   gabapentin 900 mg Oral Nightly   guaiFENesin 600 mg Oral Q12H   heparin (porcine) 5,000 Units Subcutaneous Q8H   hydrALAZINE 75 mg Oral Q8H   insulin detemir 25 Units Subcutaneous Q12H   insulin lispro 0-7 Units Subcutaneous 4x Daily With Meals & Nightly   insulin lispro 8 Units Subcutaneous TID With Meals   ipratropium-albuterol 3 mL Nebulization Q4H - RT   levothyroxine 200 mcg Oral Daily   pantoprazole 40 mg Oral Daily   predniSONE 10 mg Oral Daily With Breakfast   saccharomyces boulardii 250 mg Oral BID   ascorbic acid 500 mg Oral Daily With Breakfast       Principal Problem:    Right lower lobe pneumonia  Active Problems:    Chronic kidney disease (CKD), stage III (moderate)    Type 2 diabetes mellitus    KRIS (acute kidney injury)    Acute respiratory failure with hypoxia    Normochromic normocytic anemia    Chronic diastolic heart failure    h/o stroke with right hemiplegia    Disease of thyroid gland    Nausea and vomiting    Weakness      Assessment/Plan   Chronically ill 77-year-old woman with chronic kidney disease, diabetes mellitus, hypertension, diastolic dysfunction,CAD, PVD, hypothyroid, COPD  hospitalized with a right lower lobe pneumonia. She is developed acute on chronic renal insufficiency with a serum creatinine elevated to 2. Throughout her hospital stay her serum creatinine has ranged from 1.4-2.2. She has a significant iron deficiency anemia. Endoscopy 1 year ago showed a small AVM that was cauterized. Her serum iron was extremely low on admission. She has received transfusion. She quit smoking cigarettes approximately 7 years ago. She wears 2 L chronically. She currently has no active wheezing. She is however requiring 6 L nasal cannula oxygen. Chest x-ray looks more like edema. She is 26 pounds up compared to her admission weight. Dysphagia evaluation completed September 5 showed no signs or symptoms of aspiration with oral trials. She felt to be at risk for silent aspiration.    I would like to stop her Pulmicort nebs as I think they are causing her dysphonia  Stop systemic steroids as she no longer has wheezing  She is currently off antibiotics and afebrile  Continue duo neb  Nystatin suspension  Trial of whole soft foods  She needs decreased volume but will defer to nephrology  Ambulate with therapy  CPAP hs to help with edema    Stephanie Kwan MD  09/19/17  3:27 PM

## 2017-09-19 NOTE — THERAPY TREATMENT NOTE
Acute Care - Occupational Therapy Treatment Note  [facility]     Patient Name: Jennifer Oliveira  : 1940  MRN: [id number]  Today's Date: 2017  Onset of Illness/Injury or Date of Surgery Date: 17  Date of Referral to OT: 17  Referring Physician: ARSLAN Gonzales      Admit Date: 2017    Visit Dx:     ICD-10-CM ICD-9-CM   1. Pneumonia of right lower lobe due to infectious organism J18.9 483.8   2. Impaired mobility and ADLs Z74.09 799.89   3. Impaired functional mobility, balance, gait, and endurance Z74.09 V49.89     Patient Active Problem List   Diagnosis   • Acute hypoxemic respiratory failure   • PAD (peripheral artery disease)   • Normochromic normocytic anemia   • Chronic diastolic heart failure   • Diabetes type 2, uncontrolled   • Lower extremity cellulitis   • Hypothyroid   • HTN (hypertension)   • Metabolic encephalopathy   • TIA (transient ischemic attack)   • Acute cystitis without hematuria   • Chronic kidney disease (CKD), stage III (moderate)   • GERD (gastroesophageal reflux disease) with hx of gastritis    • h/o stroke with right hemiplegia   • Hypertension   • Disease of thyroid gland   • Diastolic heart failure   • Type 2 diabetes mellitus   • CKD (chronic kidney disease), stage III   • Symptomatic anemia   • GI bleed   • KRIS (acute kidney injury)   • Nausea and vomiting   • Right lower lobe pneumonia   • Weakness   • Pneumonia   • Acute respiratory failure with hypoxia   • Hyperkalemia             Adult Rehabilitation Note       17 0841 17 0924       Rehab Assessment/Intervention    Discipline occupational therapist  -CL physical therapist  -KM     Document Type therapy note (daily note)  -CL therapy note (daily note)  -KM     Subjective Information agree to therapy;complains of;weakness;fatigue  -CL agree to therapy  -KM     Patient Effort, Rehab Treatment good  -CL adequate  -KM     Precautions/Limitations fall precautions;oxygen therapy device and L/min    Hi-[place] O2, residual R sided weakness  -CL fall precautions;oxygen therapy device and L/min  -KM     Specific Treatment Considerations  --   high flow O2  -KM     Patient Response to Treatment  --   remote CVA  -KM     Recorded by [CL] Trinidad Nguyen OT [KM] Abimbola Cho, PT     Vital Signs    Pre Systolic BP Rehab --   VSS, RN cleared for tx.   -CL      Pre SpO2 (%)  93  -KM     O2 Delivery Pre Treatment  supplemental O2  -KM     Intra SpO2 (%)  92  -KM     O2 Delivery Intra Treatment  supplemental O2  -KM     Post SpO2 (%)  93  -KM     O2 Delivery Post Treatment  supplemental O2  -KM     Recorded by [CL] Trinidad Nguyen OT [KM] Abimbola Cho, PT     Pain Assessment    Pain Assessment Valles-Rosas FACES  -CL No/denies pain  -KM     Valles-Baker FACES Pain Rating 2  -CL      Pain Score 2  -CL      Post Pain Score 2  -CL      Pain Type Chronic pain  -CL      Pain Location Knee  -CL      Pain Orientation Right  -CL      Pain Intervention(s) Repositioned;Ambulation/increased activity  -CL      Response to Interventions Tolerated.   -CL      Recorded by [CL] Trinidad Nguyen, OT [KM] Abimbola Cho, PT     Cognitive Assessment/Intervention    Current Cognitive/Communication Assessment functional  -CL functional  -KM     Orientation Status oriented to;person;place;situation  -CL oriented to;person;place  -KM     Follows Commands/Answers Questions 100% of the time;needs cueing;needs increased time;needs repetition  -CL able to follow single-step instructions;100% of the time  -KM     Personal Safety mild impairment;decreased awareness, need for assist;decreased awareness, need for safety  -CL mild impairment  -KM     Personal Safety Interventions fall prevention program maintained;gait belt;nonskid shoes/slippers when out of bed  -CL fall prevention program maintained  -KM     Recorded by [CL] Trinidad Nguyen OT [KM] Abimbola Cho, PT     Bed Mobility, Assessment/Treatment    Bed Mobility, Assistive Device  draw sheet;bed rails  -CL draw sheet;bed rails  -KM     Bed Mob, Supine to Sit, [place] maximum assist (25% patient effort);2 person assist required;verbal cues required  -CL maximum assist (25% patient effort);2 person assist required  -KM     Bed Mob, Sit to Supine, [place] maximum assist (25% patient effort);2 person assist required;verbal cues required  -CL      Bed Mobility, Safety Issues  decreased use of arms for pushing/pulling;decreased use of legs for bridging/pushing;impaired trunk control for bed mobility  -KM     Bed Mobility, Comment VCs for HP/sequencing. Upon sitting EOB, pt w/ LOB to L lateral and posteriorly.   -CL      Recorded by [CL] Trinidad Nguyen, OT [KM] Abimbola Cho, PT     Transfer Assessment/Treatment    Transfers, Bed-Chair [place] dependent (less than 25% patient effort);2 person assist required;verbal cues required  -CL maximum assist (25% patient effort);2 person assist required  -KM     Transfers, Bed-Chair-Bed, Assist Device mechanical lift  -CL      Transfers, Sit-Stand [place] maximum assist (25% patient effort);2 person assist required;verbal cues required  -CL maximum assist (25% patient effort);2 person assist required  -KM     Transfers, Stand-Sit [place] maximum assist (25% patient effort);2 person assist required;verbal cues required  -CL maximum assist (25% patient effort);2 person assist required  -KM     Transfer, Safety Issues  weight-shifting ability decreased;sequencing ability decreased   sit pivot sit, difiiculty side stepping with l leg  -KM     Transfer, Comment Pt attempted STS x3 reps from EOB w/ BUE support and B feet/knees blocked, however pt unable to clear hips high enough from EOB to t/f to chair.  -CL      Recorded by [CL] Trinidad Nguyen OT [KM] Abimbola Cho, PT     Gait Assessment/Treatment    Gait, [place] Level  unable to perform  -KM     Recorded by  [KM] Abimbola Cho, PT     ADL Assessment/Intervention     Additional Documentation Self-Feeding Assessment/Training (Group)  -CL      Recorded by [CL] Trinidad Nguyen OT      Self-Feeding Assessment/Training    Self-Feeding Assess/Train, Position supported sitting  -CL      Self-Feeding Assess/Train, [place] set up required  -CL      Self-Feeding Assess/Train, Comment pt requires set up, however no assist needed for self-feeding.   -CL      Recorded by [CL] Trinidad Nguyen OT      Balance Skills Training    Sitting-Level of Assistance Moderate assistance   static sitting at EOB, L lateral and posterior lean  -CL Contact guard  -KM     Sitting-Balance Support Left upper extremity supported  -CL Feet supported;Left upper extremity supported  -KM     Sitting-Balance Activities Forward lean;Lateral lean;Reaching for objects;Trunk control activities  -CL Trunk control activities  -KM     Sitting # of Minutes  --   8  -KM     Standing-Level of Assistance Maximum assistance;x2  -CL Maximum assistance;x2  -KM     Static Standing Balance Support Right upper extremity supported;Left upper extremity supported  -CL      Standing-Balance Activities Weight Shift A-P  -CL      Recorded by [CL] Trinidad Nguyen OT [KM] Abimbola Cho, PT     Therapy Exercises    Right Lower Extremity  PROM:;10 reps;supine;ankle pumps/circles;calf stretch;heel slides;hip abduction/adduction  -KM     Left Lower Extremity  AAROM:;10 reps;supine;ankle pumps/circles;calf stretch;heel slides;hip abduction/adduction  -KM     Right Upper Extremity PROM:;5 reps;elbow flexion/extension;hand pumps;pronation/supination   wrist F/E  -CL      Left Upper Extremity 10 reps;AROM:;elbow flexion/extension;hand pumps;shoulder extension/flexion   isometrics  -CL      Recorded by [CL] Trinidad Nguyen OT [KM] Abimbola Cho, PT     Positioning and Restraints    Pre-Treatment Position in bed  -CL in bed  -KM     Post Treatment Position chair  -CL chair  -KM     In Chair notified nsg;reclined;call light within  reach;encouraged to call for assist;exit alarm on;RUE elevated;LUE elevated;waffle cushion;on mechanical lift sling;legs elevated  -CL sitting;call light within reach;encouraged to call for assist;exit alarm on;on mechanical lift sling  -KM     Recorded by [CL] Trinidad Nguyen, OT [KM] Abimbola Cho, PT       User Key  (r) = Recorded By, (t) = Taken By, (c) = Cosigned By    Initials Name Effective Dates    KM Abimbola Cho, PT 06/19/15 -     CL Trinidad Nguyen OT 06/08/16 -                 OT Goals       09/19/17 0935 09/15/17 1513 09/13/17 1150    Bed Mobility OT LTG    Bed Mobility OT LTG, Outcome goal ongoing  -CL goal ongoing  -TB goal ongoing  -CL    Transfer Training OT LTG    Transfer Training OT LTG, [place] Level maximum assist (25% patient effort);2 person assist required  -CL      Transfer Training OT LTG, Outcome goal revised  -CL goal ongoing  -TB goal ongoing  -CL    Transfer Training OT LTG, Reason Goal Not Met goals revised this date  -CL      Strength OT LTG    Strength Goal OT LTG, Date Established 09/19/17  -CL      Strength Goal OT LTG, Time to Achieve by discharge  -CL      Strength Goal OT LTG, Functional Goal Pt will tolerate LUE AROM HEP x10 reps and RUE PROM/AAROM HEP x10 reps to increase strength/endurance to promote ADL performance.   -CL      Patient Education OT LTG    Patient Education OT LTG Outcome goal ongoing  -CL goal ongoing  -TB goal ongoing  -CL    UB Dressing OT LTG    UB Dressing Goal OT LTG, Outcome goal no longer appropriate  -CL goal ongoing  -TB goal ongoing  -CL    UB Dressing Goal OT LTG, Reason Goal Not Met progress slower than expected  -CL        09/11/17 1349 09/07/17 1439 09/06/17 1019    Bed Mobility OT LTG    Bed Mobility OT LTG, Date Established   09/06/17  -CL    Bed Mobility OT LTG, Time to Achieve   by discharge  -CL    Bed Mobility OT LTG, Activity Type   roll left/roll right;supine to sit/sit to supine  -CL    Bed Mobility OT LTG, [place]  Level   moderate assist (50% patient effort);2 person assist required  -CL    Bed Mobility OT LTG, Additional Goal   AAD  -CL    Bed Mobility OT LTG, Outcome goal ongoing  -JR goal ongoing  -CL     Transfer Training OT LTG    Transfer Training OT LTG, Date Established   09/06/17  -CL    Transfer Training OT LTG, Time to Achieve   by discharge  -CL    Transfer Training OT LTG, Activity Type   toilet;bed to chair /chair to bed;sit to stand/stand to sit  -CL    Transfer Training OT LTG, [place] Level   moderate assist (50% patient effort);2 person assist required  -CL    Transfer Training OT LTG, Additional Goal   AAD  -CL    Transfer Training OT LTG, Outcome goal ongoing  -JR goal ongoing  -CL     Patient Education OT LTG    Patient Education OT LTG, Date Established   09/06/17  -CL    Patient Education OT LTG, Time to Achieve   by discharge  -CL    Patient Education OT LTG, Education Type   written program;HEP;posture/body mechanics;1 hand/homa technique;home safety;adaptive equipment mgmt;energy conservation;adaptive breathing  -CL    Patient Education OT LTG, Education Understanding   verbalizes understanding  -CL    Patient Education OT LTG Outcome goal ongoing  -JR goal ongoing  -CL     UB Dressing OT LTG    UB Dressing Goal OT LTG, Date Established   09/06/17  -CL    UB Dressing Goal OT LTG, Time to Achieve   by discharge  -CL    UB Dressing Goal OT LTG, Activity Type   Utilizing homa-dressing technique  -CL    UB Dressing Goal OT LTG, [place] Level   moderate assist (50% patient effort)  -CL    UB Dressing Goal OT LTG, Additional Goal   AAD  -CL    UB Dressing Goal OT LTG, Outcome goal ongoing  -JR goal ongoing  -CL       User Key  (r) = Recorded By, (t) = Taken By, (c) = Cosigned By    Initials Name Provider Type    TB Marifer Schofield, OT Occupational Therapist    JR Magalie Trinh, OT Occupational Therapist    CASEY Nguyen, OT Occupational Therapist          Occupational Therapy Education      Title: PT OT SLP Therapies (Active)     Topic: Occupational Therapy (Active)     Point: ADL training (Active)    Description: Instruct learner(s) on proper safety adaptation and remediation techniques during self care or transfers.   Instruct in proper use of assistive devices.    Learning Progress Summary    Learner Readiness Method Response Comment Documented by Status   Patient Acceptance E,D NR Pt educated on appropriate safety precautions, t/f techniques, HEP, positioning, and benefits of therapy.  09/19/17 0933 Active    Acceptance E VU  MS 09/15/17 1520 Done    Acceptance E,D NR Pt educated on appropriate safety precautions, positioning, and HEP.  09/13/17 1150 Active    Acceptance E NR Educated pt on B UE HEP.  09/11/17 1348 Active    Acceptance E,D NR Pt educated on positioning and BUE HEP.  09/07/17 1438 Active    Acceptance E,D NR Pt educated on appropriate safety precautions, t/f techniques, positioning, and benefits of therapy.  09/06/17 1018 Active               Point: Home exercise program (Active)    Description: Instruct learner(s) on appropriate technique for monitoring, assisting and/or progressing therapeutic exercises/activities.    Learning Progress Summary    Learner Readiness Method Response Comment Documented by Status   Patient Acceptance E,D NR Pt educated on appropriate safety precautions, t/f techniques, HEP, positioning, and benefits of therapy. CL 09/19/17 0933 Active    Acceptance E,D VU,NR Education reinforced for benefits of activity/therapy, role of OT and HEP  09/15/17 1512 Done    Acceptance E,D NR Pt educated on appropriate safety precautions, positioning, and HEP.  09/13/17 1150 Active    Acceptance E NR Educated pt on B UE HEP.  09/11/17 1348 Active    Acceptance E,D NR Pt educated on positioning and BUE HEP.  09/07/17 1438 Active   Other Acceptance E,D VU,NR Education reinforced for benefits of activity/therapy, role of OT and HEP TB 09/15/17 1512 Done                Point: Precautions (Active)    Description: Instruct learner(s) on prescribed precautions during self-care and functional transfers.    Learning Progress Summary    Learner Readiness Method Response Comment Documented by Status   Patient Acceptance E,D NR Pt educated on appropriate safety precautions, t/f techniques, HEP, positioning, and benefits of therapy.  09/19/17 0933 Active    Acceptance E,D NR Pt educated on appropriate safety precautions, positioning, and HEP.  09/13/17 1150 Active    Acceptance E,D NR Pt educated on positioning and BUE HEP.  09/07/17 1438 Active    Acceptance E,D NR Pt educated on appropriate safety precautions, t/f techniques, positioning, and benefits of therapy.  09/06/17 1018 Active               Point: Body mechanics (Active)    Description: Instruct learner(s) on proper positioning and spine alignment during self-care, functional mobility activities and/or exercises.    Learning Progress Summary    Learner Readiness Method Response Comment Documented by Status   Patient Acceptance E,D NR Pt educated on appropriate safety precautions, t/f techniques, HEP, positioning, and benefits of therapy.  09/19/17 0933 Active    Acceptance E,D NR Pt educated on appropriate safety precautions, t/f techniques, positioning, and benefits of therapy.  09/06/17 1018 Active                      User Key     Initials Effective Dates Name Provider Type Discipline    TB 06/22/15 -  Marifer Schofield, OT Occupational Therapist OT    JR 06/22/15 -  Magalie Trinh OT Occupational Therapist OT    MS 06/16/16 -  Darcy Colin, RN Registered Nurse Nurse    CL 06/08/16 -  Trinidad Nguyen OT Occupational Therapist OT                  OT Recommendation and Plan  Anticipated Equipment Needs At Discharge:  (TBA further)  Anticipated Discharge Disposition: skilled nursing facility  Planned Therapy Interventions: adaptive equipment training, ADL retraining, balance training, bed mobility  training, energy conservation, home exercise program, transfer training  Therapy Frequency: daily  Plan of Care Review  Plan Of Care Reviewed With: patient  Progress: progress toward functional goals is gradual  Outcome Summary/Follow up Plan: Pt unable to clear hips from EOB during STS in order to perform stand-pivot t/f this date w/ Max Ax2, required mechanical lift for safe bed/chair t/f. Recommend cont skilled IPOT POC, goals revised this date to reflect current level of performance. Recommend pt DC to SNF.         Outcome Measures       09/19/17 0841 09/18/17 0924       How much help from another person do you currently need...    Turning from your back to your side while in flat bed without using bedrails?  2  -KM     Moving from lying on back to sitting on the side of a flat bed without bedrails?  2  -KM     Moving to and from a bed to a chair (including a wheelchair)?  2  -KM     Standing up from a chair using your arms (e.g., wheelchair, bedside chair)?  2  -KM     Climbing 3-5 steps with a railing?  1  -KM     To walk in hospital room?  1  -KM     AM-PAC 6 Clicks Score  10  -KM     How much help from another is currently needed...    Putting on and taking off regular lower body clothing? 1  -CL      Bathing (including washing, rinsing, and drying) 1  -CL      Toileting (which includes using toilet bed pan or urinal) 1  -CL      Putting on and taking off regular upper body clothing 1  -CL      Taking care of personal grooming (such as brushing teeth) 2  -CL      Eating meals 3  -CL      Score 9  -CL      Functional Assessment    Outcome Measure Options AM-PAC 6 Clicks Daily Activity (OT)  -CL AM-PAC 6 Clicks Basic Mobility (PT)  -KM       User Key  (r) = Recorded By, (t) = Taken By, (c) = Cosigned By    Initials Name Provider Type    SUDEEP Cho, PT Physical Therapist    CL Trinidad Nguyen, OT Occupational Therapist           Time Calculation:         Time Calculation- OT       09/19/17 0941           Time Calculation- OT    OT Start Time 0841  -CL      Total Timed Code Minutes- OT 31 minute(s)  -CL      OT Received On 09/19/17  -CL      OT Goal Re-Cert Due Date 09/29/17  -CL        User Key  (r) = Recorded By, (t) = Taken By, (c) = Cosigned By    Initials Name Provider Type    CL Trinidad Nguyen OT Occupational Therapist           Therapy Charges for Today     Code Description Service Date Service Provider Modifiers Qty    [id number]  OT THERAPEUTIC ACT EA 15 MIN 9/19/2017 Trinidad Nguyen OT GO 2    [id number] HC OT THER SUPP EA 15 MIN 9/19/2017 Trinidad Nguyen OT GO 2               Trinidad Nguyen OT  9/19/2017

## 2017-09-19 NOTE — PLAN OF CARE
Problem: Patient Care Overview (Adult)  Goal: Plan of Care Review  Outcome: Ongoing (interventions implemented as appropriate)    09/19/17 8588   Coping/Psychosocial Response Interventions   Plan Of Care Reviewed With patient   Outcome Evaluation   Outcome Summary/Follow up Plan Pt rested well through the night. No c/o pain or discomfort. VSS. Afib. Pt still on the high flow NC. Will continue to monitor.   Patient Care Overview   Progress no change

## 2017-09-19 NOTE — PLAN OF CARE
Problem: Patient Care Overview (Adult)  Goal: Plan of Care Review  Outcome: Ongoing (interventions implemented as appropriate)    09/19/17 0935   Coping/Psychosocial Response Interventions   Plan Of Care Reviewed With patient   Outcome Evaluation   Outcome Summary/Follow up Plan Pt unable to clear hips from EOB during STS in order to perform stand-pivot t/f this date w/ Max Ax2, required mechanical lift for safe bed/chair t/f. Recommend cont skilled IPOT POC, goals revised this date to reflect current level of performance. Recommend pt DC to SNF.    Patient Care Overview   Progress progress toward functional goals is gradual         Problem: Inpatient Occupational Therapy  Goal: Bed Mobility Goal LTG- OT  Outcome: Ongoing (interventions implemented as appropriate)    09/06/17 1019 09/19/17 0935   Bed Mobility OT LTG   Bed Mobility OT LTG, Date Established 09/06/17 --    Bed Mobility OT LTG, Time to Achieve by discharge --    Bed Mobility OT LTG, Activity Type roll left/roll right;supine to sit/sit to supine --    Bed Mobility OT LTG, [place] Level moderate assist (50% patient effort);2 person assist required --    Bed Mobility OT LTG, Additional Goal AAD --    Bed Mobility OT LTG, Outcome --  goal ongoing       Goal: Transfer Training Goal 1 LTG- OT  Outcome: Revised    09/06/17 1019 09/19/17 0935   Transfer Training OT LTG   Transfer Training OT LTG, Date Established 09/06/17 --    Transfer Training OT LTG, Time to Achieve by discharge --    Transfer Training OT LTG, Activity Type toilet;bed to chair /chair to bed;sit to stand/stand to sit --    Transfer Training OT LTG, [place] Level --  maximum assist (25% patient effort);2 person assist required   Transfer Training OT LTG, Additional Goal AAD --    Transfer Training OT LTG, Outcome --  goal revised   Transfer Training OT LTG, Reason Goal Not Met --  goals revised this date       Goal: Patient Education Goal LTG- OT  Outcome: Ongoing (interventions  implemented as appropriate)    09/06/17 1019 09/19/17 0935   Patient Education OT LTG   Patient Education OT LTG, Date Established 09/06/17 --    Patient Education OT LTG, Time to Achieve by discharge --    Patient Education OT LTG, Education Type written program;HEP;posture/body mechanics;1 hand/homa technique;home safety;adaptive equipment mgmt;energy conservation;adaptive breathing --    Patient Education OT LTG, Education Understanding verbalizes understanding --    Patient Education OT LTG Outcome --  goal ongoing       Goal: UB Dressing Goal LTG- OT  Outcome: Unable to achieve outcome(s) by discharge Date Met:  09/19/17 09/06/17 1019 09/19/17 0935   UB Dressing OT LTG   UB Dressing Goal OT LTG, Date Established 09/06/17 --    UB Dressing Goal OT LTG, Time to Achieve by discharge --    UB Dressing Goal OT LTG, Activity Type Utilizing homa-dressing technique --    UB Dressing Goal OT LTG, [place] Level moderate assist (50% patient effort) --    UB Dressing Goal OT LTG, Additional Goal AAD --    UB Dressing Goal OT LTG, Outcome --  goal no longer appropriate   UB Dressing Goal OT LTG, Reason Goal Not Met --  progress slower than expected       Goal: Strength Goal LTG- OT  Outcome: Ongoing (interventions implemented as appropriate)    09/19/17 0935   Strength OT LTG   Strength Goal OT LTG, Date Established 09/19/17   Strength Goal OT LTG, Time to Achieve by discharge   Strength Goal OT LTG, Functional Goal Pt will tolerate LUE AROM HEP x10 reps and RUE PROM/AAROM HEP x10 reps to increase strength/endurance to promote ADL performance.

## 2017-09-20 LAB
ABO GROUP BLD: NORMAL
ALBUMIN SERPL-MCNC: 2.9 G/DL (ref 3.2–4.8)
ANA HOMOGEN TITR SER: NORMAL {TITER}
ANA SER QL IA: POSITIVE
ANION GAP SERPL CALCULATED.3IONS-SCNC: 8 MMOL/L (ref 3–11)
BLD GP AB SCN SERPL QL: NEGATIVE
BUN BLD-MCNC: 72 MG/DL (ref 9–23)
BUN/CREAT SERPL: 32.7 (ref 7–25)
CALCIUM SPEC-SCNC: 8.2 MG/DL (ref 8.7–10.4)
CHLORIDE SERPL-SCNC: 108 MMOL/L (ref 99–109)
CO2 SERPL-SCNC: 23 MMOL/L (ref 20–31)
CREAT BLD-MCNC: 2.2 MG/DL (ref 0.6–1.3)
GFR SERPL CREATININE-BSD FRML MDRD: 22 ML/MIN/1.73
GLUCOSE BLD-MCNC: 133 MG/DL (ref 70–100)
GLUCOSE BLDC GLUCOMTR-MCNC: 135 MG/DL (ref 70–130)
GLUCOSE BLDC GLUCOMTR-MCNC: 286 MG/DL (ref 70–130)
GLUCOSE BLDC GLUCOMTR-MCNC: 350 MG/DL (ref 70–130)
HCT VFR BLD AUTO: 20.8 % (ref 34.5–44)
HGB BLD-MCNC: 6.6 G/DL (ref 11.5–15.5)
Lab: NORMAL
PHOSPHATE SERPL-MCNC: 5.3 MG/DL (ref 2.4–5.1)
POTASSIUM BLD-SCNC: 4.7 MMOL/L (ref 3.5–5.5)
RH BLD: POSITIVE
SODIUM BLD-SCNC: 139 MMOL/L (ref 132–146)

## 2017-09-20 PROCEDURE — 97110 THERAPEUTIC EXERCISES: CPT

## 2017-09-20 PROCEDURE — 85014 HEMATOCRIT: CPT | Performed by: INTERNAL MEDICINE

## 2017-09-20 PROCEDURE — 36430 TRANSFUSION BLD/BLD COMPNT: CPT

## 2017-09-20 PROCEDURE — 86850 RBC ANTIBODY SCREEN: CPT | Performed by: INTERNAL MEDICINE

## 2017-09-20 PROCEDURE — 63710000001 INSULIN DETEMIR PER 5 UNITS: Performed by: HOSPITALIST

## 2017-09-20 PROCEDURE — 85018 HEMOGLOBIN: CPT | Performed by: INTERNAL MEDICINE

## 2017-09-20 PROCEDURE — 99233 SBSQ HOSP IP/OBS HIGH 50: CPT | Performed by: INTERNAL MEDICINE

## 2017-09-20 PROCEDURE — 25010000002 HEPARIN (PORCINE) PER 1000 UNITS: Performed by: HOSPITALIST

## 2017-09-20 PROCEDURE — 94668 MNPJ CHEST WALL SBSQ: CPT

## 2017-09-20 PROCEDURE — 63710000001 PREDNISONE PER 5 MG: Performed by: HOSPITALIST

## 2017-09-20 PROCEDURE — 94640 AIRWAY INHALATION TREATMENT: CPT

## 2017-09-20 PROCEDURE — 5A09357 ASSISTANCE WITH RESPIRATORY VENTILATION, LESS THAN 24 CONSECUTIVE HOURS, CONTINUOUS POSITIVE AIRWAY PRESSURE: ICD-10-PCS | Performed by: INTERNAL MEDICINE

## 2017-09-20 PROCEDURE — 86900 BLOOD TYPING SEROLOGIC ABO: CPT

## 2017-09-20 PROCEDURE — 82962 GLUCOSE BLOOD TEST: CPT

## 2017-09-20 PROCEDURE — 94760 N-INVAS EAR/PLS OXIMETRY 1: CPT

## 2017-09-20 PROCEDURE — 86901 BLOOD TYPING SEROLOGIC RH(D): CPT | Performed by: INTERNAL MEDICINE

## 2017-09-20 PROCEDURE — 86900 BLOOD TYPING SEROLOGIC ABO: CPT | Performed by: INTERNAL MEDICINE

## 2017-09-20 PROCEDURE — 63710000001 INSULIN DETEMIR PER 5 UNITS: Performed by: INTERNAL MEDICINE

## 2017-09-20 PROCEDURE — 94799 UNLISTED PULMONARY SVC/PX: CPT

## 2017-09-20 PROCEDURE — 80069 RENAL FUNCTION PANEL: CPT | Performed by: HOSPITALIST

## 2017-09-20 PROCEDURE — 94660 CPAP INITIATION&MGMT: CPT

## 2017-09-20 PROCEDURE — P9016 RBC LEUKOCYTES REDUCED: HCPCS

## 2017-09-20 PROCEDURE — 86923 COMPATIBILITY TEST ELECTRIC: CPT

## 2017-09-20 RX ORDER — OXYMETAZOLINE HYDROCHLORIDE 0.05 G/100ML
2 SPRAY NASAL EVERY 12 HOURS SCHEDULED
Status: DISCONTINUED | OUTPATIENT
Start: 2017-09-20 | End: 2017-09-24 | Stop reason: HOSPADM

## 2017-09-20 RX ADMIN — INSULIN DETEMIR 18 UNITS: 100 INJECTION, SOLUTION SUBCUTANEOUS at 21:48

## 2017-09-20 RX ADMIN — OXYCODONE HYDROCHLORIDE AND ACETAMINOPHEN 500 MG: 500 TABLET ORAL at 09:34

## 2017-09-20 RX ADMIN — Medication 250 MG: at 09:34

## 2017-09-20 RX ADMIN — PANTOPRAZOLE SODIUM 40 MG: 40 TABLET, DELAYED RELEASE ORAL at 06:00

## 2017-09-20 RX ADMIN — NYSTATIN 500000 UNITS: 100000 SUSPENSION ORAL at 16:22

## 2017-09-20 RX ADMIN — OXYMETAZOLINE HYDROCHLORIDE 2 SPRAY: 5 SPRAY NASAL at 16:25

## 2017-09-20 RX ADMIN — INSULIN DETEMIR 25 UNITS: 100 INJECTION, SOLUTION SUBCUTANEOUS at 09:33

## 2017-09-20 RX ADMIN — CLOPIDOGREL BISULFATE 75 MG: 75 TABLET ORAL at 09:34

## 2017-09-20 RX ADMIN — CARVEDILOL 25 MG: 12.5 TABLET, FILM COATED ORAL at 09:34

## 2017-09-20 RX ADMIN — GUAIFENESIN 600 MG: 600 TABLET, EXTENDED RELEASE ORAL at 09:34

## 2017-09-20 RX ADMIN — Medication 250 MG: at 16:34

## 2017-09-20 RX ADMIN — LEVOTHYROXINE SODIUM 200 MCG: 100 TABLET ORAL at 06:00

## 2017-09-20 RX ADMIN — BACLOFEN 10 MG: 10 TABLET ORAL at 06:00

## 2017-09-20 RX ADMIN — IPRATROPIUM BROMIDE AND ALBUTEROL SULFATE 3 ML: .5; 3 SOLUTION RESPIRATORY (INHALATION) at 20:54

## 2017-09-20 RX ADMIN — GUAIFENESIN 600 MG: 600 TABLET, EXTENDED RELEASE ORAL at 21:49

## 2017-09-20 RX ADMIN — HYDRALAZINE HYDROCHLORIDE 75 MG: 25 TABLET ORAL at 21:49

## 2017-09-20 RX ADMIN — NYSTATIN 500000 UNITS: 100000 SUSPENSION ORAL at 21:49

## 2017-09-20 RX ADMIN — CARVEDILOL 25 MG: 12.5 TABLET, FILM COATED ORAL at 21:49

## 2017-09-20 RX ADMIN — NYSTATIN 500000 UNITS: 100000 SUSPENSION ORAL at 09:33

## 2017-09-20 RX ADMIN — INSULIN LISPRO 4 UNITS: 100 INJECTION, SOLUTION INTRAVENOUS; SUBCUTANEOUS at 16:26

## 2017-09-20 RX ADMIN — BACLOFEN 10 MG: 10 TABLET ORAL at 21:49

## 2017-09-20 RX ADMIN — PREDNISONE 10 MG: 10 TABLET ORAL at 09:34

## 2017-09-20 RX ADMIN — IPRATROPIUM BROMIDE AND ALBUTEROL SULFATE 3 ML: .5; 3 SOLUTION RESPIRATORY (INHALATION) at 07:59

## 2017-09-20 RX ADMIN — GABAPENTIN 900 MG: 300 CAPSULE ORAL at 21:49

## 2017-09-20 RX ADMIN — OXYMETAZOLINE HYDROCHLORIDE 2 SPRAY: 5 SPRAY NASAL at 21:50

## 2017-09-20 RX ADMIN — IPRATROPIUM BROMIDE AND ALBUTEROL SULFATE 3 ML: .5; 3 SOLUTION RESPIRATORY (INHALATION) at 15:26

## 2017-09-20 RX ADMIN — HYDRALAZINE HYDROCHLORIDE 75 MG: 25 TABLET ORAL at 16:22

## 2017-09-20 RX ADMIN — HYDRALAZINE HYDROCHLORIDE 75 MG: 25 TABLET ORAL at 06:00

## 2017-09-20 RX ADMIN — ATORVASTATIN CALCIUM 10 MG: 10 TABLET, FILM COATED ORAL at 21:49

## 2017-09-20 RX ADMIN — INSULIN LISPRO 6 UNITS: 100 INJECTION, SOLUTION INTRAVENOUS; SUBCUTANEOUS at 21:48

## 2017-09-20 RX ADMIN — BACLOFEN 10 MG: 10 TABLET ORAL at 16:22

## 2017-09-20 RX ADMIN — HEPARIN SODIUM 5000 UNITS: 5000 INJECTION, SOLUTION INTRAVENOUS; SUBCUTANEOUS at 06:00

## 2017-09-20 RX ADMIN — ASPIRIN 81 MG 81 MG: 81 TABLET ORAL at 09:34

## 2017-09-20 NOTE — PLAN OF CARE
Problem: Patient Care Overview (Adult)  Goal: Plan of Care Review  Outcome: Ongoing (interventions implemented as appropriate)    09/20/17 3508   Coping/Psychosocial Response Interventions   Plan Of Care Reviewed With patient   Outcome Evaluation   Outcome Summary/Follow up Plan Hemaglobin is 6.6, intiated transfusion. Patient needs 2 units, infused over 4 hours each. Patient refusing TEDs, and folwy was placed today due to urinary retention.    Patient Care Overview   Progress no change       Goal: Adult Individualization and Mutuality  Outcome: Ongoing (interventions implemented as appropriate)  Goal: Discharge Needs Assessment  Outcome: Ongoing (interventions implemented as appropriate)    Problem: Pneumonia (Adult)  Goal: Signs and Symptoms of Listed Potential Problems Will be Absent or Manageable (Pneumonia)  Outcome: Ongoing (interventions implemented as appropriate)    Problem: Infection, Risk/Actual (Adult)  Goal: Identify Related Risk Factors and Signs and Symptoms  Outcome: Ongoing (interventions implemented as appropriate)  Goal: Infection Prevention/Resolution  Outcome: Ongoing (interventions implemented as appropriate)    Problem: Fall Risk (Adult)  Goal: Identify Related Risk Factors and Signs and Symptoms  Outcome: Ongoing (interventions implemented as appropriate)

## 2017-09-20 NOTE — THERAPY TREATMENT NOTE
Acute Care - Physical Therapy Treatment Note  [facility]     Patient Name: Jennifer Oliveira  : 1940  MRN: [id number]  Today's Date: 2017  Onset of Illness/Injury or Date of Surgery Date: 17  Date of Referral to PT: 17  Referring Physician: ARSLAN Gonzales    Admit Date: 2017    Visit Dx:    ICD-10-CM ICD-9-CM   1. Pneumonia of right lower lobe due to infectious organism J18.9 483.8   2. Impaired mobility and ADLs Z74.09 799.89   3. Impaired functional mobility, balance, gait, and endurance Z74.09 V49.89     Patient Active Problem List   Diagnosis   • Normochromic normocytic anemia   • Chronic diastolic heart failure   • Diabetes type 2, uncontrolled   • Hypothyroid   • Chronic kidney disease (CKD), stage III (moderate)   • GERD (gastroesophageal reflux disease) with hx of gastritis    • h/o stroke with right hemiplegia   • Hypertension   • Disease of thyroid gland   • Diastolic heart failure   • Type 2 diabetes mellitus   • KRIS (acute kidney injury)   • Nausea and vomiting   • Right lower lobe pneumonia   • Weakness   • Acute respiratory failure with hypoxia   • Hyperkalemia               Adult Rehabilitation Note       17 1036 17 0841 17 0924    Rehab Assessment/Intervention    Discipline physical therapy assistant  -AS occupational therapist  -CL physical therapist  -KM    Document Type therapy note (daily note)  -AS therapy note (daily note)  -CL therapy note (daily note)  -KM    Subjective Information agree to therapy;complains of;weakness;fatigue  -AS agree to therapy;complains of;weakness;fatigue  -CL agree to therapy  -KM    Patient Effort, Rehab Treatment adequate  -AS good  -CL adequate  -KM    Precautions/Limitations fall precautions;oxygen therapy device and L/min;other (see comments)   h/o CVA with right residual HP  -AS fall precautions;oxygen therapy device and L/min   Hi-[place] O2, residual R sided weakness  -CL fall precautions;oxygen therapy device and  L/min  -KM    Specific Treatment Considerations   --   high flow O2  -KM    Patient Response to Treatment   --   remote CVA  -KM    Recorded by [AS] Anel Rizvi PTA [CL] Trinidad Nguyen OT [KM] Abimbola Cho, PT    Vital Signs    Pre Systolic BP Rehab  --   VSS, RN cleared for tx.   -CL     Pre SpO2 (%)   93  -KM    O2 Delivery Pre Treatment   supplemental O2  -KM    Intra SpO2 (%)   92  -KM    O2 Delivery Intra Treatment   supplemental O2  -KM    Post SpO2 (%)   93  -KM    O2 Delivery Post Treatment   supplemental O2  -KM    Recorded by  [CL] Trinidad Nguyen OT [KM] Abimbola Cho, PT    Pain Assessment    Pain Assessment Valles-Rosas FACES  -AS Valles-Baker FACES  -CL No/denies pain  -KM    Valles-Baker FACES Pain Rating 2  -AS 2  -CL     Pain Score  2  -CL     Post Pain Score 2  -AS 2  -CL     Pain Type Chronic pain  -AS Chronic pain  -CL     Pain Location Knee  -AS Knee  -CL     Pain Orientation Right  -AS Right  -CL     Pain Intervention(s) Repositioned;Ambulation/increased activity  -AS Repositioned;Ambulation/increased activity  -CL     Response to Interventions tolerated  -AS Tolerated.   -CL     Recorded by [AS] Anel Rizvi PTA [CL] Trinidad Nguyen OT [KM] Abimbola Cho, ELO    Cognitive Assessment/Intervention    Current Cognitive/Communication Assessment functional  -AS functional  -CL functional  -KM    Orientation Status oriented to;person;place  -AS oriented to;person;place;situation  -CL oriented to;person;place  -KM    Follows Commands/Answers Questions 100% of the time;able to follow single-step instructions;needs cueing  -% of the time;needs cueing;needs increased time;needs repetition  -CL able to follow single-step instructions;100% of the time  -KM    Personal Safety mild impairment;decreased awareness, need for assist;decreased awareness, need for safety;decreased insight to deficits  -AS mild impairment;decreased awareness, need for assist;decreased awareness, need  for safety  -CL mild impairment  -KM    Personal Safety Interventions fall prevention program maintained;gait belt;nonskid shoes/slippers when out of bed;other (see comments)   exit alarm  -AS fall prevention program maintained;gait belt;nonskid shoes/slippers when out of bed  -CL fall prevention program maintained  -KM    Recorded by [AS] Anel Rizvi PTA [CL] Trinidad Nguyen OT [KM] Abimbola Cho, PT    Bed Mobility, Assessment/Treatment    Bed Mobility, Assistive Device  draw sheet;bed rails  -CL draw sheet;bed rails  -KM    Bed Mob, Supine to Sit, [place]  maximum assist (25% patient effort);2 person assist required;verbal cues required  -CL maximum assist (25% patient effort);2 person assist required  -KM    Bed Mob, Sit to Supine, [place]  maximum assist (25% patient effort);2 person assist required;verbal cues required  -CL     Bed Mobility, Safety Issues   decreased use of arms for pushing/pulling;decreased use of legs for bridging/pushing;impaired trunk control for bed mobility  -KM    Bed Mobility, Comment deferred as patient requested no OOB do to weakness  -AS VCs for HP/sequencing. Upon sitting EOB, pt w/ LOB to L lateral and posteriorly.   -CL     Recorded by [AS] Anel Rizvi PTA [CL] Trinidad Nguyen OT [KM] Abimbola Cho, PT    Transfer Assessment/Treatment    Transfers, Bed-Chair [place] not tested  -AS dependent (less than 25% patient effort);2 person assist required;verbal cues required  -CL maximum assist (25% patient effort);2 person assist required  -KM    Transfers, Bed-Chair-Bed, Assist Device  mechanical lift  -CL     Transfers, Sit-Stand [place]  maximum assist (25% patient effort);2 person assist required;verbal cues required  -CL maximum assist (25% patient effort);2 person assist required  -KM    Transfers, Stand-Sit [place]  maximum assist (25% patient effort);2 person assist required;verbal cues required  -CL maximum assist (25% patient  effort);2 person assist required  -KM    Transfer, Safety Issues   weight-shifting ability decreased;sequencing ability decreased   sit pivot sit, difiiculty side stepping with l leg  -KM    Transfer, Comment  Pt attempted STS x3 reps from EOB w/ BUE support and B feet/knees blocked, however pt unable to clear hips high enough from EOB to t/f to chair.  -CL     Recorded by [AS] Anel Rizvi PTA [CL] Trinidad Nguyen OT [KM] Abimbola Cho, PT    Gait Assessment/Treatment    Gait, [place] Level not tested  -AS  unable to perform  -KM    Recorded by [AS] Anel Rizvi PTA  [KM] Abimbola Cho, PT    ADL Assessment/Intervention    Additional Documentation  Self-Feeding Assessment/Training (Group)  -CL     Recorded by  [CL] Trinidad Nguyen OT     Self-Feeding Assessment/Training    Self-Feeding Assess/Train, Position  supported sitting  -CL     Self-Feeding Assess/Train, [place]  set up required  -CL     Self-Feeding Assess/Train, Comment  pt requires set up, however no assist needed for self-feeding.   -CL     Recorded by  [CL] Trinidad Nguyen OT     Balance Skills Training    Sitting-Level of Assistance  Moderate assistance   static sitting at EOB, L lateral and posterior lean  -CL Contact guard  -KM    Sitting-Balance Support  Left upper extremity supported  -CL Feet supported;Left upper extremity supported  -KM    Sitting-Balance Activities  Forward lean;Lateral lean;Reaching for objects;Trunk control activities  -CL Trunk control activities  -KM    Sitting # of Minutes   --   8  -KM    Standing-Level of Assistance  Maximum assistance;x2  -CL Maximum assistance;x2  -KM    Static Standing Balance Support  Right upper extremity supported;Left upper extremity supported  -CL     Standing-Balance Activities  Weight Shift A-P  -CL     Recorded by  [CL] Trinidad Nguyen OT [KM] Abimbola Cho, PT    Therapy Exercises    Right Lower Extremity PROM:;10 reps;supine;ankle pumps/circles;heel  slides;hip abduction/adduction  -AS  PROM:;10 reps;supine;ankle pumps/circles;calf stretch;heel slides;hip abduction/adduction  -KM    Left Lower Extremity AROM:;10 reps;supine;ankle pumps/circles;hip abduction/adduction;heel slides  -AS  AAROM:;10 reps;supine;ankle pumps/circles;calf stretch;heel slides;hip abduction/adduction  -KM    Right Upper Extremity PROM:;10 reps;supine;elbow flexion/extension;shoulder abduction/adduction;shoulder extension/flexion  -AS PROM:;5 reps;elbow flexion/extension;hand pumps;pronation/supination   wrist F/E  -CL     Left Upper Extremity AROM:;10 reps;supine;elbow flexion/extension;shoulder abduction/adduction;shoulder extension/flexion  -AS 10 reps;AROM:;elbow flexion/extension;hand pumps;shoulder extension/flexion   isometrics  -CL     Recorded by [AS] Anel Rizvi PTA [CL] Trinidad Nguyen OT [KM] Abimbola Cho PT    Positioning and Restraints    Pre-Treatment Position in bed  -AS in bed  -CL in bed  -KM    Post Treatment Position bed  -AS chair  -CL chair  -KM    In Bed supine;call light within reach;exit alarm on;encouraged to call for assist;RUE elevated;LUE elevated  -AS      In Chair  notified nsg;reclined;call light within reach;encouraged to call for assist;exit alarm on;RUE elevated;LUE elevated;waffle cushion;on mechanical lift sling;legs elevated  -CL sitting;call light within reach;encouraged to call for assist;exit alarm on;on mechanical lift sling  -KM    Recorded by [AS] Anel Rizvi PTA [CL] Trinidad Nguyen OT [KM] Abimbola Cho PT      User Key  (r) = Recorded By, (t) = Taken By, (c) = Cosigned By    Initials Name Effective Dates     Abimbola Cho PT 06/19/15 -     AS Anel Rizvi PTA 06/22/15 -     CL Trinidad Nguyen OT 06/08/16 -                 IP PT Goals       09/20/17 1054 09/18/17 1053 09/13/17 1142    Bed Mobility PT LTG    Bed Mobility PT LTG, Date Goal Reviewed 09/20/17  -AS  09/13/17  -AS    Bed Mobility PT LTG,  Outcome goal ongoing  -AS goal ongoing  -KM goal ongoing  -AS    Transfer Training PT LTG    Transfer Training PT  LTG, Date Goal Reviewed 09/20/17  -AS  09/13/17  -AS    Transfer Training PT LTG, Outcome goal ongoing  -AS goal ongoing  -KM goal ongoing  -AS    Gait Training PT LTG    Gait Training Goal PT LTG, Date Goal Reviewed 09/20/17  -AS  09/13/17  -AS    Gait Training Goal PT LTG, Outcome goal ongoing  -AS goal ongoing  -KM goal ongoing  -AS      09/08/17 1604          Bed Mobility PT LTG    Bed Mobility PT LTG, Outcome goal ongoing  -SJ      Transfer Training PT LTG    Transfer Training PT LTG, Outcome goal ongoing  -SJ      Gait Training PT LTG    Gait Training Goal PT LTG, Outcome goal ongoing  -SJ        User Key  (r) = Recorded By, (t) = Taken By, (c) = Cosigned By    Initials Name Provider Type    EMMA Nur, PT Physical Therapist    KM Abimbola Cho, PT Physical Therapist    AS Anel Rizvi, PTA Physical Therapy Assistant          Physical Therapy Education     Title: PT OT SLP Therapies (Active)     Topic: Physical Therapy (Active)     Point: Mobility training (Active)    Learning Progress Summary    Learner Readiness Method Response Comment Documented by Status   Patient Acceptance E NR  AS 09/20/17 1054 Active    Acceptance E VU instructed on plan of care and transfer technique  09/18/17 1056 Done    Acceptance E VU  MS 09/15/17 1520 Done    Acceptance E,D NR   09/14/17 1146 Active    Acceptance E NR  AS 09/13/17 1142 Active    Acceptance E,D NR   09/11/17 1618 Active    Acceptance E,D NR   09/08/17 1605 Active    Eager E,D NR   09/06/17 1036 Active               Point: Home exercise program (Active)    Learning Progress Summary    Learner Readiness Method Response Comment Documented by Status   Patient Acceptance E NR  AS 09/20/17 1054 Active    Acceptance E VU instructed on plan of care and transfer technique  09/18/17 1056 Done    Acceptance E,D NR   09/14/17  1146 Active    Acceptance E NR  AS 09/13/17 1142 Active    Acceptance E,D NR   09/11/17 1618 Active    Acceptance E,D NR   09/08/17 1605 Active    Eager E,D NR   09/06/17 1036 Active               Point: Body mechanics (Active)    Learning Progress Summary    Learner Readiness Method Response Comment Documented by Status   Patient Acceptance E NR  AS 09/20/17 1054 Active    Acceptance E VU instructed on plan of care and transfer technique  09/18/17 1056 Done    Acceptance E,D NR   09/14/17 1146 Active    Acceptance E NR  AS 09/13/17 1142 Active    Acceptance E,D NR   09/11/17 1618 Active    Acceptance E,D NR   09/08/17 1605 Active    Eager E,D NR   09/06/17 1036 Active               Point: Precautions (Active)    Learning Progress Summary    Learner Readiness Method Response Comment Documented by Status   Patient Acceptance E NR  AS 09/20/17 1054 Active    Acceptance E VU instructed on plan of care and transfer technique  09/18/17 1056 Done    Acceptance E,D NR   09/14/17 1146 Active    Acceptance E NR  AS 09/13/17 1142 Active    Acceptance E,D NR   09/11/17 1618 Active    Acceptance E,D NR   09/08/17 1605 Active    Eager E,D NR   09/06/17 1036 Active                      User Key     Initials Effective Dates Name Provider Type Discipline     06/19/15 -  Winter Nur, PT Physical Therapist PT     06/19/15 -  Abimbola Cho, PT Physical Therapist PT     06/19/15 -  Lou Mcgrath, PT Physical Therapist PT    AS 06/22/15 -  Anel Rizvi, PTA Physical Therapy Assistant PT    MS 06/16/16 -  Darcy Colin, RN Registered Nurse Nurse                    PT Recommendation and Plan  Anticipated Discharge Disposition: inpatient rehabilitation facility (pulm rehab)  PT Frequency: daily  Plan of Care Review  Plan Of Care Reviewed With: patient  Progress: no change  Outcome Summary/Follow up Plan: patient did well with B LE/ UE exercises, did not perform OOB activity as patient  with increased weakness and fatigue.          Outcome Measures       09/20/17 1036 09/19/17 0841 09/18/17 0924    How much help from another person do you currently need...    Turning from your back to your side while in flat bed without using bedrails? 2  -AS  2  -KM    Moving from lying on back to sitting on the side of a flat bed without bedrails? 2  -AS  2  -KM    Moving to and from a bed to a chair (including a wheelchair)? 2  -AS  2  -KM    Standing up from a chair using your arms (e.g., wheelchair, bedside chair)? 2  -AS  2  -KM    Climbing 3-5 steps with a railing? 1  -AS  1  -KM    To walk in hospital room? 1  -AS  1  -KM    AM-PAC 6 Clicks Score 10  -AS  10  -KM    How much help from another is currently needed...    Putting on and taking off regular lower body clothing?  1  -CL     Bathing (including washing, rinsing, and drying)  1  -CL     Toileting (which includes using toilet bed pan or urinal)  1  -CL     Putting on and taking off regular upper body clothing  1  -CL     Taking care of personal grooming (such as brushing teeth)  2  -CL     Eating meals  3  -CL     Score  9  -CL     Functional Assessment    Outcome Measure Options AM-PAC 6 Clicks Basic Mobility (PT)  -AS AM-PAC 6 Clicks Daily Activity (OT)  -CL AM-PAC 6 Clicks Basic Mobility (PT)  -KM      User Key  (r) = Recorded By, (t) = Taken By, (c) = Cosigned By    Initials Name Provider Type    KM Abimbola Cho, PT Physical Therapist    AS Anel Rizvi PTA Physical Therapy Assistant    CL Trinidad Nguyen, OT Occupational Therapist           Time Calculation:         PT Charges       09/20/17 1055          Time Calculation    Start Time 1036  -AS      PT Received On 09/20/17  -AS      PT Goal Re-Cert Due Date 09/28/17  -AS      Time Calculation- PT    Total Timed Code Minutes- PT 15 minute(s)  -AS        User Key  (r) = Recorded By, (t) = Taken By, (c) = Cosigned By    Initials Name Provider Type    AS Anel Rizvi PTA Physical  Therapy Assistant          Therapy Charges for Today     Code Description Service Date Service Provider Modifiers Qty    [id number] HC PT THER PROC EA 15 MIN 9/20/2017 Anel Rizvi, PTA GP 1          PT G-Codes  Outcome Measure Options: AM-PAC 6 Clicks Basic Mobility (PT)    Anel Rizvi, VIJAY  9/20/2017

## 2017-09-20 NOTE — PLAN OF CARE
Problem: Patient Care Overview (Adult)  Goal: Plan of Care Review  Outcome: Ongoing (interventions implemented as appropriate)    09/20/17 1054   Coping/Psychosocial Response Interventions   Plan Of Care Reviewed With patient   Outcome Evaluation   Outcome Summary/Follow up Plan patient did well with B LE/ UE exercises, did not perform OOB activity as patient with increased weakness and fatigue.   Patient Care Overview   Progress no change         Problem: Inpatient Physical Therapy  Goal: Bed Mobility Goal LTG- PT  Outcome: Ongoing (interventions implemented as appropriate)    09/06/17 1037 09/20/17 1054   Bed Mobility PT LTG   Bed Mobility PT LTG, Date Established 09/06/17 --    Bed Mobility PT LTG, Time to Achieve 1 wk --    Bed Mobility PT LTG, Activity Type all bed mobility --    Bed Mobility PT LTG, [place] Level moderate assist (50% patient effort);2 person assist required --    Bed Mobility PT LTG, Date Goal Reviewed --  09/20/17   Bed Mobility PT LTG, Outcome --  goal ongoing       Goal: Transfer Training Goal 1 LTG- PT  Outcome: Ongoing (interventions implemented as appropriate)    09/06/17 1037 09/20/17 1054   Transfer Training PT LTG   Transfer Training PT LTG, Date Established 09/06/17 --    Transfer Training PT LTG, Time to Achieve 1 wk --    Transfer Training PT LTG, Activity Type bed to chair /chair to bed;sit to stand/stand to sit --    Transfer Training PT LTG, [place] Level maximum assist (25% patient effort);2 person assist required --    Transfer Training PT LTG, Assist Device walker, homa --    Transfer Training PT LTG, Date Goal Reviewed --  09/20/17   Transfer Training PT LTG, Outcome --  goal ongoing       Goal: Gait Training Goal LTG- PT  Outcome: Ongoing (interventions implemented as appropriate)    09/06/17 1037 09/20/17 1054   Gait Training PT LTG   Gait Training Goal PT LTG, Date Established 09/06/17 --    Gait Training Goal PT LTG, Time to Achieve 1 wk --    Gait Training  Goal PT LTG, [place] Level maximum assist (25% patient effort);2 person assist required  (stable VS; RLE brace) --    Gait Training Goal PT LTG, Assist Device walker, homa --    Gait Training Goal PT LTG, Distance to Achieve 3 --    Gait Training Goal PT LTG, Date Goal Reviewed --  09/20/17   Gait Training Goal PT LTG, Outcome --  goal ongoing

## 2017-09-20 NOTE — PROGRESS NOTES
"      HOSPITALIST DAILY PROGRESS NOTE    Chief Complaint: SOB    Subjective   SUBJECTIVE/OVERNIGHT EVENTS   Nose bleeding, nasal canula humidified, no fever, no productive cough. Denies melena or brpbr. No abd pain    Review of Systems:  General - No fevers, no chills  CVS - No chest pain, no palpitations  Resp - No cough, +dyspnea  GI - No N/V/D, no abd pain    Objective   OBJECTIVE   I have reviewed the vital signs.  /52 (BP Location: Left arm, Patient Position: Lying)  Pulse 65  Temp 97.9 °F (36.6 °C) (Oral)   Resp 18  Ht 63\" (160 cm)  Wt 192 lb 8 oz (87.3 kg)  SpO2 91%  BMI 34.1 kg/m2    Physical Exam:  General - NAD, pt was sitting in chair  HEENT - EOMI, PERRLnasal passages no overt lesions but bilateral blood noted, nasal canula in place, hearing intact  CVS - RRR, S1 S2, no rubs, gallops or murmurs, 2s cap refill, 2+ peripheral edema, 2+ pulses  Resp - CTAB, no crackles and wheezes   GI - +BS, soft, ND/NT  MSK - No joint pain or joint edema  Neuro - Awake and oriented, follows commands, interactive, moves all extremities equally    Results:  I have reviewed the labs, culture data, radiology results, and diagnostic studies.      Results from last 7 days  Lab Units 09/18/17  0630 09/14/17  0516   WBC 10*3/mm3 12.58* 8.22   HEMOGLOBIN g/dL 7.5* 8.0*   HEMATOCRIT % 23.9* 24.8*   PLATELETS 10*3/mm3 271 277       Results from last 7 days  Lab Units 09/20/17  0545   SODIUM mmol/L 139   POTASSIUM mmol/L 4.7   CHLORIDE mmol/L 108   CO2 mmol/L 23.0   BUN mg/dL 72*   CREATININE mg/dL 2.20*   GLUCOSE mg/dL 133*   CALCIUM mg/dL 8.2*       Culture Data:  Cultures:  reviewed       I have reviewed the medications.    aspirin 81 mg Oral Daily   atorvastatin 10 mg Oral Daily   baclofen 10 mg Oral TID   carvedilol 25 mg Oral Q12H   clopidogrel 75 mg Oral Daily   gabapentin 900 mg Oral Nightly   guaiFENesin 600 mg Oral Q12H   heparin (porcine) 5,000 Units Subcutaneous Q8H   hydrALAZINE 75 mg Oral Q8H   insulin " detemir 25 Units Subcutaneous Q12H   insulin lispro 0-7 Units Subcutaneous 4x Daily With Meals & Nightly   insulin lispro 8 Units Subcutaneous TID With Meals   ipratropium-albuterol 3 mL Nebulization 4x Daily - RT   levothyroxine 200 mcg Oral Daily   nystatin 5 mL Oral 4x Daily   oxymetazoline 2 spray Each Nare BID   pantoprazole 40 mg Oral Daily   predniSONE 10 mg Oral Daily With Breakfast   saccharomyces boulardii 250 mg Oral BID   ascorbic acid 500 mg Oral Daily With Breakfast       Assessment/Plan   ASSESSMENT/PLAN    Principal Problem:    Right lower lobe pneumonia  Active Problems:    Normochromic normocytic anemia    Chronic diastolic heart failure    Chronic kidney disease (CKD), stage III (moderate)    h/o stroke with right hemiplegia    Disease of thyroid gland    Type 2 diabetes mellitus    KRIS (acute kidney injury)    Nausea and vomiting    Weakness    Acute respiratory failure with hypoxia    *AoC Hypoxic Respiratory Failure (multifacotrial including pneumonia, diastolic heart failure)  *RLL Pna (s/p 14 days abx, complete 9/18/17)  *COPD    -no current wheezing, steroid/nebs weaned  *Acute DHF (echo 4/20/17 normal EF w/ diastolic dysfxn)   -recent diuresis w/ bumex  *AoCKI (baseline cr 1.5-1.6)-->worsening  *Proteinuria  *epistaxis w/ nasal oxygen  *Iron def Anemia, guaiac positive on 9/13   -hgb 7.5 9/18/17  *Hx GI bleeding (egd w/ avm, s/p apc may 2015)   -cont ppi  *DM    Plan:  Stat H&H, transfuse hgb <7.5  Afrin nasal spray, try mask for oxygen if still bleeding; consider ent eval if continues  -cxr, bnp,hgb, bmp in a.m.  -wean oxygen as tolerates  -renal following regarding diuresis/volume status/kris  -completed abx for pna  -decrease levemir to 18units bid (from 25units bid) and 5 units tid meals (from 8 units) as renal fxn worsening; titrate insulins prn  -stop sq heparin til bleeding stopped;scd's/mechanical    Stepan Solo MD  09/20/17  11:57 AM

## 2017-09-20 NOTE — PROGRESS NOTES
"   LOS: 15 days    Patient Care Team:  Samuel Buck MD as PCP - General (Family Medicine)    Subjective     Stable.  Better overnight    Objective     Vital Signs:  Blood pressure 127/52, pulse 65, temperature 97.9 °F (36.6 °C), temperature source Oral, resp. rate 18, height 63\" (160 cm), weight 192 lb 8 oz (87.3 kg), SpO2 91 %.    Flowsheet Rows         First Filed Value    Admission Height  63\" (160 cm) Documented at 09/05/2017 1009    Admission Weight  170 lb (77.1 kg) Documented at 09/05/2017 1009          09/19 0701 - 09/20 0700  In: 840 [P.O.:840]  Out: 1500 [Urine:1500]    Physical Exam:    GENERAL: WD elderly female. NAD.   CV: RRR + edema  LUNGS:  Quiet,  Nonlabored resp.  Lungs CTA  ABDOMEN: Soft, nontender, nondistended. + BS  : no palp bladder, + wolfe  SKIN: Warm and dry without rash    Labs:    Results from last 7 days  Lab Units 09/18/17  0630 09/14/17  0516   WBC 10*3/mm3 12.58* 8.22   HEMOGLOBIN g/dL 7.5* 8.0*   PLATELETS 10*3/mm3 271 277       Results from last 7 days  Lab Units 09/20/17  0545 09/19/17  0532 09/18/17  0630 09/17/17  0612   SODIUM mmol/L 139 139 140 141   POTASSIUM mmol/L 4.7 4.9 4.9 4.8   CHLORIDE mmol/L 108 109 111* 111*   CO2 mmol/L 23.0 22.0 24.0 23.0   BUN mg/dL 72* 72* 65* 71*   CREATININE mg/dL 2.20* 2.00* 1.90* 1.60*   CALCIUM mg/dL 8.2* 8.3* 8.7 8.4*   PHOSPHORUS mg/dL 5.3* 5.4*  --   --    ALBUMIN g/dL 2.90* 2.90*  --   --                Results from last 7 days  Lab Units 09/19/17  1631   COLOR UA  Yellow   CLARITY UA  Cloudy*   PH, URINE  <=5.0   SPECIFIC GRAVITY, URINE  1.018   GLUCOSE UA  Negative   KETONES UA  Negative   BILIRUBIN UA  Negative   PROTEIN UA  100 mg/dL (2+)*   BLOOD UA  Negative   LEUKOCYTES UA  Small (1+)*   NITRITE UA  Negative       Estimated Creatinine Clearance: 22.4 mL/min (by C-G formula based on Cr of 2.2).    Urine Pr:Cr 3.5 gm    A/P:    ARF:  Cr  up further overnight.  Getting Bumex with improved UOP.    + distended bladder, but unsure if " post void.   Will repeat PVR. May need Phillips cath    CKD3:  Baseline 1.5-1.6 by report.  Renal u/s without hydro  .   Proteinuria: still 3.5 gm.  Past Ha1c ok.  Will get serology.       HTN: BP stable. .       PNA: on ABx     Anemia:  Hgb stable.  Tsat 9%.  Recheck s/p IVFe.     Edema: improved UOP with Bumex but Cr going up. + nephrotic range proteinuria with low albumin.      Babar Law MD  09/20/17  11:26 AM

## 2017-09-20 NOTE — PROGRESS NOTES
Continued Stay Note  [facility]     Patient Name: Jennifer Oliveira  MRN: [id number]  Today's Date: 9/20/2017    Admit Date: 9/5/2017          Discharge Plan       09/20/17 1105    Case Management/Social Work Plan    Plan [facility]    Additional Comments Updated Xoimara with [facility] on Ms. Oliveira being on 4L of oxygen now and getting more medically ready for rehab, stated she would send updated clinicals to [facility] physicians, she was not sure about acute vs subacute and will call this CM back after reviewing records.  CM will cont to follow.                Discharge Codes     None        Expected Discharge Date and Time     Expected Discharge Date Expected Discharge Time    Sep 22, 2017             Nida Burns RN

## 2017-09-20 NOTE — PLAN OF CARE
Problem: Patient Care Overview (Adult)  Goal: Plan of Care Review  Outcome: Ongoing (interventions implemented as appropriate)    09/20/17 0516   Coping/Psychosocial Response Interventions   Plan Of Care Reviewed With patient   Outcome Evaluation   Outcome Summary/Follow up Plan Pt tolerated CPAP throughout the night. No c/o pain or discomfort. VSS. Will continue to monitor.   Patient Care Overview   Progress progress toward functional goals as expected         Problem: Fall Risk (Adult)  Goal: Identify Related Risk Factors and Signs and Symptoms  Outcome: Ongoing (interventions implemented as appropriate)

## 2017-09-21 ENCOUNTER — APPOINTMENT (OUTPATIENT)
Dept: GENERAL RADIOLOGY | Facility: HOSPITAL | Age: 77
End: 2017-09-21

## 2017-09-21 PROBLEM — J44.9 COPD (CHRONIC OBSTRUCTIVE PULMONARY DISEASE) (HCC): Status: ACTIVE | Noted: 2017-09-21

## 2017-09-21 PROBLEM — R80.9 PROTEINURIA: Status: ACTIVE | Noted: 2017-09-21

## 2017-09-21 PROBLEM — Z87.19 HISTORY OF GI BLEED: Status: ACTIVE | Noted: 2017-09-21

## 2017-09-21 PROBLEM — R76.8 ANA POSITIVE: Status: ACTIVE | Noted: 2017-09-21

## 2017-09-21 PROBLEM — D50.9 IRON DEFICIENCY ANEMIA: Status: ACTIVE | Noted: 2017-09-21

## 2017-09-21 PROBLEM — R04.0 EPISTAXIS: Status: ACTIVE | Noted: 2017-09-21

## 2017-09-21 LAB
ALBUMIN 24H MFR UR ELPH: 67.4 %
ALBUMIN SERPL-MCNC: 2.4 G/DL (ref 2.9–4.4)
ALBUMIN/GLOB SERPL: 0.8 {RATIO} (ref 0.7–1.7)
ALPHA1 GLOB 24H MFR UR ELPH: 0.9 %
ALPHA1 GLOB FLD ELPH-MCNC: 0.2 G/DL (ref 0–0.4)
ALPHA2 GLOB 24H MFR UR ELPH: 7.4 %
ALPHA2 GLOB SERPL ELPH-MCNC: 1.2 G/DL (ref 0.4–1)
ANION GAP SERPL CALCULATED.3IONS-SCNC: 5 MMOL/L (ref 3–11)
B-GLOBULIN MFR UR ELPH: 11.5 %
B-GLOBULIN SERPL ELPH-MCNC: 0.7 G/DL (ref 0.7–1.3)
BNP SERPL-MCNC: 274 PG/ML (ref 0–100)
BUN BLD-MCNC: 67 MG/DL (ref 9–23)
BUN/CREAT SERPL: 37.2 (ref 7–25)
CALCIUM SPEC-SCNC: 8 MG/DL (ref 8.7–10.4)
CHLORIDE SERPL-SCNC: 108 MMOL/L (ref 99–109)
CO2 SERPL-SCNC: 24 MMOL/L (ref 20–31)
CREAT BLD-MCNC: 1.8 MG/DL (ref 0.6–1.3)
GAMMA GLOB 24H MFR UR ELPH: 12.8 %
GAMMA GLOB SERPL ELPH-MCNC: 0.8 G/DL (ref 0.4–1.8)
GFR SERPL CREATININE-BSD FRML MDRD: 27 ML/MIN/1.73
GLOBULIN SER CALC-MCNC: 3 G/DL (ref 2.2–3.9)
GLUCOSE BLD-MCNC: 137 MG/DL (ref 70–100)
GLUCOSE BLDC GLUCOMTR-MCNC: 156 MG/DL (ref 70–130)
GLUCOSE BLDC GLUCOMTR-MCNC: 237 MG/DL (ref 70–130)
GLUCOSE BLDC GLUCOMTR-MCNC: 303 MG/DL (ref 70–130)
GLUCOSE BLDC GLUCOMTR-MCNC: 314 MG/DL (ref 70–130)
GLUCOSE BLDC GLUCOMTR-MCNC: 355 MG/DL (ref 70–130)
HCT VFR BLD AUTO: 28.5 % (ref 34.5–44)
HCT VFR BLD AUTO: 31.1 % (ref 34.5–44)
HCT VFR BLD AUTO: 31.2 % (ref 34.5–44)
HGB BLD-MCNC: 9.4 G/DL (ref 11.5–15.5)
HGB BLD-MCNC: 9.9 G/DL (ref 11.5–15.5)
HGB BLD-MCNC: 9.9 G/DL (ref 11.5–15.5)
Lab: ABNORMAL
Lab: NORMAL
M-SPIKE, UR: NORMAL %
M-SPIKE: ABNORMAL G/DL
POTASSIUM BLD-SCNC: 4.9 MMOL/L (ref 3.5–5.5)
PROT SERPL-MCNC: 5.4 G/DL (ref 6–8.5)
PROT UR-MCNC: 163.7 MG/DL
SODIUM BLD-SCNC: 137 MMOL/L (ref 132–146)

## 2017-09-21 PROCEDURE — 94640 AIRWAY INHALATION TREATMENT: CPT

## 2017-09-21 PROCEDURE — 85014 HEMATOCRIT: CPT | Performed by: INTERNAL MEDICINE

## 2017-09-21 PROCEDURE — 99233 SBSQ HOSP IP/OBS HIGH 50: CPT | Performed by: INTERNAL MEDICINE

## 2017-09-21 PROCEDURE — 71010 HC CHEST PA OR AP: CPT

## 2017-09-21 PROCEDURE — 80048 BASIC METABOLIC PNL TOTAL CA: CPT | Performed by: INTERNAL MEDICINE

## 2017-09-21 PROCEDURE — 83880 ASSAY OF NATRIURETIC PEPTIDE: CPT | Performed by: INTERNAL MEDICINE

## 2017-09-21 PROCEDURE — 94799 UNLISTED PULMONARY SVC/PX: CPT

## 2017-09-21 PROCEDURE — 85018 HEMOGLOBIN: CPT | Performed by: INTERNAL MEDICINE

## 2017-09-21 PROCEDURE — 63710000001 INSULIN DETEMIR PER 5 UNITS: Performed by: INTERNAL MEDICINE

## 2017-09-21 PROCEDURE — 94668 MNPJ CHEST WALL SBSQ: CPT

## 2017-09-21 PROCEDURE — 82962 GLUCOSE BLOOD TEST: CPT

## 2017-09-21 PROCEDURE — 63710000001 PREDNISONE PER 5 MG: Performed by: HOSPITALIST

## 2017-09-21 PROCEDURE — 0W3Q7ZZ CONTROL BLEEDING IN RESPIRATORY TRACT, VIA NATURAL OR ARTIFICIAL OPENING: ICD-10-PCS | Performed by: OTOLARYNGOLOGY

## 2017-09-21 RX ORDER — POLYETHYLENE GLYCOL 3350 17 G/17G
17 POWDER, FOR SOLUTION ORAL DAILY
Status: DISCONTINUED | OUTPATIENT
Start: 2017-09-21 | End: 2017-09-24 | Stop reason: HOSPADM

## 2017-09-21 RX ORDER — PANTOPRAZOLE SODIUM 40 MG/1
40 TABLET, DELAYED RELEASE ORAL
Status: DISCONTINUED | OUTPATIENT
Start: 2017-09-21 | End: 2017-09-24 | Stop reason: HOSPADM

## 2017-09-21 RX ORDER — SENNA AND DOCUSATE SODIUM 50; 8.6 MG/1; MG/1
2 TABLET, FILM COATED ORAL DAILY
Status: DISCONTINUED | OUTPATIENT
Start: 2017-09-21 | End: 2017-09-24 | Stop reason: HOSPADM

## 2017-09-21 RX ORDER — BISACODYL 10 MG
10 SUPPOSITORY, RECTAL RECTAL 2 TIMES DAILY PRN
Status: DISCONTINUED | OUTPATIENT
Start: 2017-09-21 | End: 2017-09-24 | Stop reason: HOSPADM

## 2017-09-21 RX ADMIN — HYDRALAZINE HYDROCHLORIDE 75 MG: 25 TABLET ORAL at 21:56

## 2017-09-21 RX ADMIN — INSULIN DETEMIR 18 UNITS: 100 INJECTION, SOLUTION SUBCUTANEOUS at 11:01

## 2017-09-21 RX ADMIN — IPRATROPIUM BROMIDE AND ALBUTEROL SULFATE 3 ML: .5; 3 SOLUTION RESPIRATORY (INHALATION) at 13:39

## 2017-09-21 RX ADMIN — INSULIN LISPRO 5 UNITS: 100 INJECTION, SOLUTION INTRAVENOUS; SUBCUTANEOUS at 18:20

## 2017-09-21 RX ADMIN — BACLOFEN 10 MG: 10 TABLET ORAL at 06:02

## 2017-09-21 RX ADMIN — NYSTATIN 500000 UNITS: 100000 SUSPENSION ORAL at 14:04

## 2017-09-21 RX ADMIN — NYSTATIN 500000 UNITS: 100000 SUSPENSION ORAL at 09:25

## 2017-09-21 RX ADMIN — INSULIN LISPRO 2 UNITS: 100 INJECTION, SOLUTION INTRAVENOUS; SUBCUTANEOUS at 09:27

## 2017-09-21 RX ADMIN — IPRATROPIUM BROMIDE AND ALBUTEROL SULFATE 3 ML: .5; 3 SOLUTION RESPIRATORY (INHALATION) at 17:17

## 2017-09-21 RX ADMIN — BACLOFEN 10 MG: 10 TABLET ORAL at 14:11

## 2017-09-21 RX ADMIN — GUAIFENESIN 600 MG: 600 TABLET, EXTENDED RELEASE ORAL at 21:59

## 2017-09-21 RX ADMIN — Medication 2 TABLET: at 14:04

## 2017-09-21 RX ADMIN — PANTOPRAZOLE SODIUM 40 MG: 40 TABLET, DELAYED RELEASE ORAL at 06:02

## 2017-09-21 RX ADMIN — CARVEDILOL 25 MG: 12.5 TABLET, FILM COATED ORAL at 21:58

## 2017-09-21 RX ADMIN — LEVOTHYROXINE SODIUM 200 MCG: 100 TABLET ORAL at 06:02

## 2017-09-21 RX ADMIN — BACLOFEN 10 MG: 10 TABLET ORAL at 21:59

## 2017-09-21 RX ADMIN — OXYMETAZOLINE HYDROCHLORIDE 2 SPRAY: 5 SPRAY NASAL at 21:56

## 2017-09-21 RX ADMIN — HYDRALAZINE HYDROCHLORIDE 75 MG: 25 TABLET ORAL at 06:02

## 2017-09-21 RX ADMIN — NYSTATIN 500000 UNITS: 100000 SUSPENSION ORAL at 21:56

## 2017-09-21 RX ADMIN — Medication 250 MG: at 18:21

## 2017-09-21 RX ADMIN — NYSTATIN 500000 UNITS: 100000 SUSPENSION ORAL at 18:21

## 2017-09-21 RX ADMIN — CLOPIDOGREL BISULFATE 75 MG: 75 TABLET ORAL at 09:25

## 2017-09-21 RX ADMIN — GABAPENTIN 900 MG: 300 CAPSULE ORAL at 21:58

## 2017-09-21 RX ADMIN — INSULIN LISPRO 5 UNITS: 100 INJECTION, SOLUTION INTRAVENOUS; SUBCUTANEOUS at 22:00

## 2017-09-21 RX ADMIN — PANTOPRAZOLE SODIUM 40 MG: 40 TABLET, DELAYED RELEASE ORAL at 18:21

## 2017-09-21 RX ADMIN — Medication 250 MG: at 09:25

## 2017-09-21 RX ADMIN — INSULIN DETEMIR 18 UNITS: 100 INJECTION, SOLUTION SUBCUTANEOUS at 21:51

## 2017-09-21 RX ADMIN — CARVEDILOL 25 MG: 12.5 TABLET, FILM COATED ORAL at 09:26

## 2017-09-21 RX ADMIN — GUAIFENESIN 600 MG: 600 TABLET, EXTENDED RELEASE ORAL at 09:26

## 2017-09-21 RX ADMIN — OXYCODONE HYDROCHLORIDE AND ACETAMINOPHEN 500 MG: 500 TABLET ORAL at 09:26

## 2017-09-21 RX ADMIN — INSULIN LISPRO 3 UNITS: 100 INJECTION, SOLUTION INTRAVENOUS; SUBCUTANEOUS at 14:04

## 2017-09-21 RX ADMIN — HYDRALAZINE HYDROCHLORIDE 75 MG: 25 TABLET ORAL at 14:11

## 2017-09-21 RX ADMIN — PREDNISONE 10 MG: 10 TABLET ORAL at 09:26

## 2017-09-21 RX ADMIN — ASPIRIN 81 MG 81 MG: 81 TABLET ORAL at 09:25

## 2017-09-21 RX ADMIN — ATORVASTATIN CALCIUM 10 MG: 10 TABLET, FILM COATED ORAL at 21:59

## 2017-09-21 RX ADMIN — IPRATROPIUM BROMIDE AND ALBUTEROL SULFATE 3 ML: .5; 3 SOLUTION RESPIRATORY (INHALATION) at 19:32

## 2017-09-21 RX ADMIN — OXYMETAZOLINE HYDROCHLORIDE 2 SPRAY: 5 SPRAY NASAL at 11:01

## 2017-09-21 NOTE — PROGRESS NOTES
Adult Nutrition  Assessment/PES    Patient Name:  Jennifer Oliveira  YOB: 1940  MRN: [id number]  Admit Date:  9/5/2017    Assessment Date:  9/21/2017        Reason for Assessment       09/21/17 0818    Reason for Assessment    Reason For Assessment/Visit follow up protocol    Time Spent (min) 5                                Problem/Interventions:        Problem 1       09/21/17 0819    Nutrition Diagnoses Problem 1    Problem 1 Nutrition Appropriate for Condition at this Time    Signs/Symptoms (evidenced by) PO Intake    Percent (%) intake recorded 83 %    Over number of meals 3                          Education/Evaluation       09/21/17 0819    Monitor/Evaluation    Monitor Per protocol        Comments:      Electronically signed by:  Fabi Linares RD  09/21/17 8:19 AM

## 2017-09-21 NOTE — PLAN OF CARE
Problem: Patient Care Overview (Adult)  Goal: Plan of Care Review  Outcome: Ongoing (interventions implemented as appropriate)    09/21/17 0084   Coping/Psychosocial Response Interventions   Plan Of Care Reviewed With patient   Outcome Evaluation   Outcome Summary/Follow up Plan VSS. NSR on monitor. 2L NC. She has had a few nosebleeds but not as bad as before. H&H continues to improve.    Patient Care Overview   Progress improving

## 2017-09-21 NOTE — PROGRESS NOTES
"      HOSPITALIST DAILY PROGRESS NOTE    Chief Complaint: SOB    Subjective   SUBJECTIVE/OVERNIGHT EVENTS   Nose bleeding slowed down w/ afrin but restarted today. No chest pain. No productive cough. Dark hard stool today, no oily tarry stool. No brbpr. No n/v.    Review of Systems:  General - No fevers, no chills  CVS - No chest pain, no palpitations  Resp - No cough, +dyspnea  GI - No N/V/D, no abd pain  Negative except as above    Objective   OBJECTIVE   I have reviewed the vital signs.  /53 (BP Location: Left arm, Patient Position: Lying)  Pulse 61  Temp 97.5 °F (36.4 °C) (Oral)   Resp 18  Ht 63\" (160 cm)  Wt 188 lb 3.2 oz (85.4 kg)  SpO2 90%  BMI 33.34 kg/m2    Physical Exam:  General - NAD, pt was sitting in chair  HEENT - EOMI, PERRLnasal passages no overt lesions but bilateral blood noted, nasal canula in place, hearing intact  CVS - RRR, S1 S2, no rubs, gallops or murmurs, 2s cap refill, 2+ peripheral edema, 2+ pulses  Resp - CTAB, faint minimal bibasilar insp crackles, no wheezes, normal effort at rest  GI - +BS, soft, ND/NT  Extremity: 2+ edema legs  Neuro - Awake and oriented, follows commands, interactive, moves all extremities equally    Results:  I have reviewed the labs, culture data, radiology results, and diagnostic studies.      Results from last 7 days  Lab Units 09/21/17  0449 09/20/17  1226 09/18/17  0630   WBC 10*3/mm3  --   --  12.58*   HEMOGLOBIN g/dL 9.4* 6.6* 7.5*   HEMATOCRIT % 28.5* 20.8* 23.9*   PLATELETS 10*3/mm3  --   --  271       Results from last 7 days  Lab Units 09/21/17  0449   SODIUM mmol/L 137   POTASSIUM mmol/L 4.9   CHLORIDE mmol/L 108   CO2 mmol/L 24.0   BUN mg/dL 67*   CREATININE mg/dL 1.80*   GLUCOSE mg/dL 137*   CALCIUM mg/dL 8.0*       Culture Data:  Cultures:  reviewed       I have reviewed the medications.    aspirin 81 mg Oral Daily   atorvastatin 10 mg Oral Daily   baclofen 10 mg Oral TID   carvedilol 25 mg Oral Q12H   clopidogrel 75 mg Oral Daily "   gabapentin 900 mg Oral Nightly   guaiFENesin 600 mg Oral Q12H   hydrALAZINE 75 mg Oral Q8H   insulin detemir 18 Units Subcutaneous Q12H   insulin lispro 0-7 Units Subcutaneous 4x Daily With Meals & Nightly   insulin lispro 5 Units Subcutaneous TID With Meals   ipratropium-albuterol 3 mL Nebulization 4x Daily - RT   levothyroxine 200 mcg Oral Daily   nystatin 5 mL Oral 4x Daily   oxymetazoline 2 spray Each Nare Q12H   pantoprazole 40 mg Oral BID AC   polyethylene glycol 17 g Oral Daily   predniSONE 10 mg Oral Daily With Breakfast   saccharomyces boulardii 250 mg Oral BID   sennosides-docusate sodium 2 tablet Oral Daily   ascorbic acid 500 mg Oral Daily With Breakfast       Assessment/Plan   ASSESSMENT/PLAN    Principal Problem:    Right lower lobe pneumonia  Active Problems:    Acute respiratory failure with hypoxia    History of GI bleed    Epistaxis    Iron deficiency anemia    Chronic diastolic heart failure    Chronic kidney disease (CKD), stage III (moderate)    h/o stroke with right hemiplegia    Disease of thyroid gland    Type 2 diabetes mellitus    Acute blood loss anemia    KRIS (acute kidney injury)    Nausea and vomiting    Weakness    DALILA positive    Proteinuria    COPD (chronic obstructive pulmonary disease)  -------------------------------------------  *AoC Hypoxic Respiratory Failure (multifacotrial including pneumonia, diastolic heart failure)  *RLL Pna (s/p 14 days abx, complete 9/18/17)  *COPD    -no current wheezing, steroid/nebs weaned  *Acute DHF (echo 4/20/17 normal EF w/ diastolic dysfxn)   -recent diuresis w/ bumex  *AoCKI (baseline cr 1.5-1.6)-->worsening  *Proteinuria  *DALILA positive  *epistaxis w/ nasal oxygen  *Iron def Anemia, guaiac positive on 9/13   -hgb 7.5 9/18/17  *Hx GI bleeding (egd w/ avm, s/p apc may 2017)   -cont ppi, increased to bid  *DM  *Hx CVA w/ right hemiplegia  *hx PAD (s/p fem-pop 2016)  *Echo 4/2017: normal LV  ef  -------------------------------------------  Plan:  Interval: Received 2 units prbc 9/20/17 for hgb of 6.6 w/ overt right > left nares epistaxis (w/ humidified nasal oxygen)-->hgb up to 9.4 9/21/17; renal function improved (hgb down to 1.8 today). Dark/hard stool today, last po iron dose on 9/12.     -stop plavix (already stopped sq prophylactic heparin), restart once beeding issues resolved (epistaxis currently)  -continue asa 81mg for now (hx severe cva, pad)    -consult ENT regarding epistaxis as persists today, albeit better w/ afrin spray (? Cautery/packing); continue afrin    -q8h h&h  -a.m. Bmp, inr/ptt, iron profile    -bid ppi  -If hgb continues to drop after nose bleed ceases, then need to consider GI loss (prior avm s/p apc may 2017)    -wean oxygen as tolerates, cpap nightly; respiratory status stable/improved and completed antibiotics for pneumonia still diuresing prn per renal    -renal following, creatinine down today to 1.8 after 2 units blood; anca,spep, upep pending; diurese prn    -continue current levemir/humalog regimen and titrate prn      DVT prophylaxis: off sq heparin til bleeding stopped;scd's/mechanical at this point    Dispo:  has bed tentatively for Sunday at [facility] if medically stable. Epistaxis, anemia, renal function will all need to be stable/resolved prior to discharge    Stepan Solo MD  09/21/17  10:58 AM

## 2017-09-21 NOTE — PROGRESS NOTES
"   LOS: 16 days    Patient Care Team:  Samuel Buck MD as PCP - General (Family Medicine)    Subjective     Stable.  Better overnight    Objective     Vital Signs:  Blood pressure 144/53, pulse 61, temperature 97.5 °F (36.4 °C), temperature source Oral, resp. rate 18, height 63\" (160 cm), weight 188 lb 3.2 oz (85.4 kg), SpO2 90 %.    Flowsheet Rows         First Filed Value    Admission Height  63\" (160 cm) Documented at 09/05/2017 1009    Admission Weight  170 lb (77.1 kg) Documented at 09/05/2017 1009          09/20 0701 - 09/21 0700  In: 2980 [P.O.:1560]  Out: 2420 [Urine:2420]    Physical Exam:    GENERAL: WD elderly female. NAD.   CV: RRR + edema  LUNGS:  Quiet,  Nonlabored resp.  Lungs CTA  ABDOMEN: Soft, nontender, nondistended. + BS  : no palp bladder, + wolfe  SKIN: Warm and dry without rash    Labs:    Results from last 7 days  Lab Units 09/21/17  0449 09/20/17  1226 09/18/17  0630   WBC 10*3/mm3  --   --  12.58*   HEMOGLOBIN g/dL 9.4* 6.6* 7.5*   PLATELETS 10*3/mm3  --   --  271       Results from last 7 days  Lab Units 09/21/17  0449 09/20/17  0545 09/19/17  0532 09/18/17  0630   SODIUM mmol/L 137 139 139 140   POTASSIUM mmol/L 4.9 4.7 4.9 4.9   CHLORIDE mmol/L 108 108 109 111*   CO2 mmol/L 24.0 23.0 22.0 24.0   BUN mg/dL 67* 72* 72* 65*   CREATININE mg/dL 1.80* 2.20* 2.00* 1.90*   CALCIUM mg/dL 8.0* 8.2* 8.3* 8.7   PHOSPHORUS mg/dL  --  5.3* 5.4*  --    ALBUMIN g/dL  --  2.90* 2.90*  --                Results from last 7 days  Lab Units 09/19/17  1631   COLOR UA  Yellow   CLARITY UA  Cloudy*   PH, URINE  <=5.0   SPECIFIC GRAVITY, URINE  1.018   GLUCOSE UA  Negative   KETONES UA  Negative   BILIRUBIN UA  Negative   PROTEIN UA  100 mg/dL (2+)*   BLOOD UA  Negative   LEUKOCYTES UA  Small (1+)*   NITRITE UA  Negative       Estimated Creatinine Clearance: 27.1 mL/min (by C-G formula based on Cr of 1.8).    Urine Pr:Cr 3.5 gm    A/P:    ARF:  Cr better overnight.  UOP increased to 2400.  S/p wolfe cath " placement for urinary retention.     CKD3:  Baseline 1.5-1.6 by report.  Renal u/s without hydro  .   Proteinuria: still 3.5 gm.  Past Ha1c ok.   Serology pending.       HTN: BP stable. .       PNA: on ABx     Anemia:  Hgb stable.  Tsat 9%.  Recheck s/p IVFe.      Edema: improved UOP with Bumex but Cr going up. + nephrotic range proteinuria with low albumin.     High risk and complex pt.      Babar Law MD  09/21/17  11:25 AM

## 2017-09-21 NOTE — PROGRESS NOTES
Continued Stay Note  [facility]     Patient Name: Jennifer Oliveira  MRN: [id number]  Today's Date: 9/21/2017    Admit Date: 9/5/2017          Discharge Plan       09/21/17 1601    Case Management/Social Work Plan    Plan [facility]     Additional Comments Spoke with Xiomara from [facility], Ms. Oliveira can go to a subacute bed on Sunday 9/24/2017 if medically ready.  I spoke with Ms. Oliveira and her daughter Stacey(POA) and they are agreeable to this plan.  [facility] scheduled for 1500. CM will cont. to follow.               Discharge Codes     None        Expected Discharge Date and Time     Expected Discharge Date Expected Discharge Time    Sep 22, 2017             Nida Burns RN

## 2017-09-21 NOTE — H&P
Chief Complaint:  epistaxis  History of Present Illness:   Ms. Oliveira is a 76yo female admitted for pneumonia.  ENT consulted because of intermittent epistaxis and dropping H&H.  At the time of this consult she is resting comfortably with O2 via nasal canula and no active bleeding.        Review of Systems   Constitutional: Positive for activity change, chills, fatigue and fever.   HENT: right side nose bleed    Eyes: Negative.    Respiratory: Positive for cough and shortness of breath.    Cardiovascular: Negative.  Negative for chest pain and palpitations.   Gastrointestinal: Positive for nausea and vomiting. Negative for blood in stool.   Genitourinary: Negative.    Musculoskeletal: Negative.    Skin: Negative.    Neurological: Positive for weakness.   Psychiatric/Behavioral: Negative.       Otherwise complete ROS performed and negative except as mentioned in the HPI.      Medical History         Past Medical History:   Diagnosis Date   • CKD (chronic kidney disease), stage III       baseline Cr 1.5-1.6   • Diabetes mellitus     • Diastolic heart failure       last LVEF 70%   • Disease of thyroid gland     • GERD (gastroesophageal reflux disease)     • Hypertension     • PAD (peripheral artery disease)       s/p right fem-pop bypass 7/2016   • Stroke       residual chronic right hemiplegia             Surgical History          Past Surgical History:   Procedure Laterality Date   • CARPAL TUNNEL RELEASE       • CHOLECYSTECTOMY       • ENDOSCOPY N/A 5/15/2017     Procedure: ESOPHAGOGASTRODUODENOSCOPY;  Surgeon: Lizbet Vidales MD;  Location:  [facility];  Service:    • ENDOSCOPY N/A 5/30/2017     Procedure: ESOPHAGOGASTRODUODENOSCOPY;  Surgeon: Lizbet Vidales MD;  Location:  [facility];  Service:    • FEMORAL POPLITEAL BYPASS Right     • HYSTERECTOMY                      Family History   Problem Relation Age of Onset   • Diabetes Mother     • Heart disease Father            Social History    Social History  "           Social History   • Marital status: Single       Spouse name: N/A   • Number of children: N/A   • Years of education: N/A          Occupational History   • Not on file.            Social History Main Topics   • Smoking status: Former Smoker       Packs/day: 1.00       Years: 45.00       Quit date: 2009   • Smokeless tobacco: Never Used   • Alcohol use No   • Drug use: No   • Sexual activity: No      Other Topics Concern   • Not on file          Social History Narrative     Lives alone in [place]. [employer] and two daughters who assist her.             Medications:     Prescriptions Prior to Admission       (Not in a hospital admission)        Allergies:       Allergies   Allergen Reactions   • Erythromycin Rash            Objective      Physical Exam:  Vital Signs: /59  Pulse 78  Temp 99.8 °F (37.7 °C) (Rectal)   Resp 20  Ht 63\" (160 cm)  Wt 170 lb (77.1 kg)  SpO2 94%  BMI 30.11 kg/m2  Physical Exam   Constitutional: She is oriented to person, place, and time. No distress.   Appears ill   HENT: old clot on the right side of the nose; left side of nose looks clear; removed clot and this revealed area of bleeding; cauterized as per procedure note  Head: Normocephalic and atraumatic.   Eyes: Pupils are equal, round, and reactive to light. No scleral icterus.   Neck: Normal range of motion. No JVD present.   Cardiovascular: Normal rate, regular rhythm, normal heart sounds and intact distal pulses.    Pulmonary/Chest: No respiratory distress.   Fine crackles right base   Abdominal: Soft. Bowel sounds are normal. She exhibits no distension. There is no tenderness.   Musculoskeletal: She exhibits edema.   Baseline right hemiplegia; 2/5 strength left side   Neurological: She is alert and oriented to person, place, and time.   Skin: Skin is warm and dry.   Psychiatric: She has a normal mood and affect. Her behavior is normal. Judgment and thought content normal.       PROCEDURE NOTE:  Control of " right sided anterior epistaxis:  Removed clot from right side of nose; this revealed area of active bleeding; cauterized using silver nitrate and proper hemostasis achieved    IMP: right sided epistaxis; cauterized today and hemostasis achieved    RECS:  Seems unlikely that the epistaxis is the reason for the drop in her H&H; should continue to look for other source of bleeding; no manipulation of nose for remainder of today; beginning tomorrow, use saline rinse bid to keep inside of nose moist; also apply vaseline petroleum jelly inside nose bid to keep it moist;  May use afrin prn for any breakthrough bleeding;  Consider cutting off the distal 1/3 of her nasal cannula prongs so they are not rubbing her septum and contributing to the epistaxis

## 2017-09-21 NOTE — PLAN OF CARE
Problem: Patient Care Overview (Adult)  Goal: Plan of Care Review  Outcome: Ongoing (interventions implemented as appropriate)    09/21/17 4073   Coping/Psychosocial Response Interventions   Plan Of Care Reviewed With patient   Outcome Evaluation   Outcome Summary/Follow up Plan Pt recieved 2 units PRBC's; tolerated well with no evidence of reaction. Pt weened down to 2L NC maintaining o2 sat of 92%. Pt refused CPAP tonight; states it hurts her mouth. Repeat H/H to be drawn this am. VSS. Will continue to monitor.   Patient Care Overview   Progress progress toward functional goals as expected         Problem: Fall Risk (Adult)  Goal: Identify Related Risk Factors and Signs and Symptoms  Outcome: Ongoing (interventions implemented as appropriate)

## 2017-09-22 LAB
ABO + RH BLD: NORMAL
ABO + RH BLD: NORMAL
ANION GAP SERPL CALCULATED.3IONS-SCNC: 5 MMOL/L (ref 3–11)
APTT PPP: 25.4 SECONDS (ref 24–31)
BH BB BLOOD EXPIRATION DATE: NORMAL
BH BB BLOOD EXPIRATION DATE: NORMAL
BH BB BLOOD TYPE BARCODE: 6200
BH BB BLOOD TYPE BARCODE: 6200
BH BB DISPENSE STATUS: NORMAL
BH BB DISPENSE STATUS: NORMAL
BH BB PRODUCT CODE: NORMAL
BH BB PRODUCT CODE: NORMAL
BH BB UNIT NUMBER: NORMAL
BH BB UNIT NUMBER: NORMAL
BNP SERPL-MCNC: 301 PG/ML (ref 0–100)
BUN BLD-MCNC: 63 MG/DL (ref 9–23)
BUN/CREAT SERPL: 39.4 (ref 7–25)
C-ANCA TITR SER IF: NORMAL TITER
CALCIUM SPEC-SCNC: 8 MG/DL (ref 8.7–10.4)
CHLORIDE SERPL-SCNC: 108 MMOL/L (ref 99–109)
CO2 SERPL-SCNC: 23 MMOL/L (ref 20–31)
CREAT BLD-MCNC: 1.6 MG/DL (ref 0.6–1.3)
GFR SERPL CREATININE-BSD FRML MDRD: 31 ML/MIN/1.73
GLUCOSE BLD-MCNC: 166 MG/DL (ref 70–100)
GLUCOSE BLDC GLUCOMTR-MCNC: 157 MG/DL (ref 70–130)
GLUCOSE BLDC GLUCOMTR-MCNC: 160 MG/DL (ref 70–130)
GLUCOSE BLDC GLUCOMTR-MCNC: 161 MG/DL (ref 70–130)
GLUCOSE BLDC GLUCOMTR-MCNC: 224 MG/DL (ref 70–130)
HCT VFR BLD AUTO: 27.9 % (ref 34.5–44)
HCT VFR BLD AUTO: 29.8 % (ref 34.5–44)
HGB BLD-MCNC: 10.1 G/DL (ref 11.5–15.5)
HGB BLD-MCNC: 9.1 G/DL (ref 11.5–15.5)
INR PPP: 0.96
IRON 24H UR-MRATE: 51 MCG/DL (ref 50–175)
IRON SATN MFR SERPL: 20 % (ref 15–50)
MYELOPEROXIDASE AB SER-ACNC: <9 U/ML (ref 0–9)
P-ANCA ATYPICAL TITR SER IF: NORMAL TITER
P-ANCA TITR SER IF: NORMAL TITER
POTASSIUM BLD-SCNC: 4.7 MMOL/L (ref 3.5–5.5)
PROTEINASE3 AB SER IA-ACNC: <3.5 U/ML (ref 0–3.5)
PROTHROMBIN TIME: 10.5 SECONDS (ref 9.6–11.5)
SODIUM BLD-SCNC: 136 MMOL/L (ref 132–146)
TIBC SERPL-MCNC: 259 MCG/DL (ref 250–450)
UNIT  ABO: NORMAL
UNIT  ABO: NORMAL
UNIT  RH: NORMAL
UNIT  RH: NORMAL

## 2017-09-22 PROCEDURE — 85610 PROTHROMBIN TIME: CPT | Performed by: INTERNAL MEDICINE

## 2017-09-22 PROCEDURE — 80074 ACUTE HEPATITIS PANEL: CPT | Performed by: HOSPITALIST

## 2017-09-22 PROCEDURE — 85014 HEMATOCRIT: CPT | Performed by: INTERNAL MEDICINE

## 2017-09-22 PROCEDURE — 85018 HEMOGLOBIN: CPT | Performed by: INTERNAL MEDICINE

## 2017-09-22 PROCEDURE — 63710000001 PREDNISONE PER 5 MG: Performed by: HOSPITALIST

## 2017-09-22 PROCEDURE — 85730 THROMBOPLASTIN TIME PARTIAL: CPT | Performed by: INTERNAL MEDICINE

## 2017-09-22 PROCEDURE — 99232 SBSQ HOSP IP/OBS MODERATE 35: CPT | Performed by: INTERNAL MEDICINE

## 2017-09-22 PROCEDURE — 83550 IRON BINDING TEST: CPT | Performed by: INTERNAL MEDICINE

## 2017-09-22 PROCEDURE — 97110 THERAPEUTIC EXERCISES: CPT

## 2017-09-22 PROCEDURE — 82962 GLUCOSE BLOOD TEST: CPT

## 2017-09-22 PROCEDURE — 94799 UNLISTED PULMONARY SVC/PX: CPT

## 2017-09-22 PROCEDURE — G0432 EIA HIV-1/HIV-2 SCREEN: HCPCS | Performed by: HOSPITALIST

## 2017-09-22 PROCEDURE — 94760 N-INVAS EAR/PLS OXIMETRY 1: CPT

## 2017-09-22 PROCEDURE — 97530 THERAPEUTIC ACTIVITIES: CPT

## 2017-09-22 PROCEDURE — 83540 ASSAY OF IRON: CPT | Performed by: INTERNAL MEDICINE

## 2017-09-22 PROCEDURE — 80048 BASIC METABOLIC PNL TOTAL CA: CPT | Performed by: INTERNAL MEDICINE

## 2017-09-22 PROCEDURE — 83880 ASSAY OF NATRIURETIC PEPTIDE: CPT | Performed by: INTERNAL MEDICINE

## 2017-09-22 PROCEDURE — 63710000001 INSULIN DETEMIR PER 5 UNITS: Performed by: INTERNAL MEDICINE

## 2017-09-22 PROCEDURE — 94640 AIRWAY INHALATION TREATMENT: CPT

## 2017-09-22 RX ADMIN — NYSTATIN 500000 UNITS: 100000 SUSPENSION ORAL at 17:00

## 2017-09-22 RX ADMIN — INSULIN LISPRO 2 UNITS: 100 INJECTION, SOLUTION INTRAVENOUS; SUBCUTANEOUS at 12:59

## 2017-09-22 RX ADMIN — PREDNISONE 10 MG: 10 TABLET ORAL at 08:37

## 2017-09-22 RX ADMIN — IPRATROPIUM BROMIDE AND ALBUTEROL SULFATE 3 ML: .5; 3 SOLUTION RESPIRATORY (INHALATION) at 11:56

## 2017-09-22 RX ADMIN — NYSTATIN 500000 UNITS: 100000 SUSPENSION ORAL at 08:37

## 2017-09-22 RX ADMIN — INSULIN DETEMIR 18 UNITS: 100 INJECTION, SOLUTION SUBCUTANEOUS at 21:55

## 2017-09-22 RX ADMIN — OXYMETAZOLINE HYDROCHLORIDE 2 SPRAY: 5 SPRAY NASAL at 08:36

## 2017-09-22 RX ADMIN — HYDRALAZINE HYDROCHLORIDE 75 MG: 25 TABLET ORAL at 21:53

## 2017-09-22 RX ADMIN — LEVOTHYROXINE SODIUM 200 MCG: 100 TABLET ORAL at 06:27

## 2017-09-22 RX ADMIN — BACLOFEN 10 MG: 10 TABLET ORAL at 06:27

## 2017-09-22 RX ADMIN — IPRATROPIUM BROMIDE AND ALBUTEROL SULFATE 3 ML: .5; 3 SOLUTION RESPIRATORY (INHALATION) at 15:40

## 2017-09-22 RX ADMIN — NYSTATIN 500000 UNITS: 100000 SUSPENSION ORAL at 21:52

## 2017-09-22 RX ADMIN — INSULIN LISPRO 3 UNITS: 100 INJECTION, SOLUTION INTRAVENOUS; SUBCUTANEOUS at 21:54

## 2017-09-22 RX ADMIN — INSULIN LISPRO 2 UNITS: 100 INJECTION, SOLUTION INTRAVENOUS; SUBCUTANEOUS at 08:38

## 2017-09-22 RX ADMIN — GUAIFENESIN 600 MG: 600 TABLET, EXTENDED RELEASE ORAL at 08:37

## 2017-09-22 RX ADMIN — HYDRALAZINE HYDROCHLORIDE 75 MG: 25 TABLET ORAL at 06:27

## 2017-09-22 RX ADMIN — BACLOFEN 10 MG: 10 TABLET ORAL at 21:53

## 2017-09-22 RX ADMIN — INSULIN DETEMIR 18 UNITS: 100 INJECTION, SOLUTION SUBCUTANEOUS at 08:41

## 2017-09-22 RX ADMIN — INSULIN LISPRO 2 UNITS: 100 INJECTION, SOLUTION INTRAVENOUS; SUBCUTANEOUS at 17:00

## 2017-09-22 RX ADMIN — ASPIRIN 81 MG 81 MG: 81 TABLET ORAL at 08:37

## 2017-09-22 RX ADMIN — Medication 2 TABLET: at 08:37

## 2017-09-22 RX ADMIN — IPRATROPIUM BROMIDE AND ALBUTEROL SULFATE 3 ML: .5; 3 SOLUTION RESPIRATORY (INHALATION) at 19:12

## 2017-09-22 RX ADMIN — Medication 250 MG: at 17:00

## 2017-09-22 RX ADMIN — IPRATROPIUM BROMIDE AND ALBUTEROL SULFATE 3 ML: .5; 3 SOLUTION RESPIRATORY (INHALATION) at 08:20

## 2017-09-22 RX ADMIN — ATORVASTATIN CALCIUM 10 MG: 10 TABLET, FILM COATED ORAL at 21:53

## 2017-09-22 RX ADMIN — PANTOPRAZOLE SODIUM 40 MG: 40 TABLET, DELAYED RELEASE ORAL at 17:00

## 2017-09-22 RX ADMIN — POLYETHYLENE GLYCOL 3350 17 G: 17 POWDER, FOR SOLUTION ORAL at 08:37

## 2017-09-22 RX ADMIN — HYDRALAZINE HYDROCHLORIDE 75 MG: 25 TABLET ORAL at 13:00

## 2017-09-22 RX ADMIN — GABAPENTIN 900 MG: 300 CAPSULE ORAL at 21:53

## 2017-09-22 RX ADMIN — GUAIFENESIN 600 MG: 600 TABLET, EXTENDED RELEASE ORAL at 21:53

## 2017-09-22 RX ADMIN — PANTOPRAZOLE SODIUM 40 MG: 40 TABLET, DELAYED RELEASE ORAL at 08:37

## 2017-09-22 RX ADMIN — NYSTATIN 500000 UNITS: 100000 SUSPENSION ORAL at 13:00

## 2017-09-22 RX ADMIN — Medication 250 MG: at 08:37

## 2017-09-22 RX ADMIN — OXYCODONE HYDROCHLORIDE AND ACETAMINOPHEN 500 MG: 500 TABLET ORAL at 08:37

## 2017-09-22 RX ADMIN — CARVEDILOL 25 MG: 12.5 TABLET, FILM COATED ORAL at 08:37

## 2017-09-22 RX ADMIN — BACLOFEN 10 MG: 10 TABLET ORAL at 13:00

## 2017-09-22 NOTE — PROGRESS NOTES
"   LOS: 17 days    Patient Care Team:  Samuel Buck MD as PCP - General (Family Medicine)    Subjective     Stable. She feels well, no new over night events    Objective     Vital Signs:  Blood pressure 123/47, pulse 57, temperature 98.5 °F (36.9 °C), temperature source Oral, resp. rate 20, height 63\" (160 cm), weight 189 lb 12.8 oz (86.1 kg), SpO2 91 %.    Flowsheet Rows         First Filed Value    Admission Height  63\" (160 cm) Documented at 09/05/2017 1009    Admission Weight  170 lb (77.1 kg) Documented at 09/05/2017 1009          09/21 0701 - 09/22 0700  In: 1160 [P.O.:1160]  Out: 2950 [Urine:2950]    Physical Exam:    GENERAL: WD elderly female. NAD.   CV: RRR + edema  LUNGS:  Quiet,  Nonlabored resp.  Lungs CTA  ABDOMEN: Soft, nontender, nondistended. + BS  : no palp bladder, + wolfe  SKIN: Warm and dry without rash    Labs:    Results from last 7 days  Lab Units 09/22/17  0624 09/22/17  0111 09/21/17  2046  09/18/17  0630   WBC 10*3/mm3  --   --   --   --  12.58*   HEMOGLOBIN g/dL 10.1* 9.1* 9.9*  < > 7.5*   PLATELETS 10*3/mm3  --   --   --   --  271   < > = values in this interval not displayed.    Results from last 7 days  Lab Units 09/22/17  0624 09/21/17  0449 09/20/17  0545 09/19/17  1617 09/19/17  0532   SODIUM mmol/L 136 137 139  --  139   POTASSIUM mmol/L 4.7 4.9 4.7  --  4.9   CHLORIDE mmol/L 108 108 108  --  109   CO2 mmol/L 23.0 24.0 23.0  --  22.0   BUN mg/dL 63* 67* 72*  --  72*   CREATININE mg/dL 1.60* 1.80* 2.20*  --  2.00*   CALCIUM mg/dL 8.0* 8.0* 8.2*  --  8.3*   PHOSPHORUS mg/dL  --   --  5.3*  --  5.4*   ALBUMIN g/dL  --   --  2.90* 2.4* 2.90*               Results from last 7 days  Lab Units 09/19/17  1631   COLOR UA  Yellow   CLARITY UA  Cloudy*   PH, URINE  <=5.0   SPECIFIC GRAVITY, URINE  1.018   GLUCOSE UA  Negative   KETONES UA  Negative   BILIRUBIN UA  Negative   PROTEIN UA  100 mg/dL (2+)*   BLOOD UA  Negative   LEUKOCYTES UA  Small (1+)*   NITRITE UA  Negative "       Estimated Creatinine Clearance: 30.6 mL/min (by C-G formula based on Cr of 1.6).    Urine Pr:Cr 3.5 gm    A/P:    ARF:  Cr better overnight.  UOP increased to 2400.  S/p wolfe cath placement for urinary retention. Good urine out put    CKD3:  Baseline 1.5-1.6 by report.  Renal u/s without hydro  .   Proteinuria: still 3.5 gm.  Past HA1c ok.   Serology pending.       HTN: BP stable. .       PNA: on ABx     Anemia:  Hgb stable.  Tsat 9%.  Recheck s/p IVFe.      Edema: improved UOP with Bumex but Cr going up. + nephrotic range proteinuria with low albumin.     High risk and complex pt.      Roshan Miller MD  09/22/17  12:38 PM

## 2017-09-22 NOTE — PLAN OF CARE
Problem: Patient Care Overview (Adult)  Goal: Plan of Care Review    09/22/17 1012   Coping/Psychosocial Response Interventions   Plan Of Care Reviewed With patient   Patient Care Overview   Progress improving

## 2017-09-22 NOTE — PLAN OF CARE
Problem: Patient Care Overview (Adult)  Goal: Plan of Care Review  Outcome: Ongoing (interventions implemented as appropriate)    09/22/17 1112   Coping/Psychosocial Response Interventions   Plan Of Care Reviewed With patient   Outcome Evaluation   Outcome Summary/Follow up Plan patient with increased lethargy this session with difficulty staying awake, lift to recliner for safety.. B UE/LE exercises performed.   Patient Care Overview   Progress progress towards functional goals is fair         Problem: Inpatient Physical Therapy  Goal: Bed Mobility Goal LTG- PT  Outcome: Ongoing (interventions implemented as appropriate)    09/06/17 1037 09/22/17 1112   Bed Mobility PT LTG   Bed Mobility PT LTG, Date Established 09/06/17 --    Bed Mobility PT LTG, Time to Achieve 1 wk --    Bed Mobility PT LTG, Activity Type all bed mobility --    Bed Mobility PT LTG, [place] Level moderate assist (50% patient effort);2 person assist required --    Bed Mobility PT LTG, Date Goal Reviewed --  09/22/17   Bed Mobility PT LTG, Outcome --  goal ongoing       Goal: Transfer Training Goal 1 LTG- PT  Outcome: Ongoing (interventions implemented as appropriate)    09/06/17 1037 09/22/17 1112   Transfer Training PT LTG   Transfer Training PT LTG, Date Established 09/06/17 --    Transfer Training PT LTG, Time to Achieve 1 wk --    Transfer Training PT LTG, Activity Type bed to chair /chair to bed;sit to stand/stand to sit --    Transfer Training PT LTG, [place] Level maximum assist (25% patient effort);2 person assist required --    Transfer Training PT LTG, Assist Device walker, homa --    Transfer Training PT LTG, Date Goal Reviewed --  09/22/17   Transfer Training PT LTG, Outcome --  goal ongoing       Goal: Gait Training Goal LTG- PT  Outcome: Ongoing (interventions implemented as appropriate)    09/06/17 1037 09/22/17 1112   Gait Training PT LTG   Gait Training Goal PT LTG, Date Established 09/06/17 --    Gait Training Goal PT  LTG, Time to Achieve 1 wk --    Gait Training Goal PT LTG, [place] Level maximum assist (25% patient effort);2 person assist required  (stable VS; RLE brace) --    Gait Training Goal PT LTG, Assist Device walker, homa --    Gait Training Goal PT LTG, Distance to Achieve 3 --    Gait Training Goal PT LTG, Date Goal Reviewed --  09/22/17   Gait Training Goal PT LTG, Outcome --  goal ongoing

## 2017-09-22 NOTE — THERAPY TREATMENT NOTE
Acute Care - Occupational Therapy Treatment Note  [facility]     Patient Name: Jennifer Oliveira  : 1940  MRN: [id number]  Today's Date: 2017  Onset of Illness/Injury or Date of Surgery Date: 17  Date of Referral to OT: 17  Referring Physician: ARSLAN Gonzales      Admit Date: 2017    Visit Dx:     ICD-10-CM ICD-9-CM   1. Pneumonia of right lower lobe due to infectious organism J18.9 483.8   2. Impaired mobility and ADLs Z74.09 799.89   3. Impaired functional mobility, balance, gait, and endurance Z74.09 V49.89     Patient Active Problem List   Diagnosis   • Normochromic normocytic anemia   • Chronic diastolic heart failure   • Diabetes type 2, uncontrolled   • Hypothyroid   • Chronic kidney disease (CKD), stage III (moderate)   • GERD (gastroesophageal reflux disease) with hx of gastritis    • h/o stroke with right hemiplegia   • Hypertension   • Disease of thyroid gland   • Diastolic heart failure   • Type 2 diabetes mellitus   • Acute blood loss anemia   • KRIS (acute kidney injury)   • Nausea and vomiting   • Right lower lobe pneumonia   • Weakness   • Acute respiratory failure with hypoxia   • Hyperkalemia   • History of GI bleed   • Epistaxis   • Iron deficiency anemia   • DALILA positive   • Proteinuria   • COPD (chronic obstructive pulmonary disease)             Adult Rehabilitation Note       17 1449 17 1008 17 1036    Rehab Assessment/Intervention    Discipline occupational therapist  -TA physical therapy assistant  -AS physical therapy assistant  -AS    Document Type therapy note (daily note)  -TA therapy note (daily note)  -AS therapy note (daily note)  -AS    Subjective Information agree to therapy;no complaints  -TA agree to therapy;complains of;pain;weakness  -AS agree to therapy;complains of;weakness;fatigue  -AS    Patient Effort, Rehab Treatment good  -TA good  -AS adequate  -AS    Symptoms Noted During/After Treatment none  -TA fatigue;increased pain  -AS      Precautions/Limitations fall precautions;oxygen therapy device and L/min;other (see comments)   Phillips cath, R sided weakness with h/o CVA  -TA fall precautions;oxygen therapy device and L/min   catheter, right sided weakness with h/o CVA  -AS fall precautions;oxygen therapy device and L/min;other (see comments)   h/o CVA with right residual HP  -AS    Recorded by [TA] Tan Linn OT [AS] Anel Rizvi PTA [AS] Anel Rizvi PTA    Vital Signs    Pre Systolic BP Rehab --   Nurse cleared pt for tx, VSS  -TA      Pre SpO2 (%) 93  -TA      O2 Delivery Pre Treatment room air  -TA      Intra SpO2 (%) 93  -TA      O2 Delivery Intra Treatment supplemental O2  -TA      Post SpO2 (%) 94  -TA      O2 Delivery Post Treatment supplemental O2  -TA      Pre Patient Position Sitting  -TA      Intra Patient Position Sitting  -TA      Post Patient Position Sitting  -TA      Recorded by [TA] Tan Linn OT      Pain Assessment    Pain Assessment No/denies pain  -TA Valles-Baker FACES  -AS Valles-Baker FACES  -AS    Valles-Rosas FACES Pain Rating 0  -TA  2  -AS    Pain Score 0  -TA 2   with movement  -AS     Post Pain Score 0  -TA 0  -AS 2  -AS    Pain Type  Chronic pain  -AS Chronic pain  -AS    Pain Location  Knee  -AS Knee  -AS    Pain Orientation  Right  -AS Right  -AS    Pain Intervention(s) Repositioned;Ambulation/increased activity  -TA Repositioned  -AS Repositioned;Ambulation/increased activity  -AS    Response to Interventions tolerated  -TA tolerated  -AS tolerated  -AS    Recorded by [TA] Tan Linn OT [AS] Anel Rizvi, VIJAY [AS] Anel Rizvi PTA    Cognitive Assessment/Intervention    Current Cognitive/Communication Assessment functional  -TA impaired   patient falling asleep throughout session, confusion  -AS functional  -AS    Orientation Status oriented x 4  -TA oriented to;person;place  -AS oriented to;person;place  -AS    Follows Commands/Answers Questions 100% of the  time;able to follow single-step instructions;needs cueing;needs repetition  -TA 75% of the time;needs cueing;needs increased time;needs repetition  -% of the time;able to follow single-step instructions;needs cueing  -AS    Personal Safety mild impairment  -TA mild impairment;decreased awareness, need for assist;decreased awareness, need for safety  -AS mild impairment;decreased awareness, need for assist;decreased awareness, need for safety;decreased insight to deficits  -AS    Personal Safety Interventions fall prevention program maintained;gait belt;muscle strengthening facilitated;nonskid shoes/slippers when out of bed;supervised activity  -TA fall prevention program maintained;gait belt;nonskid shoes/slippers when out of bed;other (see comments)   exit alarm  -AS fall prevention program maintained;gait belt;nonskid shoes/slippers when out of bed;other (see comments)   exit alarm  -AS    Recorded by [TA] Tan Linn OT [AS] Anel Rizvi PTA [AS] Anel Rizvi PTA    Bed Mobility, Assessment/Treatment    Bed Mobility, Roll Left, [place]  verbal cues required;maximum assist (25% patient effort);2 person assist required  -AS     Bed Mobility, Roll Right, [place]  verbal cues required;dependent (less than 25% patient effort);2 person assist required  -AS     Bed Mobility, Comment Pt UIC  -TA rolled to place lift sling for transfer to recliner  -AS deferred as patient requested no OOB do to weakness  -AS    Recorded by [TA] Tan Linn OT [AS] Anel Rizvi PTA [AS] Anel Rizvi PTA    Transfer Assessment/Treatment    Transfers, Bed-Chair [place]  verbal cues required;dependent (less than 25% patient effort);2 person assist required  -AS not tested  -AS    Transfers, Bed-Chair-Bed, Assist Device  mechanical lift  -AS     Recorded by  [AS] Anel Rizvi PTA [AS] Anel Rizvi PTA    Gait Assessment/Treatment    Gait, [place] Level  not  appropriate to assess  -AS not tested  -AS    Recorded by  [AS] Anel Rizvi PTA [AS] Anel Rizvi PTA    Therapy Exercises    Right Lower Extremity  PROM:;10 reps;supine;calf stretch;ankle pumps/circles;heel slides;hip abduction/adduction;hip ER;hip IR  -AS PROM:;10 reps;supine;ankle pumps/circles;heel slides;hip abduction/adduction  -AS    Left Lower Extremity  AAROM:;10 reps;supine;ankle pumps/circles;calf stretch;heel slides;hip abduction/adduction;hip ER;hip IR  -AS AROM:;10 reps;supine;ankle pumps/circles;hip abduction/adduction;heel slides  -AS    Right Upper Extremity PROM:;10 reps;sitting;elbow flexion/extension;hand pumps;pronation/supination;shoulder circles;shoulder extension/flexion;shoulder horizontal abd/add  -TA PROM:;10 reps;supine;elbow flexion/extension;shoulder abduction/adduction;shoulder extension/flexion  -AS PROM:;10 reps;supine;elbow flexion/extension;shoulder abduction/adduction;shoulder extension/flexion  -AS    Left Upper Extremity AROM:;10 reps;sitting;elbow flexion/extension;hand pumps;pronation/supination;shoulder circles;shoulder extension/flexion;shoulder horizontal abd/add;shoulder protraction/retraction  -TA AAROM:;10 reps;supine;elbow flexion/extension;shoulder abduction/adduction;shoulder extension/flexion  -AS AROM:;10 reps;supine;elbow flexion/extension;shoulder abduction/adduction;shoulder extension/flexion  -AS    LUE Resistance manual resistance- minimal  -TA      Recorded by [TA] Tan Linn OT [AS] Anel Rizvi PTA [AS] Anel Rizvi PTA    Positioning and Restraints    Pre-Treatment Position sitting in chair/recliner  -TA in bed  -AS in bed  -AS    Post Treatment Position chair  -TA chair  -AS bed  -AS    In Bed   supine;call light within reach;exit alarm on;encouraged to call for assist;RUE elevated;LUE elevated  -AS    In Chair reclined;call light within reach;encouraged to call for assist;exit alarm on;RUE elevated;LUE elevated;waffle  cushion;on mechanical lift sling;legs elevated;heels elevated  -TA reclined;call light within reach;encouraged to call for assist;exit alarm on;waffle cushion;on mechanical lift sling;RUE elevated;LUE elevated;legs elevated  -AS     Recorded by [TA] Tan Linn, OT [AS] Anel Rizvi PTA [AS] Anel Rizvi PTA      User Key  (r) = Recorded By, (t) = Taken By, (c) = Cosigned By    Initials Name Effective Dates    AS Anel Rizvi PTA 06/22/15 -     TA Tan Linn OT 03/14/16 -                 OT Goals       09/22/17 1536 09/19/17 0935 09/15/17 1513    Bed Mobility OT LTG    Bed Mobility OT LTG, Outcome goal ongoing  -TA goal ongoing  -CL goal ongoing  -TB    Transfer Training OT LTG    Transfer Training OT LTG, [place] Level  maximum assist (25% patient effort);2 person assist required  -CL     Transfer Training OT LTG, Outcome goal ongoing  -TA goal revised  -CL goal ongoing  -TB    Transfer Training OT LTG, Reason Goal Not Met  goals revised this date  -CL     Strength OT LTG    Strength Goal OT LTG, Date Established  09/19/17  -CL     Strength Goal OT LTG, Time to Achieve  by discharge  -CL     Strength Goal OT LTG, Functional Goal  Pt will tolerate LUE AROM HEP x10 reps and RUE PROM/AAROM HEP x10 reps to increase strength/endurance to promote ADL performance.   -CL     Strength Goal OT LTG, Outcome goal ongoing   Progressing; mansoor'd 10 reps w/min resistance LUE  -TA      Patient Education OT LTG    Patient Education OT LTG Outcome goal ongoing   Progressing with SROM BUE HEP; adaptive breathing  -TA goal ongoing  -CL goal ongoing  -TB    UB Dressing OT LTG    UB Dressing Goal OT LTG, Outcome  goal no longer appropriate  -CL goal ongoing  -TB    UB Dressing Goal OT LTG, Reason Goal Not Met  progress slower than expected  -CL       09/13/17 1150 09/11/17 1349       Bed Mobility OT LTG    Bed Mobility OT LTG, Outcome goal ongoing  -CL goal ongoing  -JR     Transfer Training OT  LTG    Transfer Training OT LTG, Outcome goal ongoing  -CL goal ongoing  -JR     Patient Education OT LTG    Patient Education OT LTG Outcome goal ongoing  -CL goal ongoing  -JR     UB Dressing OT LTG    UB Dressing Goal OT LTG, Outcome goal ongoing  -CL goal ongoing  -JR       User Key  (r) = Recorded By, (t) = Taken By, (c) = Cosigned By    Initials Name Provider Type    TB Mariferjeff Schofield, OT Occupational Therapist    JR Magalie Trinh, OT Occupational Therapist    JONAH Linn, OT Occupational Therapist    CL Trinidad Nguyen, OT Occupational Therapist          Occupational Therapy Education     Title: PT OT SLP Therapies (Active)     Topic: Occupational Therapy (Active)     Point: ADL training (Active)    Description: Instruct learner(s) on proper safety adaptation and remediation techniques during self care or transfers.   Instruct in proper use of assistive devices.    Learning Progress Summary    Learner Readiness Method Response Comment Documented by Status   Patient Acceptance E,D NR Pt educated on appropriate safety precautions, t/f techniques, HEP, positioning, and benefits of therapy. CL 09/19/17 0933 Active    Acceptance E VU  MS 09/15/17 1520 Done    Acceptance E,D NR Pt educated on appropriate safety precautions, positioning, and HEP. CL 09/13/17 1150 Active    Acceptance E NR Educated pt on B UE HEP. JR 09/11/17 1348 Active    Acceptance E,D NR Pt educated on positioning and BUE HEP. CL 09/07/17 1438 Active    Acceptance E,D NR Pt educated on appropriate safety precautions, t/f techniques, positioning, and benefits of therapy.  09/06/17 1018 Active               Point: Home exercise program (Done)    Description: Instruct learner(s) on appropriate technique for monitoring, assisting and/or progressing therapeutic exercises/activities.    Learning Progress Summary    Learner Readiness Method Response Comment Documented by Status   Patient Acceptance E,D VU,DU,NR BUE SROM; positioning of  RUE/Rhand in fxl position TA 09/22/17 1535 Done    Acceptance E,D NR Pt educated on appropriate safety precautions, t/f techniques, HEP, positioning, and benefits of therapy.  09/19/17 0933 Active    Acceptance E,D VU,NR Education reinforced for benefits of activity/therapy, role of OT and HEP TB 09/15/17 1512 Done    Acceptance E,D NR Pt educated on appropriate safety precautions, positioning, and HEP. CL 09/13/17 1150 Active    Acceptance E NR Educated pt on B UE HEP. JR 09/11/17 1348 Active    Acceptance E,D NR Pt educated on positioning and BUE HEP. CL 09/07/17 1438 Active   Other Acceptance E,D VU,NR Education reinforced for benefits of activity/therapy, role of OT and HEP TB 09/15/17 1512 Done               Point: Precautions (Active)    Description: Instruct learner(s) on prescribed precautions during self-care and functional transfers.    Learning Progress Summary    Learner Readiness Method Response Comment Documented by Status   Patient Acceptance E,D NR Pt educated on appropriate safety precautions, t/f techniques, HEP, positioning, and benefits of therapy.  09/19/17 0933 Active    Acceptance E,D NR Pt educated on appropriate safety precautions, positioning, and HEP.  09/13/17 1150 Active    Acceptance E,D NR Pt educated on positioning and BUE HEP.  09/07/17 1438 Active    Acceptance E,D NR Pt educated on appropriate safety precautions, t/f techniques, positioning, and benefits of therapy.  09/06/17 1018 Active               Point: Body mechanics (Active)    Description: Instruct learner(s) on proper positioning and spine alignment during self-care, functional mobility activities and/or exercises.    Learning Progress Summary    Learner Readiness Method Response Comment Documented by Status   Patient Acceptance E,D NR Pt educated on appropriate safety precautions, t/f techniques, HEP, positioning, and benefits of therapy.  09/19/17 0933 Active    Acceptance E,D NR Pt educated on appropriate  safety precautions, t/f techniques, positioning, and benefits of therapy. CL 09/06/17 1018 Active                      User Key     Initials Effective Dates Name Provider Type Discipline    TB 06/22/15 -  Marifer Schofield, OT Occupational Therapist OT    JR 06/22/15 -  Magalie Trinh, OT Occupational Therapist OT    TA 03/14/16 -  Tan Linn, OT Occupational Therapist OT    MS 06/16/16 -  Darcy Colin, RN Registered Nurse Nurse    CL 06/08/16 -  Trinidad Nguyen, OT Occupational Therapist OT                  OT Recommendation and Plan  Anticipated Equipment Needs At Discharge:  (TBA further)  Anticipated Discharge Disposition: skilled nursing facility  Planned Therapy Interventions: adaptive equipment training, ADL retraining, balance training, bed mobility training, energy conservation, home exercise program, transfer training  Therapy Frequency: daily  Plan of Care Review  Plan Of Care Reviewed With: patient  Progress: progress towards functional goals is fair  Outcome Summary/Follow up Plan: Pt alert, Ox4 this afternoon; mansoor'd min resistance with LUE ex's; PROM RUE; education re SROM BUE HEP, adaptive breathing, positioning LUE/Lhand in neutral; continue per OT POC.        Outcome Measures       09/22/17 1449 09/22/17 1008 09/20/17 1036    How much help from another person do you currently need...    Turning from your back to your side while in flat bed without using bedrails?  2  -AS 2  -AS    Moving from lying on back to sitting on the side of a flat bed without bedrails?  2  -AS 2  -AS    Moving to and from a bed to a chair (including a wheelchair)?  1  -AS 2  -AS    Standing up from a chair using your arms (e.g., wheelchair, bedside chair)?  1  -AS 2  -AS    Climbing 3-5 steps with a railing?  1  -AS 1  -AS    To walk in hospital room?  1  -AS 1  -AS    AM-PAC 6 Clicks Score  8  -AS 10  -AS    How much help from another is currently needed...    Putting on and taking off regular lower body  clothing? 1  -TA      Bathing (including washing, rinsing, and drying) 1  -TA      Toileting (which includes using toilet bed pan or urinal) 1  -TA      Putting on and taking off regular upper body clothing 1  -TA      Taking care of personal grooming (such as brushing teeth) 3  -TA      Eating meals 3  -TA      Score 10  -TA      Functional Assessment    Outcome Measure Options AM-PAC 6 Clicks Daily Activity (OT)  -TA AM-PAC 6 Clicks Basic Mobility (PT)  -AS AM-PAC 6 Clicks Basic Mobility (PT)  -AS      User Key  (r) = Recorded By, (t) = Taken By, (c) = Cosigned By    Initials Name Provider Type    AS Anel Rizvi PTA Physical Therapy Assistant    JONAH Linn OT Occupational Therapist           Time Calculation:         Time Calculation- OT       09/22/17 1540          Time Calculation- OT    OT Start Time 1449   ttc 27 minutes  -TA      Total Timed Code Minutes- OT 27 minute(s)  -TA      OT Received On 09/22/17  -TA      OT Goal Re-Cert Due Date 09/29/17  -TA        User Key  (r) = Recorded By, (t) = Taken By, (c) = Cosigned By    Initials Name Provider Type    JONAH Linn OT Occupational Therapist           Therapy Charges for Today     Code Description Service Date Service Provider Modifiers Qty    [id number]  OT THERAPEUTIC ACT EA 15 MIN 9/22/2017 Tan Linn OT GO 2               Tan Linn OT  9/22/2017

## 2017-09-22 NOTE — PROGRESS NOTES
"      HOSPITALIST DAILY PROGRESS NOTE    Chief Complaint: SOB    Subjective   SUBJECTIVE/OVERNIGHT EVENTS   No further nose bleeding; breathing continues to improve. No productive cough, no fever, no chills. S/p cauterization of right nares nasal bleed yesterday by ent.    Review of Systems:  General - No fevers, no chills  CVS - No chest pain, no palpitations  Resp - No cough, +dyspnea  GI - No N/V/D, no abd pain  Negative except as above    Objective   OBJECTIVE   I have reviewed the vital signs.  /47 (BP Location: Right arm, Patient Position: Sitting)  Pulse 63  Temp 98.5 °F (36.9 °C) (Oral)   Resp 20  Ht 63\" (160 cm)  Wt 189 lb 12.8 oz (86.1 kg)  SpO2 93%  BMI 33.62 kg/m2    Physical Exam:  General - NAD, pt was sitting in chair  HEENT - EOMI, PERRL, nares with oxygen canulas in place, no overt bleeding noted  CVS - RRR, S1 S2, no rubs, gallops or murmurs, 2s cap refill, 2+ peripheral edema, 2+ pulses  Resp - CTAB, faint minimal bibasilar insp crackles, no wheezes, normal effort at rest  GI - +BS, soft, ND/NT  Extremity: 2+ edema legs  Neuro - Awake and oriented, follows commands, interactive, moves all extremities equally    Results:  I have reviewed the labs, culture data, radiology results, and diagnostic studies.      Results from last 7 days  Lab Units 09/22/17  0624 09/22/17  0111 09/21/17 2046  09/18/17  0630   WBC 10*3/mm3  --   --   --   --  12.58*   HEMOGLOBIN g/dL 10.1* 9.1* 9.9*  < > 7.5*   HEMATOCRIT % 29.8* 27.9* 31.2*  < > 23.9*   PLATELETS 10*3/mm3  --   --   --   --  271   < > = values in this interval not displayed.    Results from last 7 days  Lab Units 09/22/17  0624   SODIUM mmol/L 136   POTASSIUM mmol/L 4.7   CHLORIDE mmol/L 108   CO2 mmol/L 23.0   BUN mg/dL 63*   CREATININE mg/dL 1.60*   GLUCOSE mg/dL 166*   CALCIUM mg/dL 8.0*       Culture Data:  Cultures:  reviewed       I have reviewed the medications.    aspirin 81 mg Oral Daily   atorvastatin 10 mg Oral Daily   baclofen " 10 mg Oral TID   carvedilol 25 mg Oral Q12H   gabapentin 900 mg Oral Nightly   guaiFENesin 600 mg Oral Q12H   hydrALAZINE 75 mg Oral Q8H   insulin detemir 18 Units Subcutaneous Q12H   insulin lispro 0-7 Units Subcutaneous 4x Daily With Meals & Nightly   insulin lispro 5 Units Subcutaneous TID With Meals   ipratropium-albuterol 3 mL Nebulization 4x Daily - RT   levothyroxine 200 mcg Oral Daily   nystatin 5 mL Oral 4x Daily   oxymetazoline 2 spray Each Nare Q12H   pantoprazole 40 mg Oral BID AC   pharmacy consult - MTM  Does not apply Daily   polyethylene glycol 17 g Oral Daily   predniSONE 10 mg Oral Daily With Breakfast   saccharomyces boulardii 250 mg Oral BID   sennosides-docusate sodium 2 tablet Oral Daily   ascorbic acid 500 mg Oral Daily With Breakfast       Assessment/Plan   ASSESSMENT/PLAN    Principal Problem:    Right lower lobe pneumonia  Active Problems:    Acute respiratory failure with hypoxia    History of GI bleed    Epistaxis    Iron deficiency anemia    Chronic diastolic heart failure    Chronic kidney disease (CKD), stage III (moderate)    h/o stroke with right hemiplegia    Disease of thyroid gland    Type 2 diabetes mellitus    Acute blood loss anemia    KRIS (acute kidney injury)    Nausea and vomiting    Weakness    DALILA positive    Proteinuria    COPD (chronic obstructive pulmonary disease)  -------------------------------------------  *AoC Hypoxic Respiratory Failure (multifacotrial including pneumonia, diastolic heart failure)  *s/p RLL Pna (s/p 14 days abx, complete 9/18/17)  *COPD    -no current wheezing, steroid/nebs weaned  *s/p Acute DHF (echo 4/20/17 normal EF w/ diastolic dysfxn)   -recent diuresis w/ bumex  *AoCKI (baseline cr 1.5-1.6)-->improved (currently off diuretics)  *Proteinuria   -anca negative   -spep & upep negative  *DALILA positive  *Epistaxis (resolved currently)   -s/p cauterization of right sided epistaxis by ENT 9/21/17  *Iron def Anemia   -s/p 2 units prbc on 9/20/17 (hgb  6.6 with overt right nose bleed)  *guaiac positive on 9/13/17  *Hx prior GI bleeding (egd w/ avm, s/p apc may 2017)   -cont ppi, increased to bid  *DM  *Hx CVA w/ right hemiplegia  *hx PAD (s/p fem-pop 2016)  *Echo 4/2017: normal LV ef  -------------------------------------------  Plan  -continue asa  -plavix on hold; restart once hgb stable and bleeding stopped  -continue afrin, nares care per ENT recs (9/21/17 note)  -continue PPI  -Monitor Hgb, if drops hgb further consider possibility of GI beed (has history of prior AVM bleed in may of 2017)  -hgb, bmp in a.m.  -Renal following; restart diuretics when ok w/ renal (currently appears near euvolemic  -wean oxygen as tolerates (on 2Lnc at home); cpap nightly    *if no further bleeding, hgb stable and otherwise clinically stable, then to rehab as early as Sunday if ok w/ renal (accepted to [facility] Sunday 9/24/17, tentative ambulance at 3pm)    DVT prophylaxis: off sq heparin til bleeding stopped;scd's/mechanical at this point    Stepan Solo MD  09/22/17  4:40 PM

## 2017-09-22 NOTE — PLAN OF CARE
Problem: Patient Care Overview (Adult)  Goal: Plan of Care Review  Outcome: Ongoing (interventions implemented as appropriate)    09/22/17 1536   Coping/Psychosocial Response Interventions   Plan Of Care Reviewed With patient   Outcome Evaluation   Outcome Summary/Follow up Plan Pt alert, Ox4 this afternoon; mansoor'd min resistance with LUE ex's; PROM RUE; education re SROM BUE HEP, adaptive breathing, positioning LUE/Lhand in neutral; continue per OT POC.   Patient Care Overview   Progress progress towards functional goals is fair         Problem: Inpatient Occupational Therapy  Goal: Bed Mobility Goal LTG- OT  Outcome: Ongoing (interventions implemented as appropriate)    09/06/17 1019 09/22/17 1536   Bed Mobility OT LTG   Bed Mobility OT LTG, Date Established 09/06/17 --    Bed Mobility OT LTG, Time to Achieve by discharge --    Bed Mobility OT LTG, Activity Type roll left/roll right;supine to sit/sit to supine --    Bed Mobility OT LTG, [place] Level moderate assist (50% patient effort);2 person assist required --    Bed Mobility OT LTG, Additional Goal AAD --    Bed Mobility OT LTG, Outcome --  goal ongoing       Goal: Transfer Training Goal 1 LTG- OT  Outcome: Ongoing (interventions implemented as appropriate)    09/06/17 1019 09/19/17 0935 09/22/17 1536   Transfer Training OT LTG   Transfer Training OT LTG, Date Established 09/06/17 --  --    Transfer Training OT LTG, Time to Achieve by discharge --  --    Transfer Training OT LTG, Activity Type toilet;bed to chair /chair to bed;sit to stand/stand to sit --  --    Transfer Training OT LTG, [place] Level --  maximum assist (25% patient effort);2 person assist required --    Transfer Training OT LTG, Additional Goal AAD --  --    Transfer Training OT LTG, Outcome --  --  goal ongoing       Goal: Patient Education Goal LTG- OT  Outcome: Ongoing (interventions implemented as appropriate)    09/06/17 1019 09/22/17 1536   Patient Education OT LTG   Patient  Education OT LTG, Date Established 09/06/17 --    Patient Education OT LTG, Time to Achieve by discharge --    Patient Education OT LTG, Education Type written program;HEP;posture/body mechanics;1 hand/homa technique;home safety;adaptive equipment mgmt;energy conservation;adaptive breathing --    Patient Education OT LTG, Education Understanding verbalizes understanding --    Patient Education OT LTG Outcome --  goal ongoing  (Progressing with SROM BUE HEP; adaptive breathing)       Goal: Strength Goal LTG- OT  Outcome: Ongoing (interventions implemented as appropriate)    09/19/17 0935 09/22/17 1536   Strength OT LTG   Strength Goal OT LTG, Date Established 09/19/17 --    Strength Goal OT LTG, Time to Achieve by discharge --    Strength Goal OT LTG, Functional Goal Pt will tolerate LUE AROM HEP x10 reps and RUE PROM/AAROM HEP x10 reps to increase strength/endurance to promote ADL performance.  --    Strength Goal OT LTG, Outcome --  goal ongoing  (Progressing; mansoor'd 10 reps w/min resistance LUE)

## 2017-09-22 NOTE — THERAPY TREATMENT NOTE
Acute Care - Physical Therapy Treatment Note  [facility]     Patient Name: Jennifer Oliveira  : 1940  MRN: [id number]  Today's Date: 2017  Onset of Illness/Injury or Date of Surgery Date: 17  Date of Referral to PT: 17  Referring Physician: ARSLAN Gonzales    Admit Date: 2017    Visit Dx:    ICD-10-CM ICD-9-CM   1. Pneumonia of right lower lobe due to infectious organism J18.9 483.8   2. Impaired mobility and ADLs Z74.09 799.89   3. Impaired functional mobility, balance, gait, and endurance Z74.09 V49.89     Patient Active Problem List   Diagnosis   • Normochromic normocytic anemia   • Chronic diastolic heart failure   • Diabetes type 2, uncontrolled   • Hypothyroid   • Chronic kidney disease (CKD), stage III (moderate)   • GERD (gastroesophageal reflux disease) with hx of gastritis    • h/o stroke with right hemiplegia   • Hypertension   • Disease of thyroid gland   • Diastolic heart failure   • Type 2 diabetes mellitus   • Acute blood loss anemia   • KRIS (acute kidney injury)   • Nausea and vomiting   • Right lower lobe pneumonia   • Weakness   • Acute respiratory failure with hypoxia   • Hyperkalemia   • History of GI bleed   • Epistaxis   • Iron deficiency anemia   • DALILA positive   • Proteinuria   • COPD (chronic obstructive pulmonary disease)               Adult Rehabilitation Note       17 1008 17 1036       Rehab Assessment/Intervention    Discipline physical therapy assistant  -AS physical therapy assistant  -AS     Document Type therapy note (daily note)  -AS therapy note (daily note)  -AS     Subjective Information agree to therapy;complains of;pain;weakness  -AS agree to therapy;complains of;weakness;fatigue  -AS     Patient Effort, Rehab Treatment good  -AS adequate  -AS     Symptoms Noted During/After Treatment fatigue;increased pain  -AS      Precautions/Limitations fall precautions;oxygen therapy device and L/min   catheter, right sided weakness with h/o CVA  -AS  fall precautions;oxygen therapy device and L/min;other (see comments)   h/o CVA with right residual HP  -AS     Recorded by [AS] Anel Rizvi PTA [AS] Anel Rizvi PTA     Pain Assessment    Pain Assessment Valles-Rosas FACES  -AS Valles-Baker FACES  -AS     Valles-Rosas FACES Pain Rating  2  -AS     Pain Score 2   with movement  -AS      Post Pain Score 0  -AS 2  -AS     Pain Type Chronic pain  -AS Chronic pain  -AS     Pain Location Knee  -AS Knee  -AS     Pain Orientation Right  -AS Right  -AS     Pain Intervention(s) Repositioned  -AS Repositioned;Ambulation/increased activity  -AS     Response to Interventions tolerated  -AS tolerated  -AS     Recorded by [AS] Anel Rizvi PTA [AS] Anel Rizvi PTA     Cognitive Assessment/Intervention    Current Cognitive/Communication Assessment impaired   patient falling asleep throughout session, confusion  -AS functional  -AS     Orientation Status oriented to;person;place  -AS oriented to;person;place  -AS     Follows Commands/Answers Questions 75% of the time;needs cueing;needs increased time;needs repetition  -% of the time;able to follow single-step instructions;needs cueing  -AS     Personal Safety mild impairment;decreased awareness, need for assist;decreased awareness, need for safety  -AS mild impairment;decreased awareness, need for assist;decreased awareness, need for safety;decreased insight to deficits  -AS     Personal Safety Interventions fall prevention program maintained;gait belt;nonskid shoes/slippers when out of bed;other (see comments)   exit alarm  -AS fall prevention program maintained;gait belt;nonskid shoes/slippers when out of bed;other (see comments)   exit alarm  -AS     Recorded by [AS] Anel Rizvi PTA [AS] Anel Rizvi PTA     Bed Mobility, Assessment/Treatment    Bed Mobility, Roll Left, [place] verbal cues required;maximum assist (25% patient effort);2 person assist required  -AS      Bed  Mobility, Roll Right, [place] verbal cues required;dependent (less than 25% patient effort);2 person assist required  -AS      Bed Mobility, Comment rolled to place lift sling for transfer to recliner  -AS deferred as patient requested no OOB do to weakness  -AS     Recorded by [AS] Anel Rizvi PTA [AS] Anel Rizvi PTA     Transfer Assessment/Treatment    Transfers, Bed-Chair [place] verbal cues required;dependent (less than 25% patient effort);2 person assist required  -AS not tested  -AS     Transfers, Bed-Chair-Bed, Assist Device mechanical lift  -AS      Recorded by [AS] Anel Rizvi PTA [AS] Anel Rizvi PTA     Gait Assessment/Treatment    Gait, [place] Level not appropriate to assess  -AS not tested  -AS     Recorded by [AS] Anel Rizvi PTA [AS] Anel Rizvi PTA     Therapy Exercises    Right Lower Extremity PROM:;10 reps;supine;calf stretch;ankle pumps/circles;heel slides;hip abduction/adduction;hip ER;hip IR  -AS PROM:;10 reps;supine;ankle pumps/circles;heel slides;hip abduction/adduction  -AS     Left Lower Extremity AAROM:;10 reps;supine;ankle pumps/circles;calf stretch;heel slides;hip abduction/adduction;hip ER;hip IR  -AS AROM:;10 reps;supine;ankle pumps/circles;hip abduction/adduction;heel slides  -AS     Right Upper Extremity PROM:;10 reps;supine;elbow flexion/extension;shoulder abduction/adduction;shoulder extension/flexion  -AS PROM:;10 reps;supine;elbow flexion/extension;shoulder abduction/adduction;shoulder extension/flexion  -AS     Left Upper Extremity AAROM:;10 reps;supine;elbow flexion/extension;shoulder abduction/adduction;shoulder extension/flexion  -AS AROM:;10 reps;supine;elbow flexion/extension;shoulder abduction/adduction;shoulder extension/flexion  -AS     Recorded by [AS] Anel Rizvi PTA [AS] Anel Rizvi PTA     Positioning and Restraints    Pre-Treatment Position in bed  -AS in bed  -AS     Post Treatment  Position chair  -AS bed  -AS     In Bed  supine;call light within reach;exit alarm on;encouraged to call for assist;RUE elevated;LUE elevated  -AS     In Chair reclined;call light within reach;encouraged to call for assist;exit alarm on;waffle cushion;on mechanical lift sling;RUE elevated;LUE elevated;legs elevated  -AS      Recorded by [AS] Anel Rizvi PTA [AS] Anel Rizvi PTA       User Key  (r) = Recorded By, (t) = Taken By, (c) = Cosigned By    Initials Name Effective Dates    AS Anel Rizvi PTA 06/22/15 -                 IP PT Goals       09/22/17 1112 09/20/17 1054 09/18/17 1053    Bed Mobility PT LTG    Bed Mobility PT LTG, Date Goal Reviewed 09/22/17  -AS 09/20/17  -AS     Bed Mobility PT LTG, Outcome goal ongoing  -AS goal ongoing  -AS goal ongoing  -KM    Transfer Training PT LTG    Transfer Training PT  LTG, Date Goal Reviewed 09/22/17  -AS 09/20/17  -AS     Transfer Training PT LTG, Outcome goal ongoing  -AS goal ongoing  -AS goal ongoing  -KM    Gait Training PT LTG    Gait Training Goal PT LTG, Date Goal Reviewed 09/22/17  -AS 09/20/17  -AS     Gait Training Goal PT LTG, Outcome goal ongoing  -AS goal ongoing  -AS goal ongoing  -KM      09/13/17 1142          Bed Mobility PT LTG    Bed Mobility PT LTG, Date Goal Reviewed 09/13/17  -AS      Bed Mobility PT LTG, Outcome goal ongoing  -AS      Transfer Training PT LTG    Transfer Training PT  LTG, Date Goal Reviewed 09/13/17  -AS      Transfer Training PT LTG, Outcome goal ongoing  -AS      Gait Training PT LTG    Gait Training Goal PT LTG, Date Goal Reviewed 09/13/17  -AS      Gait Training Goal PT LTG, Outcome goal ongoing  -AS        User Key  (r) = Recorded By, (t) = Taken By, (c) = Cosigned By    Initials Name Provider Type    SUDEEP Cho, PT Physical Therapist    AS Anel Rizvi PTA Physical Therapy Assistant          Physical Therapy Education     Title: PT OT SLP Therapies (Active)     Topic: Physical  Therapy (Active)     Point: Mobility training (Active)    Learning Progress Summary    Learner Readiness Method Response Comment Documented by Status   Patient Acceptance E NR  AS 09/22/17 1112 Active    Acceptance E NR  AS 09/20/17 1054 Active    Acceptance E VU instructed on plan of care and transfer technique  09/18/17 1056 Done    Acceptance E VU  MS 09/15/17 1520 Done    Acceptance E,D NR   09/14/17 1146 Active    Acceptance E NR  AS 09/13/17 1142 Active    Acceptance E,D NR   09/11/17 1618 Active    Acceptance E,D NR   09/08/17 1605 Active    Eager E,D NR   09/06/17 1036 Active               Point: Home exercise program (Active)    Learning Progress Summary    Learner Readiness Method Response Comment Documented by Status   Patient Acceptance E NR  AS 09/22/17 1112 Active    Acceptance E NR  AS 09/20/17 1054 Active    Acceptance E VU instructed on plan of care and transfer technique  09/18/17 1056 Done    Acceptance E,D NR   09/14/17 1146 Active    Acceptance E NR  AS 09/13/17 1142 Active    Acceptance E,D NR   09/11/17 1618 Active    Acceptance E,D NR   09/08/17 1605 Active    Eager E,D NR   09/06/17 1036 Active               Point: Body mechanics (Active)    Learning Progress Summary    Learner Readiness Method Response Comment Documented by Status   Patient Acceptance E NR  AS 09/22/17 1112 Active    Acceptance E NR  AS 09/20/17 1054 Active    Acceptance E VU instructed on plan of care and transfer technique  09/18/17 1056 Done    Acceptance E,D NR   09/14/17 1146 Active    Acceptance E NR  AS 09/13/17 1142 Active    Acceptance E,D NR   09/11/17 1618 Active    Acceptance E,D NR   09/08/17 1605 Active    Eager E,D NR   09/06/17 1036 Active               Point: Precautions (Active)    Learning Progress Summary    Learner Readiness Method Response Comment Documented by Status   Patient Acceptance E NR  AS 09/22/17 1112 Active    Acceptance E NR  AS 09/20/17 1054 Active     Acceptance E VU instructed on plan of care and transfer technique  09/18/17 1056 Done    Acceptance E,D NR   09/14/17 1146 Active    Acceptance E NR  AS 09/13/17 1142 Active    Acceptance E,D NR   09/11/17 1618 Active    Acceptance E,D NR   09/08/17 1605 Active    Eager E,D NR  DM 09/06/17 1036 Active                      User Key     Initials Effective Dates Name Provider Type Discipline     06/19/15 -  Winter Nur, PT Physical Therapist PT     06/19/15 -  Abimbola Cho, PT Physical Therapist PT     06/19/15 -  Lou Mcgrath, PT Physical Therapist PT    AS 06/22/15 -  Anel Rizvi, PTA Physical Therapy Assistant PT    MS 06/16/16 -  Darcy Colin, RN Registered Nurse Nurse                    PT Recommendation and Plan  Anticipated Discharge Disposition: inpatient rehabilitation facility ([facility] rehab)  PT Frequency: daily  Plan of Care Review  Plan Of Care Reviewed With: patient  Progress: progress towards functional goals is fair  Outcome Summary/Follow up Plan: patient with increased lethargy this session with difficulty staying awake, lift to recliner for safety.. B UE/LE exercises performed.          Outcome Measures       09/22/17 1008 09/20/17 1036       How much help from another person do you currently need...    Turning from your back to your side while in flat bed without using bedrails? 2  -AS 2  -AS     Moving from lying on back to sitting on the side of a flat bed without bedrails? 2  -AS 2  -AS     Moving to and from a bed to a chair (including a wheelchair)? 1  -AS 2  -AS     Standing up from a chair using your arms (e.g., wheelchair, bedside chair)? 1  -AS 2  -AS     Climbing 3-5 steps with a railing? 1  -AS 1  -AS     To walk in hospital room? 1  -AS 1  -AS     AM-PAC 6 Clicks Score 8  -AS 10  -AS     Functional Assessment    Outcome Measure Options AM-PAC 6 Clicks Basic Mobility (PT)  -AS AM-PAC 6 Clicks Basic Mobility (PT)  -AS       User Key  (r) = Recorded By,  (t) = Taken By, (c) = Cosigned By    Initials Name Provider Type    AS Anel Rizvi PTA Physical Therapy Assistant           Time Calculation:         PT Charges       09/22/17 1114          Time Calculation    Start Time 1008  -AS      PT Received On 09/22/17  -AS      PT Goal Re-Cert Due Date 09/28/17  -AS      Time Calculation- PT    Total Timed Code Minutes- PT 23 minute(s)  -AS        User Key  (r) = Recorded By, (t) = Taken By, (c) = Cosigned By    Initials Name Provider Type    AS Anel Rizvi PTA Physical Therapy Assistant          Therapy Charges for Today     Code Description Service Date Service Provider Modifiers Qty    19223693160 HC PT THER PROC EA 15 MIN 9/22/2017 Anel Rizvi PTA GP 2    76277976584 HC PT THER SUPP EA 15 MIN 9/22/2017 Anel Rizvi PTA GP 2          PT G-Codes  Outcome Measure Options: AM-PAC 6 Clicks Basic Mobility (PT)    Anel Rizvi PTA  9/22/2017

## 2017-09-22 NOTE — PLAN OF CARE
Problem: Patient Care Overview (Adult)  Goal: Plan of Care Review  Outcome: Ongoing (interventions implemented as appropriate)    09/22/17 0656   Coping/Psychosocial Response Interventions   Plan Of Care Reviewed With patient   Outcome Evaluation   Outcome Summary/Follow up Plan 2L oxygen all night sats 89-94% encouraged frequent wt shifts No gross bleeding thru the night Cannula trimmed as ordered   Patient Care Overview   Progress progress toward functional goals as expected

## 2017-09-23 ENCOUNTER — APPOINTMENT (OUTPATIENT)
Dept: GENERAL RADIOLOGY | Facility: HOSPITAL | Age: 77
End: 2017-09-23

## 2017-09-23 PROBLEM — R53.1 WEAKNESS: Status: RESOLVED | Noted: 2017-09-05 | Resolved: 2017-09-23

## 2017-09-23 LAB
ANION GAP SERPL CALCULATED.3IONS-SCNC: 7 MMOL/L (ref 3–11)
BUN BLD-MCNC: 56 MG/DL (ref 9–23)
BUN/CREAT SERPL: 32.9 (ref 7–25)
CALCIUM SPEC-SCNC: 8.1 MG/DL (ref 8.7–10.4)
CHLORIDE SERPL-SCNC: 110 MMOL/L (ref 99–109)
CO2 SERPL-SCNC: 24 MMOL/L (ref 20–31)
CREAT BLD-MCNC: 1.7 MG/DL (ref 0.6–1.3)
DEPRECATED RDW RBC AUTO: 56.6 FL (ref 37–54)
ERYTHROCYTE [DISTWIDTH] IN BLOOD BY AUTOMATED COUNT: 16.7 % (ref 11.3–14.5)
GFR SERPL CREATININE-BSD FRML MDRD: 29 ML/MIN/1.73
GLUCOSE BLD-MCNC: 114 MG/DL (ref 70–100)
GLUCOSE BLDC GLUCOMTR-MCNC: 136 MG/DL (ref 70–130)
GLUCOSE BLDC GLUCOMTR-MCNC: 145 MG/DL (ref 70–130)
GLUCOSE BLDC GLUCOMTR-MCNC: 196 MG/DL (ref 70–130)
GLUCOSE BLDC GLUCOMTR-MCNC: 208 MG/DL (ref 70–130)
HAV IGM SERPL QL IA: NORMAL
HBV CORE IGM SERPL QL IA: NORMAL
HBV SURFACE AG SERPL QL IA: NORMAL
HCT VFR BLD AUTO: 27.9 % (ref 34.5–44)
HCT VFR BLD AUTO: 29.3 % (ref 34.5–44)
HCV AB SER DONR QL: NORMAL
HGB BLD-MCNC: 8.8 G/DL (ref 11.5–15.5)
HGB BLD-MCNC: 9.3 G/DL (ref 11.5–15.5)
HIV1+2 AB SER QL: NORMAL
MCH RBC QN AUTO: 30.2 PG (ref 27–31)
MCHC RBC AUTO-ENTMCNC: 31.5 G/DL (ref 32–36)
MCV RBC AUTO: 95.9 FL (ref 80–99)
PLATELET # BLD AUTO: 162 10*3/MM3 (ref 150–450)
PMV BLD AUTO: 10.8 FL (ref 6–12)
POTASSIUM BLD-SCNC: 5.1 MMOL/L (ref 3.5–5.5)
RBC # BLD AUTO: 2.91 10*6/MM3 (ref 3.89–5.14)
SODIUM BLD-SCNC: 141 MMOL/L (ref 132–146)
WBC NRBC COR # BLD: 9.21 10*3/MM3 (ref 3.5–10.8)

## 2017-09-23 PROCEDURE — 80048 BASIC METABOLIC PNL TOTAL CA: CPT | Performed by: INTERNAL MEDICINE

## 2017-09-23 PROCEDURE — 94640 AIRWAY INHALATION TREATMENT: CPT

## 2017-09-23 PROCEDURE — 94799 UNLISTED PULMONARY SVC/PX: CPT

## 2017-09-23 PROCEDURE — 82962 GLUCOSE BLOOD TEST: CPT

## 2017-09-23 PROCEDURE — 74230 X-RAY XM SWLNG FUNCJ C+: CPT

## 2017-09-23 PROCEDURE — 94760 N-INVAS EAR/PLS OXIMETRY 1: CPT

## 2017-09-23 PROCEDURE — 85014 HEMATOCRIT: CPT | Performed by: INTERNAL MEDICINE

## 2017-09-23 PROCEDURE — 92611 MOTION FLUOROSCOPY/SWALLOW: CPT

## 2017-09-23 PROCEDURE — 71010 HC CHEST PA OR AP: CPT

## 2017-09-23 PROCEDURE — 99233 SBSQ HOSP IP/OBS HIGH 50: CPT | Performed by: HOSPITALIST

## 2017-09-23 PROCEDURE — 63710000001 INSULIN DETEMIR PER 5 UNITS: Performed by: INTERNAL MEDICINE

## 2017-09-23 PROCEDURE — 85018 HEMOGLOBIN: CPT | Performed by: INTERNAL MEDICINE

## 2017-09-23 PROCEDURE — 85027 COMPLETE CBC AUTOMATED: CPT | Performed by: HOSPITALIST

## 2017-09-23 PROCEDURE — 94668 MNPJ CHEST WALL SBSQ: CPT

## 2017-09-23 RX ORDER — BUDESONIDE 0.5 MG/2ML
0.5 INHALANT ORAL
Status: DISCONTINUED | OUTPATIENT
Start: 2017-09-23 | End: 2017-09-24 | Stop reason: HOSPADM

## 2017-09-23 RX ORDER — FUROSEMIDE 40 MG/1
40 TABLET ORAL DAILY
Status: DISCONTINUED | OUTPATIENT
Start: 2017-09-23 | End: 2017-09-24 | Stop reason: HOSPADM

## 2017-09-23 RX ORDER — BUMETANIDE 0.25 MG/ML
2 INJECTION INTRAMUSCULAR; INTRAVENOUS ONCE
Status: DISCONTINUED | OUTPATIENT
Start: 2017-09-23 | End: 2017-09-23

## 2017-09-23 RX ORDER — IPRATROPIUM BROMIDE AND ALBUTEROL SULFATE 2.5; .5 MG/3ML; MG/3ML
3 SOLUTION RESPIRATORY (INHALATION)
Status: DISCONTINUED | OUTPATIENT
Start: 2017-09-23 | End: 2017-09-24 | Stop reason: HOSPADM

## 2017-09-23 RX ADMIN — NYSTATIN 500000 UNITS: 100000 SUSPENSION ORAL at 17:38

## 2017-09-23 RX ADMIN — PANTOPRAZOLE SODIUM 40 MG: 40 TABLET, DELAYED RELEASE ORAL at 17:38

## 2017-09-23 RX ADMIN — HYDRALAZINE HYDROCHLORIDE 75 MG: 25 TABLET ORAL at 06:53

## 2017-09-23 RX ADMIN — HYDRALAZINE HYDROCHLORIDE 75 MG: 25 TABLET ORAL at 21:20

## 2017-09-23 RX ADMIN — INSULIN LISPRO 2 UNITS: 100 INJECTION, SOLUTION INTRAVENOUS; SUBCUTANEOUS at 12:59

## 2017-09-23 RX ADMIN — BUDESONIDE 0.5 MG: 0.5 INHALANT RESPIRATORY (INHALATION) at 20:06

## 2017-09-23 RX ADMIN — OXYCODONE HYDROCHLORIDE AND ACETAMINOPHEN 500 MG: 500 TABLET ORAL at 08:58

## 2017-09-23 RX ADMIN — GUAIFENESIN 600 MG: 600 TABLET, EXTENDED RELEASE ORAL at 21:21

## 2017-09-23 RX ADMIN — INSULIN LISPRO 3 UNITS: 100 INJECTION, SOLUTION INTRAVENOUS; SUBCUTANEOUS at 21:21

## 2017-09-23 RX ADMIN — IPRATROPIUM BROMIDE AND ALBUTEROL SULFATE 3 ML: .5; 3 SOLUTION RESPIRATORY (INHALATION) at 07:31

## 2017-09-23 RX ADMIN — Medication 250 MG: at 17:38

## 2017-09-23 RX ADMIN — ATORVASTATIN CALCIUM 10 MG: 10 TABLET, FILM COATED ORAL at 21:19

## 2017-09-23 RX ADMIN — INSULIN DETEMIR 18 UNITS: 100 INJECTION, SOLUTION SUBCUTANEOUS at 08:59

## 2017-09-23 RX ADMIN — CARVEDILOL 25 MG: 12.5 TABLET, FILM COATED ORAL at 08:58

## 2017-09-23 RX ADMIN — IPRATROPIUM BROMIDE AND ALBUTEROL SULFATE 3 ML: .5; 3 SOLUTION RESPIRATORY (INHALATION) at 16:03

## 2017-09-23 RX ADMIN — IPRATROPIUM BROMIDE AND ALBUTEROL SULFATE 3 ML: .5; 3 SOLUTION RESPIRATORY (INHALATION) at 12:11

## 2017-09-23 RX ADMIN — BACLOFEN 10 MG: 10 TABLET ORAL at 21:19

## 2017-09-23 RX ADMIN — BACLOFEN 10 MG: 10 TABLET ORAL at 06:53

## 2017-09-23 RX ADMIN — BACLOFEN 10 MG: 10 TABLET ORAL at 13:00

## 2017-09-23 RX ADMIN — Medication 250 MG: at 08:58

## 2017-09-23 RX ADMIN — INSULIN DETEMIR 18 UNITS: 100 INJECTION, SOLUTION SUBCUTANEOUS at 21:21

## 2017-09-23 RX ADMIN — ASPIRIN 81 MG 81 MG: 81 TABLET ORAL at 08:58

## 2017-09-23 RX ADMIN — GUAIFENESIN 600 MG: 600 TABLET, EXTENDED RELEASE ORAL at 08:58

## 2017-09-23 RX ADMIN — CARVEDILOL 25 MG: 12.5 TABLET, FILM COATED ORAL at 21:19

## 2017-09-23 RX ADMIN — NYSTATIN 500000 UNITS: 100000 SUSPENSION ORAL at 08:58

## 2017-09-23 RX ADMIN — IPRATROPIUM BROMIDE AND ALBUTEROL SULFATE 3 ML: .5; 3 SOLUTION RESPIRATORY (INHALATION) at 20:06

## 2017-09-23 RX ADMIN — Medication 2 TABLET: at 08:58

## 2017-09-23 RX ADMIN — FUROSEMIDE 40 MG: 40 TABLET ORAL at 15:45

## 2017-09-23 RX ADMIN — GABAPENTIN 900 MG: 300 CAPSULE ORAL at 21:19

## 2017-09-23 RX ADMIN — OXYMETAZOLINE HYDROCHLORIDE 2 SPRAY: 5 SPRAY NASAL at 08:58

## 2017-09-23 RX ADMIN — NYSTATIN 500000 UNITS: 100000 SUSPENSION ORAL at 12:59

## 2017-09-23 RX ADMIN — NYSTATIN 500000 UNITS: 100000 SUSPENSION ORAL at 21:21

## 2017-09-23 RX ADMIN — PANTOPRAZOLE SODIUM 40 MG: 40 TABLET, DELAYED RELEASE ORAL at 08:58

## 2017-09-23 RX ADMIN — HYDRALAZINE HYDROCHLORIDE 75 MG: 25 TABLET ORAL at 13:00

## 2017-09-23 RX ADMIN — LEVOTHYROXINE SODIUM 200 MCG: 100 TABLET ORAL at 06:52

## 2017-09-23 RX ADMIN — OXYMETAZOLINE HYDROCHLORIDE 2 SPRAY: 5 SPRAY NASAL at 21:19

## 2017-09-23 NOTE — MBS/VFSS/FEES
Acute Care - Speech Language Pathology   Swallow Re-Evaluation  [facility]   Modified Barium Swallow Study (MBS)       Patient Name: Jennifer Oliveira  : 1940  MRN: [id number]  Today's Date: 2017  Onset of Illness/Injury or Date of Surgery Date: 17            Admit Date: 2017    Visit Dx:     ICD-10-CM ICD-9-CM   1. Pneumonia of right lower lobe due to infectious organism J18.9 483.8   2. Impaired mobility and ADLs Z74.09 799.89   3. Impaired functional mobility, balance, gait, and endurance Z74.09 V49.89     Patient Active Problem List   Diagnosis   • Normochromic normocytic anemia   • Chronic diastolic heart failure   • Diabetes type 2, uncontrolled   • Hypothyroid   • Chronic kidney disease (CKD), stage III (moderate)   • GERD (gastroesophageal reflux disease) with hx of gastritis    • h/o stroke with right hemiplegia   • Hypertension   • Disease of thyroid gland   • Diastolic heart failure   • Type 2 diabetes mellitus   • Acute blood loss anemia   • KRIS (acute kidney injury)   • Nausea and vomiting   • Right lower lobe pneumonia   • Acute respiratory failure with hypoxia   • Hyperkalemia   • History of GI bleed   • Epistaxis   • Iron deficiency anemia   • DALILA positive   • Proteinuria   • COPD (chronic obstructive pulmonary disease)     Past Medical History:   Diagnosis Date   • CKD (chronic kidney disease), stage III     baseline Cr 1.5-1.6   • Coronary artery disease    • Diabetes mellitus    • Diastolic heart failure     last LVEF 70%   • Disease of thyroid gland    • GERD (gastroesophageal reflux disease)    • GI bleed 2017   • HTN (hypertension) 2016   • Hypertension    • Lower extremity cellulitis 2016   • Metabolic encephalopathy 2016   • PAD (peripheral artery disease)     s/p right fem-pop bypass 2016   • Stroke     residual chronic right hemiplegia   • TIA (transient ischemic attack) 2017    - Onset AM , MRI Brain negative for acute CVA  -  Associated with transient left-sided weakness and dysarthria  - Pt chronically on aspirin, plavix, pravastatin - Hx of right-sided weakness due to hx of CVA, MRI Brain with possible old left pontine CVA     Past Surgical History:   Procedure Laterality Date   • CARPAL TUNNEL RELEASE     • CHOLECYSTECTOMY     • ENDOSCOPY N/A 5/15/2017    Procedure: ESOPHAGOGASTRODUODENOSCOPY;  Surgeon: Lizbet Vidales MD;  Location:  [facility];  Service:    • ENDOSCOPY N/A 5/30/2017    Procedure: ESOPHAGOGASTRODUODENOSCOPY;  Surgeon: Lizbet Vidales MD;  Location:  [facility];  Service:    • FEMORAL POPLITEAL BYPASS Right    • HYSTERECTOMY            SWALLOW EVALUATION (last 72 hours)      Swallow Evaluation       09/23/17 1430                Rehab Evaluation    Document Type evaluation  -LS        Subjective Information no complaints;agree to therapy  -LS        General Information    Patient Profile Review yes  -LS        Subjective Patient Observations alert and cooperative  -LS        Pertinent History Of Current Problem RLL PNA. Hx CVA with R homa  -LS        Current Diet Limitations thin liquids;regular solid  -LS        Prior Level of Function- Communication functional in all spheres  -LS        Prior Level of Function- Swallowing no diet consistency restrictions  -LS        Plans/Goals Discussed With patient;agreed upon  -LS        Barriers to Rehab none identified  -LS        Videofluoroscopic Swallowing Exam    Risks/Benefits Reviewed risks/benefits explained;agreed to eval;patient  -LS        Positioning Needs for Swallow Exam upright at 90 degrees  -LS        Special Considerations Pt reports food sticking in her dentures. Concerns for ASP. Direct order for MBS before D/C.  -LS        Motor Function During Phonation/Speech    Observation Anatomic Considerations no anatomic structural deviation via videofluroscopy  -LS        VFSS    Mode of Presentation thin:;nectar:;pudding:;cohesive solid:  -LS        Oral Phase  Results pudding:;cohesive solid:;increased prep time   Pt using finger to unstick PO.  -LS        Pharyngeal Phase Physiologic Impairment thin:   pen to TVF during the swallow.  -LS        Post Swallow Residue thin:;nectar:;pudding:;cohesive solid:;residue present in valleculae;residue present on pharyngeal walls  -LS        Rosenbek's PenAsp Scale thin:;5-->Level 5;nectar:;4-->Level 4;honey:;cohesive solid:;1-->Level 1  -LS        Response to Aspiration unproductive reflexive throat clear  -LS        Att Compensatory Maneuvers bolus size;bolus presentation style  -LS        Pharyngeal Phase Results impaired pharyngeal phase of swallowing  -LS        Summary of VFSS Moderate pharyngeal dysphagia. Pt reports PO sticking in dentures and used finger to extract material during MBS. However, this did not impact safety of swallow. Difficulut view of the TVF even with cues to relax shoulders. Delaye initiation of thins to the posterior portion of the posterior portion of the epiglotts. Deep pen to the TVF with thins 2' delayed initiation and decreased hyoid excursion/elevation. Suspect asp x1 2' throat clear reflex but unable to visualize. Cued throat clear cleared material. Improved timing with nectar thick. Pen with large consecutive sips of nectar thick that did not clear with completion of the swallow. Pen did clear w/ cued throat clear. No pen/asp with pureed, and solid. Mild-mod residue mostly cleared w/ spontaneous second swallow. REC: nectar thick, dys level 4, small sips/bites, alternate sips and bites. Meds whole in pureed.   -LS          User Key  (r) = Recorded By, (t) = Taken By, (c) = Cosigned By    Initials Name Effective Dates     Shelly Resendez, MS [facility]-SLP 06/22/15 -         EDUCATION  The patient has been educated in the following areas:   Dysphagia (Swallowing Impairment) Oral Care/Hydration Modified Diet Instruction.    SLP Recommendation and Plan     SLP Diet Recommendation: IV - mechanical soft, no  mixed consistencies, nectar/syrup-thick liquids  Recommended Feeding/Eating Techniques: small sips/bites, alternate between small bites and sips of food/liquid  SLP Rec. for Method of Medication Administration: meds whole in pudding/applesauce     Recommended Diagnostics: VFSS (MBS)  Criteria for Skilled Therapeutic Interventions Met: skilled criteria for dysphagia intervention met        Therapy Frequency: 5 times/wk             Plan of Care Review  Plan Of Care Reviewed With: patient  Outcome Summary/Follow up Plan: MBS complete: Moderate pharyngeal dysphagia. Pt reports PO sticking in dentures and used finger to extract material during MBS. However, this did not impact safety of swallow. Difficult view of the TVF even with cues to relax shoulders. Delayed initiation of thins to the posterior portion of the posterior portion of the epiglottes. Deep pen to the TVF with thins 2' delayed initiation and decreased hyoid excursion/elevation. Suspect asp x1 2' throat clear reflex but unable to visualize. Cued throat clear cleared material. Improved timing with nectar thick. Pen with large consecutive sips of nectar thick that did not clear with completion of the swallow. Pen did clear w/ cued throat clear. No pen/asp with pureed, and solid. Mild-mod residue mostly cleared w/ spontaneous second swallow. REC: nectar thick, dys level 4, small sips/bites, alternate sips and bites. Meds whole in pureed.              Time Calculation:         Time Calculation- SLP       09/23/17 1624          Time Calculation- SLP    SLP Start Time 1437  -      SLP Received On 09/23/17  -        User Key  (r) = Recorded By, (t) = Taken By, (c) = Cosigned By    Initials Name Provider Type     Shelly Resendez MS CCC-SLP Speech and Language Pathologist          Therapy Charges for Today     Code Description Service Date Service Provider Modifiers Qty    20487230783 HC ST MOTION FLUORO EVAL SWALLOW 5 9/23/2017 Shelly Resendez MS CCC-SLP GUILLERMO  1               Shelly Resendez, MS CCC-SLP  9/23/2017

## 2017-09-23 NOTE — PLAN OF CARE
Problem: Patient Care Overview (Adult)  Goal: Plan of Care Review    09/23/17 1147   Coping/Psychosocial Response Interventions   Plan Of Care Reviewed With patient   Patient Care Overview   Progress declining

## 2017-09-23 NOTE — PROGRESS NOTES
"      HOSPITALIST DAILY PROGRESS NOTE    Chief Complaint: SOB    Subjective   SUBJECTIVE/OVERNIGHT EVENTS   No acute events ON. Breathing improved. Coughing improved.     Review of Systems:  General - No fevers, no chills  CVS - No chest pain, no palpitations  Resp - No cough, +dyspnea  GI - No N/V/D, no abd pain    Objective   OBJECTIVE   I have reviewed the vital signs.  /60 (BP Location: Left arm, Patient Position: Lying)  Pulse 64  Temp 98.2 °F (36.8 °C) (Oral)   Resp 18  Ht 63\" (160 cm)  Wt 187 lb 12.8 oz (85.2 kg)  SpO2 93%  BMI 33.27 kg/m2    Physical Exam:  General - NAD, pt was sitting in chair  HEENT - EOMI, PERRL, oropharynx clear, hearing intact  CVS - RRR, S1 S2, no rubs, gallops or murmurs, 2s cap refill, 2+ peripheral edema, 2+ pulses  Resp - Expiratory wheezing bilaterally posteriorly   GI - +BS, soft, ND, non-TTP  MSK - No joint pain or joint edema  Neuro - Awake and oriented, follows commands, interactive, moves all extremities equally    Results:  I have reviewed the labs, culture data, radiology results, and diagnostic studies.      Results from last 7 days  Lab Units 09/23/17  0557 09/22/17  0624 09/22/17  0111  09/18/17  0630   WBC 10*3/mm3  --   --   --   --  12.58*   HEMOGLOBIN g/dL 9.3* 10.1* 9.1*  < > 7.5*   HEMATOCRIT % 29.3* 29.8* 27.9*  < > 23.9*   PLATELETS 10*3/mm3  --   --   --   --  271   < > = values in this interval not displayed.    Results from last 7 days  Lab Units 09/23/17  0557   SODIUM mmol/L 141   POTASSIUM mmol/L 5.1   CHLORIDE mmol/L 110*   CO2 mmol/L 24.0   BUN mg/dL 56*   CREATININE mg/dL 1.70*   GLUCOSE mg/dL 114*   CALCIUM mg/dL 8.1*       Culture Data:  Cultures:         I have reviewed the medications.    aspirin 81 mg Oral Daily   atorvastatin 10 mg Oral Daily   baclofen 10 mg Oral TID   carvedilol 25 mg Oral Q12H   gabapentin 900 mg Oral Nightly   guaiFENesin 600 mg Oral Q12H   hydrALAZINE 75 mg Oral Q8H   insulin detemir 18 Units Subcutaneous Q12H "   insulin lispro 0-7 Units Subcutaneous 4x Daily With Meals & Nightly   insulin lispro 5 Units Subcutaneous TID With Meals   ipratropium-albuterol 3 mL Nebulization 4x Daily - RT   levothyroxine 200 mcg Oral Daily   nystatin 5 mL Oral 4x Daily   oxymetazoline 2 spray Each Nare Q12H   pantoprazole 40 mg Oral BID AC   pharmacy consult - MTM  Does not apply Daily   polyethylene glycol 17 g Oral Daily   predniSONE 10 mg Oral Daily With Breakfast   saccharomyces boulardii 250 mg Oral BID   sennosides-docusate sodium 2 tablet Oral Daily   ascorbic acid 500 mg Oral Daily With Breakfast       Assessment/Plan   ASSESSMENT/PLAN    Principal Problem:    Right lower lobe pneumonia  Active Problems:    Chronic diastolic heart failure    Chronic kidney disease (CKD), stage III (moderate)    h/o stroke with right hemiplegia    Disease of thyroid gland    Type 2 diabetes mellitus    Acute blood loss anemia    KRIS (acute kidney injury)    Nausea and vomiting    Weakness    Acute respiratory failure with hypoxia    History of GI bleed    Epistaxis    Iron deficiency anemia    DALILA positive    Proteinuria    COPD (chronic obstructive pulmonary disease)    # Acute on chronic hypoxemic respiratory failure: resolved  - 2L NC at home, currently back on 2L  - Erroneously recorded as requiring 15L NC  - Repeat CXR ordered and personally reviewed. Bibasilar opacities with bronchogram     # Acute diastolic HF: Improving  - ECHO in 04/2017 normal EF, previously documented diastolic dysfunction  - UOP 2800/24hr with Is/Os -1.7L  - Will restart maintenance diuretic dose: PO lasix 40mg daily  - KAREN wolfe    # RLL PNA  - Completed 14days of abx 9/18  - Completed prednisone taper    # Epistaxis  - s/p ENT cauterization 9/21    # KRIS on CKD  - Baseline creatinine 1.4  - Nephro on board  - FEUrea >32%  - Holding ACEi/ARB    # Nephrotic-range proteinuria  - HIV and acute hepatitis panel obtained and are neg  - Nephro on board    # HTN  - Continue Coreg  at current dose  - DCed amlodipine for borderline bradycardia  - Continue Hydralazine for now. 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

## 2017-09-23 NOTE — PLAN OF CARE
Problem: Patient Care Overview (Adult)  Goal: Plan of Care Review  Outcome: Ongoing (interventions implemented as appropriate)    09/23/17 3506   Coping/Psychosocial Response Interventions   Plan Of Care Reviewed With patient   Outcome Evaluation   Outcome Summary/Follow up Plan MBS complete: Moderate pharyngeal dysphagia. Pt reports PO sticking in dentures and used finger to extract material during MBS. However, this did not impact safety of swallow. Difficult view of the TVF even with cues to relax shoulders. Delayed initiation of thins to the posterior portion of the posterior portion of the epiglottes. Deep pen to the TVF with thins 2' delayed initiation and decreased hyoid excursion/elevation. Suspect asp x1 2' throat clear reflex but unable to visualize. Cued throat clear cleared material. Improved timing with nectar thick. Pen with large consecutive sips of nectar thick that did not clear with completion of the swallow. Pen did clear w/ cued throat clear. No pen/asp with pureed, and solid. Mild-mod residue mostly cleared w/ spontaneous second swallow. REC: nectar thick, dys level 4, small sips/bites, alternate sips and bites. Meds whole in pureed.

## 2017-09-23 NOTE — PROGRESS NOTES
"   LOS: 18 days    Patient Care Team:  Samuel Buck MD as PCP - General (Family Medicine)    Subjective     Stable. She feels well, no new over night events- appetite better    Objective     Vital Signs:  Blood pressure 139/81, pulse 65, temperature 98.6 °F (37 °C), temperature source Oral, resp. rate 20, height 63\" (160 cm), weight 187 lb 12.8 oz (85.2 kg), SpO2 (!) 87 %.    Flowsheet Rows         First Filed Value    Admission Height  63\" (160 cm) Documented at 09/05/2017 1009    Admission Weight  170 lb (77.1 kg) Documented at 09/05/2017 1009          09/22 0701 - 09/23 0700  In: 1080 [P.O.:1080]  Out: 2800 [Urine:2800]    Physical Exam:    GENERAL: WD elderly female. NAD.   CV: RRR + edema  LUNGS:  Quiet,  Nonlabored resp.  Lungs CTA  ABDOMEN: Soft, nontender, nondistended. + BS  : no palp bladder, + wolfe  SKIN: Warm and dry without rash    Labs:    Results from last 7 days  Lab Units 09/23/17  1225 09/23/17  0557 09/22/17  0624  09/18/17  0630   WBC 10*3/mm3 9.21  --   --   --  12.58*   HEMOGLOBIN g/dL 8.8* 9.3* 10.1*  < > 7.5*   PLATELETS 10*3/mm3 162  --   --   --  271   < > = values in this interval not displayed.    Results from last 7 days  Lab Units 09/23/17  0557 09/22/17  0624 09/21/17  0449 09/20/17  0545 09/19/17  1617 09/19/17  0532   SODIUM mmol/L 141 136 137 139  --  139   POTASSIUM mmol/L 5.1 4.7 4.9 4.7  --  4.9   CHLORIDE mmol/L 110* 108 108 108  --  109   CO2 mmol/L 24.0 23.0 24.0 23.0  --  22.0   BUN mg/dL 56* 63* 67* 72*  --  72*   CREATININE mg/dL 1.70* 1.60* 1.80* 2.20*  --  2.00*   CALCIUM mg/dL 8.1* 8.0* 8.0* 8.2*  --  8.3*   PHOSPHORUS mg/dL  --   --   --  5.3*  --  5.4*   ALBUMIN g/dL  --   --   --  2.90* 2.4* 2.90*               Results from last 7 days  Lab Units 09/19/17  1631   COLOR UA  Yellow   CLARITY UA  Cloudy*   PH, URINE  <=5.0   SPECIFIC GRAVITY, URINE  1.018   GLUCOSE UA  Negative   KETONES UA  Negative   BILIRUBIN UA  Negative   PROTEIN UA  100 mg/dL (2+)*   BLOOD UA  " Negative   LEUKOCYTES UA  Small (1+)*   NITRITE UA  Negative       Estimated Creatinine Clearance: 28.7 mL/min (by C-G formula based on Cr of 1.7).    Urine Pr:Cr 3.5 gm    A/P:    ARF:  Cr better overnight.  UOP increased to 2800.  S/p wolfe cath placement for urinary retention. Good urine out put, cr stable at 1.7 close to her baseline now    CKD3:  Baseline 1.5-1.6 by report.  Renal u/s without hydro  .   Proteinuria: still 3.5 gm.  Past HA1c ok.   Serology pending.       HTN: BP stable. .       PNA: on ABx     Anemia:  Hgb stable.  Tsat 9%.  Recheck s/p IVFe.      Edema: stable for now    High risk and complex pt.      Roshan Miller MD  09/23/17  12:58 PM

## 2017-09-24 VITALS
DIASTOLIC BLOOD PRESSURE: 60 MMHG | HEART RATE: 61 BPM | TEMPERATURE: 97.6 F | SYSTOLIC BLOOD PRESSURE: 120 MMHG | BODY MASS INDEX: 32.02 KG/M2 | RESPIRATION RATE: 18 BRPM | HEIGHT: 63 IN | WEIGHT: 180.7 LBS | OXYGEN SATURATION: 92 %

## 2017-09-24 PROBLEM — J96.01 ACUTE RESPIRATORY FAILURE WITH HYPOXIA (HCC): Status: RESOLVED | Noted: 2017-09-05 | Resolved: 2017-09-24

## 2017-09-24 PROBLEM — R11.2 NAUSEA AND VOMITING: Status: RESOLVED | Noted: 2017-09-05 | Resolved: 2017-09-24

## 2017-09-24 PROBLEM — R04.0 EPISTAXIS: Status: RESOLVED | Noted: 2017-09-21 | Resolved: 2017-09-24

## 2017-09-24 PROBLEM — J18.9 RIGHT LOWER LOBE PNEUMONIA: Status: RESOLVED | Noted: 2017-09-05 | Resolved: 2017-09-24

## 2017-09-24 LAB
ALBUMIN SERPL-MCNC: 3 G/DL (ref 3.2–4.8)
ANION GAP SERPL CALCULATED.3IONS-SCNC: 6 MMOL/L (ref 3–11)
BUN BLD-MCNC: 53 MG/DL (ref 9–23)
BUN/CREAT SERPL: 29.4 (ref 7–25)
CALCIUM SPEC-SCNC: 8.4 MG/DL (ref 8.7–10.4)
CHLORIDE SERPL-SCNC: 110 MMOL/L (ref 99–109)
CO2 SERPL-SCNC: 26 MMOL/L (ref 20–31)
CREAT BLD-MCNC: 1.8 MG/DL (ref 0.6–1.3)
DEPRECATED RDW RBC AUTO: 56.4 FL (ref 37–54)
ERYTHROCYTE [DISTWIDTH] IN BLOOD BY AUTOMATED COUNT: 16.5 % (ref 11.3–14.5)
FERRITIN SERPL-MCNC: 481 NG/ML (ref 10–291)
GFR SERPL CREATININE-BSD FRML MDRD: 27 ML/MIN/1.73
GLUCOSE BLD-MCNC: 79 MG/DL (ref 70–100)
GLUCOSE BLDC GLUCOMTR-MCNC: 149 MG/DL (ref 70–130)
GLUCOSE BLDC GLUCOMTR-MCNC: 82 MG/DL (ref 70–130)
HCT VFR BLD AUTO: 31.1 % (ref 34.5–44)
HGB BLD-MCNC: 9.9 G/DL (ref 11.5–15.5)
IRON 24H UR-MRATE: 28 MCG/DL (ref 50–175)
IRON SATN MFR SERPL: 12 % (ref 15–50)
MCH RBC QN AUTO: 30.5 PG (ref 27–31)
MCHC RBC AUTO-ENTMCNC: 31.8 G/DL (ref 32–36)
MCV RBC AUTO: 95.7 FL (ref 80–99)
PHOSPHATE SERPL-MCNC: 4.6 MG/DL (ref 2.4–5.1)
PLATELET # BLD AUTO: 179 10*3/MM3 (ref 150–450)
PMV BLD AUTO: 11.3 FL (ref 6–12)
POTASSIUM BLD-SCNC: 4.8 MMOL/L (ref 3.5–5.5)
RBC # BLD AUTO: 3.25 10*6/MM3 (ref 3.89–5.14)
SODIUM BLD-SCNC: 142 MMOL/L (ref 132–146)
TIBC SERPL-MCNC: 242 MCG/DL (ref 250–450)
WBC NRBC COR # BLD: 8.98 10*3/MM3 (ref 3.5–10.8)

## 2017-09-24 PROCEDURE — 94640 AIRWAY INHALATION TREATMENT: CPT

## 2017-09-24 PROCEDURE — 82962 GLUCOSE BLOOD TEST: CPT

## 2017-09-24 PROCEDURE — 85027 COMPLETE CBC AUTOMATED: CPT | Performed by: HOSPITALIST

## 2017-09-24 PROCEDURE — 94760 N-INVAS EAR/PLS OXIMETRY 1: CPT

## 2017-09-24 PROCEDURE — 80069 RENAL FUNCTION PANEL: CPT | Performed by: HOSPITALIST

## 2017-09-24 PROCEDURE — 94668 MNPJ CHEST WALL SBSQ: CPT

## 2017-09-24 PROCEDURE — 83550 IRON BINDING TEST: CPT | Performed by: INTERNAL MEDICINE

## 2017-09-24 PROCEDURE — 99239 HOSP IP/OBS DSCHRG MGMT >30: CPT | Performed by: NURSE PRACTITIONER

## 2017-09-24 PROCEDURE — 94799 UNLISTED PULMONARY SVC/PX: CPT

## 2017-09-24 PROCEDURE — 83540 ASSAY OF IRON: CPT | Performed by: INTERNAL MEDICINE

## 2017-09-24 PROCEDURE — 82728 ASSAY OF FERRITIN: CPT | Performed by: INTERNAL MEDICINE

## 2017-09-24 RX ORDER — IPRATROPIUM BROMIDE AND ALBUTEROL SULFATE 2.5; .5 MG/3ML; MG/3ML
3 SOLUTION RESPIRATORY (INHALATION)
Qty: 360 ML
Start: 2017-09-24

## 2017-09-24 RX ORDER — POLYETHYLENE GLYCOL 3350 17 G/17G
17 POWDER, FOR SOLUTION ORAL DAILY
Start: 2017-09-25

## 2017-09-24 RX ORDER — CARVEDILOL 25 MG/1
25 TABLET ORAL EVERY 12 HOURS SCHEDULED
Start: 2017-09-24

## 2017-09-24 RX ORDER — BUDESONIDE 0.5 MG/2ML
0.5 INHALANT ORAL
Start: 2017-09-24

## 2017-09-24 RX ORDER — FUROSEMIDE 40 MG/1
20 TABLET ORAL DAILY
Start: 2017-09-25

## 2017-09-24 RX ORDER — ALBUTEROL SULFATE 2.5 MG/3ML
2.5 SOLUTION RESPIRATORY (INHALATION) EVERY 4 HOURS PRN
Refills: 12
Start: 2017-09-24

## 2017-09-24 RX ORDER — SACCHAROMYCES BOULARDII 250 MG
250 CAPSULE ORAL 2 TIMES DAILY
Start: 2017-09-24

## 2017-09-24 RX ORDER — HYDRALAZINE HYDROCHLORIDE 25 MG/1
75 TABLET, FILM COATED ORAL EVERY 8 HOURS SCHEDULED
Start: 2017-09-24

## 2017-09-24 RX ORDER — GUAIFENESIN 600 MG/1
600 TABLET, EXTENDED RELEASE ORAL EVERY 12 HOURS SCHEDULED
Start: 2017-09-24

## 2017-09-24 RX ORDER — SENNA AND DOCUSATE SODIUM 50; 8.6 MG/1; MG/1
2 TABLET, FILM COATED ORAL DAILY
Start: 2017-09-25

## 2017-09-24 RX ORDER — CLONIDINE HYDROCHLORIDE 0.3 MG/1
0.3 TABLET ORAL EVERY 6 HOURS PRN
Start: 2017-09-24

## 2017-09-24 RX ORDER — BISACODYL 10 MG
10 SUPPOSITORY, RECTAL RECTAL 2 TIMES DAILY PRN
Start: 2017-09-24

## 2017-09-24 RX ADMIN — BACLOFEN 10 MG: 10 TABLET ORAL at 13:48

## 2017-09-24 RX ADMIN — IPRATROPIUM BROMIDE AND ALBUTEROL SULFATE 3 ML: .5; 3 SOLUTION RESPIRATORY (INHALATION) at 13:29

## 2017-09-24 RX ADMIN — CARVEDILOL 25 MG: 12.5 TABLET, FILM COATED ORAL at 09:14

## 2017-09-24 RX ADMIN — Medication 2 TABLET: at 09:14

## 2017-09-24 RX ADMIN — OXYMETAZOLINE HYDROCHLORIDE 2 SPRAY: 5 SPRAY NASAL at 09:14

## 2017-09-24 RX ADMIN — ASPIRIN 81 MG 81 MG: 81 TABLET ORAL at 09:14

## 2017-09-24 RX ADMIN — NYSTATIN 500000 UNITS: 100000 SUSPENSION ORAL at 12:21

## 2017-09-24 RX ADMIN — NYSTATIN 500000 UNITS: 100000 SUSPENSION ORAL at 09:14

## 2017-09-24 RX ADMIN — PANTOPRAZOLE SODIUM 40 MG: 40 TABLET, DELAYED RELEASE ORAL at 09:14

## 2017-09-24 RX ADMIN — BUDESONIDE 0.5 MG: 0.5 INHALANT RESPIRATORY (INHALATION) at 09:05

## 2017-09-24 RX ADMIN — LEVOTHYROXINE SODIUM 200 MCG: 100 TABLET ORAL at 05:24

## 2017-09-24 RX ADMIN — Medication 250 MG: at 09:14

## 2017-09-24 RX ADMIN — FUROSEMIDE 40 MG: 40 TABLET ORAL at 09:14

## 2017-09-24 RX ADMIN — GUAIFENESIN 600 MG: 600 TABLET, EXTENDED RELEASE ORAL at 09:14

## 2017-09-24 RX ADMIN — IPRATROPIUM BROMIDE AND ALBUTEROL SULFATE 3 ML: .5; 3 SOLUTION RESPIRATORY (INHALATION) at 09:05

## 2017-09-24 RX ADMIN — HYDRALAZINE HYDROCHLORIDE 75 MG: 25 TABLET ORAL at 13:48

## 2017-09-24 RX ADMIN — BACLOFEN 10 MG: 10 TABLET ORAL at 05:25

## 2017-09-24 RX ADMIN — HYDRALAZINE HYDROCHLORIDE 75 MG: 25 TABLET ORAL at 05:24

## 2017-09-24 RX ADMIN — OXYCODONE HYDROCHLORIDE AND ACETAMINOPHEN 500 MG: 500 TABLET ORAL at 09:14

## 2017-09-24 NOTE — PROGRESS NOTES
"   LOS: 19 days    Patient Care Team:  Samuel Buck MD as PCP - General (Family Medicine)    Subjective     Stable. She feels well, no new over night events-sleepy today    Objective     Vital Signs:  Blood pressure 144/60, pulse 67, temperature 97.8 °F (36.6 °C), temperature source Oral, resp. rate 18, height 63\" (160 cm), weight 180 lb 11.2 oz (82 kg), SpO2 95 %.    Flowsheet Rows         First Filed Value    Admission Height  63\" (160 cm) Documented at 09/05/2017 1009    Admission Weight  170 lb (77.1 kg) Documented at 09/05/2017 1009          09/23 0701 - 09/24 0700  In: 480 [P.O.:480]  Out: 725 [Urine:725]    Physical Exam:    GENERAL: WD elderly female. NAD.   CV: RRR + edema  LUNGS:  Quiet,  Nonlabored resp.  Lungs CTA  ABDOMEN: Soft, nontender, nondistended. + BS  : no palp bladder, + wolfe  SKIN: Warm and dry without rash    Labs:    Results from last 7 days  Lab Units 09/24/17  0734 09/23/17  1225 09/23/17  0557  09/18/17  0630   WBC 10*3/mm3 8.98 9.21  --   --  12.58*   HEMOGLOBIN g/dL 9.9* 8.8* 9.3*  < > 7.5*   PLATELETS 10*3/mm3 179 162  --   --  271   < > = values in this interval not displayed.    Results from last 7 days  Lab Units 09/24/17  0734 09/23/17  0557 09/22/17  0624 09/21/17  0449 09/20/17  0545 09/19/17  1617 09/19/17  0532   SODIUM mmol/L 142 141 136 137 139  --  139   POTASSIUM mmol/L 4.8 5.1 4.7 4.9 4.7  --  4.9   CHLORIDE mmol/L 110* 110* 108 108 108  --  109   CO2 mmol/L 26.0 24.0 23.0 24.0 23.0  --  22.0   BUN mg/dL 53* 56* 63* 67* 72*  --  72*   CREATININE mg/dL 1.80* 1.70* 1.60* 1.80* 2.20*  --  2.00*   CALCIUM mg/dL 8.4* 8.1* 8.0* 8.0* 8.2*  --  8.3*   PHOSPHORUS mg/dL 4.6  --   --   --  5.3*  --  5.4*   ALBUMIN g/dL 3.00*  --   --   --  2.90* 2.4* 2.90*               Results from last 7 days  Lab Units 09/19/17  1631   COLOR UA  Yellow   CLARITY UA  Cloudy*   PH, URINE  <=5.0   SPECIFIC GRAVITY, URINE  1.018   GLUCOSE UA  Negative   KETONES UA  Negative   BILIRUBIN UA  " Negative   PROTEIN UA  100 mg/dL (2+)*   BLOOD UA  Negative   LEUKOCYTES UA  Small (1+)*   NITRITE UA  Negative       Estimated Creatinine Clearance: 26.5 mL/min (by C-G formula based on Cr of 1.8).    Urine Pr:Cr 3.5 gm    A/P:    ARF:  Cr better overnight.  UOP increased to 2800.  S/p wolfe cath placement for urinary retention. Good urine out put, cr stable at 1.8 close to her baseline now    CKD3:  Baseline 1.5-1.6 by report.  Renal u/s without hydro  .   Proteinuria: still 3.5 gm.  Past HA1c ok.   Serology pending.       HTN: BP stable. .       PNA: on ABx     Anemia:  Hgb stable.  Tsat 9%.  Recheck s/p IVFe.      Edema: stable for now-May have to change the Lasix to 20 mg if creatinine is further rising    High risk and complex pt.      Roshan Miller MD  09/24/17  10:48 AM

## 2017-09-24 NOTE — DISCHARGE SUMMARY
[facility] Medicine Services  DISCHARGE SUMMARY       Date of Admission: 9/5/2017  Date of Discharge:  9/24/2017  Primary Care Physician: Samuel Buck MD  Consulting Physician(s)     Provider Relationship    Babar Law MD Consulting Physician    Gagan Jose MD Consulting Physician          Discharge Diagnoses:  Active Hospital Problems (** Indicates Principal Problem)    Diagnosis Date Noted   • History of GI bleed [Z87.19] 09/21/2017     S/p EGD w/ apc of gastric AVM may 2017     • Iron deficiency anemia [D50.9] 09/21/2017   • DALILA positive [R76.8] 09/21/2017   • Proteinuria [R80.9] 09/21/2017   • COPD (chronic obstructive pulmonary disease) [J44.9] 09/21/2017   • KRIS (acute kidney injury) [N17.9] 05/26/2017   • Acute blood loss anemia [D62] 05/14/2017   • Type 2 diabetes mellitus [E11.9]    • Disease of thyroid gland [E07.9]    • h/o stroke with right hemiplegia [I63.9]      residual chronic right hemiplegia     • Chronic kidney disease (CKD), stage III (moderate) [N18.3] 04/19/2017     - baseline Cr 1.5-1.6     • Chronic diastolic heart failure [I50.32] 11/14/2016     - Last echo 11/2016 LVEF 70%, normal VHD        Resolved Hospital Problems    Diagnosis Date Noted Date Resolved   • **Right lower lobe pneumonia [J18.9] 09/05/2017 09/24/2017     Completed rx     • Epistaxis [R04.0] 09/21/2017 09/24/2017   • Nausea and vomiting [R11.2] 09/05/2017 09/24/2017   • Weakness [R53.1] 09/05/2017 09/23/2017   • Acute respiratory failure with hypoxia [J96.01] 09/05/2017 09/24/2017   • HTN (hypertension) [I10] 11/14/2016 09/19/2017     Presenting Problem/History of Present Illness  Pneumonia [J18.9]     Chief Complaint on Day of Discharge:   Generalized weakness    History of Present Illness on Day of Discharge:   Patient states she's feeling much better and ready to go to rehabilitation.  Patient states that she's eating well and having bowel movements.    Hospital  Course  Mrs. Oliveira is a 77-year-old female with PMH significant for CVA/TIA with residual right-sided hemiparesis, PAD S/P femoropopliteal 2016, HTN, T2 DM, hypothyroid, recent GI be requiring multiple transfusions, that were seen yesterday at [facility] ED and diagnosed with CPAP.  Patient was discharged last night but symptoms progressed to high-grade fever, worsening cough and severe weakness (unable to stand) the following day.  Patient also had decreased appetite, nausea, vomiting.  Patient denied any diarrhea or blood in stool.  Patient's O2 requirements increased from 2 L to 4 L NC.  Patient was prescribed Levaquin at OSH, but did not  the prescription due to the pharmacy being closed, so patient presented to [facility] ED on 9/5/17 for worsening symptoms.  Chest x-ray revealed bibasilar opacities, RLL infiltrate, creatinine 2.2, WBC 8.  Patient was admitted to medicine service for further evaluation and treatment.  While in the ED, patient started having nose bleed and her H/H dropped, so ENT was consulted.  Dr. Reyes found a right-sided epistaxis; cauterized 9/5/17 and achieved hemostasis.  He did not feel that the decreased H/H was due to a nosebleed and felt that other sources may be evaluated. Saline nasal spray was ordered.  Patient was transfused and given IV fluid for anemia and dehydration.    Nephrology was consult due to creatinine persistently being between 2.0-2.2 since admission.  Thought to probably have underlying ATN due to dehydration and nausea and vomiting.  Patient is on Lasix and this was stopped.  All NSAIDs and nephrotoxic medications were stopped.  Patient's creatinine returned to baseline prior to discharge.  Pulmonology was consulted due to patient having progressive restrictive failure despite antibiotic therapy.  I felt that she was volume overloaded with pulmonary edema due to being recently transfused.  Pulmonary recommended diuresis with strict I/O to ensure the  patient is net negative.  Felt that Phillips catheter needed to be placed for accurate output.  Pulmonology continued to follow patient throughout the ED stay and patient has improved.  Patient will be transported to [facility] skilled/subacute unit by ambulance today.    Procedures Performed   None    Consults:   Consults     Date and Time Order Name Status Description    9/21/2017 1057 Inpatient Consult to ENT      9/11/2017 1033 Inpatient Consult to Pulmonology      9/11/2017 1029 Inpatient Consult to Nephrology Completed         Pertinent Test Results:  Imaging Results (last 7 days)     Procedure Component Value Units Date/Time    US Renal Bilateral [[id number]] Collected:  09/20/17 0916     Updated:  09/20/17 1121    Narrative:       EXAMINATION: US RENAL, BILATERAL-09/19/2017:     INDICATION: ARF; J18.9-Pneumonia, unspecified organism; Z74.09-Other  reduced mobility; Z74.09-Other reduced mobility, renal insufficiency.     TECHNIQUE: Sonographic imaging was obtained of the kidneys in both the  sagittal and transverse planes.     COMPARISON: NONE.     FINDINGS: The right kidney measures in length from pole to pole 10.7 cm.  No solid cortical mass or renal cortical cyst seen within the right  kidney. No hydronephrosis or nephrolithiasis.     The left kidney measures in length from pole to pole 10.6 cm. There is a  small renal cortical cyst measuring 2.5 cm. No solid mass identified. No  hydronephrosis or nephrolithiasis. The bladder is distended with no  gross abnormality.       Impression:       Small left renal cortical cyst. The remainder of the renal  ultrasound is unremarkable. Distention of the bladder. Clinical  correlation is needed.     D:  09/20/2017  E:  09/20/2017            This report was finalized on 9/20/2017 11:18 AM by Dr. Agata Berger MD.       XR Chest 1 View [[id number]] Collected:  09/21/17 0928     Updated:  09/21/17 1138    Narrative:       EXAMINATION: XR CHEST 1 VW-      INDICATION:  Followup CHF; J18.9-Pneumonia, unspecified organism;  Z74.09-Other reduced mobility.     COMPARISON: 09/15/2017.     FINDINGS: Cardiac silhouette is normal. There are patchy perihilar and  bibasilar airspace changes, slightly improved when compared to previous  examination of 09/15/2017.           Impression:       There are patchy perihilar and basilar, nonconsolidative  airspace changes which have improved when compared with 09/15/2017.     D:  09/21/2017  E:  09/21/2017     This report was finalized on 9/21/2017 11:32 AM by Dr. Tan Mujica MD.       XR Chest 1 View [[id number]] Collected:  09/23/17 1538     Updated:  09/23/17 2305    Narrative:       EXAMINATION: XR CHEST, SINGLE VIEW - 09/23/2017     INDICATION: J18.9-Pneumonia, unspecified organism; Z74.09-Other reduced  mobility.     COMPARISON: 9/21/2017 portable chest radiograph.     FINDINGS: Patchy opacity of the left base and mild diffuse interstitial  changes elsewhere appear similar to the prior exam. Focal airspace  opacity over a roughly 3 cm area in the left upper lung, however, may be  slightly increased. This area has waxed and waned over multiple previous  studies.           Impression:       Patchy bilateral pulmonary disease, mostly stable from  9/21/2017 exam. Findings could represent mild asymmetric pulmonary  edema, although a superimposed pneumonia particularly in the left apex  cannot be excluded.     DICTATED:     09/23/2017  EDITED:         09/23/2017     This report was finalized on 9/23/2017 11:07 PM by DR. Mitch Cabrera MD.       FL Video Swallow With Speech [[id number]] Collected:  09/23/17 1845     Updated:  09/23/17 1959    Narrative:       EXAMINATION: FL VIDEO SWALLOW W/SPEECH PATHOLOGY - 09/23/2017     INDICATION: J18.9-Pneumonia, unspecified organism; Z74.09-Other reduced  mobility.     COMPARISON: None.     FINDINGS: 2 minutes and 41 seconds of fluoroscopy time was used. Patient  was evaluated in the seated lateral position while  taking a variety of  consistencies by mouth under video fluoroscopy. There was penetration of  the vestibule with several consistencies, but no witnessed aspiration.      Please see the speech pathology report for full details and  recommendations.       Impression:       Fluoroscopy provided for modified barium swallow.     DICTATED:     09/23/2017  EDITED:         09/23/2017     This report was finalized on 9/23/2017 11:47 PM by DR. Mitch Cabrera MD.           Lab Results (last 7 days)     Procedure Component Value Units Date/Time    POC Glucose Fingerstick [[id number]]  (Abnormal) Collected:  09/17/17 1609    Specimen:  Blood Updated:  09/17/17 1611     Glucose 249 (H) mg/dL     Narrative:       Meter: [id number] : [id number] Zeus Jung    POC Glucose Fingerstick [[id number]]  (Abnormal) Collected:  09/17/17 2142    Specimen:  Blood Updated:  09/17/17 2145     Glucose 218 (H) mg/dL     Narrative:       Meter: [id number] : [id number] Sergio Beavers    POC Glucose Fingerstick [[id number]]  (Normal) Collected:  09/18/17 0720    Specimen:  Blood Updated:  09/18/17 0721     Glucose 95 mg/dL     Narrative:       Meter: [id number] : [id number] Macario Martinez    CBC Auto Differential [[id number]]  (Abnormal) Collected:  09/18/17 0630    Specimen:  Blood Updated:  09/18/17 0727     WBC 12.58 (H) 10*3/mm3      RBC 2.61 (L) 10*6/mm3      Hemoglobin 7.5 (L) g/dL      Hematocrit 23.9 (L) %      MCV 91.6 fL      MCH 28.7 pg      MCHC 31.4 (L) g/dL      RDW 14.5 %      RDW-SD 47.8 fl      MPV 9.8 fL      Platelets 271 10*3/mm3      Neutrophil % 75.8 (H) %      Lymphocyte % 12.8 (L) %      Monocyte % 5.7 %      Eosinophil % 1.2 %      Basophil % 0.2 %      Immature Grans % 4.3 (H) %      Neutrophils, Absolute 9.54 (H) 10*3/mm3      Lymphocytes, Absolute 1.61 10*3/mm3      Monocytes, Absolute 0.72 10*3/mm3      Eosinophils, Absolute 0.15 10*3/mm3      Basophils, Absolute 0.02 10*3/mm3      Immature Grans, Absolute 0.54  (H) 10*3/mm3     Urea Nitrogen, Urine [[id number]] Collected:  09/18/17 1502    Specimen:  Urine from Urine, Clean Catch Updated:  09/18/17 1602     Urea Nitrogen, Urine 464 mg/dL     POC Glucose Fingerstick [[id number]]  (Abnormal) Collected:  09/18/17 1644    Specimen:  Blood Updated:  09/18/17 1651     Glucose 152 (H) mg/dL     Narrative:       Meter: [id number] : [id number] [place]    Creatinine, Urine, Random [[id number]] Collected:  09/18/17 1502    Specimen:  Urine from Urine, Clean Catch Updated:  09/18/17 1818     Creatinine, Urine 29.8 mg/dL     Sodium, Urine, Random [[id number]]  (Normal) Collected:  09/18/17 1502    Specimen:  Urine from Urine, Clean Catch Updated:  09/18/17 1818     Sodium, Urine 80 mmol/L     Protein, Urine, Random [[id number]]  (Abnormal) Collected:  09/18/17 1502    Specimen:  Urine from Urine, Clean Catch Updated:  09/18/17 1829     Total Protein, Urine 105.0 (H) mg/dL     POC Glucose Fingerstick [[id number]]  (Abnormal) Collected:  09/18/17 2031    Specimen:  Blood Updated:  09/18/17 2037     Glucose 218 (H) mg/dL     Narrative:       Meter: [id number] : [id number] [facility]    Renal Function Panel [[id number]]  (Abnormal) Collected:  09/19/17 0532    Specimen:  Blood Updated:  09/19/17 0640     Glucose 187 (H) mg/dL      BUN 72 (H) mg/dL      Creatinine 2.00 (H) mg/dL      Sodium 139 mmol/L      Potassium 4.9 mmol/L      Chloride 109 mmol/L      CO2 22.0 mmol/L      Calcium 8.3 (L) mg/dL      Albumin 2.90 (L) g/dL      Phosphorus 5.4 (H) mg/dL      Anion Gap 8.0 mmol/L      BUN/Creatinine Ratio 36.0 (H)     eGFR Non  Amer 24 (L) mL/min/1.73     Narrative:       National Kidney Foundation Guidelines    Stage     Description        GFR  1         Normal or High     90+  2         Mild decrease      60-89  3         Moderate decrease  30-59  4         Severe decrease    15-29  5         Kidney failure     <15    POC Glucose Fingerstick [[id number]]  (Abnormal)  Collected:  09/19/17 0701    Specimen:  Blood Updated:  09/19/17 0710     Glucose 197 (H) mg/dL     Narrative:       Meter: [id number] : [id number] Skyn [place]    POC Glucose Fingerstick [[id number]]  (Abnormal) Collected:  09/19/17 1103    Specimen:  Blood Updated:  09/19/17 1108     Glucose 184 (H) mg/dL     Narrative:       Meter: [id number] : [id number] Skyn [place]    POC Glucose Fingerstick [[id number]]  (Abnormal) Collected:  09/19/17 1656    Specimen:  Blood Updated:  09/19/17 1708     Glucose 155 (H) mg/dL     Narrative:       Meter: [id number] : [id number] [place]    Urinalysis With / Microscopic If Indicated [[id number]]  (Abnormal) Collected:  09/19/17 1631    Specimen:  Urine from Urine, Clean Catch Updated:  09/19/17 1740     Color, UA Yellow     Appearance, UA Cloudy (A)     pH, UA <=5.0     Specific Gravity, UA 1.018     Glucose, UA Negative     Ketones, UA Negative     Bilirubin, UA Negative     Blood, UA Negative     Protein,  mg/dL (2+) (A)     Leuk Esterase, UA Small (1+) (A)     Nitrite, UA Negative     Urobilinogen, UA 0.2 E.U./dL    Urinalysis, Microscopic Only [[id number]]  (Abnormal) Collected:  09/19/17 1631    Specimen:  Urine from Urine, Clean Catch Updated:  09/19/17 1740     RBC, UA 0-2 /HPF      WBC, UA 3-5 (A) /HPF      Bacteria, UA None Seen /HPF      Squamous Epithelial Cells, UA 0-2 /HPF      Yeast, UA Large/3+ Budding Yeast /HPF      Hyaline Casts, UA None Seen /LPF      Methodology Manual Light Microscopy    POC Glucose Fingerstick [[id number]]  (Abnormal) Collected:  09/19/17 2025    Specimen:  Blood Updated:  09/19/17 2050     Glucose 252 (H) mg/dL     Narrative:       Meter: [id number] : [id number] [place]    Renal Function Panel [[id number]]  (Abnormal) Collected:  09/20/17 0545    Specimen:  Blood Updated:  09/20/17 0715     Glucose 133 (H) mg/dL      BUN 72 (H) mg/dL      Creatinine 2.20 (H) mg/dL      Sodium 139 mmol/L      Potassium 4.7  mmol/L      Chloride 108 mmol/L      CO2 23.0 mmol/L      Calcium 8.2 (L) mg/dL      Albumin 2.90 (L) g/dL      Phosphorus 5.3 (H) mg/dL      Anion Gap 8.0 mmol/L      BUN/Creatinine Ratio 32.7 (H)     eGFR Non  Amer 22 (L) mL/min/1.73     POC Glucose Fingerstick [[id number]]  (Abnormal) Collected:  09/20/17 0742    Specimen:  Blood Updated:  09/20/17 0746     Glucose 135 (H) mg/dL     Narrative:       Meter: [id number] : [id number] Perez Marques    Hemoglobin & Hematocrit, Blood [[id number]]  (Abnormal) Collected:  09/20/17 1226    Specimen:  Blood Updated:  09/20/17 1242     Hemoglobin 6.6 (L) g/dL      Hematocrit 20.8 (L) %     POC Glucose Fingerstick [[id number]]  (Abnormal) Collected:  09/20/17 1244    Specimen:  Blood Updated:  09/20/17 1245     Glucose 286 (H) mg/dL     Narrative:       [facility]: [id number] : [id number] [facility]    Nuclear Antigen Antibody, IFA [[id number]]  (Abnormal) Collected:  09/19/17 1232    Specimen:  Blood Updated:  09/20/17 1908     DALILA Positive (A)                                           Negative   <1:80                                       Borderline  1:80                                       Positive   >1:80       Narrative:       Performed at:  [address] [place] [address]  [id number]  : Ernie Dawson PhD, Phone:  [phone]    KATLYN Staining Patterns [[id number]] Collected:  09/19/17 1232    Specimen:  Blood Updated:  09/20/17 1908     Homogeneous Pattern 1:80     Note: (Reference) Comment      A positive DALILA result may occur in healthy individuals (low  titer) or be associated with a variety of diseases.  See  interpretation chart which is not all inclusive:  Pattern      Antigen Detected  Suggested Disease Association  -----------  ----------------  -----------------------------  Homogeneous  DNA(ds,ss),       SLE - High titers               Nucleosomes,               Histones          Drug-induced SLE  -----------   ----------------  -----------------------------  Speckled     Sm, RNP, SCL-70,  SLE,MCTD,PSS (diffuse form),               SS-A/SS-B         Sjogrens  -----------  ----------------  -----------------------------  Nucleolar    SCL-70, PM-1/SCL  High titers Scleroderma,                                 PM/DM  -----------  ----------------  -----------------------------  Centromere   Centromere        PSS (limited form) w/Crest                                 syndrome variable  -----------  ----------------  -----------------------------  Nuclear Dot  [id number]  Primary Biliary Cirrhosis  -----------  ----------------  -----------------------------  Nuclear      ,            Primary Biliary Cirrhosis  Membrane     quinten A,B,C  -----------  ----------------  -----------------------------       Narrative:       Performed at:  [address]  [id number]  : Ernie Dawson PhD, Phone:  [phone]    POC Glucose Fingerstick [[id number]]  (Abnormal) Collected:  09/20/17 1926    Specimen:  Blood Updated:  09/20/17 1927     Glucose 350 (H) mg/dL     Narrative:       Meter: [id number] : [id number] Alfreda MCCOY    Basic Metabolic Panel [[id number]]  (Abnormal) Collected:  09/21/17 0449    Specimen:  Blood Updated:  09/21/17 0546     Glucose 137 (H) mg/dL      BUN 67 (H) mg/dL      Creatinine 1.80 (H) mg/dL      Sodium 137 mmol/L      Potassium 4.9 mmol/L      Chloride 108 mmol/L      CO2 24.0 mmol/L      Calcium 8.0 (L) mg/dL      eGFR Non African Amer 27 (L) mL/min/1.73      BUN/Creatinine Ratio 37.2 (H)     Anion Gap 5.0 mmol/L     Narrative:       National Kidney Foundation Guidelines    Stage     Description        GFR  1         Normal or High     90+  2         Mild decrease      60-89  3         Moderate decrease  30-59  4         Severe decrease    15-29  5         Kidney failure     <15    BNP [[id number]]  (Abnormal) Collected:  09/21/17 0449    Specimen:   Blood Updated:  09/21/17 0547     .0 (H) pg/mL     Hemoglobin & Hematocrit, Blood [[id number]]  (Abnormal) Collected:  09/21/17 0449    Specimen:  Blood Updated:  09/21/17 0640     Hemoglobin 9.4 (L) g/dL      Hematocrit 28.5 (L) %     POC Glucose Fingerstick [[id number]]  (Abnormal) Collected:  09/21/17 0713    Specimen:  Blood Updated:  09/21/17 0715     Glucose 156 (H) mg/dL     Narrative:       Meter: [id number] : [id number] Genny Oshea    POC Glucose Fingerstick [[id number]]  (Abnormal) Collected:  09/21/17 1144    Specimen:  Blood Updated:  09/21/17 1145     Glucose 237 (H) mg/dL     Narrative:       Meter: [id number] : [id number] Genny Oshea    Hemoglobin & Hematocrit, Blood [[id number]]  (Abnormal) Collected:  09/21/17 1321    Specimen:  Blood Updated:  09/21/17 1403     Hemoglobin 9.9 (L) g/dL      Hematocrit 31.1 (L) %     POC Glucose Fingerstick [[id number]]  (Abnormal) Collected:  09/21/17 1616    Specimen:  Blood Updated:  09/21/17 1618     Glucose 314 (H) mg/dL     Narrative:       Meter: [id number] : [id number] Genny Oshea    Protein Electrophoresis, Total [[id number]]  (Abnormal) Collected:  09/19/17 1617    Specimen:  Blood Updated:  09/21/17 1711     Total Protein 5.4 (L) g/dL      Albumin 2.4 (L) g/dL      Alpha-1-Globulin 0.2 g/dL      Alpha-2-Globulin 1.2 (H) g/dL      Beta Globulin 0.7 g/dL      Gamma Globulin 0.8 g/dL      M-Willie Not Observed g/dL      Globulin 3.0 g/dL      A/G Ratio 0.8     Please note Comment      Protein electrophoresis scan will follow via computer, mail, or   delivery.       Narrative:       Performed at:  [address] [place] [address]  [id number]  : Ernie Dawson PhD, Phone:  [phone]    Protein Electrophoresis, Random Urine [[id number]] Collected:  09/19/17 1631    Specimen:  Urine from Urine, Clean Catch Updated:  09/21/17 1711     Total Protein, Urine 163.7 mg/dL      Albumin, U 67.4 %       Alpha-1-Globulin, U 0.9 %      Alpha-2-Globulin, U 7.4 %      Beta Globulin, U 11.5 %      Gamma Globulin, Urine 12.8 %      M-Willie Comment: %       Asymmetrical gamma        Please note Comment      Protein electrophoresis scan will follow via computer, mail, or   delivery.       POC Glucose Fingerstick [[id number]]  (Abnormal) Collected:  09/21/17 1901    Specimen:  Blood Updated:  09/21/17 1903     Glucose 355 (H) mg/dL     Narrative:       Meter: [id number] : [id number] Alfreda MCCOY    Hemoglobin & Hematocrit, Blood [[id number]]  (Abnormal) Collected:  09/21/17 2046    Specimen:  Blood Updated:  09/21/17 2108     Hemoglobin 9.9 (L) g/dL      Hematocrit 31.2 (L) %     POC Glucose Fingerstick [[id number]]  (Abnormal) Collected:  09/21/17 2154    Specimen:  Blood Updated:  09/21/17 2156     Glucose 303 (H) mg/dL     Narrative:       Meter: [id number] : [id number] Ale MCMILLAN    Hemoglobin & Hematocrit, Blood [[id number]]  (Abnormal) Collected:  09/22/17 0111    Specimen:  Blood Updated:  09/22/17 0233     Hemoglobin 9.1 (L) g/dL      Hematocrit 27.9 (L) %     Hemoglobin & Hematocrit, Blood [[id number]]  (Abnormal) Collected:  09/22/17 0624    Specimen:  Blood Updated:  09/22/17 0642     Hemoglobin 10.1 (L) g/dL      Hematocrit 29.8 (L) %     POC Glucose Fingerstick [[id number]]  (Abnormal) Collected:  09/22/17 0706    Specimen:  Blood Updated:  09/22/17 0707     Glucose 157 (H) mg/dL     Narrative:       Meter: [id number] : [id number] Rodriguez Roberts    Basic Metabolic Panel [[id number]]  (Abnormal) Collected:  09/22/17 0624    Specimen:  Blood Updated:  09/22/17 0721     Glucose 166 (H) mg/dL      BUN 63 (H) mg/dL      Creatinine 1.60 (H) mg/dL      Sodium 136 mmol/L      Potassium 4.7 mmol/L      Chloride 108 mmol/L      CO2 23.0 mmol/L      Calcium 8.0 (L) mg/dL      eGFR Non African Amer 31 (L) mL/min/1.73      BUN/Creatinine Ratio 39.4 (H)     Anion Gap 5.0 mmol/L     Iron Profile  [[id number]]  (Normal) Collected:  09/22/17 0624    Specimen:  Blood Updated:  09/22/17 0721     Iron 51 mcg/dL      TIBC 259 mcg/dL      Iron Saturation 20 %     Protime-INR [[id number]] Collected:  09/22/17 0624    Specimen:  Blood Updated:  09/22/17 0727     Protime 10.5 Seconds      INR 0.96    aPTT [[id number]]  (Normal) Collected:  09/22/17 0624    Specimen:  Blood Updated:  09/22/17 0727     PTT 25.4 seconds     Narrative:       PTT = The equivalent PTT values for the therapeutic range of heparin levels at 0.3 to 0.5 U/ml are 45 to 60 seconds.    BNP [[id number]]  (Abnormal) Collected:  09/22/17 0624    Specimen:  Blood Updated:  09/22/17 0727     .0 (H) pg/mL     POC Glucose Fingerstick [[id number]]  (Abnormal) Collected:  09/22/17 1151    Specimen:  Blood Updated:  09/22/17 1153     Glucose 160 (H) mg/dL     Narrative:       Meter: [id number] : [id number] Rodriguez Lynchine    ANCA Panel [[id number]] Collected:  09/19/17 1232    Specimen:  Blood Updated:  09/22/17 1515     Myeloperoxidase Ab <9.0 U/mL      Antiproteinase 3 (ID-3) <3.5 U/mL      C-ANCA <1:20 titer      P-ANCA <1:20 titer       The presence of positive fluorescence exhibiting P-ANCA or C-ANCA  patterns alone is not specific for the diagnosis of Wegener's  Granulomatosis (WG) or microscopic polyangiitis. Decisions about  treatment should not be based solely on ANCA IFA results.  The  International ANCA Group Consensus recommends follow up testing of  positive sera with both ID-3 and MPO-ANCA enzyme immunoassays. As  many as 5% serum samples are positive only by EIA.  Ref. AM J Clin Pathol 1999;111:507-513.        Atypical pANCA <1:20 titer       The atypical pANCA pattern has been observed in a significant  percentage of patients with ulcerative colitis, primary sclerosing  cholangitis and autoimmune hepatitis.       POC Glucose Fingerstick [[id number]]  (Abnormal) Collected:  09/22/17 1638    Specimen:  Blood Updated:  09/22/17 1640      Glucose 161 (H) mg/dL     Narrative:       Meter: [id number] : [id number] Rodriguez Roberts    POC Glucose Fingerstick [[id number]]  (Abnormal) Collected:  09/22/17 2014    Specimen:  Blood Updated:  09/22/17 2016     Glucose 224 (H) mg/dL     Narrative:       Meter: [id number] : [id number] Kavon Guerrero    Hemoglobin & Hematocrit, Blood [[id number]]  (Abnormal) Collected:  09/23/17 0557    Specimen:  Blood Updated:  09/23/17 0629     Hemoglobin 9.3 (L) g/dL      Hematocrit 29.3 (L) %     Basic Metabolic Panel [[id number]]  (Abnormal) Collected:  09/23/17 0557    Specimen:  Blood Updated:  09/23/17 0650     Glucose 114 (H) mg/dL      BUN 56 (H) mg/dL      Creatinine 1.70 (H) mg/dL      Sodium 141 mmol/L      Potassium 5.1 mmol/L      Chloride 110 (H) mmol/L      CO2 24.0 mmol/L      Calcium 8.1 (L) mg/dL      eGFR Non African Amer 29 (L) mL/min/1.73      BUN/Creatinine Ratio 32.9 (H)     Anion Gap 7.0 mmol/L     POC Glucose Fingerstick [[id number]]  (Abnormal) Collected:  09/23/17 0712    Specimen:  Blood Updated:  09/23/17 0714     Glucose 136 (H) mg/dL     Narrative:       Meter: [id number] : [id number] Frias Alejandra    HIV-1 / O / 2 Ag / Antibody 4th Generation [[id number]]  (Normal) Collected:  09/22/17 0624    Specimen:  Blood Updated:  09/23/17 1121     HIV-1/ HIV-2 Non-Reactive    Narrative:       The HIV antigen/antibody combo assay is a qualitative assay for HIV that includes the p24 antigen as well as antibodies to HIV types 1 and 2.  The assay is intended to be used as a screening assay in the diagnosis of HIV infection in pediatric and adult populations, but has not been validated for children under age 2.  A reactive result does not distinguish HIV-1 p24 antigen, HIV-1 antibody, HIV-2 antibody, and HIV-1 group O antibody.  A positive result will be reflexed to a follow up immunoassay for antibody differentiation.    Hepatitis Panel, Acute [[id number]]  (Normal) Collected:  09/22/17 0624     Specimen:  Blood Updated:  09/23/17 1128     Hepatitis B Surface Ag Non-Reactive     Hep A IgM Non-Reactive     Hep B C IgM Non-Reactive     Hepatitis C Ab Non-Reactive    POC Glucose Fingerstick [[id number]]  (Abnormal) Collected:  09/23/17 1142    Specimen:  Blood Updated:  09/23/17 1144     Glucose 196 (H) mg/dL     Narrative:       Meter: [id number] : [id number] Rodriguez Roberts    CBC (No Diff) [[id number]]  (Abnormal) Collected:  09/23/17 1225    Specimen:  Blood Updated:  09/23/17 1236     WBC 9.21 10*3/mm3      RBC 2.91 (L) 10*6/mm3      Hemoglobin 8.8 (L) g/dL      Hematocrit 27.9 (L) %      MCV 95.9 fL      MCH 30.2 pg      MCHC 31.5 (L) g/dL      RDW 16.7 (H) %      RDW-SD 56.6 (H) fl      MPV 10.8 fL      Platelets 162 10*3/mm3     POC Glucose Fingerstick [[id number]]  (Abnormal) Collected:  09/23/17 1646    Specimen:  Blood Updated:  09/23/17 1647     Glucose 145 (H) mg/dL     Narrative:       Meter: [id number] : [id number] Rodriguez Roberts    POC Glucose Fingerstick [[id number]]  (Abnormal) Collected:  09/23/17 2035    Specimen:  Blood Updated:  09/23/17 2037     Glucose 208 (H) mg/dL     Narrative:       Meter: [id number] : [id number] Kavon Guerrero    POC Glucose Fingerstick [[id number]]  (Normal) Collected:  09/24/17 0711    Specimen:  Blood Updated:  09/24/17 0712     Glucose 82 mg/dL     Narrative:       Meter: [id number] : [id number] Rodriguez Roberts    CBC (No Diff) [[id number]]  (Abnormal) Collected:  09/24/17 0734    Specimen:  Blood Updated:  09/24/17 0835     WBC 8.98 10*3/mm3      RBC 3.25 (L) 10*6/mm3      Hemoglobin 9.9 (L) g/dL      Hematocrit 31.1 (L) %      MCV 95.7 fL      MCH 30.5 pg      MCHC 31.8 (L) g/dL      RDW 16.5 (H) %      RDW-SD 56.4 (H) fl      MPV 11.3 fL      Platelets 179 10*3/mm3     Iron Profile [[id number]]  (Abnormal) Collected:  09/24/17 0734    Specimen:  Blood Updated:  09/24/17 0935     Iron 28 (L) mcg/dL      TIBC 242 (L) mcg/dL      Iron Saturation 12 (L)  "%     Renal Function Panel [[id number]]  (Abnormal) Collected:  09/24/17 0734    Specimen:  Blood Updated:  09/24/17 0935     Glucose 79 mg/dL      BUN 53 (H) mg/dL      Creatinine 1.80 (H) mg/dL      Sodium 142 mmol/L      Potassium 4.8 mmol/L      Chloride 110 (H) mmol/L      CO2 26.0 mmol/L      Calcium 8.4 (L) mg/dL      Albumin 3.00 (L) g/dL      Phosphorus 4.6 mg/dL      Anion Gap 6.0 mmol/L      BUN/Creatinine Ratio 29.4 (H)     eGFR Non  Amer 27 (L) mL/min/1.73     Ferritin [[id number]]  (Abnormal) Collected:  09/24/17 0734    Specimen:  Blood Updated:  09/24/17 0945     Ferritin 481.00 (H) ng/mL     POC Glucose Fingerstick [[id number]]  (Abnormal) Collected:  09/24/17 1130    Specimen:  Blood Updated:  09/24/17 1132     Glucose 149 (H) mg/dL     Narrative:       Meter: [id number] : [id number] Rodriguez Roberts        Condition on Discharge:  Stable    Physical Exam on Discharge:/60 (BP Location: Left leg, Patient Position: Lying)  Pulse 61  Temp 97.6 °F (36.4 °C) (Oral)   Resp 18  Ht 63\" (160 cm)  Wt 180 lb 11.2 oz (82 kg)  SpO2 92%  BMI 32.01 kg/m2  Physical Exam  General: Well-developed well-nourished female in no acute distress    Head: Normocephalic atraumatic    Eyes: PERRLA, EOMI, nonicteric, conjunctiva normal    ENT: Pink, moist mucous membranes    Neck: Supple, nontender, trachea midline without lymphadenopathy, JVD, nuchal rigidity.      Cardiovascular: RRR  no M/R/G    Respiratory: Nonlabored, symmetrical chest expansion, clear to auscultation bilaterally    Abdomen: Soft, nontender, nondistended,  positive bowel sounds in all 4 quadrants     Extremities: FROM in upper and lower extremities bilaterally negative for edema/cyanosis/clubbing.  Negative calf pain    Skin: Pink/warm/dry.  No rash or lesions noted    Neuro: Alert and oriented to person place time and situation, speech is clear, follows all commands, right sided hemiplegia.    Psych: Patient is pleasant and " cooperative.  Normal affect.  Negative suicidal ideation or homicidal ideation.    Discharge Disposition  Rehab Facility or Unit (DC - External)    Discharge Medications   Jennifer Oliveira   Home Medication Instructions KAMAR:[id number]    Printed on:09/24/17 5642   Medication Information                      albuterol (PROVENTIL) (2.5 MG/3ML) 0.083% nebulizer solution  Take 2.5 mg by nebulization Every 4 (Four) Hours As Needed for Wheezing or Shortness of Air.             aspirin 81 MG chewable tablet  Chew 81 mg Daily.             baclofen (LIORESAL) 10 MG tablet  Take 10 mg by mouth 3 (Three) Times a Day.             bisacodyl (DULCOLAX) 10 MG suppository  Insert 1 suppository into the rectum 2 (Two) Times a Day As Needed for Constipation.             budesonide (PULMICORT) 0.5 MG/2ML nebulizer solution  Take 2 mL by nebulization 2 (Two) Times a Day.             carvedilol (COREG) 25 MG tablet  Take 1 tablet by mouth Every 12 (Twelve) Hours.             cholecalciferol (VITAMIN D3) 1000 units tablet  Take 1,000 Units by mouth Daily.             CloNIDine (CATAPRES) 0.3 MG tablet  Take 1 tablet by mouth Every 6 (Six) Hours As Needed for High Blood Pressure (SBP>180 or DBP>100).             ferrous sulfate 325 (65 FE) MG tablet  Take 1 tablet by mouth Daily With Breakfast.             furosemide (LASIX) 40 MG tablet  Take 0.5 tablets by mouth Daily.             gabapentin (NEURONTIN) 300 MG capsule  Take 900 mg by mouth every night at bedtime.             guaiFENesin (MUCINEX) 600 MG 12 hr tablet  Take 1 tablet by mouth Every 12 (Twelve) Hours.             hydrALAZINE (APRESOLINE) 25 MG tablet  Take 3 tablets by mouth Every 8 (Eight) Hours.             insulin detemir (LEVEMIR) 100 UNIT/ML injection  Inject 18 Units under the skin Every 12 (Twelve) Hours.             insulin lispro (humaLOG) 100 UNIT/ML injection  Inject 5 Units under the skin 3 (Three) Times a Day With Meals.             insulin lispro (humaLOG)  100 UNIT/ML injection  Glucose 150-199-2u; 200-249-3u; 250-299-4u; 300-349-5u; 350-400-6u; >400-7u             ipratropium-albuterol (DUO-NEB) 0.5-2.5 mg/mL nebulizer  Take 3 mL by nebulization 4 (Four) Times a Day.             levothyroxine (SYNTHROID, LEVOTHROID) 200 MCG tablet  Take 200 mcg by mouth Daily.             pantoprazole (PROTONIX) 40 MG EC tablet  Take 40 mg by mouth Daily.             polyethylene glycol (MIRALAX) packet  Take 17 g by mouth Daily.             pravastatin (PRAVACHOL) 40 MG tablet  Take 40 mg by mouth Daily.             saccharomyces boulardii (FLORASTOR) 250 MG capsule  Take 1 capsule by mouth 2 (Two) Times a Day. For 1 week             sennosides-docusate sodium (SENOKOT-S) 8.6-50 MG tablet  Take 2 tablets by mouth Daily.             vitamin C (VITAMIN C) 500 MG tablet  Take 1 tablet by mouth Daily.               Discharge Diet:   Diet Instructions     Diet: Dysphagia; Nectar / Syrup Thick Liquids; Mechanical Soft       Discharge Diet:  Dysphagia   Fluid Consistency:  Nectar / Syrup Thick Liquids   Pureed Options:  Mechanical Soft   No mixed consistencies               Discharge Care Plan / Instructions:  Activity at Discharge:   Activity Instructions     Activity as Tolerated                   Follow-up Appointments  No future appointments.  Additional Instructions for the Follow-ups that You Need to Schedule     Discharge Follow-Up With Specified Provider    As directed    To:  Dr Miller in 4-6 weeks       Discharge Follow-up with PCP    As directed    Follow Up Details:  PCP or facility provider in next 2-3 days               Test Results Pending at Discharge  None     ARSLAN Alanis 09/24/17 2:06 PM    Time: Discharge 55 min    Please note that portions of this note may have been completed with a voice recognition program. Efforts were made to edit the dictations, but occasionally words are mistranscribed.

## 2017-09-24 NOTE — PLAN OF CARE
Problem: Patient Care Overview (Adult)  Goal: Plan of Care Review    09/24/17 1011   Coping/Psychosocial Response Interventions   Plan Of Care Reviewed With patient   Patient Care Overview   Progress improving

## 2017-09-24 NOTE — PLAN OF CARE
Problem: Patient Care Overview (Adult)  Goal: Plan of Care Review  Outcome: Ongoing (interventions implemented as appropriate)    09/24/17 0506   Coping/Psychosocial Response Interventions   Plan Of Care Reviewed With patient   Outcome Evaluation   Outcome Summary/Follow up Plan Pt rested well this shift. No c/o pain. VSS. NSR. Urinating on her own after wolfe removal.    Patient Care Overview   Progress progress toward functional goals as expected       Goal: Adult Individualization and Mutuality  Outcome: Ongoing (interventions implemented as appropriate)  Goal: Discharge Needs Assessment  Outcome: Ongoing (interventions implemented as appropriate)    Problem: Pneumonia (Adult)  Goal: Signs and Symptoms of Listed Potential Problems Will be Absent or Manageable (Pneumonia)  Outcome: Ongoing (interventions implemented as appropriate)    Problem: Infection, Risk/Actual (Adult)  Goal: Identify Related Risk Factors and Signs and Symptoms  Outcome: Ongoing (interventions implemented as appropriate)  Goal: Infection Prevention/Resolution  Outcome: Ongoing (interventions implemented as appropriate)    Problem: Fall Risk (Adult)  Goal: Identify Related Risk Factors and Signs and Symptoms  Outcome: Ongoing (interventions implemented as appropriate)

## 2018-02-08 NOTE — PROGRESS NOTES
"    [facility] Medicine Services  PROGRESS NOTE      Date of Admission: 2017  Length of Stay: 6  Primary Care Physician: Samuel Buck MD    Patient Name: Jennifer Oliveira  : 1940  MRN: [id number]    Subjective     CC:  Pneumonia, weakness    Weakness - Generalized   Associated symptoms include a fever.   Fever      Sitting up in bed. More tired today. Says her left arm hurts. Cough/congestion persists. More weak today. No n/v. Does appear lethargic today.  Review of Systems   Constitutional: Positive for fever.   Respiratory: Positive for shortness of breath.         Otherwise complete ROS is negative except as mentioned in the HPI.      Objective     Vital Signs: /62 (BP Location: Left leg, Patient Position: Lying)  Pulse 78  Temp 98.8 °F (37.1 °C) (Axillary)   Resp 22  Ht 63\" (160 cm)  Wt 170 lb (77.1 kg)  SpO2 95%  BMI 30.11 kg/m2     Physical Exam :  Sitting up in bad, mild tachypnea with shallow breaths, increased work of breathing  OP clear, MMM  Neck supple  RRR  Decreased bases, worse on R, B rales, no rhonchi, no wheezes  +BS, ND, NT  LUNA   Tr edema B UE/LE  No rashes  Good strength L side, 5/5 LUE/LLE, weak R from prior CVA      Results Reviewed:  I have personally reviewed current lab, radiology, and data and agree.      Results from last 7 days  Lab Units 17  0734 09/10/17  0707 17  0519   WBC 10*3/mm3 11.36* 7.82 5.04   HEMOGLOBIN g/dL 10.7* 9.8* 8.9*   HEMATOCRIT % 33.5* 30.0* 27.9*   PLATELETS 10*3/mm3 271 228 172       Results from last 7 days  Lab Units 17  0846 09/10/17  0707 17  0526  17  0519 177 17  1146   SODIUM mmol/L 143 140 136  < > 136  --  140   POTASSIUM mmol/L 3.7 4.0 3.9  < > 4.2  --  4.4   CHLORIDE mmol/L 109 107 109  < > 106  --  108   CO2 mmol/L 24.0 21.0 22.0  < > 22.0  --  26.0   BUN mg/dL 57* 60* 47*  < > 32*  --  43*   CREATININE mg/dL 2.20* 2.20* 2.10*  < > 2.00*  --  2.20*   GLUCOSE " mg/dL 75 258* 228*  < > 64*  --  57*   CALCIUM mg/dL 8.0* 8.2* 8.8  < > 7.9*  --  8.7   ALT (SGPT) U/L  --   --   --   --  19  --  28   AST (SGOT) U/L  --   --   --   --  28  --  51*   TROPONIN I ng/mL  --   --   --   --   --  0.073* 0.085*   < > = values in this interval not displayed.  No results found for: BNP  No results found for: PHART  Blood Culture   Date Value Ref Range Status   09/05/2017 No growth at 24 hours  Preliminary   09/05/2017 No growth at 24 hours  Preliminary       Imaging Results (last 24 hours)     Procedure Component Value Units Date/Time    XR Chest 1 View [[id number]] Collected:  09/10/17 1133     Updated:  09/11/17 0827    Narrative:       EXAMINATION: XR CHEST, SINGLE VIEW - 09/10/2017     INDICATION: Dyspnea on exertion.     COMPARISON: One-day prior.     FINDINGS: Cardiomediastinal contour unchanged. Persistent bibasilar  opacifications which are slightly increased from prior comparison.  Mild  hypoventilatory appearance compared to prior. Small right and probable  trace left pleural effusions without significant change. No  pneumothorax.       Impression:       Slight increase in bibasilar opacifications with stable  effusions. Opacifications may appear more prominent with slightly  decreased ventilatory effort when compared to prior.     DICTATED:     09/10/2017  EDITED:         09/10/2017     This report was finalized on 9/11/2017 8:25 AM by Dr. Jass Martinez.       XR Chest 1 View [[id number]] Collected:  09/11/17 0929     Updated:  09/11/17 0929    Narrative:       EXAMINATION: XR CHEST 1 VW- 09/11/2017     INDICATION: DYSPNEA, ON EXERTION      COMPARISON: 09/10/2017     FINDINGS: Portable chest reveals heart to be enlarged. Increased  pulmonary vascularity bilaterally with ill-defined opacification lung  bases. Degenerative changes seen within the spine.            Impression:       Stable chest as above with ill-defined opacification lung  bases bilaterally. Increased pulmonary  vascularity bilaterally.     D:  09/11/2017  E:  09/11/2017                  I have reviewed the medications.      Assessment / Plan     Assessment & Plan :  Hospital Problem List     * (Principal)Right lower lobe pneumonia    Normochromic normocytic anemia    Overview Addendum 9/5/2017  3:01 PM by ARSLAN Montalvo     - Baseline Hgb 9s  - Hx of C-scope with polyps 2016, EGD 2016 with gastritis   - EGD 5/14/17 and 5/26/17 with symptomatic anemia         Chronic diastolic heart failure    Overview Signed 4/19/2017  1:21 PM by Samuel Dela Cruz MD     - Last echo 11/2016 LVEF 70%, normal VHD         HTN (hypertension)    Chronic kidney disease (CKD), stage III (moderate)    Overview Signed 4/19/2017  1:14 PM by Samuel Dela Cruz MD     - baseline Cr 1.5-1.6         h/o stroke with right hemiplegia    Overview Signed 5/14/2017 10:24 PM by Mariam Murray, RN EXTENDER     residual chronic right hemiplegia         Disease of thyroid gland    Type 2 diabetes mellitus    KRIS (acute kidney injury)    Nausea and vomiting    Weakness    Acute respiratory failure with hypoxia               Brief Hospital Course to date:  Jennifer Oliveira is a 77 y.o. 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 Symptoms progressed to high grade fever, worsening cough and severe weakness (unable to stand) N/V within 24 hrs.  Also had to increase baseline home O2 from 2L to 4L.  Presented to [facility] ED found to have RLL pna with fever and KRIS:      Acute hypoxic respiratory failure  --Continue antibiotics  --CXR favors some vascular congestion, give Lasix x 1, but normal ECHO 4/17  --order ABG  --consult pulmonary for further recommendations as  not improving  Altered MS/weakness  --check ABG  --check CT head  Hx of CVA/TIA with R weakness  KRIS/CKD  --stable but elevated  --repeat UA, check urine eos, minimize nephrotoxic medications, ask for nephrology input  DM  Hypothyroidism  HTN  Hx of PAD  DVT prophylaxis        Disposition: I expect the patient to be discharged to rehab.    Mitch Raymundo MD  09/11/17  10:29 AM         30.9

## 2021-07-19 NOTE — PROGRESS NOTES
Continued Stay Note  [facility]     Patient Name: Jennifer Oliveira  MRN: [id number]  Today's Date: 9/22/2017    Admit Date: 9/5/2017          Discharge Plan       09/22/17 1457    Case Management/Social Work Plan    Plan [facility]    Additional Comments Ms Oliveira is going to a subacute bed at [facility] Sunday 9/24/17 via [facility] at 1500, form on chartlett. Nursing to call report to [phone]. Please fax DC summary to unit at [phone].  Updated Ms. Oliveira and her daughter Stacey of DC plan, all is in agreement.  CM will cont to follow.                Discharge Codes     None        Expected Discharge Date and Time     Expected Discharge Date Expected Discharge Time    Sep 22, 2017             Nida Burns RN     Patient came into the office for PT/INR  INR- 1.7  PT- 20.5  Current dose of Warfarin Fri/Sat/Sun- 4.5mg , Rest of week 3mg.  Please advise [phone]

## 2022-12-29 NOTE — PLAN OF CARE
Problem: Patient Care Overview (Adult)  Goal: Plan of Care Review  Outcome: Ongoing (interventions implemented as appropriate)    09/13/17 1155   Coping/Psychosocial Response Interventions   Plan Of Care Reviewed With patient   Patient Care Overview   Progress no change       Goal: Adult Individualization and Mutuality  Outcome: Ongoing (interventions implemented as appropriate)    09/13/17 1155   Individualization   Patient Specific Preferences assist with ADL's, no bread, assist with tray setup   Patient Specific Goals maintain airway, adequate oxygenation   Patient Specific Interventions encourage pulmonary toilet, increase mobility       Goal: Discharge Needs Assessment  Outcome: Ongoing (interventions implemented as appropriate)    09/13/17 1155   Discharge Needs Assessment   Concerns To Be Addressed denies needs/concerns at this time            Patient c/o right ear pain and hearing change x 5 months.

## 2023-09-19 NOTE — H&P
[facility] Medicine Services  HISTORY AND PHYSICAL    Primary Care Physician: Samuel Buck MD    Subjective     Chief Complaint:  SOA, fever  History of Present Illness:   Ms. 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 Patient was discharged last night but symptoms progressed to high grade fever, worsening cough and severe weakness (unable to stand) today. Associated symptoms include poor appetite and n/v. Denies current diarrhea or blood in stool. Patient has had to increase baseline home o2 from 2L to 4L 24/7 over the past couple of days.    Patient was started on Levaquin yesterday but unable to  prescription due to pharmacy being closed. Presented to [facility] ED for worsening sx in less than 24 hrs.    CXR reveals bibasilar opacities, RLL infiltrate. Creatinine 2.2, WBC 8. Patient to be admitted to Medicine service for further eval.      Review of Systems   Constitutional: Positive for activity change, chills, fatigue and fever.   HENT: Negative.    Eyes: Negative.    Respiratory: Positive for cough and shortness of breath.    Cardiovascular: Negative.  Negative for chest pain and palpitations.   Gastrointestinal: Positive for nausea and vomiting. Negative for blood in stool.   Genitourinary: Negative.    Musculoskeletal: Negative.    Skin: Negative.    Neurological: Positive for weakness.   Psychiatric/Behavioral: Negative.       Otherwise complete ROS performed and negative except as mentioned in the HPI.    Past Medical History:   Diagnosis Date   • CKD (chronic kidney disease), stage III     baseline Cr 1.5-1.6   • Diabetes mellitus    • Diastolic heart failure     last LVEF 70%   • Disease of thyroid gland    • GERD (gastroesophageal reflux disease)    •  "Hypertension    • PAD (peripheral artery disease)     s/p right fem-pop bypass 7/2016   • Stroke     residual chronic right hemiplegia       Past Surgical History:   Procedure Laterality Date   • CARPAL TUNNEL RELEASE     • CHOLECYSTECTOMY     • ENDOSCOPY N/A 5/15/2017    Procedure: ESOPHAGOGASTRODUODENOSCOPY;  Surgeon: Lizbet Vidales MD;  Location:  [facility];  Service:    • ENDOSCOPY N/A 5/30/2017    Procedure: ESOPHAGOGASTRODUODENOSCOPY;  Surgeon: Lizbet Vidales MD;  Location:  [facility];  Service:    • FEMORAL POPLITEAL BYPASS Right    • HYSTERECTOMY         Family History   Problem Relation Age of Onset   • Diabetes Mother    • Heart disease Father        Social History     Social History   • Marital status: Single     Spouse name: N/A   • Number of children: N/A   • Years of education: N/A     Occupational History   • Not on file.     Social History Main Topics   • Smoking status: Former Smoker     Packs/day: 1.00     Years: 45.00     Quit date: 2009   • Smokeless tobacco: Never Used   • Alcohol use No   • Drug use: No   • Sexual activity: No     Other Topics Concern   • Not on file     Social History Narrative    Lives alone in [place]. [employer] and two daughters who assist her.        Medications:    (Not in a hospital admission)    Allergies:  Allergies   Allergen Reactions   • Erythromycin Rash         Objective     Physical Exam:  Vital Signs: /59  Pulse 78  Temp 99.8 °F (37.7 °C) (Rectal)   Resp 20  Ht 63\" (160 cm)  Wt 170 lb (77.1 kg)  SpO2 94%  BMI 30.11 kg/m2  Physical Exam   Constitutional: She is oriented to person, place, and time. No distress.   Appears ill   HENT:   Head: Normocephalic and atraumatic.   Eyes: Pupils are equal, round, and reactive to light. No scleral icterus.   Neck: Normal range of motion. No JVD present.   Cardiovascular: Normal rate, regular rhythm, normal heart sounds and intact distal pulses.    Pulmonary/Chest: No respiratory distress.   Fine " crackles right base   Abdominal: Soft. Bowel sounds are normal. She exhibits no distension. There is no tenderness.   Musculoskeletal: She exhibits edema.   Baseline right hemiplegia; 2/5 strength left side   Neurological: She is alert and oriented to person, place, and time.   Skin: Skin is warm and dry.   Psychiatric: She has a normal mood and affect. Her behavior is normal. Judgment and thought content normal.           Results Reviewed:    Results from last 7 days  Lab Units 09/05/17  1146   WBC 10*3/mm3 8.21   HEMOGLOBIN g/dL 9.4*   PLATELETS 10*3/mm3 226       Results from last 7 days  Lab Units 09/05/17  1146   SODIUM mmol/L 140   POTASSIUM mmol/L 4.4   CO2 mmol/L 26.0   CREATININE mg/dL 2.20*   GLUCOSE mg/dL 57*   CALCIUM mg/dL 8.7       I have personally reviewed and interpreted available lab data, radiology studies and ECG obtained at time of admission.     Assessment / Plan     Problem List:   Hospital Problem List     * (Principal)Right lower lobe pneumonia    Elevated troponin    Fever    Hypoxia    Acute-on-chronic kidney injury    Weakness    Normochromic normocytic anemia    Overview Addendum 9/5/2017  3:01 PM by ARSLAN Montalvo     - Baseline Hgb 9s  - Hx of C-scope with polyps 2016, EGD 2016 with gastritis   - EGD 5/14/17 and 5/26/17 with symptomatic anemia         Chronic diastolic heart failure    Overview Signed 4/19/2017  1:21 PM by Samuel Dela Cruz MD     - Last echo 11/2016 LVEF 70%, normal VHD         HTN (hypertension)    GERD (gastroesophageal reflux disease) with hx of gastritis     h/o stroke with right hemiplegia    Overview Signed 5/14/2017 10:24 PM by Mariam Murray RN EXTENDER     residual chronic right hemiplegia         Disease of thyroid gland    Diabetes mellitus    Nausea and vomiting          Assessment:    Plan:  RLL pneumonia  -- continue treatment for HCAP pneumonia, with concern for aspiration add zosyn to levaquin started in ED  -- blood and sputum cx  pending  --nebs scheduled and prn  -- continue supplemental O2  --  Tylenol prn fever  -- zofran prn n/v    Elevated troponin  -- 0.85 initial troponin with unremarkable EKG, repeat both now and trend troponin  -- likely 2/2 to acute respiratory/ febrile illness    Acute on Chronic kidney injury  -- baseline creatinine from last admissions (May 2017)- 1.3-1.5  -- recently seen 8/21/17 for blood transfusion- 1 uPRBCs and cr 2.2,  Same as today  -- urine studies, renal duplex  -- IVFs, appears dehydrated  -- renal consult if no improvement    Recent symptomatic anemia  -- transfused 1u PRBC 8/21- was suppose to follow up with Dr. Sage  -- EGD 5/14 and 5/26 admission with transfusion both admits- 5/30 EGD found nonbleeding angiodysplastic lesion on the stomach  -- patient had effient discontinued. plavix restarted     HTN  -- continue home rx    T2 DM  -- slight hypoglycemia today, with poor appetite and vomiting  -- hold home rx, low ssi only for now  -- check a1c    Hypothyroidism  -- check tsh  -- continue home rx    H/O GIB with multiple transfusions  -- continue ppi    H/O CVA with residual right sided hemiplegia  -- started on Effient 4/17 when found to be plavix resistant and new TIA, preferred by cardiology 2/2 to right femoral block   -- May 2017 unable to take Effient 2/2 GIB    Weakness  --PT/OT/ CM consult        DVT prophylaxis:mechanical 2/2 ongoing anemia/ recent transfusion  Code Status: full     Tiffanie Gonzales, APRN 09/05/17 3:01 PM      Brief Attending Admission Note     I have seen and examined the patient, performing an independent face-to-face diagnostic evaluation with plan of care reviewed and developed with the advanced practice clinician (APC).      Brief Summary Statement/HPI:   Patient is a 77-year-old female smoker chronically ill with a history of diabetes, renal insufficiency, lower extremity edema, anemia with GI bleeds who presented to [facility] yesterday with complaints of  weakness and cough.  She was diagnosed with a community-acquired pneumonia get a dose of IV antibiotics and discharged home with oral antibiotics.  She was unable to get that filled because pharmacy was closed has not had a dose today.  She continued to worsen overnight with fevers, cough, increasing weakness.  Also associated with some nausea and vomiting.  Workup in the emergency room here showed some hypoxia and evidence of a right lower lobe pneumonia.  Temperature is 100.7.  Creatinine is also elevated above baseline.  She's been admitted for further evaluation.  Of note was recently in the emergency room on August 21 with a normal chest x-ray and a hemoglobin of 7.8.  She was given 1 unit of packed red blood cells and was discharged home.  She denies any current evidence of GI bleeding.      Attending Physical Exam:  Constitutional: weak appearing, pale  Eyes: PERRLA, sclerae anicteric, no conjunctival injection  HENT: NCAT, mucous membranes moist  Neck: supple, no thyromegaly, no lymphadenopathy, trachea midline  Respiratory: some bilateral rhonchi and rales in right base  Cardiovascular: RRR, no murmurs, rubs, or gallops, palpable pedal pulses bilaterally  Gastrointestinal: Positive bowel sounds, soft, nontender, nondistended  Musculoskeletal:1+ bilateral LE edema and some chronic erythema  Psychiatric: oriented x 3, appropriate affect, cooperative  Neurologic: Strength symmetric in all extremities, Cranial Nerves grossly intact to confrontation, speech clear  Derm: no rashes    Laboratory data reviewed and is significant for a creatinine of 2.2 with a baseline of 1.4.  Hemoglobin is 9.4 up from 7.9 on August 21.    Personally reviewed chest x-ray which showed right lower lobe infiltrate new compared to previous    Brief Assessment/Plan :  See above for further detailed assessment and plan developed with APC which I have reviewed and/or edited.    77-year-old with multiple chronic medical problems who  presents with pneumonia failed outpatient treatment after being seen in the [facility] emergency room yesterday.  Also has evidence of acute renal failure with creatinine of 2.2 with baseline 1.4.  We'll continue Levaquin and Zosyn for treatment for now and consider de-escalation based on clinical progress.  History of GI bleeding related to AVMs on both aspirin and Plavix, no overt bleeding now but will need to monitor.  If renal function does not improve we'll consider nephrology consultation.  Only PT eval/she appears weak and lives by herself, she might need placement. We'll continue other medications as appropriate.      I believe this patient meets INPATIENT status due to the need for care which can only be reasonably provided in an hospital setting such as aggressive/expedited ancillary services and/or consultation services, the necessity for IV medications, close physician monitoring and/or the possible need for procedures.  In such, I feel patient’s risk for adverse outcomes and need for care warrant INPATIENT evaluation and predict the patient’s care encounter to likely last beyond 2 midnights.    Patrick Cope MD  09/05/17  5:27 PM              Eucrisa Counseling: Patient may experience a mild burning sensation during topical application. Eucrisa is not approved in children less than 3 months of age.

## (undated) DEVICE — "MH-438 A/W VLVE F/140 EVIS-140": Brand: AIR/WATER VALVE

## (undated) DEVICE — "MH-948 A/W CHANNEL CLEANING ADPTR -VIDEO": Brand: AW CHANNEL CLEANING ADAPTE

## (undated) DEVICE — ENDOGATOR HYBRID TUBING KIT FOR USE WITH ENDOGATOR IRRIGATION PUMP, OLYMPUS PUMP, GI4000 ESU, AND TORRENT IRRIGATION PUMP.: Brand: ENDOGATOR KIT

## (undated) DEVICE — TBG 02 CRUSH RESIST LF CLR 7FT

## (undated) DEVICE — Device: Brand: ENDOGATOR

## (undated) DEVICE — "MH-443 SUCTION VALVE F/EVIS140 EVIS160": Brand: SUCTION VALVE

## (undated) DEVICE — SYR LUERLOK 50ML

## (undated) DEVICE — THE BITE BLOCK MAXI, LATEX FREE STRAP IS USED TO PROTECT THE ENDOSCOPE INSERTION TUBE FROM BEING BITTEN BY THE PATIENT.

## (undated) DEVICE — CANN NASL CO2 DIVIDED A/

## (undated) DEVICE — ERBE NESSY®PLATE 170 SPLIT; 168CM²; CABLE 3M: Brand: ERBE

## (undated) DEVICE — INTRO ACCSR BLNT TP

## (undated) DEVICE — CONTN GRAD MEAS TRIANG 32OZ BLK

## (undated) DEVICE — FIAPC® PROBE W/ FILTER 3000 A OD 2.3MM/6.9FR; L 3M/9.8FT: Brand: ERBE